# Patient Record
Sex: FEMALE | Race: WHITE | NOT HISPANIC OR LATINO | Employment: OTHER | URBAN - METROPOLITAN AREA
[De-identification: names, ages, dates, MRNs, and addresses within clinical notes are randomized per-mention and may not be internally consistent; named-entity substitution may affect disease eponyms.]

---

## 2017-01-31 DIAGNOSIS — F41.9 ANXIETY: Primary | ICD-10-CM

## 2017-02-01 RX ORDER — LORAZEPAM 0.5 MG/1
0.5 TABLET ORAL EVERY 8 HOURS PRN
Qty: 40 TABLET | Refills: 0 | Status: SHIPPED | OUTPATIENT
Start: 2017-02-01 | End: 2017-05-11

## 2017-02-01 NOTE — TELEPHONE ENCOUNTER
LORazepam (ATIVAN) tablet 0.5 mg (Discontinued)      Last Written Prescription Date:  08-  Last Fill Quantity: NA,   # refills: NA  Last Office Visit with G, P or [facility] prescribing provider: 12-  Future Office visit:       Routing refill request to provider for review/approval because:  LORazepam (ATIVAN) tablet 0.5 mg (Discontinued)

## 2017-02-05 DIAGNOSIS — E78.5 HYPERLIPIDEMIA LDL GOAL <100: Primary | ICD-10-CM

## 2017-02-05 NOTE — TELEPHONE ENCOUNTER
atorvastatin (LIPITOR) 40 MG tablet     Last Written Prescription Date: 03/29/2016  Last Fill Quantity: 90, # refills: 1  Last Office Visit with [facility], [facility] or [facility] prescribing provider: 11/16/2016 Jadiel       CHOL      133   8/2/2016  HDL       73   8/2/2016  LDL       45   8/2/2016  TRIG       73   8/2/2016  CHOLHDLRATIO      1.5   3/19/2015

## 2017-02-06 RX ORDER — ATORVASTATIN CALCIUM 40 MG/1
40 TABLET, FILM COATED ORAL DAILY
Qty: 90 TABLET | Refills: 1 | Status: SHIPPED | OUTPATIENT
Start: 2017-02-06 | End: 2017-05-11

## 2017-02-06 NOTE — TELEPHONE ENCOUNTER
Prescription approved per [facility] Refill Protocol.  Penelope Haas RN, BSN

## 2017-03-29 ENCOUNTER — OFFICE VISIT (OUTPATIENT)
Dept: FAMILY MEDICINE | Facility: CLINIC | Age: 72
End: 2017-03-29
Payer: COMMERCIAL

## 2017-03-29 VITALS
WEIGHT: 171 LBS | HEIGHT: 64 IN | BODY MASS INDEX: 29.19 KG/M2 | DIASTOLIC BLOOD PRESSURE: 80 MMHG | SYSTOLIC BLOOD PRESSURE: 134 MMHG | TEMPERATURE: 98 F

## 2017-03-29 DIAGNOSIS — Z12.31 ENCOUNTER FOR SCREENING MAMMOGRAM FOR MALIGNANT NEOPLASM OF BREAST: ICD-10-CM

## 2017-03-29 DIAGNOSIS — I10 BENIGN ESSENTIAL HYPERTENSION: ICD-10-CM

## 2017-03-29 DIAGNOSIS — Z87.891 FORMER SMOKER: ICD-10-CM

## 2017-03-29 DIAGNOSIS — E78.5 HYPERLIPIDEMIA LDL GOAL <100: ICD-10-CM

## 2017-03-29 DIAGNOSIS — Z00.00 ENCOUNTER FOR ROUTINE ADULT HEALTH EXAMINATION WITHOUT ABNORMAL FINDINGS: Primary | ICD-10-CM

## 2017-03-29 DIAGNOSIS — M89.9 DISORDER OF BONE: ICD-10-CM

## 2017-03-29 DIAGNOSIS — F41.9 ANXIETY: ICD-10-CM

## 2017-03-29 DIAGNOSIS — K21.9 GASTROESOPHAGEAL REFLUX DISEASE WITHOUT ESOPHAGITIS: ICD-10-CM

## 2017-03-29 LAB
ERYTHROCYTE [DISTWIDTH] IN BLOOD BY AUTOMATED COUNT: 12.4 % (ref 10–15)
HCT VFR BLD AUTO: 39.3 % (ref 35–47)
HGB BLD-MCNC: 13 G/DL (ref 11.7–15.7)
MCH RBC QN AUTO: 31.3 PG (ref 26.5–33)
MCHC RBC AUTO-ENTMCNC: 33.1 G/DL (ref 31.5–36.5)
MCV RBC AUTO: 95 FL (ref 78–100)
PLATELET # BLD AUTO: 230 10E9/L (ref 150–450)
RBC # BLD AUTO: 4.16 10E12/L (ref 3.8–5.2)
WBC # BLD AUTO: 6.3 10E9/L (ref 4–11)

## 2017-03-29 PROCEDURE — 85027 COMPLETE CBC AUTOMATED: CPT | Performed by: FAMILY MEDICINE

## 2017-03-29 PROCEDURE — G0009 ADMIN PNEUMOCOCCAL VACCINE: HCPCS | Performed by: FAMILY MEDICINE

## 2017-03-29 PROCEDURE — 84443 ASSAY THYROID STIM HORMONE: CPT | Performed by: FAMILY MEDICINE

## 2017-03-29 PROCEDURE — 36415 COLL VENOUS BLD VENIPUNCTURE: CPT | Performed by: FAMILY MEDICINE

## 2017-03-29 PROCEDURE — 80053 COMPREHEN METABOLIC PANEL: CPT | Performed by: FAMILY MEDICINE

## 2017-03-29 PROCEDURE — 80061 LIPID PANEL: CPT | Performed by: FAMILY MEDICINE

## 2017-03-29 PROCEDURE — 90732 PPSV23 VACC 2 YRS+ SUBQ/IM: CPT | Performed by: FAMILY MEDICINE

## 2017-03-29 PROCEDURE — 99397 PER PM REEVAL EST PAT 65+ YR: CPT | Mod: 25 | Performed by: FAMILY MEDICINE

## 2017-03-29 RX ORDER — HYDROCHLOROTHIAZIDE 25 MG/1
25 TABLET ORAL DAILY
Qty: 90 TABLET | Refills: 3 | Status: SHIPPED | OUTPATIENT
Start: 2017-03-29 | End: 2018-03-19

## 2017-03-29 RX ORDER — OMEPRAZOLE 40 MG/1
40 CAPSULE, DELAYED RELEASE ORAL DAILY
Qty: 90 CAPSULE | Refills: 3 | Status: SHIPPED | OUTPATIENT
Start: 2017-03-29 | End: 2018-03-22

## 2017-03-29 RX ORDER — ATORVASTATIN CALCIUM 20 MG/1
20 TABLET, FILM COATED ORAL DAILY
Qty: 45 TABLET | Refills: 3 | Status: SHIPPED | OUTPATIENT
Start: 2017-03-29 | End: 2017-03-29

## 2017-03-29 RX ORDER — LORAZEPAM 1 MG/1
1 TABLET ORAL EVERY 8 HOURS PRN
Qty: 20 TABLET | Refills: 3 | Status: SHIPPED | OUTPATIENT
Start: 2017-03-29 | End: 2017-07-08

## 2017-03-29 RX ORDER — TRAZODONE HYDROCHLORIDE 100 MG/1
200 TABLET ORAL AT BEDTIME
Qty: 180 TABLET | Refills: 3 | Status: SHIPPED | OUTPATIENT
Start: 2017-03-29 | End: 2018-03-09

## 2017-03-29 RX ORDER — DULOXETIN HYDROCHLORIDE 60 MG/1
60 CAPSULE, DELAYED RELEASE ORAL DAILY
Qty: 90 CAPSULE | Refills: 3 | Status: SHIPPED | OUTPATIENT
Start: 2017-03-29 | End: 2018-05-06

## 2017-03-29 RX ORDER — LISINOPRIL 40 MG/1
40 TABLET ORAL DAILY
Qty: 90 TABLET | Refills: 3 | Status: SHIPPED | OUTPATIENT
Start: 2017-03-29 | End: 2018-03-20

## 2017-03-29 RX ORDER — HYDRALAZINE HYDROCHLORIDE 50 MG/1
50 TABLET, FILM COATED ORAL 2 TIMES DAILY
Qty: 180 TABLET | Refills: 3 | Status: SHIPPED | OUTPATIENT
Start: 2017-03-29 | End: 2017-05-11

## 2017-03-29 RX ORDER — BUPROPION HYDROCHLORIDE 150 MG/1
150 TABLET, EXTENDED RELEASE ORAL 2 TIMES DAILY
Qty: 180 TABLET | Refills: 3 | Status: SHIPPED | OUTPATIENT
Start: 2017-03-29 | End: 2018-05-06

## 2017-03-29 RX ORDER — ATORVASTATIN CALCIUM 20 MG/1
20 TABLET, FILM COATED ORAL DAILY
Qty: 90 TABLET | Refills: 3 | Status: SHIPPED | OUTPATIENT
Start: 2017-03-29 | End: 2018-05-06

## 2017-03-29 RX ORDER — LORAZEPAM 0.5 MG/1
0.5 TABLET ORAL EVERY 8 HOURS PRN
Qty: 40 TABLET | Refills: 0 | Status: CANCELLED | OUTPATIENT
Start: 2017-03-29

## 2017-03-29 RX ORDER — ATORVASTATIN CALCIUM 40 MG/1
40 TABLET, FILM COATED ORAL DAILY
Qty: 90 TABLET | Refills: 1 | Status: CANCELLED | OUTPATIENT
Start: 2017-03-29

## 2017-03-29 ASSESSMENT — ANXIETY QUESTIONNAIRES
IF YOU CHECKED OFF ANY PROBLEMS ON THIS QUESTIONNAIRE, HOW DIFFICULT HAVE THESE PROBLEMS MADE IT FOR YOU TO DO YOUR WORK, TAKE CARE OF THINGS AT HOME, OR GET ALONG WITH OTHER PEOPLE: SOMEWHAT DIFFICULT
5. BEING SO RESTLESS THAT IT IS HARD TO SIT STILL: NOT AT ALL
6. BECOMING EASILY ANNOYED OR IRRITABLE: SEVERAL DAYS
2. NOT BEING ABLE TO STOP OR CONTROL WORRYING: MORE THAN HALF THE DAYS
GAD7 TOTAL SCORE: 8
1. FEELING NERVOUS, ANXIOUS, OR ON EDGE: SEVERAL DAYS
7. FEELING AFRAID AS IF SOMETHING AWFUL MIGHT HAPPEN: MORE THAN HALF THE DAYS
3. WORRYING TOO MUCH ABOUT DIFFERENT THINGS: SEVERAL DAYS

## 2017-03-29 ASSESSMENT — PATIENT HEALTH QUESTIONNAIRE - PHQ9: 5. POOR APPETITE OR OVEREATING: SEVERAL DAYS

## 2017-03-29 NOTE — MR AVS SNAPSHOT
After Visit Summary   3/29/2017    Monse Peoples    MRN: [id number]           Patient Information     Date Of Birth          1945        Visit Information        Provider Department      3/29/2017 11:00 AM Susan Camacho MD [facility]        Today's Diagnoses     Encounter for routine adult health examination without abnormal findings    -  1    Hyperlipidemia LDL goal <100        Anxiety        Essential hypertension        Benign essential hypertension        Gastroesophageal reflux disease without esophagitis        Encounter for screening mammogram for malignant neoplasm of breast         Disorder of bone           Care Instructions      Preventive Health Recommendations  Female Ages 65 +    Yearly exam:     See your health care provider every year in order to  o Review health changes.   o Discuss preventive care.    o Review your medicines if your doctor has prescribed any.      You no longer need a yearly Pap test unless you've had an abnormal Pap test in the past 10 years. If you have vaginal symptoms, such as bleeding or discharge, be sure to talk with your provider about a Pap test.      Every 1 to 2 years, have a mammogram.  If you are over 69, talk with your health care provider about whether or not you want to continue having screening mammograms.      Every 10 years, have a colonoscopy. Or, have a yearly FIT test (stool test). These exams will check for colon cancer.       Have a cholesterol test every 5 years, or more often if your doctor advises it.       Have a diabetes test (fasting glucose) every three years. If you are at risk for diabetes, you should have this test more often.       At age 65, have a bone density scan (DEXA) to check for osteoporosis (brittle bone disease).    Shots:    Get a flu shot each year.    Get a tetanus shot every 10 years.    Talk to your doctor about your pneumonia vaccines. There are now two you should receive - Pneumovax  (PPSV 23) and Prevnar (PCV 13).    Talk to your doctor about the shingles vaccine.    Talk to your doctor about the hepatitis B vaccine.    Nutrition:     Eat at least 5 servings of fruits and vegetables each day.      Eat whole-grain bread, whole-wheat pasta and brown rice instead of white grains and rice.      Talk to your provider about Calcium and Vitamin D.     Lifestyle    Exercise at least 150 minutes a week (30 minutes a day, 5 days a week). This will help you control your weight and prevent disease.      Limit alcohol to one drink per day.      No smoking.       Wear sunscreen to prevent skin cancer.       See your dentist twice a year for an exam and cleaning.      See your eye doctor every 1 to 2 years to screen for conditions such as glaucoma, macular degeneration, cataracts, etc         Follow-ups after your visit        Future tests that were ordered for you today     Open Future Orders        Priority Expected Expires Ordered    *MA Screening Digital Bilateral Routine  3/29/2018 3/29/2017    DX Hip/Pelvis/Spine Routine  3/29/2018 3/29/2017            Who to contact     If you have questions or need follow up information about today's clinic visit or your schedule please contact [facility] directly at [phone].  Normal or non-critical lab and imaging results will be communicated to you by MyChart, letter or phone within 4 business days after the clinic has received the results. If you do not hear from us within 7 days, please contact the clinic through [employer] or phone. If you have a critical or abnormal lab result, we will notify you by phone as soon as possible.  Submit refill requests through [employer] or call your pharmacy and they will forward the refill request to us. Please allow 3 business days for your refill to be completed.          Additional Information About Your Visit        [employer]Share[employer] Information     [employer] lets you send messages to your doctor, view your test results,  "renew your prescriptions, schedule appointments and more. To sign up, go to [email or web address] . Click on \"Log in\" on the left side of the screen, which will take you to the Welcome page. Then click on \"Sign up Now\" on the right side of the page.     You will be asked to enter the access code listed below, as well as some personal information. Please follow the directions to create your username and password.     Your access code is: [email or web address]  Expires: 2017 11:56 AM     Your access code will  in 90 days. If you need help or a new code, please call your [facility] clinic or [phone].        Care EveryWhere ID     This is your Care EveryWhere ID. This could be used by other organizations to access your [facility] medical records  [id number]        Your Vitals Were     Temperature Height Breastfeeding? BMI (Body Mass Index)          98  F (36.7  C) (Oral) 5' 4.25\" (1.632 m) No 29.12 kg/m2         Blood Pressure from Last 3 Encounters:   17 154/70   16 124/80   16 130/82    Weight from Last 3 Encounters:   17 171 lb (77.6 kg)   16 167 lb (75.8 kg)   16 167 lb 12.8 oz (76.1 kg)              We Performed the Following     CBC with platelets     Comprehensive metabolic panel (BMP + Alb, Alk Phos, ALT, AST, Total. Bili, TP)     Lipid Profile with reflex to direct LDL     TSH with free T4 reflex          Today's Medication Changes          These changes are accurate as of: 3/29/17 11:56 AM.  If you have any questions, ask your nurse or doctor.               These medicines have changed or have updated prescriptions.        Dose/Directions    * atorvastatin 40 MG tablet   Commonly known as:  LIPITOR   This may have changed:  Another medication with the same name was added. Make sure you understand how and when to take each.   Used for:  Hyperlipidemia LDL goal <100   Changed by:  Susan Camacho MD        Dose:  40 mg   Take 1 tablet (40 mg) by mouth daily "   Quantity:  90 tablet   Refills:  1       * atorvastatin 20 MG tablet   Commonly known as:  LIPITOR   This may have changed:  You were already taking a medication with the same name, and this prescription was added. Make sure you understand how and when to take each.   Used for:  Hyperlipidemia LDL goal <100   Changed by:  Susan Camacho MD        Dose:  20 mg   Take 1 tablet (20 mg) by mouth daily   Quantity:  45 tablet   Refills:  3       * LORazepam 0.5 MG tablet   Commonly known as:  ATIVAN   This may have changed:  Another medication with the same name was added. Make sure you understand how and when to take each.   Used for:  Anxiety   Changed by:  Susan Camacho MD        Dose:  0.5 mg   Take 1 tablet (0.5 mg) by mouth every 8 hours as needed for anxiety   Quantity:  40 tablet   Refills:  0       * LORazepam 1 MG tablet   Commonly known as:  ATIVAN   This may have changed:  You were already taking a medication with the same name, and this prescription was added. Make sure you understand how and when to take each.   Used for:  Anxiety   Changed by:  Susan Camacho MD        Dose:  1 mg   Take 1 tablet (1 mg) by mouth every 8 hours as needed for anxiety   Quantity:  20 tablet   Refills:  3       * Notice:  This list has 4 medication(s) that are the same as other medications prescribed for you. Read the directions carefully, and ask your doctor or other care provider to review them with you.      Stop taking these medicines if you haven't already. Please contact your care team if you have questions.     COMPRESSION STOCKINGS   Stopped by:  Susan Camacho MD                Where to get your medicines      These medications were sent to [id number] - [facility] [address] [address] [address]     Phone:  [phone]     atorvastatin 20 MG tablet    buPROPion 150 MG 12 hr tablet    DULoxetine 60 MG EC capsule    hydrALAZINE 50 MG tablet     hydrochlorothiazide 25 MG tablet    lisinopril 40 MG tablet    omeprazole 40 MG capsule    traZODone 100 MG tablet         Some of these will need a paper prescription and others can be bought over the counter.  Ask your nurse if you have questions.     Bring a paper prescription for each of these medications     LORazepam 1 MG tablet                Primary Care Provider Office Phone # Fax #    Susan Luc Camacho [phone]       [facility] [address]  [facility] [address]        Thank you!     Thank you for choosing [facility]  for your care. Our goal is always to provide you with excellent care. Hearing back from our patients is one way we can continue to improve our services. Please take a few minutes to complete the written survey that you may receive in the mail after your visit with us. Thank you!             Your Updated Medication List - Protect others around you: Learn how to safely use, store and throw away your medicines at www.disposemymeds.org.          This list is accurate as of: 3/29/17 11:56 AM.  Always use your most recent med list.                   Brand Name Dispense Instructions for use    atenolol 50 MG tablet    TENORMIN    30 tablet    Take 1 tablet (50 mg) by mouth daily       * atorvastatin 40 MG tablet    LIPITOR    90 tablet    Take 1 tablet (40 mg) by mouth daily       * atorvastatin 20 MG tablet    LIPITOR    45 tablet    Take 1 tablet (20 mg) by mouth daily       buPROPion 150 MG 12 hr tablet    WELLBUTRIN SR    180 tablet    Take 1 tablet (150 mg) by mouth 2 times daily       calcium-vitamin D 600-400 MG-UNIT per tablet    CALTRATE     Take 1 tablet by mouth 2 times daily       CO Q 10 PO      Take by mouth daily       CYANOCOBALAMIN SL      Place 1,000 mcg under the tongue daily       DULoxetine 60 MG EC capsule    CYMBALTA    90 capsule    Take 1 capsule (60 mg) by mouth daily       hydrALAZINE 50 MG tablet    APRESOLINE     180 tablet    Take 1 tablet (50 mg) by mouth 2 times daily       hydrochlorothiazide 25 MG tablet    HYDRODIURIL    90 tablet    Take 1 tablet (25 mg) by mouth daily       lisinopril 40 MG tablet    PRINIVIL/ZESTRIL    90 tablet    Take 1 tablet (40 mg) by mouth daily       * LORazepam 0.5 MG tablet    ATIVAN    40 tablet    Take 1 tablet (0.5 mg) by mouth every 8 hours as needed for anxiety       * LORazepam 1 MG tablet    ATIVAN    20 tablet    Take 1 tablet (1 mg) by mouth every 8 hours as needed for anxiety       Multi-vitamin Tabs tablet      Take 1 tablet by mouth daily       OMEGA-3 FISH OIL PO      Take 1.2 g by mouth 2 times daily (with meals)       omeprazole 40 MG capsule    priLOSEC    90 capsule    Take 1 capsule (40 mg) by mouth daily Take 30-60 minutes before a meal.       traMADol 50 MG tablet    ULTRAM    15 tablet    Take 1-2 tablets ( mg) by mouth every 6 hours as needed for pain maximum 6 tablet(s) per day       traZODone 100 MG tablet    DESYREL    180 tablet    Take 2 tablets (200 mg) by mouth At Bedtime       TYLENOL 325 MG tablet   Generic drug:  acetaminophen     100 tablet    Take 2 tablets (650 mg) by mouth 2 times daily as needed for mild pain       * Notice:  This list has 4 medication(s) that are the same as other medications prescribed for you. Read the directions carefully, and ask your doctor or other care provider to review them with you.

## 2017-03-29 NOTE — NURSING NOTE
"Chief Complaint   Patient presents with     Physical       Initial /70 (BP Location: Right arm, Patient Position: Chair, Cuff Size: Adult Large)  Temp 98  F (36.7  C) (Oral)  Ht 5' 4.25\" (1.632 m)  Wt 171 lb (77.6 kg)  Breastfeeding? No  BMI 29.12 kg/m2 Estimated body mass index is 29.12 kg/(m^2) as calculated from the following:    Height as of this encounter: 5' 4.25\" (1.632 m).    Weight as of this encounter: 171 lb (77.6 kg).  Medication Reconciliation: complete     Hugo Taylor CMA          "

## 2017-03-29 NOTE — LETTER
[facility]          [address] [place] [address] [phone]      Monse Peoples  [address]  [place] [id number]      Dear Monse,        Your recent lab looks great.  Enclosed is a copy of the results.      Thank you for choosing [facility]. We appreciate the opportunity to serve you and look forward to supporting your healthcare needs in the future.    If you have any questions or concerns, please call me or my nurse at [phone].        Sincerely,    Susan Camacho MD      Results for orders placed or performed in visit on 03/29/17   Comprehensive metabolic panel (BMP + Alb, Alk Phos, ALT, AST, Total. Bili, TP)   Result Value Ref Range    Sodium 140 133 - 144 mmol/L    Potassium 4.5 3.4 - 5.3 mmol/L    Chloride 105 94 - 109 mmol/L    Carbon Dioxide 28 20 - 32 mmol/L    Anion Gap 7 3 - 14 mmol/L    Glucose 88 70 - 99 mg/dL    Urea Nitrogen 15 7 - 30 mg/dL    Creatinine 0.84 0.52 - 1.04 mg/dL    GFR Estimate 67 >60 mL/min/1.7m2    GFR Estimate If Black 81 >60 mL/min/1.7m2    Calcium 8.9 8.5 - 10.1 mg/dL    Bilirubin Total 0.4 0.2 - 1.3 mg/dL    Albumin 3.9 3.4 - 5.0 g/dL    Protein Total 7.1 6.8 - 8.8 g/dL    Alkaline Phosphatase 58 40 - 150 U/L    ALT 51 (H) 0 - 50 U/L    AST 31 0 - 45 U/L   Lipid Profile with reflex to direct LDL   Result Value Ref Range    Cholesterol 145 <200 mg/dL    Triglycerides 108 <150 mg/dL    HDL Cholesterol 72 >49 mg/dL    LDL Cholesterol Calculated 51 <100 mg/dL    Non HDL Cholesterol 73 <130 mg/dL   TSH with free T4 reflex   Result Value Ref Range    TSH 0.53 0.40 - 4.00 mU/L   CBC with platelets   Result Value Ref Range    WBC 6.3 4.0 - 11.0 10e9/L    RBC Count 4.16 3.8 - 5.2 10e12/L    Hemoglobin 13.0 11.7 - 15.7 g/dL    Hematocrit 39.3 35.0 - 47.0 %    MCV 95 78 - 100 fl    MCH 31.3 26.5 - 33.0 pg     MCHC 33.1 31.5 - 36.5 g/dL    RDW 12.4 10.0 - 15.0 %    Platelet Count 230 150 - 450 10e9/L

## 2017-03-29 NOTE — PROGRESS NOTES
SUBJECTIVE:                                                            Monse Peoples is a 71 year old female who presents for Preventive Visit.  Are you in the first 12 months of your Medicare Part B coverage?  No    Healthy Habits:    Do you get at least three servings of calcium containing foods daily (dairy, green leafy vegetables, etc.)? yes    Amount of exercise or daily activities, outside of work: 1 day(s) per week    Problems taking medications regularly No    Medication side effects: No    Have you had an eye exam in the past two years? yes    Do you see a dentist twice per year? no    Do you have sleep apnea, excessive snoring or daytime drowsiness?no    COGNITIVE SCREEN  1) Repeat 3 items (Banana, Sunrise, Chair)    2) Clock draw: NORMAL  3) 3 item recall: Recalls 2 objects   Results: NORMAL clock, 1-2 items recalled: COGNITIVE IMPAIRMENT LESS LIKELY    Mini-CogTM Copyright S Uma. Licensed by the author for use in [facility]; reprinted with permission (jed@[facility]). All rights reserved.        PROBLEMS TO ADD ON...    Wonders about safety of omeprazole, saw that it can be harmful to the heart. Finds benefit in controlling her GERD, has tried other agents in the past, were not helpful.       Reviewed and updated as needed this visit by clinical staff  Tobacco  Allergies  Med Hx  Surg Hx  Fam Hx  Soc Hx        Reviewed and updated as needed this visit by Provider        Social History   Substance Use Topics     Smoking status: Former Smoker     Packs/day: 0.50     Years: 50.00     Types: Cigarettes     Quit date: 1/19/2013     Smokeless tobacco: Never Used     Alcohol use 0.0 oz/week     0 Standard drinks or equivalent per week      Comment: 4 drinks weekly       The patient does not drink >3 drinks per day nor >7 drinks per week.    Today's PHQ-2 Score:   PHQ-2 ( 1999 Pf[employer]) 3/19/2015   Q1: Little interest or pleasure in doing things 2   Q2: Feeling down, depressed or hopeless 2    PHQ-2 Score 4       Do you feel safe in your environment - Yes    Do you have a Health Care Directive?: Yes: Advance Directive has been received and scanned.    Current providers sharing in care for this patient include:   Patient Care Team:  Susan Camacho MD as PCP - General (Family Practice)      Hearing impairment: No    Ability to successfully perform activities of daily living: Yes, no assistance needed     Fall risk:       Home safety:  Grab bar used in the shower      The following health maintenance items are reviewed in Epic and correct as of today:  Health Maintenance   Topic Date Due     HEPATITIS C SCREENING  12/11/1963     DEXA SCAN SCREENING (SYSTEM ASSIGNED)  12/11/2011     PNEUMOCOCCAL (2 of 2 - PPSV23) 11/03/2016     FALL RISK ASSESSMENT  03/29/2017     MICROALBUMIN Q1 YEAR( NO INBASKET)  03/29/2017     MAMMO SCREEN Q2 YR (SYSTEM ASSIGNED)  05/11/2017     INFLUENZA VACCINE (SYSTEM ASSIGNED)  09/01/2017     ADVANCE DIRECTIVE PLANNING Q5 YRS (NO INBASKET)  11/03/2020     COLONOSCOPY Q5 YR INBASKET MESSAGE  04/19/2021     LIPID SCREEN Q5 YR FEMALE (SYSTEM ASSIGNED)  08/02/2021     TETANUS IMMUNIZATION (SYSTEM ASSIGNED)  03/29/2026         Pneumonia Vaccine:Adults age 65+ who have not received previous Pneumovax (PPSV23) or PCV13 as an adult: Should first be given PCV13 AND then should be given PPSV23 6-12 months after PCV13  Mammogram Screening: Patient over age 50, mutual decision to screen reflected in health maintenance.     ROS:  Constitutional, HEENT, cardiovascular, pulmonary, GI, , musculoskeletal, neuro, skin, endocrine and psych systems are negative, except as otherwise noted.    Problem list, Medication list, Allergies, and Medical/Social/Surgical histories reviewed in [facility] and updated as appropriate.  OBJECTIVE:                                                            /70 (BP Location: Right arm, Patient Position: Chair, Cuff Size: Adult Large)  Temp 98  F (36.7  C)  "(Oral)  Ht 5' 4.25\" (1.632 m)  Wt 171 lb (77.6 kg)  Breastfeeding? No  BMI 29.12 kg/m2 Estimated body mass index is 29.12 kg/(m^2) as calculated from the following:    Height as of this encounter: 5' 4.25\" (1.632 m).    Weight as of this encounter: 171 lb (77.6 kg).  EXAM:   GENERAL APPEARANCE: healthy, alert and no distress  EYES: Eyes grossly normal to inspection, PERRL and conjunctivae and sclerae normal  HENT: ear canals and TM's normal, nose and mouth without ulcers or lesions, oropharynx clear and oral mucous membranes moist  NECK: no adenopathy, no asymmetry, masses, or scars and thyroid normal to palpation  RESP: lungs clear to auscultation - no rales, rhonchi or wheezes  BREAST: normal without masses, tenderness or nipple discharge and no palpable axillary masses or adenopathy  CV: regular rate and rhythm, normal S1 S2, no S3 or S4, no murmur, click or rub, no peripheral edema and peripheral pulses strong  ABDOMEN: soft, nontender, no hepatosplenomegaly, no masses and bowel sounds normal   (female): normal female external genitalia, normal urethral meatus, vaginal mucosal atrophy noted, normal adnexae, and uterus without masses or abnormal discharge  MS: no musculoskeletal defects are noted and gait is age appropriate without ataxia  SKIN: no suspicious lesions or rashes  NEURO: Normal strength and tone, sensory exam grossly normal, mentation intact and speech normal  PSYCH: mentation appears normal and affect normal/bright    ASSESSMENT / PLAN:                                                            1. Encounter for routine adult health examination without abnormal findings  - *MA Screening Digital Bilateral; Future  - DX Hip/Pelvis/Spine; Future  - Comprehensive metabolic panel (BMP + Alb, Alk Phos, ALT, AST, Total. Bili, TP)  - Lipid Profile with reflex to direct LDL  - TSH with free T4 reflex  - CBC with platelets    2. Hyperlipidemia LDL goal <100 - stable last year, will recheck lipids for " Cardiology, refills  - atorvastatin (LIPITOR) 20 MG tablet; Take 1 tablet (20 mg) by mouth daily  Dispense: 90 tablet; Refill: 3    3. Anxiety - taking her ativan 3-4 times monthly, using 2 tabs of the 0.5 mg each dose.   - buPROPion (WELLBUTRIN SR) 150 MG 12 hr tablet; Take 1 tablet (150 mg) by mouth 2 times daily  Dispense: 180 tablet; Refill: 3  - DULoxetine (CYMBALTA) 60 MG EC capsule; Take 1 capsule (60 mg) by mouth daily  Dispense: 90 capsule; Refill: 3  - traZODone (DESYREL) 100 MG tablet; Take 2 tablets (200 mg) by mouth At Bedtime  Dispense: 180 tablet; Refill: 3  - LORazepam (ATIVAN) 1 MG tablet; Take 1 tablet (1 mg) by mouth every 8 hours as needed for anxiety  Dispense: 20 tablet; Refill: 3    5. Benign essential hypertension - controlled upon recheck, advised recheck at Cardiology visit in the near future.   - hydrochlorothiazide (HYDRODIURIL) 25 MG tablet; Take 1 tablet (25 mg) by mouth daily  Dispense: 90 tablet; Refill: 3  - hydrALAZINE (APRESOLINE) 50 MG tablet; Take 1 tablet (50 mg) by mouth 2 times daily  Dispense: 180 tablet; Refill: 3  - lisinopril (PRINIVIL/ZESTRIL) 40 MG tablet; Take 1 tablet (40 mg) by mouth daily  Dispense: 90 tablet; Refill: 3    6. Gastroesophageal reflux disease without esophagitis - well controlled, continue refills, will continue to address new information regarding PPIs as it comes out.   - omeprazole (PRILOSEC) 40 MG capsule; Take 1 capsule (40 mg) by mouth daily Take 30-60 minutes before a meal.  Dispense: 90 capsule; Refill: 3    7. Encounter for screening mammogram for malignant neoplasm of breast   - *MA Screening Digital Bilateral; Future    8. Disorder of bone - due for DXA  - DX Hip/Pelvis/Spine; Future    9. Former smoker - has had pulmonary nodules in the past - CT scan in 2016 showed stable    End of Life Planning:  Patient currently has an advanced directive: No.  I have verified the patient's ablity to prepare an advanced directive/make health care  "decisions.  Literature was provided to assist patient in preparing an advanced directive.    COUNSELING:  Reviewed preventive health counseling, as reflected in patient instructions    Estimated body mass index is 29.12 kg/(m^2) as calculated from the following:    Height as of this encounter: 5' 4.25\" (1.632 m).    Weight as of this encounter: 171 lb (77.6 kg).     reports that she quit smoking about 4 years ago. Her smoking use included Cigarettes. She has a 25.00 pack-year smoking history. She has never used smokeless tobacco.      Appropriate preventive services were discussed with this patient, including applicable screening as appropriate for cardiovascular disease, diabetes, osteopenia/osteoporosis, and glaucoma.  As appropriate for age/gender, discussed screening for colorectal cancer, prostate cancer, breast cancer, and cervical cancer. Checklist reviewing preventive services available has been given to the patient.    Reviewed patients plan of care and provided an AVS. The Basic Care Plan (routine screening as documented in Health Maintenance) for Monse meets the Care Plan requirement. This Care Plan has been established and reviewed with the Patient.    Susan Camacho MD  [facility]  "

## 2017-03-30 LAB
ALBUMIN SERPL-MCNC: 3.9 G/DL (ref 3.4–5)
ALP SERPL-CCNC: 58 U/L (ref 40–150)
ALT SERPL W P-5'-P-CCNC: 51 U/L (ref 0–50)
ANION GAP SERPL CALCULATED.3IONS-SCNC: 7 MMOL/L (ref 3–14)
AST SERPL W P-5'-P-CCNC: 31 U/L (ref 0–45)
BILIRUB SERPL-MCNC: 0.4 MG/DL (ref 0.2–1.3)
BUN SERPL-MCNC: 15 MG/DL (ref 7–30)
CALCIUM SERPL-MCNC: 8.9 MG/DL (ref 8.5–10.1)
CHLORIDE SERPL-SCNC: 105 MMOL/L (ref 94–109)
CHOLEST SERPL-MCNC: 145 MG/DL
CO2 SERPL-SCNC: 28 MMOL/L (ref 20–32)
CREAT SERPL-MCNC: 0.84 MG/DL (ref 0.52–1.04)
GFR SERPL CREATININE-BSD FRML MDRD: 67 ML/MIN/1.7M2
GLUCOSE SERPL-MCNC: 88 MG/DL (ref 70–99)
HDLC SERPL-MCNC: 72 MG/DL
LDLC SERPL CALC-MCNC: 51 MG/DL
NONHDLC SERPL-MCNC: 73 MG/DL
POTASSIUM SERPL-SCNC: 4.5 MMOL/L (ref 3.4–5.3)
PROT SERPL-MCNC: 7.1 G/DL (ref 6.8–8.8)
SODIUM SERPL-SCNC: 140 MMOL/L (ref 133–144)
TRIGL SERPL-MCNC: 108 MG/DL
TSH SERPL DL<=0.005 MIU/L-ACNC: 0.53 MU/L (ref 0.4–4)

## 2017-03-30 ASSESSMENT — ANXIETY QUESTIONNAIRES: GAD7 TOTAL SCORE: 8

## 2017-03-30 ASSESSMENT — PATIENT HEALTH QUESTIONNAIRE - PHQ9: SUM OF ALL RESPONSES TO PHQ QUESTIONS 1-9: 4

## 2017-03-31 ENCOUNTER — HOSPITAL ENCOUNTER (OUTPATIENT)
Dept: MAMMOGRAPHY | Facility: CLINIC | Age: 72
Discharge: HOME | End: 2017-03-31
Attending: FAMILY MEDICINE | Admitting: FAMILY MEDICINE
Payer: MEDICARE

## 2017-03-31 DIAGNOSIS — Z00.00 ENCOUNTER FOR ROUTINE ADULT HEALTH EXAMINATION WITHOUT ABNORMAL FINDINGS: ICD-10-CM

## 2017-03-31 DIAGNOSIS — Z12.31 ENCOUNTER FOR SCREENING MAMMOGRAM FOR MALIGNANT NEOPLASM OF BREAST: ICD-10-CM

## 2017-03-31 PROCEDURE — 77063 BREAST TOMOSYNTHESIS BI: CPT

## 2017-04-10 DIAGNOSIS — I10 ESSENTIAL HYPERTENSION: ICD-10-CM

## 2017-04-10 RX ORDER — ATENOLOL 50 MG/1
50 TABLET ORAL DAILY
Qty: 30 TABLET | Refills: 11
Start: 2017-04-10 | End: 2018-03-19

## 2017-04-10 RX ORDER — ATENOLOL 50 MG/1
50 TABLET ORAL DAILY
Qty: 30 TABLET | Refills: 0 | Status: SHIPPED | OUTPATIENT
Start: 2017-04-10 | End: 2017-04-10

## 2017-04-10 NOTE — TELEPHONE ENCOUNTER
atenolol (TENORMIN) 50 MG tablet      Last Written Prescription Date: 03/15/17  Last Fill Quantity: 30, # refills: 0    Last Office Visit with [facility], [facility] or [facility] prescribing provider:  03/29/17   Future Office Visit:        BP Readings from Last 3 Encounters:   03/29/17 134/80   12/30/16 124/80   11/16/16 130/82

## 2017-04-10 NOTE — TELEPHONE ENCOUNTER
Prescription approved per [facility] Refill Protocol.  Kayla Lares RN

## 2017-05-02 ENCOUNTER — OFFICE VISIT (OUTPATIENT)
Dept: FAMILY MEDICINE | Facility: CLINIC | Age: 72
End: 2017-05-02
Payer: COMMERCIAL

## 2017-05-02 VITALS
HEIGHT: 64 IN | HEART RATE: 70 BPM | DIASTOLIC BLOOD PRESSURE: 84 MMHG | BODY MASS INDEX: 29.98 KG/M2 | RESPIRATION RATE: 18 BRPM | TEMPERATURE: 98.4 F | SYSTOLIC BLOOD PRESSURE: 170 MMHG | WEIGHT: 175.6 LBS

## 2017-05-02 DIAGNOSIS — R03.0 ELEVATED BLOOD PRESSURE (NOT HYPERTENSION): Primary | ICD-10-CM

## 2017-05-02 DIAGNOSIS — R30.0 DYSURIA: ICD-10-CM

## 2017-05-02 LAB
ALBUMIN UR-MCNC: NEGATIVE MG/DL
APPEARANCE UR: CLEAR
BACTERIA #/AREA URNS HPF: ABNORMAL /HPF
BILIRUB UR QL STRIP: ABNORMAL
COLOR UR AUTO: YELLOW
GLUCOSE UR STRIP-MCNC: NEGATIVE MG/DL
HGB UR QL STRIP: NEGATIVE
KETONES UR STRIP-MCNC: ABNORMAL MG/DL
LEUKOCYTE ESTERASE UR QL STRIP: ABNORMAL
MUCOUS THREADS #/AREA URNS LPF: PRESENT /LPF
NITRATE UR QL: NEGATIVE
NON-SQ EPI CELLS #/AREA URNS LPF: ABNORMAL /LPF
PH UR STRIP: 6 PH (ref 5–7)
RBC #/AREA URNS AUTO: ABNORMAL /HPF (ref 0–2)
SP GR UR STRIP: 1.02 (ref 1–1.03)
URN SPEC COLLECT METH UR: ABNORMAL
UROBILINOGEN UR STRIP-ACNC: 1 EU/DL (ref 0.2–1)
WBC #/AREA URNS AUTO: ABNORMAL /HPF (ref 0–2)

## 2017-05-02 PROCEDURE — 36415 COLL VENOUS BLD VENIPUNCTURE: CPT | Performed by: NURSE PRACTITIONER

## 2017-05-02 PROCEDURE — 93000 ELECTROCARDIOGRAM COMPLETE: CPT | Performed by: NURSE PRACTITIONER

## 2017-05-02 PROCEDURE — 81001 URINALYSIS AUTO W/SCOPE: CPT | Performed by: NURSE PRACTITIONER

## 2017-05-02 PROCEDURE — 99214 OFFICE O/P EST MOD 30 MIN: CPT | Performed by: NURSE PRACTITIONER

## 2017-05-02 PROCEDURE — 87086 URINE CULTURE/COLONY COUNT: CPT | Performed by: NURSE PRACTITIONER

## 2017-05-02 PROCEDURE — 80053 COMPREHEN METABOLIC PANEL: CPT | Performed by: NURSE PRACTITIONER

## 2017-05-02 NOTE — MR AVS SNAPSHOT
After Visit Summary   5/2/2017    Monse Peoples    MRN: [id number]           Patient Information     Date Of Birth          1945        Visit Information        Provider Department      5/2/2017 2:00 PM Hilton Duvall APRN [facility]        Today's Diagnoses     Elevated blood pressure (not hypertension)    -  1    Dysuria          Care Instructions    Start taking your Hydralazine 50 mgs three times a day. Follow up with cardiology as discussed. Can be seen sooner with concerns as discussed and as below       Established High Blood Pressure    High blood pressure (hypertension) is a chronic disease. Often health care providers don t know what causes it. But it can be caused by certain health conditions and medicines.  If you have high blood pressure, you may not have any symptoms. If you do have symptoms, they may include headache, dizziness, changes in your vision, chest pain, and shortness of breath. But even without symptoms, high blood pressure that s not treated raises your risk for heart attack and stroke. High blood pressure is a serious health risk and shouldn t be ignored.  A blood pressure reading is made up of two numbers: a higher number over a lower number. The top number is the systolic pressure. The bottom number is the diastolic pressure. A normal blood pressure is less than 120 over less than 80.  High blood pressure is when either the top number is 140 or higher, or the bottom number is 90 or higher. This must be the result when taking your blood pressure a number of times. The blood pressures between normal and high are called prehypertension.  Home care  If you have high blood pressure, you should do what is listed below to lower your blood pressure. If you are taking medicines for high blood pressure, these methods may reduce or end your need for medicines in the future.    Begin a weight-loss program if you are overweight.    Cut back on how  much salt you get in your diet. Here s how to do this:    Don t eat foods that have a lot of salt. These include olives, pickles, smoked meats, and salted potato chips.    Don t add salt to your food at the table.    Use only small amounts of salt when cooking.    Begin an exercise program. Talk with your health care provider about the type of exercise program that would be best for you. It doesn't have to be hard. Even brisk walking for 20 minutes 3 times a week is a good form of exercise.    Don t take medicines that have heart stimulants. This includes many cold and sinus decongestant pills and sprays, as well as diet pills. Check the warnings about hypertension on the label. Stimulants such as amphetamine or cocaine could be lethal for someone with high blood pressure. Never take these.    Limit how much caffeine you get in your diet. Switch to caffeine-free products.    Stop smoking. If you are a long-time smoker, this can be hard. Enroll in a stop-smoking program to make it more likely that you will quit for good.    Learn how to handle stress. This is an important part of any program to lower blood pressure. Learn about relaxation methods like meditation, yoga, or biofeedback.    If your provider prescribed medicines, take them exactly as directed. Missing doses may cause your blood pressure get out of control.    Consider buying an automatic blood pressure machine. You can get one of these at most pharmacies. Use this to watch your blood pressure at home. Give the results to your provider.  Follow-up care  You will need to make regular visits to your health care provider. This is to check your blood pressure and to make changes to your medicines. Make a follow-up appointment as directed.  When to seek medical advice  Call your health care provider right away if any of these occur:    Chest pain or shortness of breath    Severe headache    Throbbing or rushing sound in the ears    Nosebleed    Sudden severe  pain in your belly (abdomen)    Extreme drowsiness, confusion, or fainting    Dizziness or dizziness with a spinning sensation (vertigo)    Weakness of an arm or leg or one side of the face    You have problems speaking or seeing     [id number] [employer]. [address] [place] [address]98. All rights reserved. This information is not intended as a substitute for professional medical care. Always follow your healthcare professional's instructions.              Follow-ups after your visit        Additional Services     CARDIOLOGY EVAL ADULT REFERRAL       Your provider has referred you to:  Patient see Dr Montenegro     Please be aware that coverage of these services is subject to the terms and limitations of your health insurance plan.  Call member services at your health plan with any benefit or coverage questions.      Type of Referral:  Cardiology Follow Up    Timeframe requested:  1 Week    Please bring the following to your appointment:  >>   Any x-rays, CTs or MRIs which have been performed.  Contact the facility where they were done to arrange for  prior to your scheduled appointment.    >>   List of current medications  >>   This referral request   >>   Any documents/labs given to you for this referral                  Your next 10 appointments already scheduled     May 04, 2017  2:00 PM CDT   DX HIP/PELVIS/SPINE with RID[facility]   [facility]    [address]  [address]  [facility] [address]              Please do not take any of the following 48 hours prior to your exam: vitamins, calcium tablets, antacids.            May 11, 2017  3:10 PM CDT   Return Visit with Jaelyn Lowry PA-C   [facility] ([facility])    [address]  [facility] [address] [phone]              Who to contact     If you have questions or need follow up information about today's  "clinic visit or your schedule please contact [facility] directly at [phone].  Normal or non-critical lab and imaging results will be communicated to you by MyChart, letter or phone within 4 business days after the clinic has received the results. If you do not hear from us within 7 days, please contact the clinic through [email or web address] or phone. If you have a critical or abnormal lab result, we will notify you by phone as soon as possible.  Submit refill requests through [employer] or call your pharmacy and they will forward the refill request to us. Please allow 3 business days for your refill to be completed.          Additional Information About Your Visit        MyChart Information     [employer] lets you send messages to your doctor, view your test results, renew your prescriptions, schedule appointments and more. To sign up, go to [email or web address] . Click on \"Log in\" on the left side of the screen, which will take you to the Welcome page. Then click on \"Sign up Now\" on the right side of the page.     You will be asked to enter the access code listed below, as well as some personal information. Please follow the directions to create your username and password.     Your access code is: [email or web address]  Expires: 2017 11:56 AM     Your access code will  in 90 days. If you need help or a new code, please call your [facility] clinic or [phone].        Care EveryWhere ID     This is your Care EveryWhere ID. This could be used by other organizations to access your [facility] medical records  [id number]        Your Vitals Were     Pulse Temperature Respirations Height BMI (Body Mass Index)       70 98.4  F (36.9  C) (Oral) 18 5' 4.25\" (1.632 m) 29.91 kg/m2        Blood Pressure from Last 3 Encounters:   17 170/84   17 134/80   16 124/80    Weight from Last 3 Encounters:   17 175 lb 9.6 oz (79.7 kg)   17 171 lb (77.6 kg)   16 167 lb (75.8 kg)              We " Performed the Following     *UA reflex to Microscopic and Culture ([place] [facility] (except [place] and [place])     CARDIOLOGY EVAL ADULT REFERRAL     Comprehensive metabolic panel (BMP + Alb, Alk Phos, ALT, AST, Total. Bili, TP)     EKG 12-lead complete w/read - Clinics     Urine Culture Aerobic Bacterial     Urine Microscopic        Primary Care Provider Office Phone # Fax #    Susan Luc Camacho [phone]       [facility] [address]  [facility] [address]        Thank you!     Thank you for choosing [facility]  for your care. Our goal is always to provide you with excellent care. Hearing back from our patients is one way we can continue to improve our services. Please take a few minutes to complete the written survey that you may receive in the mail after your visit with us. Thank you!             Your Updated Medication List - Protect others around you: Learn how to safely use, store and throw away your medicines at www.disposemymeds.org.          This list is accurate as of: 5/2/17  3:39 PM.  Always use your most recent med list.                   Brand Name Dispense Instructions for use    atenolol 50 MG tablet    TENORMIN    30 tablet    Take 1 tablet (50 mg) by mouth daily       * atorvastatin 40 MG tablet    LIPITOR    90 tablet    Take 1 tablet (40 mg) by mouth daily       * atorvastatin 20 MG tablet    LIPITOR    90 tablet    Take 1 tablet (20 mg) by mouth daily       buPROPion 150 MG 12 hr tablet    WELLBUTRIN SR    180 tablet    Take 1 tablet (150 mg) by mouth 2 times daily       calcium-vitamin D 600-400 MG-UNIT per tablet    CALTRATE     Take 1 tablet by mouth 2 times daily       CO Q 10 PO      Take by mouth daily       CYANOCOBALAMIN SL      Place 1,000 mcg under the tongue daily       DULoxetine 60 MG EC capsule    CYMBALTA    90 capsule    Take 1 capsule (60 mg) by mouth daily       hydrALAZINE 50 MG tablet    APRESOLINE    180  tablet    Take 1 tablet (50 mg) by mouth 2 times daily       hydrochlorothiazide 25 MG tablet    HYDRODIURIL    90 tablet    Take 1 tablet (25 mg) by mouth daily       lisinopril 40 MG tablet    PRINIVIL/ZESTRIL    90 tablet    Take 1 tablet (40 mg) by mouth daily       * LORazepam 0.5 MG tablet    ATIVAN    40 tablet    Take 1 tablet (0.5 mg) by mouth every 8 hours as needed for anxiety       * LORazepam 1 MG tablet    ATIVAN    20 tablet    Take 1 tablet (1 mg) by mouth every 8 hours as needed for anxiety       Multi-vitamin Tabs tablet      Take 1 tablet by mouth daily       OMEGA-3 FISH OIL PO      Take 1.2 g by mouth 2 times daily (with meals)       omeprazole 40 MG capsule    priLOSEC    90 capsule    Take 1 capsule (40 mg) by mouth daily Take 30-60 minutes before a meal.       traMADol 50 MG tablet    ULTRAM    15 tablet    Take 1-2 tablets ( mg) by mouth every 6 hours as needed for pain maximum 6 tablet(s) per day       traZODone 100 MG tablet    DESYREL    180 tablet    Take 2 tablets (200 mg) by mouth At Bedtime       TYLENOL 325 MG tablet   Generic drug:  acetaminophen     100 tablet    Take 2 tablets (650 mg) by mouth 2 times daily as needed for mild pain       * Notice:  This list has 4 medication(s) that are the same as other medications prescribed for you. Read the directions carefully, and ask your doctor or other care provider to review them with you.

## 2017-05-02 NOTE — NURSING NOTE
"Chief Complaint   Patient presents with     Hypertension     Headache       Initial /84  Pulse 70  Temp 98.4  F (36.9  C) (Oral)  Resp 18  Ht 5' 4.25\" (1.632 m)  Wt 175 lb 9.6 oz (79.7 kg)  BMI 29.91 kg/m2 Estimated body mass index is 29.91 kg/(m^2) as calculated from the following:    Height as of this encounter: 5' 4.25\" (1.632 m).    Weight as of this encounter: 175 lb 9.6 oz (79.7 kg).  Medication Reconciliation: complete     Joanna Tamayo CMA      "

## 2017-05-02 NOTE — PROGRESS NOTES
SUBJECTIVE:                                                    Monse Peoples is a 71 year old female who presents to clinic today for the following health issues:      Hypertension Follow-up      Outpatient blood pressures are being checked at home 160/91,157/86    Low Salt Diet: not monitoring salt       Amount of exercise or physical activity: none    Problems taking medications regularly: NO    Medication side effects: none    Diet: regular (no restrictions)   Having pressure headaches  Patient is here primarily for evaluation of headaches and elevated blood pressures. She has been taking her blood pressures at home and she presents her home machine which has multiple blood pressure readings which are averaging in the mid 160s systolically and high 70s diastolically. Headaches have been present now for about 2 weeks. She is on multiple hypertensive medications and reporting compliance. She is seen regularly by cardiology and there has been no recent changes in dosing. However she switched her pharmacy and has been receiving her prescription from a new pharmacy lately. Denies any edema. No dyspnea during exertion although she has some brief episodes of shortness of breath when she flexes forward. She has chronic intermittent reflux for which she is on a PPI. Also with intermittent chest pains which reportedly also chronic and she was evaluated including some time late last month for the same with negative findings. She is reporting taking an over-the-counter Biotin  vitamin. She also takes fiber additive. Denying any other over-the-counter usage. She is acknowledging ongoing family stressors including caring for her son who is an alcoholic.      Reviewed and updated as needed this visit by clinical staff  Tobacco  Allergies  Meds  Med Hx  Surg Hx  Fam Hx  Soc Hx      Reviewed and updated as needed this visit by Provider         ROS:  Constitutional, HEENT, cardiovascular, pulmonary, gi and gu systems  "are negative, except as otherwise noted.    OBJECTIVE:                                                    /84  Pulse 70  Temp 98.4  F (36.9  C) (Oral)  Resp 18  Ht 5' 4.25\" (1.632 m)  Wt 175 lb 9.6 oz (79.7 kg)  BMI 29.91 kg/m2  Body mass index is 29.91 kg/(m^2).  GENERAL: healthy, alert and no distress  EYES: Eyes grossly normal to inspection, PERRL and conjunctivae and sclerae normal  HENT: ear canals and TM's normal, nose and mouth without ulcers or lesions  NECK: no adenopathy, no asymmetry, masses, or scars and thyroid normal to palpation. No carotid bruits  RESP: lungs clear to auscultation - no rales, rhonchi or wheezes  CV: regular rate and rhythm, normal S1 S2, no S3 or S4, no murmur, click or rub, no peripheral edema and peripheral pulses strong. No abdominal bruit  ABDOMEN: soft, nontender, no hepatosplenomegaly, no masses and bowel sounds normal  MS: no gross musculoskeletal defects noted, no edema. No calf tenderness    Diagnostic Test Results:    EKG was of sinus rhythm without any ectopy.  Repeat blood pressures were also elevated.  Results for orders placed or performed in visit on 05/02/17 (from the past 24 hour(s))   *UA reflex to Microscopic and Culture ([place] [facility] (except [place] and [place])   Result Value Ref Range    Color Urine Yellow     Appearance Urine Clear     Glucose Urine Negative NEG mg/dL    Bilirubin Urine (A) NEG     Small  This is an unconfirmed screening test result. A positive result may be false.      Ketones Urine Trace (A) NEG mg/dL    Specific Gravity Urine 1.020 1.003 - 1.035    Blood Urine Negative NEG    pH Urine 6.0 5.0 - 7.0 pH    Protein Albumin Urine Negative NEG mg/dL    Urobilinogen Urine 1.0 0.2 - 1.0 EU/dL    Nitrite Urine Negative NEG    Leukocyte Esterase Urine Trace (A) NEG    Source Midstream Urine    Urine Microscopic   Result Value Ref Range    WBC Urine 2-5 (A) 0 - 2 /HPF    RBC Urine O - 2 0 - 2 /HPF    Squamous Epithelial " /LPF Urine Few FEW /LPF    Bacteria Urine Few (A) NEG /HPF    Mucous Urine Present (A) NEG /LPF        ASSESSMENT/PLAN:                                                      Clinically stable patient with elevated blood pressure with intermittent headaches. Reviewed differentials extensively. We have a BMP pending. We'll increase the hydralazine to t.i.d. We made arrangements for patient to be seen by cardiology in the coming week. Continue to monitor symptoms very closely. Can be seen emergently with concerning symptomatology. Sooner with complications.      ICD-10-CM    1. Elevated blood pressure (not hypertension) R03.0 *UA reflex to Microscopic and Culture ([place] and [facility] (except [place] and [place])     Urine Microscopic     EKG 12-lead complete w/read - Clinics     CARDIOLOGY EVAL ADULT REFERRAL     Comprehensive metabolic panel (BMP + Alb, Alk Phos, ALT, AST, Total. Bili, TP)     CANCELED: Basic metabolic panel  (Ca, Cl, CO2, Creat, Gluc, K, Na, BUN)   2. Dysuria R30.0 Urine Culture Aerobic Bacterial           MIRTA Larson CNP  [facility]

## 2017-05-02 NOTE — PATIENT INSTRUCTIONS
Start taking your Hydralazine 50 mgs three times a day. Follow up with cardiology as discussed. Can be seen sooner with concerns as discussed and as below       Established High Blood Pressure    High blood pressure (hypertension) is a chronic disease. Often health care providers don t know what causes it. But it can be caused by certain health conditions and medicines.  If you have high blood pressure, you may not have any symptoms. If you do have symptoms, they may include headache, dizziness, changes in your vision, chest pain, and shortness of breath. But even without symptoms, high blood pressure that s not treated raises your risk for heart attack and stroke. High blood pressure is a serious health risk and shouldn t be ignored.  A blood pressure reading is made up of two numbers: a higher number over a lower number. The top number is the systolic pressure. The bottom number is the diastolic pressure. A normal blood pressure is less than 120 over less than 80.  High blood pressure is when either the top number is 140 or higher, or the bottom number is 90 or higher. This must be the result when taking your blood pressure a number of times. The blood pressures between normal and high are called prehypertension.  Home care  If you have high blood pressure, you should do what is listed below to lower your blood pressure. If you are taking medicines for high blood pressure, these methods may reduce or end your need for medicines in the future.    Begin a weight-loss program if you are overweight.    Cut back on how much salt you get in your diet. Here s how to do this:    Don t eat foods that have a lot of salt. These include olives, pickles, smoked meats, and salted potato chips.    Don t add salt to your food at the table.    Use only small amounts of salt when cooking.    Begin an exercise program. Talk with your health care provider about the type of exercise program that would be best for you. It doesn't have  to be hard. Even brisk walking for 20 minutes 3 times a week is a good form of exercise.    Don t take medicines that have heart stimulants. This includes many cold and sinus decongestant pills and sprays, as well as diet pills. Check the warnings about hypertension on the label. Stimulants such as amphetamine or cocaine could be lethal for someone with high blood pressure. Never take these.    Limit how much caffeine you get in your diet. Switch to caffeine-free products.    Stop smoking. If you are a long-time smoker, this can be hard. Enroll in a stop-smoking program to make it more likely that you will quit for good.    Learn how to handle stress. This is an important part of any program to lower blood pressure. Learn about relaxation methods like meditation, yoga, or biofeedback.    If your provider prescribed medicines, take them exactly as directed. Missing doses may cause your blood pressure get out of control.    Consider buying an automatic blood pressure machine. You can get one of these at most pharmacies. Use this to watch your blood pressure at home. Give the results to your provider.  Follow-up care  You will need to make regular visits to your health care provider. This is to check your blood pressure and to make changes to your medicines. Make a follow-up appointment as directed.  When to seek medical advice  Call your health care provider right away if any of these occur:    Chest pain or shortness of breath    Severe headache    Throbbing or rushing sound in the ears    Nosebleed    Sudden severe pain in your belly (abdomen)    Extreme drowsiness, confusion, or fainting    Dizziness or dizziness with a spinning sensation (vertigo)    Weakness of an arm or leg or one side of the face    You have problems speaking or seeing     [id number] [employer]. [address] [place] [address]. All rights reserved. This information is not intended as a substitute for professional medical  care. Always follow your healthcare professional's instructions.

## 2017-05-03 LAB
ALBUMIN SERPL-MCNC: 4.1 G/DL (ref 3.4–5)
ALP SERPL-CCNC: 60 U/L (ref 40–150)
ALT SERPL W P-5'-P-CCNC: 50 U/L (ref 0–50)
ANION GAP SERPL CALCULATED.3IONS-SCNC: 7 MMOL/L (ref 3–14)
AST SERPL W P-5'-P-CCNC: 30 U/L (ref 0–45)
BILIRUB SERPL-MCNC: 0.5 MG/DL (ref 0.2–1.3)
BUN SERPL-MCNC: 16 MG/DL (ref 7–30)
CALCIUM SERPL-MCNC: 8.7 MG/DL (ref 8.5–10.1)
CHLORIDE SERPL-SCNC: 104 MMOL/L (ref 94–109)
CO2 SERPL-SCNC: 28 MMOL/L (ref 20–32)
CREAT SERPL-MCNC: 0.76 MG/DL (ref 0.52–1.04)
GFR SERPL CREATININE-BSD FRML MDRD: 76 ML/MIN/1.7M2
GLUCOSE SERPL-MCNC: 86 MG/DL (ref 70–99)
POTASSIUM SERPL-SCNC: 4.1 MMOL/L (ref 3.4–5.3)
PROT SERPL-MCNC: 7.5 G/DL (ref 6.8–8.8)
SODIUM SERPL-SCNC: 139 MMOL/L (ref 133–144)

## 2017-05-04 ENCOUNTER — RADIANT APPOINTMENT (OUTPATIENT)
Dept: BONE DENSITY | Facility: CLINIC | Age: 72
End: 2017-05-04
Payer: COMMERCIAL

## 2017-05-04 ENCOUNTER — RADIANT APPOINTMENT (OUTPATIENT)
Dept: BONE DENSITY | Facility: CLINIC | Age: 72
End: 2017-05-04
Attending: FAMILY MEDICINE
Payer: COMMERCIAL

## 2017-05-04 DIAGNOSIS — M89.9 DISORDER OF BONE: ICD-10-CM

## 2017-05-04 DIAGNOSIS — Z00.00 ENCOUNTER FOR ROUTINE ADULT HEALTH EXAMINATION WITHOUT ABNORMAL FINDINGS: ICD-10-CM

## 2017-05-04 PROCEDURE — 77085 DXA BONE DENSITY AXL VRT FX: CPT | Performed by: INTERNAL MEDICINE

## 2017-05-09 LAB
BACTERIA SPEC CULT: NO GROWTH
MICRO REPORT STATUS: NORMAL
SPECIMEN SOURCE: NORMAL

## 2017-05-11 ENCOUNTER — OFFICE VISIT (OUTPATIENT)
Dept: CARDIOLOGY | Facility: CLINIC | Age: 72
End: 2017-05-11
Attending: NURSE PRACTITIONER
Payer: COMMERCIAL

## 2017-05-11 VITALS
HEART RATE: 66 BPM | SYSTOLIC BLOOD PRESSURE: 132 MMHG | DIASTOLIC BLOOD PRESSURE: 68 MMHG | HEIGHT: 64 IN | WEIGHT: 175 LBS | BODY MASS INDEX: 29.88 KG/M2

## 2017-05-11 DIAGNOSIS — I10 BENIGN ESSENTIAL HYPERTENSION: ICD-10-CM

## 2017-05-11 PROCEDURE — 99214 OFFICE O/P EST MOD 30 MIN: CPT | Performed by: PHYSICIAN ASSISTANT

## 2017-05-11 RX ORDER — HYDRALAZINE HYDROCHLORIDE 50 MG/1
50 TABLET, FILM COATED ORAL 3 TIMES DAILY
Qty: 270 TABLET | Refills: 3 | Status: SHIPPED | OUTPATIENT
Start: 2017-05-11 | End: 2018-03-23

## 2017-05-11 NOTE — LETTER
5/11/2017    Hilton Duvall, APRN CNP  [address] [place] [address]    RE: Monse LO Richelle       Dear Colleague,    I had the pleasure of seeing Monse Peoples in the [facility].    I had the pleasure of meeting Monse Peoples today in the Cardiology Clinic for followup of her hypertension.  Monse is a very pleasant 71-year-old woman who is a patient of Dr. Sotero Garza.  Her past medical history consists of mild nonobstructive coronary artery disease based upon a coronary angiogram performed in 2005 at [facility].  She has a history of hypertension, hyperlipidemia and gastric bypass surgery.  According to past notes, Monse also underwent stress testing in 2009 and 2011 at [facility], both of which by verbal report were normal, though we do not have the actual reports available to us.      Monse was last seen by Dr. Garza 10/28/2016, and at that time she was doing quite well.  She had some occasional atypical chest discomfort.  Her lipid profile was under control, and her blood pressure was controlled on 4 antihypertensive medications.  Plan was to follow up in a year with a stress echocardiogram and repeat fasting lipid panel.      Monse was seen by her primary care provider 05/02/2017 due to elevated systolic blood pressures in the 160s and 170s at home.  She also had a headache associated with these blood pressures.  Her provider increased the hydralazine to 50 mg t.i.d. (was dosed at 50 mg b.i.d.), and recommended that she follow up with Cardiology.      Monse presents today and tells me that her blood pressures at home are much better controlled in the 130s and 140s.  She notes that sometimes around 9:30, her blood pressure is elevated, but she takes her hydralazine shortly thereafter and her blood pressure improves.  Monse tells me that she had been following with a therapist and using talk therapy regarding some of her issues,  and she says she has not seen him since December and she is a bit stressed out these days and is considering talking with Dr. Walters again.  She also has a son and it sounds like he has a pretty significant alcoholism problem.  This is weighing on her heavily, and she is quite stressed.  She again notes some atypical chest pain.  This discomfort is sharp, nonexertional.  It is very short in duration and spontaneously resolves.  She has no true shortness of breath.  She has no palpitations or syncope.  She had a fasting lipid panel drawn in March which looked very well controlled with a total cholesterol 145, HDL 72, LDL 51, triglycerides 108.      PHYSICAL EXAMINATION:   VITAL SIGNS:  Blood pressure 132/68, pulse 66, weight 175.  BMI 29.87.   GENERAL:  A 71-year-old woman in no apparent distress, mildly anxious.   NECK:  No carotid bruit, no JVD.   LUNGS:  Clear.   CARDIAC:  Regular S1, S2, no significant murmur appreciated.   ABDOMEN:  Soft, bowel sounds positive, nontender.   EXTREMITIES:  Warm, without any pitting edema.  The upper extremity pulses are full and equal.   NEUROLOGIC:  Alert and oriented x3, no focal deficits.     Outpatient Encounter Prescriptions as of 5/11/2017   Medication Sig Dispense Refill     aspirin 81 MG tablet Take 81 mg by mouth daily       hydrALAZINE (APRESOLINE) 50 MG tablet Take 1 tablet (50 mg) by mouth 3 times daily 270 tablet 3     atenolol (TENORMIN) 50 MG tablet Take 1 tablet (50 mg) by mouth daily 30 tablet 11     Coenzyme Q10 (CO Q 10 PO) Take by mouth daily       buPROPion (WELLBUTRIN SR) 150 MG 12 hr tablet Take 1 tablet (150 mg) by mouth 2 times daily 180 tablet 3     DULoxetine (CYMBALTA) 60 MG EC capsule Take 1 capsule (60 mg) by mouth daily 90 capsule 3     hydrochlorothiazide (HYDRODIURIL) 25 MG tablet Take 1 tablet (25 mg) by mouth daily 90 tablet 3     lisinopril (PRINIVIL/ZESTRIL) 40 MG tablet Take 1 tablet (40 mg) by mouth daily 90 tablet 3     omeprazole (PRILOSEC)  40 MG capsule Take 1 capsule (40 mg) by mouth daily Take 30-60 minutes before a meal. 90 capsule 3     traZODone (DESYREL) 100 MG tablet Take 2 tablets (200 mg) by mouth At Bedtime 180 tablet 3     atorvastatin (LIPITOR) 20 MG tablet Take 1 tablet (20 mg) by mouth daily 90 tablet 3     [DISCONTINUED] LORazepam (ATIVAN) 1 MG tablet Take 1 tablet (1 mg) by mouth every 8 hours as needed for anxiety 20 tablet 3     acetaminophen (TYLENOL) 325 MG tablet Take 650 mg by mouth 2 times daily  100 tablet 0     calcium-vitamin D (CALTRATE) 600-400 MG-UNIT per tablet Take 1 tablet by mouth 2 times daily       CYANOCOBALAMIN SL Place 1,000 mcg under the tongue daily       Omega-3 Fatty Acids (OMEGA-3 FISH OIL PO) Take 1.2 g by mouth 2 times daily (with meals)        multivitamin, therapeutic with minerals (MULTI-VITAMIN) TABS Take 1 tablet by mouth daily       [DISCONTINUED] hydrALAZINE (APRESOLINE) 50 MG tablet Take 1 tablet (50 mg) by mouth 2 times daily (Patient taking differently: Take 50 mg by mouth 3 times daily ) 180 tablet 3     [DISCONTINUED] atorvastatin (LIPITOR) 40 MG tablet Take 1 tablet (40 mg) by mouth daily 90 tablet 1     [DISCONTINUED] LORazepam (ATIVAN) 0.5 MG tablet Take 1 tablet (0.5 mg) by mouth every 8 hours as needed for anxiety 40 tablet 0     [DISCONTINUED] traMADol (ULTRAM) 50 MG tablet Take 1-2 tablets ( mg) by mouth every 6 hours as needed for pain maximum 6 tablet(s) per day (Patient not taking: Reported on 5/11/2017) 15 tablet 0     No facility-administered encounter medications on file as of 5/11/2017.       IMPRESSION:   1.  Mild nonobstructive coronary artery disease based on coronary angiogram in 2005 at [facility].     a.  She apparently underwent stress testing in 2009 and 2011 at [facility], both of which by report were normal (we do not have the actual reports available).   b.  No symptoms of angina.   2.  Excellent fasting lipid profile on atorvastatin 20 mg daily and  fish oil.   3.  Essential hypertension, blood pressure had been well controlled on 4 agents.  More recently with increase in stress, her blood pressure was uncontrolled with systolic blood pressures in the 160s and 170s.  Her hydralazine was increased to t.i.d. dosing.  Blood pressures are much better controlled now with a blood pressure today of 132/68.   4.  Atypical chest discomfort.      PLAN:   1.  We will continue with the current medication regimen including aspirin and statin for her mild nonobstructive coronary artery disease.   2.  We will continue her on atenolol 50 mg daily, lisinopril 40 mg daily, hydralazine 50 mg t.i.d., hydrochlorothiazide 25 mg daily, at this time.  Should her stress level come down and she notes that her blood pressures are trending lower at home, she will give us a call and we can decrease/adjust her medications.  In the future if her blood pressures are trending high again, we could consider changing her atenolol to carvedilol for additional blood pressure control.   3.  She will see Dr. Garza in October and will have a stress echocardiogram prior to that visit.      Thank you very much for allowing me to participate in the care of Monse Peoples.     Sincerely,    Jaelyn Lowry PA-C     [facility]    cc:   Susan Camacho MD  [address] [address] [address]

## 2017-05-11 NOTE — MR AVS SNAPSHOT
After Visit Summary   5/11/2017    Monse Peoples    MRN: [id number]           Patient Information     Date Of Birth          1945        Visit Information        Provider Department      5/11/2017 3:10 PM Jaelyn Lowry PA-C [facility] [place]        Today's Diagnoses     Benign essential hypertension          Care Instructions    With the increase in hydralazine, the blood pressure looks much better controlled. Let's keep you at the three times a day dosing for now.   If you notice that your blood pressures are starting to run lower (with less stress going), give us a call and we can back off the medication a bit.   Otherwise follow up with Dr. Garza in October 2017. He wants you to have that stress echo before the appointment. They will send out a  to help remind you to schedule. Call as soon as you can as schedules fill up.         Follow-ups after your visit        Who to contact     If you have questions or need follow up information about today's clinic visit or your schedule please contact [facility] [place] directly at [phone].  Normal or non-critical lab and imaging results will be communicated to you by [employer], letter or phone within 4 business days after the clinic has received the results. If you do not hear from us within 7 days, please contact the clinic through [employer] or phone. If you have a critical or abnormal lab result, we will notify you by phone as soon as possible.  Submit refill requests through [employer] or call your pharmacy and they will forward the refill request to us. Please allow 3 business days for your refill to be completed.          Additional Information About Your Visit        [employer] Information     [employer] gives you secure access to your electronic health record. If you see a primary care provider, you can also send messages to your care team and make appointments. If  "you have questions, please call your primary care clinic.  If you do not have a primary care provider, please call [phone] and they will assist you.        Care EveryWhere ID     This is your Care EveryWhere ID. This could be used by other organizations to access your [place] medical records  [id number]        Your Vitals Were     Pulse Height BMI (Body Mass Index)             66 1.632 m (5' 4.25\") 29.81 kg/m2          Blood Pressure from Last 3 Encounters:   05/11/17 132/68   05/02/17 170/84   03/29/17 134/80    Weight from Last 3 Encounters:   05/11/17 79.4 kg (175 lb)   05/02/17 79.7 kg (175 lb 9.6 oz)   03/29/17 77.6 kg (171 lb)              Today, you had the following     No orders found for display         Where to get your medicines      These medications were sent to [id number] - [place], [place] - [address] [address] [address]     Phone:  [phone]     hydrALAZINE 50 MG tablet          Primary Care Provider Office Phone # Fax #    Susan Luc Camacho [phone]       [place] [address]  [facility] [address]        Thank you!     Thank you for choosing [facility] [place]  for your care. Our goal is always to provide you with excellent care. Hearing back from our patients is one way we can continue to improve our services. Please take a few minutes to complete the written survey that you may receive in the mail after your visit with us. Thank you!             Your Updated Medication List - Protect others around you: Learn how to safely use, store and throw away your medicines at www.disposemymeds.org.          This list is accurate as of: 5/11/17  3:49 PM.  Always use your most recent med list.                   Brand Name Dispense Instructions for use    aspirin 81 MG tablet      Take 81 mg by mouth daily       atenolol 50 MG tablet    TENORMIN    30 tablet    Take 1 tablet (50 mg) by " mouth daily       atorvastatin 20 MG tablet    LIPITOR    90 tablet    Take 1 tablet (20 mg) by mouth daily       buPROPion 150 MG 12 hr tablet    WELLBUTRIN SR    180 tablet    Take 1 tablet (150 mg) by mouth 2 times daily       calcium-vitamin D 600-400 MG-UNIT per tablet    CALTRATE     Take 1 tablet by mouth 2 times daily       CO Q 10 PO      Take by mouth daily       CYANOCOBALAMIN SL      Place 1,000 mcg under the tongue daily       DULoxetine 60 MG EC capsule    CYMBALTA    90 capsule    Take 1 capsule (60 mg) by mouth daily       hydrALAZINE 50 MG tablet    APRESOLINE    270 tablet    Take 1 tablet (50 mg) by mouth 3 times daily       hydrochlorothiazide 25 MG tablet    HYDRODIURIL    90 tablet    Take 1 tablet (25 mg) by mouth daily       lisinopril 40 MG tablet    PRINIVIL/ZESTRIL    90 tablet    Take 1 tablet (40 mg) by mouth daily       LORazepam 1 MG tablet    ATIVAN    20 tablet    Take 1 tablet (1 mg) by mouth every 8 hours as needed for anxiety       Multi-vitamin Tabs tablet      Take 1 tablet by mouth daily       OMEGA-3 FISH OIL PO      Take 1.2 g by mouth 2 times daily (with meals)       omeprazole 40 MG capsule    priLOSEC    90 capsule    Take 1 capsule (40 mg) by mouth daily Take 30-60 minutes before a meal.       traZODone 100 MG tablet    DESYREL    180 tablet    Take 2 tablets (200 mg) by mouth At Bedtime       TYLENOL 325 MG tablet   Generic drug:  acetaminophen     100 tablet    Take 650 mg by mouth 2 times daily

## 2017-05-11 NOTE — PATIENT INSTRUCTIONS
With the increase in hydralazine, the blood pressure looks much better controlled. Let's keep you at the three times a day dosing for now.   If you notice that your blood pressures are starting to run lower (with less stress going), give us a call and we can back off the medication a bit.   Otherwise follow up with Dr. Garza in October 2017. He wants you to have that stress echo before the appointment. They will send out a  to help remind you to schedule. Call as soon as you can as schedules fill up.

## 2017-05-11 NOTE — PROGRESS NOTES
HPI and Plan:   See dictation  [id number]    Orders this Visit:  No orders of the defined types were placed in this encounter.    Orders Placed This Encounter   Medications     aspirin 81 MG tablet     Sig: Take 81 mg by mouth daily     hydrALAZINE (APRESOLINE) 50 MG tablet     Sig: Take 1 tablet (50 mg) by mouth 3 times daily     Dispense:  270 tablet     Refill:  3     Do not fill at this time. Patient will call when ready to refill.     Medications Discontinued During This Encounter   Medication Reason     atorvastatin (LIPITOR) 40 MG tablet Medication Reconciliation Clean Up     LORazepam (ATIVAN) 0.5 MG tablet Medication Reconciliation Clean Up     traMADol (ULTRAM) 50 MG tablet Therapy completed     hydrALAZINE (APRESOLINE) 50 MG tablet Reorder         Encounter Diagnosis   Name Primary?     Benign essential hypertension        CURRENT MEDICATIONS:  Current Outpatient Prescriptions   Medication Sig Dispense Refill     aspirin 81 MG tablet Take 81 mg by mouth daily       hydrALAZINE (APRESOLINE) 50 MG tablet Take 1 tablet (50 mg) by mouth 3 times daily 270 tablet 3     atenolol (TENORMIN) 50 MG tablet Take 1 tablet (50 mg) by mouth daily 30 tablet 11     Coenzyme Q10 (CO Q 10 PO) Take by mouth daily       buPROPion (WELLBUTRIN SR) 150 MG 12 hr tablet Take 1 tablet (150 mg) by mouth 2 times daily 180 tablet 3     DULoxetine (CYMBALTA) 60 MG EC capsule Take 1 capsule (60 mg) by mouth daily 90 capsule 3     hydrochlorothiazide (HYDRODIURIL) 25 MG tablet Take 1 tablet (25 mg) by mouth daily 90 tablet 3     lisinopril (PRINIVIL/ZESTRIL) 40 MG tablet Take 1 tablet (40 mg) by mouth daily 90 tablet 3     omeprazole (PRILOSEC) 40 MG capsule Take 1 capsule (40 mg) by mouth daily Take 30-60 minutes before a meal. 90 capsule 3     traZODone (DESYREL) 100 MG tablet Take 2 tablets (200 mg) by mouth At Bedtime 180 tablet 3     LORazepam (ATIVAN) 1 MG tablet Take 1 tablet (1 mg) by mouth every 8 hours as needed for anxiety 20  "tablet 3     atorvastatin (LIPITOR) 20 MG tablet Take 1 tablet (20 mg) by mouth daily 90 tablet 3     acetaminophen (TYLENOL) 325 MG tablet Take 650 mg by mouth 2 times daily  100 tablet 0     calcium-vitamin D (CALTRATE) 600-400 MG-UNIT per tablet Take 1 tablet by mouth 2 times daily       CYANOCOBALAMIN SL Place 1,000 mcg under the tongue daily       Omega-3 Fatty Acids (OMEGA-3 FISH OIL PO) Take 1.2 g by mouth 2 times daily (with meals)        multivitamin, therapeutic with minerals (MULTI-VITAMIN) TABS Take 1 tablet by mouth daily       [DISCONTINUED] hydrALAZINE (APRESOLINE) 50 MG tablet Take 1 tablet (50 mg) by mouth 2 times daily (Patient taking differently: Take 50 mg by mouth 3 times daily ) 180 tablet 3     [DISCONTINUED] atorvastatin (LIPITOR) 40 MG tablet Take 1 tablet (40 mg) by mouth daily 90 tablet 1       ALLERGIES  Allergies   Allergen Reactions     Codeine Sulfate      \"hyper\"       PAST MEDICAL HISTORY:  Past Medical History:   Diagnosis Date     Asthma     no inhaler for 2-3 years     Coronary artery disease      Depression      Gastro-oesophageal reflux disease      History of blood transfusion      Hyperlipidemia      Hypertension      Osteoarthritis      Osteopenia        PAST SURGICAL HISTORY:  Past Surgical History:   Procedure Laterality Date     ANGIOGRAM  6/15/05     ARTHROPLASTY HIP  9/23/2013    Procedure: ARTHROPLASTY HIP;  Right total hip arthroplasty       ARTHROPLASTY HIP Left 5/19/2015    Procedure: ARTHROPLASTY HIP;  Surgeon: Tyrese Howell MD;  Location:  OR     ARTHROPLASTY KNEE Left 8/22/2016    Procedure: ARTHROPLASTY KNEE;  Surgeon: Tyrese Howell MD;  Location:  OR     CHOLECYSTECTOMY       COLONOSCOPY       COLONOSCOPY Left 4/19/2016    Procedure: COLONOSCOPY;  Surgeon: Moe Haney MD;  Location:  GI     GASTRIC BYPASS      2011 tiff en y     GI SURGERY      fistulotomy     ORTHOPEDIC SURGERY      left knee \"green procedure\"     TONSILLECTOMY       TUBAL " "LIGATION         FAMILY HISTORY:  Family History   Problem Relation Age of Onset     Coronary Artery Disease Father      Hypertension Father      Coronary Artery Disease Sister      Hypertension Sister      Coronary Artery Disease Brother      Hypertension Brother      HEART DISEASE Nephew      Other Cancer Maternal Half-Sister        SOCIAL HISTORY:  Social History     Social History     Marital status:      Spouse name: N/A     Number of children: N/A     Years of education: N/A     Social History Main Topics     Smoking status: Former Smoker     Packs/day: 0.50     Years: 50.00     Types: Cigarettes     Quit date: 1/19/2013     Smokeless tobacco: Never Used     Alcohol use 0.0 oz/week     0 Standard drinks or equivalent per week      Comment: 4 drinks weekly     Drug use: No     Sexual activity: Yes     Partners: Male     Other Topics Concern     Parent/Sibling W/ Cabg, Mi Or Angioplasty Before 65f 55m? Yes     brother age 31     Caffeine Concern Yes     1 cup of coffee      Sleep Concern Yes     lately it has been an issue     Special Diet No     watching what she eats      Exercise No     Seat Belt Yes     Social History Narrative       Review of Systems:  Skin:  Positive for bruising   Eyes:  Positive for glasses;cataracts  ENT:  Positive for tinnitus  Respiratory:    shortness of breath;cough  Cardiovascular:    Positive for;chest pain;fatigue  Gastroenterology: Positive for heartburn  Genitourinary:  Negative    Musculoskeletal:  Positive for foot pain;joint pain;joint stiffness;arthritis  Neurologic:  Positive for headaches  Psychiatric:  Positive for excessive stress;anxiety;depression  Heme/Lymph/Imm:  Positive for easy bruising;allergies;hay fever  Endocrine:  Negative        Physical Exam:  Vitals: /68 (BP Location: Right arm, Patient Position: Chair, Cuff Size: Adult Regular)  Pulse 66  Ht 1.632 m (5' 4.25\")  Wt 79.4 kg (175 lb)  BMI 29.81 kg/m2    Constitutional:  cooperative, alert " and oriented, well developed, well nourished, in no acute distress overweight mildly anxious    Skin:  warm and dry to the touch, no apparent skin lesions or masses noted        Head:  normocephalic, no masses or lesions        Eyes:  pupils equal and round;conjunctivae and lids unremarkable;sclera white        ENT:  no pallor or cyanosis, dentition good        Neck:  no carotid bruit;JVP normal        Chest:  normal breath sounds, clear to auscultation, normal A-P diameter, normal symmetry, normal respiratory excursion, no use of accessory muscles        Cardiac: regular rhythm, normal S1/S2, no S3 or S4, apical impulse not displaced, no murmurs, gallops or rubs                  Abdomen:  abdomen soft;BS normoactive;non-tender        Vascular:                                   UE pulses full and equal    Extremities and Back:  no deformities, clubbing, cyanosis, erythema observed;no edema   no edema    Neurological:  affect appropriate, oriented to time, person and place;no gross motor deficits          Recent Lab Results:  LIPID RESULTS:  Lab Results   Component Value Date    CHOL 145 03/29/2017    HDL 72 03/29/2017    LDL 51 03/29/2017    TRIG 108 03/29/2017    CHOLHDLRATIO 1.5 03/19/2015       LIVER ENZYME RESULTS:  Lab Results   Component Value Date    AST 30 05/02/2017    ALT 50 05/02/2017       CBC RESULTS:  Lab Results   Component Value Date    WBC 6.3 03/29/2017    RBC 4.16 03/29/2017    HGB 13.0 03/29/2017    HCT 39.3 03/29/2017    MCV 95 03/29/2017    MCH 31.3 03/29/2017    MCHC 33.1 03/29/2017    RDW 12.4 03/29/2017     03/29/2017       BMP RESULTS:  Lab Results   Component Value Date     05/02/2017    POTASSIUM 4.1 05/02/2017    CHLORIDE 104 05/02/2017    CO2 28 05/02/2017    ANIONGAP 7 05/02/2017    GLC 86 05/02/2017    BUN 16 05/02/2017    CR 0.76 05/02/2017    GFRESTIMATED 76 05/02/2017    GFRESTBLACK >90   GFR Calc   05/02/2017    HATTIE 8.7 05/02/2017        A1C  RESULTS:  Lab Results   Component Value Date    A1C 5.5 03/19/2015       INR RESULTS:  Lab Results   Component Value Date    INR 1.91 (H) 08/25/2016    INR 2.05 (H) 08/24/2016           CC  [facility]  [address] [place] [address]

## 2017-05-12 NOTE — PROGRESS NOTES
HISTORY OF PRESENT ILLNESS:  I had the pleasure of meeting Monse Peoples today in the Cardiology Clinic for followup of her hypertension.  Monse is a very pleasant 71-year-old woman who is a patient of Dr. Sotero Garza.  Her past medical history consists of mild nonobstructive coronary artery disease based upon a coronary angiogram performed in 2005 at [facility].  She has a history of hypertension, hyperlipidemia and gastric bypass surgery.  According to past notes, Monse also underwent stress testing in 2009 and 2011 at [facility], both of which by verbal report were normal, though we do not have the actual reports available to us.      Monse was last seen by Dr. Garza 10/28/2016, and at that time she was doing quite well.  She had some occasional atypical chest discomfort.  Her lipid profile was under control, and her blood pressure was controlled on 4 antihypertensive medications.  Plan was to follow up in a year with a stress echocardiogram and repeat fasting lipid panel.      Monse was seen by her primary care provider 05/02/2017 due to elevated systolic blood pressures in the 160s and 170s at home.  She also had a headache associated with these blood pressures.  Her provider increased the hydralazine to 50 mg t.i.d. (was dosed at 50 mg b.i.d.), and recommended that she follow up with Cardiology.      Monse presents today and tells me that her blood pressures at home are much better controlled in the 130s and 140s.  She notes that sometimes around 9:30, her blood pressure is elevated, but she takes her hydralazine shortly thereafter and her blood pressure improves.  Monse tells me that she had been following with a therapist and using talk therapy regarding some of her issues, and she says she has not seen him since December and she is a bit stressed out these days and is considering talking with Dr. Walters again.  She also has a son and it sounds like he has a pretty significant  alcoholism problem.  This is weighing on her heavily, and she is quite stressed.  She again notes some atypical chest pain.  This discomfort is sharp, nonexertional.  It is very short in duration and spontaneously resolves.  She has no true shortness of breath.  She has no palpitations or syncope.  She had a fasting lipid panel drawn in March which looked very well controlled with a total cholesterol 145, HDL 72, LDL 51, triglycerides 108.      PHYSICAL EXAMINATION:   VITAL SIGNS:  Blood pressure 132/68, pulse 66, weight 175.  BMI 29.87.   GENERAL:  A 71-year-old woman in no apparent distress, mildly anxious.   NECK:  No carotid bruit, no JVD.   LUNGS:  Clear.   CARDIAC:  Regular S1, S2, no significant murmur appreciated.   ABDOMEN:  Soft, bowel sounds positive, nontender.   EXTREMITIES:  Warm, without any pitting edema.  The upper extremity pulses are full and equal.   NEUROLOGIC:  Alert and oriented x3, no focal deficits.      IMPRESSION:   1.  Mild nonobstructive coronary artery disease based on coronary angiogram in 2005 at [facility].     a.  She apparently underwent stress testing in 2009 and 2011 at [facility], both of which by report were normal (we do not have the actual reports available).   b.  No symptoms of angina.   2.  Excellent fasting lipid profile on atorvastatin 20 mg daily and fish oil.   3.  Essential hypertension, blood pressure had been well controlled on 4 agents.  More recently with increase in stress, her blood pressure was uncontrolled with systolic blood pressures in the 160s and 170s.  Her hydralazine was increased to t.i.d. dosing.  Blood pressures are much better controlled now with a blood pressure today of 132/68.   4.  Atypical chest discomfort.      PLAN:   1.  We will continue with the current medication regimen including aspirin and statin for her mild nonobstructive coronary artery disease.   2.  We will continue her on atenolol 50 mg daily, lisinopril 40 mg daily,  hydralazine 50 mg t.i.d., hydrochlorothiazide 25 mg daily, at this time.  Should her stress level come down and she notes that her blood pressures are trending lower at home, she will give us a call and we can decrease/adjust her medications.  In the future if her blood pressures are trending high again, we could consider changing her atenolol to carvedilol for additional blood pressure control.   3.  She will see Dr. Garza in October and will have a stress echocardiogram prior to that visit.      Thank you very much for allowing me to participate in the care of Aminta Goel.      ARGELIA Constantino PA-C             D: 2017 16:30   T: 2017 15:04   MT: SUZE      Name:     AMINTA GOEL   MRN:      [id number]        Account:      [id number]   :      1945           Service Date: 2017      Document: [id number]

## 2017-07-08 DIAGNOSIS — F41.9 ANXIETY: ICD-10-CM

## 2017-07-08 NOTE — TELEPHONE ENCOUNTER
Pending Prescriptions:                       Disp   Refills    LORazepam (ATIVAN) 1 MG tablet            20 tab*3            Sig: Take 1 tablet (1 mg) by mouth every 8 hours as           needed for anxiety      LORAZEPAM      Last Written Prescription Date:  3/19/2017  Last Fill Quantity: 20,   # refills: 3  Last Office Visit with [facility] or [facility] prescribing provider: 3/29/2017Doug    Future Office visit:       Routing refill request to provider for review/approval because:  Drug not on the [facility], [facility] or [facility] refill protocol or controlled substance

## 2017-07-10 NOTE — TELEPHONE ENCOUNTER
[place] ([place]) reviewed:  reviewed- no concerns    [place]  [email or web address]

## 2017-07-12 RX ORDER — LORAZEPAM 1 MG/1
1 TABLET ORAL EVERY 8 HOURS PRN
Qty: 20 TABLET | Refills: 3 | Status: SHIPPED | OUTPATIENT
Start: 2017-07-12 | End: 2018-02-08

## 2017-08-27 ENCOUNTER — HOSPITAL ENCOUNTER (OUTPATIENT)
Facility: CLINIC | Age: 72
Setting detail: OBSERVATION
Discharge: HOME | End: 2017-08-28
Attending: EMERGENCY MEDICINE | Admitting: INTERNAL MEDICINE
Payer: MEDICARE

## 2017-08-27 ENCOUNTER — APPOINTMENT (OUTPATIENT)
Dept: GENERAL RADIOLOGY | Facility: CLINIC | Age: 72
End: 2017-08-27
Attending: EMERGENCY MEDICINE
Payer: MEDICARE

## 2017-08-27 DIAGNOSIS — R06.02 SOB (SHORTNESS OF BREATH): ICD-10-CM

## 2017-08-27 DIAGNOSIS — N39.0 URINARY TRACT INFECTION WITHOUT HEMATURIA, SITE UNSPECIFIED: ICD-10-CM

## 2017-08-27 DIAGNOSIS — R11.0 NAUSEA: ICD-10-CM

## 2017-08-27 DIAGNOSIS — R42 LIGHTHEADEDNESS: ICD-10-CM

## 2017-08-27 DIAGNOSIS — R07.9 CHEST PAIN, UNSPECIFIED TYPE: ICD-10-CM

## 2017-08-27 LAB
ALBUMIN UR-MCNC: NEGATIVE MG/DL
ANION GAP SERPL CALCULATED.3IONS-SCNC: 7 MMOL/L (ref 3–14)
APPEARANCE UR: ABNORMAL
BACTERIA #/AREA URNS HPF: ABNORMAL /HPF
BASOPHILS # BLD AUTO: 0 10E9/L (ref 0–0.2)
BASOPHILS NFR BLD AUTO: 0.4 %
BILIRUB UR QL STRIP: NEGATIVE
BUN SERPL-MCNC: 15 MG/DL (ref 7–30)
CALCIUM SERPL-MCNC: 8.7 MG/DL (ref 8.5–10.1)
CHLORIDE SERPL-SCNC: 104 MMOL/L (ref 94–109)
CO2 SERPL-SCNC: 27 MMOL/L (ref 20–32)
COLOR UR AUTO: YELLOW
CREAT SERPL-MCNC: 0.85 MG/DL (ref 0.52–1.04)
DIFFERENTIAL METHOD BLD: NORMAL
EOSINOPHIL # BLD AUTO: 0.1 10E9/L (ref 0–0.7)
EOSINOPHIL NFR BLD AUTO: 1.6 %
ERYTHROCYTE [DISTWIDTH] IN BLOOD BY AUTOMATED COUNT: 12.4 % (ref 10–15)
GFR SERPL CREATININE-BSD FRML MDRD: 66 ML/MIN/1.7M2
GLUCOSE SERPL-MCNC: 101 MG/DL (ref 70–99)
GLUCOSE UR STRIP-MCNC: NEGATIVE MG/DL
HCT VFR BLD AUTO: 37.8 % (ref 35–47)
HGB BLD-MCNC: 12.7 G/DL (ref 11.7–15.7)
HGB UR QL STRIP: NEGATIVE
HYALINE CASTS #/AREA URNS LPF: 7 /LPF (ref 0–2)
IMM GRANULOCYTES # BLD: 0 10E9/L (ref 0–0.4)
IMM GRANULOCYTES NFR BLD: 0.4 %
KETONES UR STRIP-MCNC: NEGATIVE MG/DL
LEUKOCYTE ESTERASE UR QL STRIP: ABNORMAL
LYMPHOCYTES # BLD AUTO: 2.5 10E9/L (ref 0.8–5.3)
LYMPHOCYTES NFR BLD AUTO: 30.9 %
MCH RBC QN AUTO: 31.7 PG (ref 26.5–33)
MCHC RBC AUTO-ENTMCNC: 33.6 G/DL (ref 31.5–36.5)
MCV RBC AUTO: 94 FL (ref 78–100)
MONOCYTES # BLD AUTO: 0.7 10E9/L (ref 0–1.3)
MONOCYTES NFR BLD AUTO: 8.4 %
MUCOUS THREADS #/AREA URNS LPF: PRESENT /LPF
NEUTROPHILS # BLD AUTO: 4.7 10E9/L (ref 1.6–8.3)
NEUTROPHILS NFR BLD AUTO: 58.3 %
NITRATE UR QL: NEGATIVE
NRBC # BLD AUTO: 0 10*3/UL
NRBC BLD AUTO-RTO: 0 /100
PH UR STRIP: 5 PH (ref 5–7)
PLATELET # BLD AUTO: 237 10E9/L (ref 150–450)
POTASSIUM SERPL-SCNC: 3.8 MMOL/L (ref 3.4–5.3)
RBC # BLD AUTO: 4.01 10E12/L (ref 3.8–5.2)
RBC #/AREA URNS AUTO: 3 /HPF (ref 0–2)
SODIUM SERPL-SCNC: 138 MMOL/L (ref 133–144)
SOURCE: ABNORMAL
SP GR UR STRIP: 1.02 (ref 1–1.03)
SQUAMOUS #/AREA URNS AUTO: 9 /HPF (ref 0–1)
TRANS CELLS #/AREA URNS HPF: 1 /HPF (ref 0–1)
TROPONIN I SERPL-MCNC: <0.015 UG/L (ref 0–0.04)
TROPONIN I SERPL-MCNC: <0.015 UG/L (ref 0–0.04)
UROBILINOGEN UR STRIP-MCNC: 0 MG/DL (ref 0–2)
WBC # BLD AUTO: 8 10E9/L (ref 4–11)
WBC #/AREA URNS AUTO: 16 /HPF (ref 0–2)

## 2017-08-27 PROCEDURE — 85025 COMPLETE CBC W/AUTO DIFF WBC: CPT | Performed by: EMERGENCY MEDICINE

## 2017-08-27 PROCEDURE — 99220 ZZC INITIAL OBSERVATION CARE,LEVL III: CPT | Performed by: PHYSICIAN ASSISTANT

## 2017-08-27 PROCEDURE — 93005 ELECTROCARDIOGRAM TRACING: CPT

## 2017-08-27 PROCEDURE — 99285 EMERGENCY DEPT VISIT HI MDM: CPT | Mod: 25

## 2017-08-27 PROCEDURE — 84484 ASSAY OF TROPONIN QUANT: CPT | Performed by: PHYSICIAN ASSISTANT

## 2017-08-27 PROCEDURE — G0378 HOSPITAL OBSERVATION PER HR: HCPCS

## 2017-08-27 PROCEDURE — 25000128 H RX IP 250 OP 636: Performed by: EMERGENCY MEDICINE

## 2017-08-27 PROCEDURE — 96366 THER/PROPH/DIAG IV INF ADDON: CPT

## 2017-08-27 PROCEDURE — 36415 COLL VENOUS BLD VENIPUNCTURE: CPT | Performed by: PHYSICIAN ASSISTANT

## 2017-08-27 PROCEDURE — 81001 URINALYSIS AUTO W/SCOPE: CPT | Performed by: EMERGENCY MEDICINE

## 2017-08-27 PROCEDURE — 96365 THER/PROPH/DIAG IV INF INIT: CPT

## 2017-08-27 PROCEDURE — 25000132 ZZH RX MED GY IP 250 OP 250 PS 637: Mod: GY | Performed by: EMERGENCY MEDICINE

## 2017-08-27 PROCEDURE — 84484 ASSAY OF TROPONIN QUANT: CPT | Performed by: EMERGENCY MEDICINE

## 2017-08-27 PROCEDURE — 80048 BASIC METABOLIC PNL TOTAL CA: CPT | Performed by: EMERGENCY MEDICINE

## 2017-08-27 PROCEDURE — 96375 TX/PRO/DX INJ NEW DRUG ADDON: CPT

## 2017-08-27 PROCEDURE — A9270 NON-COVERED ITEM OR SERVICE: HCPCS | Mod: GY | Performed by: EMERGENCY MEDICINE

## 2017-08-27 PROCEDURE — 87086 URINE CULTURE/COLONY COUNT: CPT | Performed by: EMERGENCY MEDICINE

## 2017-08-27 PROCEDURE — 71020 XR CHEST 2 VW: CPT

## 2017-08-27 RX ORDER — ALUMINA, MAGNESIA, AND SIMETHICONE 2400; 2400; 240 MG/30ML; MG/30ML; MG/30ML
15-30 SUSPENSION ORAL EVERY 4 HOURS PRN
Status: DISCONTINUED | OUTPATIENT
Start: 2017-08-27 | End: 2017-08-28 | Stop reason: HOSPADM

## 2017-08-27 RX ORDER — NITROGLYCERIN 0.4 MG/1
0.4 TABLET SUBLINGUAL EVERY 5 MIN PRN
Status: DISCONTINUED | OUTPATIENT
Start: 2017-08-27 | End: 2017-08-27

## 2017-08-27 RX ORDER — ASPIRIN 81 MG/1
324 TABLET, CHEWABLE ORAL ONCE
Status: COMPLETED | OUTPATIENT
Start: 2017-08-27 | End: 2017-08-27

## 2017-08-27 RX ORDER — HYDRALAZINE HYDROCHLORIDE 25 MG/1
50 TABLET, FILM COATED ORAL 3 TIMES DAILY
Status: DISCONTINUED | OUTPATIENT
Start: 2017-08-28 | End: 2017-08-28 | Stop reason: HOSPADM

## 2017-08-27 RX ORDER — ONDANSETRON 2 MG/ML
4 INJECTION INTRAMUSCULAR; INTRAVENOUS ONCE
Status: COMPLETED | OUTPATIENT
Start: 2017-08-27 | End: 2017-08-27

## 2017-08-27 RX ORDER — LORATADINE 10 MG
1 TABLET ORAL DAILY
COMMUNITY

## 2017-08-27 RX ORDER — NALOXONE HYDROCHLORIDE 0.4 MG/ML
.1-.4 INJECTION, SOLUTION INTRAMUSCULAR; INTRAVENOUS; SUBCUTANEOUS
Status: DISCONTINUED | OUTPATIENT
Start: 2017-08-27 | End: 2017-08-28 | Stop reason: HOSPADM

## 2017-08-27 RX ORDER — ASPIRIN 81 MG/1
81 TABLET ORAL DAILY
Status: DISCONTINUED | OUTPATIENT
Start: 2017-08-28 | End: 2017-08-28 | Stop reason: HOSPADM

## 2017-08-27 RX ORDER — TRAZODONE HYDROCHLORIDE 100 MG/1
200 TABLET ORAL
Status: DISCONTINUED | OUTPATIENT
Start: 2017-08-27 | End: 2017-08-28 | Stop reason: HOSPADM

## 2017-08-27 RX ORDER — ACETAMINOPHEN 325 MG/1
650 TABLET ORAL EVERY 4 HOURS PRN
Status: DISCONTINUED | OUTPATIENT
Start: 2017-08-27 | End: 2017-08-28 | Stop reason: HOSPADM

## 2017-08-27 RX ORDER — LIDOCAINE 40 MG/G
CREAM TOPICAL
Status: DISCONTINUED | OUTPATIENT
Start: 2017-08-27 | End: 2017-08-28 | Stop reason: HOSPADM

## 2017-08-27 RX ORDER — LORAZEPAM 0.5 MG/1
1 TABLET ORAL EVERY 8 HOURS PRN
Status: DISCONTINUED | OUTPATIENT
Start: 2017-08-27 | End: 2017-08-28 | Stop reason: HOSPADM

## 2017-08-27 RX ORDER — NITROGLYCERIN 0.4 MG/1
0.4 TABLET SUBLINGUAL EVERY 5 MIN PRN
Status: DISCONTINUED | OUTPATIENT
Start: 2017-08-27 | End: 2017-08-28 | Stop reason: HOSPADM

## 2017-08-27 RX ORDER — HYDROCHLOROTHIAZIDE 25 MG/1
25 TABLET ORAL DAILY
Status: DISCONTINUED | OUTPATIENT
Start: 2017-08-28 | End: 2017-08-28 | Stop reason: HOSPADM

## 2017-08-27 RX ORDER — LISINOPRIL 20 MG/1
40 TABLET ORAL EVERY EVENING
Status: DISCONTINUED | OUTPATIENT
Start: 2017-08-28 | End: 2017-08-28 | Stop reason: HOSPADM

## 2017-08-27 RX ORDER — CEFTRIAXONE 500 MG/1
500 INJECTION, POWDER, FOR SOLUTION INTRAMUSCULAR; INTRAVENOUS ONCE
Status: COMPLETED | OUTPATIENT
Start: 2017-08-27 | End: 2017-08-27

## 2017-08-27 RX ORDER — ACETAMINOPHEN 650 MG/1
650 SUPPOSITORY RECTAL EVERY 4 HOURS PRN
Status: DISCONTINUED | OUTPATIENT
Start: 2017-08-27 | End: 2017-08-28 | Stop reason: HOSPADM

## 2017-08-27 RX ADMIN — CEFTRIAXONE SODIUM 500 MG: 500 INJECTION, POWDER, FOR SOLUTION INTRAMUSCULAR; INTRAVENOUS at 20:49

## 2017-08-27 RX ADMIN — ASPIRIN 81 MG 324 MG: 81 TABLET ORAL at 20:38

## 2017-08-27 RX ADMIN — ONDANSETRON 4 MG: 2 INJECTION INTRAMUSCULAR; INTRAVENOUS at 20:37

## 2017-08-27 ASSESSMENT — ENCOUNTER SYMPTOMS
NUMBNESS: 0
SHORTNESS OF BREATH: 1
NAUSEA: 1
DIAPHORESIS: 1
LIGHT-HEADEDNESS: 1
FREQUENCY: 1
HEADACHES: 1
CHILLS: 1
COUGH: 0
SORE THROAT: 0
VOMITING: 0

## 2017-08-27 NOTE — IP AVS SNAPSHOT
MRN:[id number]                      After Visit Summary   8/27/2017    Monse Peoples    MRN: [id number]           Thank you!     Thank you for choosing [facility] for your care. Our goal is always to provide you with excellent care. Hearing back from our patients is one way we can continue to improve our services. Please take a few minutes to complete the written survey that you may receive in the mail after you visit. If you would like to speak to someone directly about your visit please contact Patient Relations at [phone]. Thank you!          Patient Information     Date Of Birth          1945        About your hospital stay     You were admitted on:  August 27, 2017 You last received care in the:  [facility] Observation Department    You were discharged on:  August 28, 2017        Reason for your hospital stay       You were evaluated in the hospital for chest pain. You had a normal lab work up and a normal chest x-ray. Your exercise stress echo looked normal. Follow up with your primary care provider in the next week. Follow up with Dr. Garza in October as planned. You were not having urinary tract infection symptoms this morning, so we will send you home without an antibiotic. If anything grows in the urine culture, we will call you to make sure you get started on an antibiotic.                  Who to Call     For medical emergencies, please call 601.  For non-urgent questions about your medical care, please call your primary care provider or clinic, [phone]          Attending Provider     Provider Specialty    Susan Perez MD Emergency Medicine    Lia Goldman DO Internal Medicine       Primary Care Provider Office Phone # Fax #    Susan Camacho [phone]       When to contact your care team       Call your primary doctor if you have any of the following: temperature greater than 101,  increased shortness of breath or increased pain.                  After Care Instructions     Activity       Your activity upon discharge: activity as tolerated            Diet       Follow this diet upon discharge: Cardiac prudent diet                  Follow-up Appointments     Follow-up and recommended labs and tests        Follow up with primary care provider, Susan Camacho, within 7 days for hospital follow- up.  No follow up labs or test are needed.                  Your next 10 appointments already scheduled     Oct 26, 2017 12:15 PM CDT   Return Visit with Sotero Garza MD   [facility] ([facility])    [address] [place] [id number] [phone]                         Pending Results     Date and Time Order Name Status Description    8/27/2017 1942 Urine Culture Aerobic Bacterial Preliminary             Statement of Approval     Ordered          08/28/17 1031  I have reviewed and agree with all the recommendations and orders detailed in this document.  EFFECTIVE NOW     Approved and electronically signed by:  Maryann Gray PA-C             Admission Information     Date & Time Provider Department Dept. Phone    8/27/2017 Lia Goldman DO [facility] Observation Department [phone]      Your Vitals Were     Blood Pressure Pulse Temperature Respirations Pulse Oximetry       121/58 74 98  F (36.7  C) (Oral) 20 98%       MyChart Information     [employer] gives you secure access to your electronic health record. If you see a primary care provider, you can also send messages to your care team and make appointments. If you have questions, please call your primary care clinic.  If you do not have a primary care provider, please call [phone] and they will assist you.        Care EveryWhere ID     This is your Care EveryWhere ID. This could be used by other organizations to access your  [facility] medical records  [id number]        Equal Access to Services     RAYMUNDORICKEY YANNI : Hadii aad ku hadmerlinesaba León, waezio alvarez, qakenroy gurrola, john moore. So [facility] [phone].    ATENCIÓN: Si habla español, tiene a rodgers disposición servicios gratuitos de asistencia lingüística. Llame al [phone].    We comply with applicable federal civil rights laws and [place] laws. We do not discriminate on the basis of race, color, national origin, age, disability sex, sexual orientation or gender identity.               Review of your medicines      CONTINUE these medicines which have NOT CHANGED        Dose / Directions    aspirin 81 MG tablet        Dose:  81 mg   Take 81 mg by mouth daily   Refills:  0       atenolol 50 MG tablet   Commonly known as:  TENORMIN   Used for:  Essential hypertension        Dose:  50 mg   Take 1 tablet (50 mg) by mouth daily   Quantity:  30 tablet   Refills:  11       atorvastatin 20 MG tablet   Commonly known as:  LIPITOR   Used for:  Hyperlipidemia LDL goal <100        Dose:  20 mg   Take 1 tablet (20 mg) by mouth daily   Quantity:  90 tablet   Refills:  3       buPROPion 150 MG 12 hr tablet   Commonly known as:  WELLBUTRIN SR   Used for:  Anxiety        Dose:  150 mg   Take 1 tablet (150 mg) by mouth 2 times daily   Quantity:  180 tablet   Refills:  3       calcium carbonate-vitamin D 600-400 MG-UNIT Chew        Dose:  2 tablet   Take 2 tablets by mouth every morning   Refills:  0       CO Q 10 PO        Dose:  100 mg   Take 100 mg by mouth every evening   Refills:  0       CYANOCOBALAMIN SL        Dose:  1000 mcg   Place 1,000 mcg under the tongue daily   Refills:  0       DULoxetine 60 MG EC capsule   Commonly known as:  CYMBALTA   Used for:  Anxiety        Dose:  60 mg   Take 1 capsule (60 mg) by mouth daily   Quantity:  90 capsule   Refills:  3       FIBER SELECT GUMMIES Chew        Dose:  1 chew tab   Take 1 chew tab by mouth every  other day   Refills:  0       hydrALAZINE 50 MG tablet   Commonly known as:  APRESOLINE   Used for:  Benign essential hypertension        Dose:  50 mg   Take 1 tablet (50 mg) by mouth 3 times daily   Quantity:  270 tablet   Refills:  3       hydrochlorothiazide 25 MG tablet   Commonly known as:  HYDRODIURIL   Used for:  Benign essential hypertension        Dose:  25 mg   Take 1 tablet (25 mg) by mouth daily   Quantity:  90 tablet   Refills:  3       lisinopril 40 MG tablet   Commonly known as:  PRINIVIL/ZESTRIL   Used for:  Benign essential hypertension        Dose:  40 mg   Take 1 tablet (40 mg) by mouth daily   Quantity:  90 tablet   Refills:  3       LORazepam 1 MG tablet   Commonly known as:  ATIVAN   Used for:  Anxiety        Dose:  1 mg   Take 1 tablet (1 mg) by mouth every 8 hours as needed for anxiety   Quantity:  20 tablet   Refills:  3       Multi-vitamin Tabs tablet        Dose:  1 tablet   Take 1 tablet by mouth daily   Refills:  0       OMEGA-3 FISH OIL PO        Dose:  1.2 g   Take 1.2 g by mouth daily   Refills:  0       omeprazole 40 MG capsule   Commonly known as:  priLOSEC   Used for:  Gastroesophageal reflux disease without esophagitis        Dose:  40 mg   Take 1 capsule (40 mg) by mouth daily Take 30-60 minutes before a meal.   Quantity:  90 capsule   Refills:  3       traZODone 100 MG tablet   Commonly known as:  DESYREL   Used for:  Anxiety        Dose:  200 mg   Take 2 tablets (200 mg) by mouth At Bedtime   Quantity:  180 tablet   Refills:  3       * ACETAMINOPHEN PO        Dose:  650 mg   Take 650 mg by mouth daily as needed for pain   Refills:  0       * TYLENOL 325 MG tablet   Generic drug:  acetaminophen        Dose:  650 mg   Take 650 mg by mouth every morning   Quantity:  100 tablet   Refills:  0       * Notice:  This list has 2 medication(s) that are the same as other medications prescribed for you. Read the directions carefully, and ask your doctor or other care provider to review  them with you.             Protect others around you: Learn how to safely use, store and throw away your medicines at www.disposemymeds.org.             Medication List: This is a list of all your medications and when to take them. Check marks below indicate your daily home schedule. Keep this list as a reference.      Medications           Morning Afternoon Evening Bedtime As Needed    aspirin 81 MG tablet   Take 81 mg by mouth daily   Last time this was given:  8/28/17 1130am                                   atenolol 50 MG tablet   Commonly known as:  TENORMIN   Take 1 tablet (50 mg) by mouth daily                                   atorvastatin 20 MG tablet   Commonly known as:  LIPITOR   Take 1 tablet (20 mg) by mouth daily   Last time this was given:  20 mg on 8/28/2017  1:42 AM                                   buPROPion 150 MG 12 hr tablet   Commonly known as:  WELLBUTRIN SR   Take 1 tablet (150 mg) by mouth 2 times daily   Last time this was given:  150 mg on 8/28/2017  1:42 AM                                      calcium carbonate-vitamin D 600-400 MG-UNIT Chew   Take 2 tablets by mouth every morning                                   CO Q 10 PO   Take 100 mg by mouth every evening                                   CYANOCOBALAMIN SL   Place 1,000 mcg under the tongue daily                                   DULoxetine 60 MG EC capsule   Commonly known as:  CYMBALTA   Take 1 capsule (60 mg) by mouth daily   Last time this was given:  60 mg on 8/28/2017 11:24 AM                                   FIBER SELECT GUMMIES Chew   Take 1 chew tab by mouth every other day                                hydrALAZINE 50 MG tablet   Commonly known as:  APRESOLINE   Take 1 tablet (50 mg) by mouth 3 times daily                                         hydrochlorothiazide 25 MG tablet   Commonly known as:  HYDRODIURIL   Take 1 tablet (25 mg) by mouth daily                                   lisinopril 40 MG tablet   Commonly  known as:  PRINIVIL/ZESTRIL   Take 1 tablet (40 mg) by mouth daily   Last time this was given:  40 mg on 8/28/2017  1:42 AM                                   LORazepam 1 MG tablet   Commonly known as:  ATIVAN   Take 1 tablet (1 mg) by mouth every 8 hours as needed for anxiety                                   Multi-vitamin Tabs tablet   Take 1 tablet by mouth daily                                   OMEGA-3 FISH OIL PO   Take 1.2 g by mouth daily                                   omeprazole 40 MG capsule   Commonly known as:  priLOSEC   Take 1 capsule (40 mg) by mouth daily Take 30-60 minutes before a meal.   Last time this was given:  40 mg on 8/28/2017 11:25 AM                                   traZODone 100 MG tablet   Commonly known as:  DESYREL   Take 2 tablets (200 mg) by mouth At Bedtime   Last time this was given:  200 mg on 8/28/2017  1:42 AM                                   * ACETAMINOPHEN PO   Take 650 mg by mouth daily as needed for pain                                   * TYLENOL 325 MG tablet   Take 650 mg by mouth every morning   Generic drug:  acetaminophen                                   * Notice:  This list has 2 medication(s) that are the same as other medications prescribed for you. Read the directions carefully, and ask your doctor or other care provider to review them with you.

## 2017-08-27 NOTE — ED PROVIDER NOTES
History     Chief Complaint:  Shortness of breath    HPI   Monse Peoples is a 71 year old female with a history of GERD, hypertension, and hyperlipidemia who presents with shortness of breath. The patient reports that she has been experiencing intermittent brief episodes of chest pain over the last week and increased fatigue over the past two days approximately 3 hours ago. Today the patient took a brief walk at the mall when she began experience some shortness of breath, a cold sweat and some light headedness. She notes experiencing some chest pain and shoulder pain that she notes were not bad at all. She ate half a burger and some fries but continued to have nausea. recently she has had increasing chest pain, non exertional.  The patient denies any history of clots or clotting disorder. She has been worked up for cardiac disease several times with no positive findings. She does note that her primary care doctor has schedule an echo scheduled for October. Patient notes a strong family history of heart disease but no individual history. Of note the patent also endorses having increased frequency as of late. She denies currently experiencing any numbness/weakness, vomiting, cough, sore throat, or other cold symptoms.     Cardiac/PE/DVT Risk Factors:  History of hypertension - Yes  History of hyperlipidemia - Yes  History of diabetes - No  History of smoking - Former  Personal history of PE/DVT - No  Family history of PE/DVT - No  Family history of heart complications - Yes  Recent travel - No  Recent surgery - No  Other immobilizations - No  Cancer - No     Allergies:  Codeine sulfate    Medications:    Ativan  Apresoline  Tenormin  Wellbutrin  Cymbalta  Hydrodiuril  Prinivil  Prilosec  Desyrel  Lipitor    Past Medical History:    Asthma  CAD  Depression  GERD  History of blood transfusion  Hyperlipidemia  Hypertension  Osteoarthritis  Osteopenia  Anxiety    Past Surgical History:     Angiogram  Arthroplasty  Cholecystectomy  Colonoscopy  Gastric bypass  Fistulotomy  Tonsillectomy  Tubal ligation    Family History:    CAD  Hypertension    Social History:  The patient was accompanied to the ED by her .  Smoking Status: Former  Smokeless Tobacco: No  Alcohol Use: Yes   Marital Status:   [2]    Review of Systems   Constitutional: Positive for chills and diaphoresis.   HENT: Negative for sore throat.    Respiratory: Positive for shortness of breath. Negative for cough.    Cardiovascular: Positive for chest pain.   Gastrointestinal: Positive for nausea. Negative for vomiting.   Genitourinary: Positive for frequency.   Neurological: Positive for light-headedness and headaches. Negative for numbness.   All other systems reviewed and are negative.    Physical Exam   Vitals:  Patient Vitals for the past 24 hrs:   BP Temp Temp src Pulse Resp SpO2   08/27/17 1829 149/83 96.8  F (36  C) Temporal 80 24 98 %      Physical Exam  Physical Exam   General: Resting on the bed.  Head: No obvious trauma to head.  Ears, Nose, Throat:  External ears normal.  Nose normal.    Eyes:  Conjunctivae and EOM are normal.  Pupils are equal, round, and reactive.   Neck: Normal range of motion.  Neck supple.   CV: Regular rate and rhythm.  No murmurs.  2+ radial pulses.    Respiratory: Effort normal and breath sounds normal.  No wheezing or crackles.   Gastrointestinal: Soft.  No distension. There is no tenderness.   Musculoskeletal: non tender calves, no edema  Neuro: Alert. Moving all extremities appropriately.  Normal speech.    Skin: Skin is warm and dry.  No rash noted.   Psych: Normal mood and affect. Behavior is normal.     Emergency Department Course     ECG:  ECG taken at 1836, ECG read at 1840  Normal sinus rhythm. Normal ECG  Rate 74 bpm. CO interval 192. QRS duration 84. QT/QTc 412/457. P-R-T axes 39, 66, 34.     Imaging:  Radiology findings were communicated with the patient who voiced understanding  of the findings.  XR chest 2 views:  IMPRESSION: No active cardiopulmonary disease or change since the  prior exam.  Reading per radiology.     Laboratory:  Laboratory findings were communicated with the patient and family who voiced understanding of the findings.  Urine culture: pending  UA: Leukocyte esterase: small, RB: 3(H), WBC: 16(H), bacteria: few, Squamous epithelial: 9(H), Mucous: present, Hyaline crystal: 7(H)  CBC: AWNL (WBC 8.0, HGB 12.7, )   BMP: Glucose: 101(H), o/w WNL (Creatinine 0.85)   Troponin (Collected 1920): <0.015     Interventions:  2037 Zofran 4 mg IV   2038 Aspirin 324 mg oral  2049 Rocephin 500 mg IV  Medications   nitroGLYcerin (NITROSTAT) sublingual tablet 0.4 mg (not administered)   cefTRIAXone (ROCEPHIN) 500 mg vial to attach to  ml bag for ADULTS or NS 50 ml bag for PEDS (500 mg Intravenous New Bag 8/27/17 2049)   aspirin chewable tablet 324 mg (324 mg Oral Given 8/27/17 2038)   ondansetron (ZOFRAN) injection 4 mg (4 mg Intravenous Given 8/27/17 2037)        Emergency Department Course:  Nursing notes and vitals reviewed.  I performed an exam of the patient as documented above.   IV was inserted and blood was drawn for laboratory testing, results above.  The patient provided a urine sample here in the emergency department. This was sent for laboratory testing, findings above.   The patient was sent for a XR while in the emergency department, results above.      2051 I spoke with Abraham BOJORQUEZ, who will accept the patient    I discussed the treatment plan with the patient. They expressed understanding of this plan and consented to admission. I discussed the patient with Genesis ALVARADO, who will admit the patient to a monitored bed for further evaluation and treatment.     I personally reviewed the laboratory results with the Patient and answered all related questions prior to discharge.    Impression & Plan      Medical Decision Making:  Monse Peoples is a 71 year old  female with a history of hypertension and hyperlipidemia, family history of heart disease  who presents to the emergency department today with chest pain, diaphoresis, and light headedness. She also reports a vague left shoulder pain.  VS reviewed, reassuring.  Broad differential including but not limited to acute coronary syndrome, PE, dissection, pneumonia, anxiety, dehydration or electrolyte abnormality, etc. Work up was directed towards acute coronary symptoms in light of her symptoms and history. ECG was reviewed showing sinus rhythm, no acute ischemic changes, normal intervals. Troponin, BMP, CBC all ordered in addition to chest x ray. Overall very low suspicion for dissection as patient has no neurologic findings, symmetrical peripheral pulses, no tearing pain. Additionally low suspicion for PE. She is not tachycardic, no evidence of hypoxia, no history of blood clots or clotting disorders. She did have a PE study done once in 2016 which was negative for any acute dissection or PE. Overall low suspicion for infectious etiology as well. She did note some dysuria and therefor urinalysis was obtained. UA shows evidence of infection, therefore administered ceftriaxone. Plan for admission based on patient's heart scare. Heart score of 5. Patient was given aspirin, zofran and nitro in the emergency department.  Advised admission for acute coronary syndrome rule out, serial troponins, and stress test.  Hospitalist graciously accepted admission.      Diagnosis:    ICD-10-CM    1. Chest pain, unspecified type R07.9    2. SOB (shortness of breath) R06.02    3. Nausea R11.0    4. Lightheadedness R42    5. Urinary tract infection without hematuria, site unspecified N39.0       Disposition:   Admitted    Scribe Disclosure:  KAREY, Thierno Oshea, am serving as a scribe at 6:35 PM on 8/27/2017 to document services personally performed by Susan Perez MD, based on my observations and the provider's statements to me.    [facility] EMERGENCY DEPARTMENT       Susan Perez MD  08/27/17 0705

## 2017-08-27 NOTE — ED NOTES
ABC's intact.  Alert and oriented x4.    Pt c/o intermittent chest pain over last few weeks.  Has a heart test scheduled, but doesn't know what it is.  Today went for a walk and has been short of breath with sweating since.  Pt referred to some discomfort to L shoulder.

## 2017-08-27 NOTE — IP AVS SNAPSHOT
[facility] Observation Department    [address]    [facility] [address]    Phone:  [phone]                                       After Visit Summary   8/27/2017    Monse Peoples    MRN: [id number]           After Visit Summary Signature Page     I have received my discharge instructions, and my questions have been answered. I have discussed any challenges I see with this plan with the nurse or doctor.    ..........................................................................................................................................  Patient/Patient Representative Signature      ..........................................................................................................................................  Patient Representative Print Name and Relationship to Patient    ..................................................               ................................................  Date                                            Time    ..........................................................................................................................................  Reviewed by Signature/Title    ...................................................              ..............................................  Date                                                            Time

## 2017-08-28 ENCOUNTER — APPOINTMENT (OUTPATIENT)
Dept: CARDIOLOGY | Facility: CLINIC | Age: 72
End: 2017-08-28
Attending: PHYSICIAN ASSISTANT
Payer: MEDICARE

## 2017-08-28 VITALS
OXYGEN SATURATION: 98 % | DIASTOLIC BLOOD PRESSURE: 58 MMHG | HEART RATE: 74 BPM | SYSTOLIC BLOOD PRESSURE: 121 MMHG | RESPIRATION RATE: 20 BRPM | TEMPERATURE: 98 F

## 2017-08-28 LAB
BACTERIA SPEC CULT: NORMAL
BACTERIA SPEC CULT: NORMAL
INTERPRETATION ECG - MUSE: NORMAL
Lab: NORMAL
SPECIMEN SOURCE: NORMAL

## 2017-08-28 PROCEDURE — 93350 STRESS TTE ONLY: CPT | Mod: TC

## 2017-08-28 PROCEDURE — A9270 NON-COVERED ITEM OR SERVICE: HCPCS | Mod: GY | Performed by: INTERNAL MEDICINE

## 2017-08-28 PROCEDURE — 93350 STRESS TTE ONLY: CPT | Mod: 26 | Performed by: INTERNAL MEDICINE

## 2017-08-28 PROCEDURE — 25000132 ZZH RX MED GY IP 250 OP 250 PS 637: Mod: GY | Performed by: INTERNAL MEDICINE

## 2017-08-28 PROCEDURE — A9270 NON-COVERED ITEM OR SERVICE: HCPCS | Mod: GY | Performed by: PHYSICIAN ASSISTANT

## 2017-08-28 PROCEDURE — 99217 ZZC OBSERVATION CARE DISCHARGE: CPT | Performed by: PHYSICIAN ASSISTANT

## 2017-08-28 PROCEDURE — 93325 DOPPLER ECHO COLOR FLOW MAPG: CPT | Mod: 26 | Performed by: INTERNAL MEDICINE

## 2017-08-28 PROCEDURE — 93018 CV STRESS TEST I&R ONLY: CPT | Performed by: INTERNAL MEDICINE

## 2017-08-28 PROCEDURE — G0378 HOSPITAL OBSERVATION PER HR: HCPCS

## 2017-08-28 PROCEDURE — 25000132 ZZH RX MED GY IP 250 OP 250 PS 637: Mod: GY | Performed by: PHYSICIAN ASSISTANT

## 2017-08-28 PROCEDURE — 93016 CV STRESS TEST SUPVJ ONLY: CPT | Performed by: INTERNAL MEDICINE

## 2017-08-28 PROCEDURE — 93321 DOPPLER ECHO F-UP/LMTD STD: CPT | Mod: 26 | Performed by: INTERNAL MEDICINE

## 2017-08-28 RX ORDER — ATORVASTATIN CALCIUM 20 MG/1
20 TABLET, FILM COATED ORAL EVERY EVENING
Status: DISCONTINUED | OUTPATIENT
Start: 2017-08-28 | End: 2017-08-28 | Stop reason: HOSPADM

## 2017-08-28 RX ORDER — BUPROPION HYDROCHLORIDE 150 MG/1
150 TABLET, EXTENDED RELEASE ORAL 2 TIMES DAILY
Status: DISCONTINUED | OUTPATIENT
Start: 2017-08-28 | End: 2017-08-28 | Stop reason: HOSPADM

## 2017-08-28 RX ORDER — DULOXETIN HYDROCHLORIDE 30 MG/1
60 CAPSULE, DELAYED RELEASE ORAL DAILY
Status: DISCONTINUED | OUTPATIENT
Start: 2017-08-28 | End: 2017-08-28 | Stop reason: HOSPADM

## 2017-08-28 RX ADMIN — TRAZODONE HYDROCHLORIDE 200 MG: 100 TABLET ORAL at 01:42

## 2017-08-28 RX ADMIN — OMEPRAZOLE 40 MG: 20 CAPSULE, DELAYED RELEASE ORAL at 11:25

## 2017-08-28 RX ADMIN — ASPIRIN 81 MG: 81 TABLET, COATED ORAL at 11:25

## 2017-08-28 RX ADMIN — BUPROPION HYDROCHLORIDE 150 MG: 150 TABLET, FILM COATED, EXTENDED RELEASE ORAL at 01:42

## 2017-08-28 RX ADMIN — DULOXETINE HYDROCHLORIDE 60 MG: 30 CAPSULE, DELAYED RELEASE ORAL at 11:24

## 2017-08-28 RX ADMIN — LISINOPRIL 40 MG: 20 TABLET ORAL at 01:42

## 2017-08-28 RX ADMIN — ATORVASTATIN CALCIUM 20 MG: 20 TABLET, FILM COATED ORAL at 01:42

## 2017-08-28 NOTE — PLAN OF CARE
Problem: Discharge Planning  Goal: Discharge Planning (Adult, OB, Behavioral, Peds)  Outcome: Improving  PRIMARY DIAGNOSIS: CHEST PAIN  OUTPATIENT/OBSERVATION GOALS TO BE MET BEFORE DISCHARGE:  1. Ruled out acute coronary syndrome (negative or stable Troponin):  Yes  2. Pain Status: Pain free.  3. Appropriate provocative testing performed: No  - Stress Test Procedure: Regular  - Interpretation of cardiac rhythm per telemetry tech: SR w/minimal ST elevation HR 60s-70s     4. Cleared by Consultants (if applicable):N/A  5. Return to near baseline physical activity: Yes  Discharge Planner Nurse   Safe discharge environment identified: Yes  Barriers to discharge: Yes           Please review provider order for any additional goals.   Nurse to notify provider when observation goals have been met and patient is ready for discharge.  A&O x4. VSS. Independent in room. Denies cp, sob, dizziness, diaphoresis, and N/V. Trops neg x2. Exercise stress test scheduled for today. Will continue to monitor.

## 2017-08-28 NOTE — ED NOTES
ROOM # 213-2    Living Situation (if not independent, order SW consult): Independent w/  Facility name: N/A  : - [facility]    Activity level at baseline: Independent  Activity level on admit: Independent    Patient registered to observation; given Patient Bill of Rights; given the opportunity to ask questions about observation status and their plan of care.  Patient has been oriented to the observation room, bathroom and call light is in place.    Discussed discharge goals and expectations with patient/family.

## 2017-08-28 NOTE — PHARMACY-ADMISSION MEDICATION HISTORY
Admission medication history interview status for this patient is complete. See [facility] admission navigator for allergy information, prior to admission medications and immunization status.     Medication history interview source(s):Patient  Medication history resources (including written lists, pill bottles, clinic record):None  Primary pharmacy:[id number] [place], [place] [facility] [address] [facility]    Changes made to PTA medication list:  Added: fiber chew, apap prn, co Q10 dose and sig  Deleted: none  Changed: apap from 650 mg bid to 650 mg qd and PRN, bogdan/vit d to chewable,     Actions taken by pharmacist (provider contacted, etc):None     Additional medication history information:None    Medication reconciliation/reorder completed by provider prior to medication history? No    Do you take OTC medications (eg tylenol, ibuprofen, fish oil, eye/ear drops, etc)? Y -- APAP, fish oil, MVI, CoQ10, fiber chews, ASA 81 mg, calcium-vitamin D        Prior to Admission medications    Medication Sig Last Dose Taking? Auth Provider   calcium carbonate-vitamin D 600-400 MG-UNIT CHEW Take 2 tablets by mouth every morning 8/27/2017 at AM Yes Unknown, Entered By History   FIBER SELECT GUMMIES CHEW Take 1 chew tab by mouth every other day 8/27/2017 at AM Yes Unknown, Entered By History   LORazepam (ATIVAN) 1 MG tablet Take 1 tablet (1 mg) by mouth every 8 hours as needed for anxiety Past Week at Unknown time Yes Susan Camacho MD   aspirin 81 MG tablet Take 81 mg by mouth daily 8/27/2017 at AM Yes Reported, Patient   hydrALAZINE (APRESOLINE) 50 MG tablet Take 1 tablet (50 mg) by mouth 3 times daily 8/27/2017 at 1530 Yes Jaelyn Lowry PA-C   atenolol (TENORMIN) 50 MG tablet Take 1 tablet (50 mg) by mouth daily 8/27/2017 at AM Yes Susan Camacho MD   Coenzyme Q10 (CO Q 10 PO) Take 100 mg by mouth every evening  8/26/2017 at PM Yes Reported, Patient   buPROPion (WELLBUTRIN SR) 150 MG 12 hr tablet Take 1 tablet (150  mg) by mouth 2 times daily 8/27/2017 at AM Yes Susan Camacho MD   DULoxetine (CYMBALTA) 60 MG EC capsule Take 1 capsule (60 mg) by mouth daily 8/27/2017 at AM Yes Susan Camacho MD   hydrochlorothiazide (HYDRODIURIL) 25 MG tablet Take 1 tablet (25 mg) by mouth daily 8/27/2017 at AM Yes Susan Camacho MD   lisinopril (PRINIVIL/ZESTRIL) 40 MG tablet Take 1 tablet (40 mg) by mouth daily 8/26/2017 at PM Yes Susan Camacho MD   omeprazole (PRILOSEC) 40 MG capsule Take 1 capsule (40 mg) by mouth daily Take 30-60 minutes before a meal. 8/27/2017 at AM Yes Susan Camacho MD   atorvastatin (LIPITOR) 20 MG tablet Take 1 tablet (20 mg) by mouth daily 8/26/2017 at PM Yes Susan Camacho MD   acetaminophen (TYLENOL) 325 MG tablet Take 650 mg by mouth every morning  8/27/2017 at AM Yes Susan Camacho MD   CYANOCOBALAMIN SL Place 1,000 mcg under the tongue daily 8/27/2017 at AM Yes Reported, Patient   Omega-3 Fatty Acids (OMEGA-3 FISH OIL PO) Take 1.2 g by mouth daily  8/27/2017 at AM Yes Reported, Patient   multivitamin, therapeutic with minerals (MULTI-VITAMIN) TABS Take 1 tablet by mouth daily 8/27/2017 at AM Yes Reported, Patient   ACETAMINOPHEN PO Take 650 mg by mouth daily as needed for pain Unknown at Unknown time  Unknown, Entered By History   traZODone (DESYREL) 100 MG tablet Take 2 tablets (200 mg) by mouth At Bedtime  at PM  Susan Camacho MD

## 2017-08-28 NOTE — PLAN OF CARE
Problem: Discharge Planning  Goal: Discharge Planning (Adult, OB, Behavioral, Peds)  Outcome: Adequate for Discharge Date Met:  08/28/17  PRIMARY DIAGNOSIS: CHEST PAIN  OUTPATIENT/OBSERVATION GOALS TO BE MET BEFORE DISCHARGE:  1. Ruled out acute coronary syndrome (negative or stable Troponin):  Yes  2. Pain Status: Pain free.  3. Appropriate provocative testing performed: Yes  - Stress Test Procedure: Echo  - Interpretation of cardiac rhythm per telemetry tech: SR with ST depression per tele tech     4. Cleared by Consultants (if applicable):N/A  5. Return to near baseline physical activity: Yes  Discharge Planner Nurse   Safe discharge environment identified: Yes  Barriers to discharge: No       Entered by: Lynne Monzon 08/28/2017 11:38 AM     Please review provider order for any additional goals.   Nurse to notify provider when observation goals have been met and patient is ready for discharge.

## 2017-08-28 NOTE — PROGRESS NOTES
Patient discharged home via private car, ambulated with family off unit.  Patient verbalized and received copy of discharge instructions.  Personal belongings gathered and sent with patient.  VSS. IV removed prior to discharge.

## 2017-08-28 NOTE — PROGRESS NOTES
Pt in cardio for stress echo.  Pt has a stress echo scheduled in October- cancelled that appt since she had test today.

## 2017-08-28 NOTE — ED NOTES
"[facility]  ED Nurse Handoff Report    Aminta Goel is a 71 year old female   ED Chief complaint: Shortness of Breath  . ED Diagnosis:   Final diagnoses:   Chest pain, unspecified type   SOB (shortness of breath)   Nausea   Lightheadedness   Urinary tract infection without hematuria, site unspecified     Allergies:   Allergies   Allergen Reactions     Codeine Sulfate      \"hyper\"       Code Status: Full Code  Activity level - Baseline/Home:  Independent. Activity Level - Current:   Independent. Lift room needed: No. Bariatric: No   Needed: No   Isolation: No. Infection: Not Applicable.     Vital Signs:   Vitals:    08/27/17 1829   BP: 149/83   Pulse: 80   Resp: 24   Temp: 96.8  F (36  C)   TempSrc: Temporal   SpO2: 98%       Cardiac Rhythm:  ,      Pain level: 0-10 Pain Scale: 2  Patient confused: No. Patient Falls Risk: Yes.   Elimination Status: Has voided   Patient Report / Focused Assessment: pt comes in with intermittent chest pain for some time, but worsened a few days ago when at the mall witth nausea, fatigue and a  Episode where she became sweaty but cold.    Tests Performed: . Abnormal Results: .   Results for AMINTA GOEL (MRN [id number]) as of 8/27/2017 21:05   Ref. Range 8/27/2017 19:20 8/27/2017 19:32 8/27/2017 19:42 8/27/2017 20:04   Sodium Latest Ref Range: 133 - 144 mmol/L 138      Potassium Latest Ref Range: 3.4 - 5.3 mmol/L 3.8      Chloride Latest Ref Range: 94 - 109 mmol/L 104      Carbon Dioxide Latest Ref Range: 20 - 32 mmol/L 27      Urea Nitrogen Latest Ref Range: 7 - 30 mg/dL 15      Creatinine Latest Ref Range: 0.52 - 1.04 mg/dL 0.85      GFR Estimate Latest Ref Range: >60 mL/min/1.7m2 66      GFR Estimate If Black Latest Ref Range: >60 mL/min/1.7m2 80      Calcium Latest Ref Range: 8.5 - 10.1 mg/dL 8.7      Anion Gap Latest Ref Range: 3 - 14 mmol/L 7      Troponin I ES Latest Ref Range: 0.000 - 0.045 ug/L <0.015      Glucose Latest Ref Range: 70 - 99 " mg/dL 101 (H)      WBC Latest Ref Range: 4.0 - 11.0 10e9/L 8.0      Hemoglobin Latest Ref Range: 11.7 - 15.7 g/dL 12.7      Hematocrit Latest Ref Range: 35.0 - 47.0 % 37.8      Platelet Count Latest Ref Range: 150 - 450 10e9/L 237      RBC Count Latest Ref Range: 3.8 - 5.2 10e12/L 4.01      MCV Latest Ref Range: 78 - 100 fl 94      MCH Latest Ref Range: 26.5 - 33.0 pg 31.7      MCHC Latest Ref Range: 31.5 - 36.5 g/dL 33.6      RDW Latest Ref Range: 10.0 - 15.0 % 12.4      Diff Method Unknown Automated Method      % Neutrophils Latest Units: % 58.3      % Lymphocytes Latest Units: % 30.9      % Monocytes Latest Units: % 8.4      % Eosinophils Latest Units: % 1.6      % Basophils Latest Units: % 0.4      % Immature Granulocytes Latest Units: % 0.4      Nucleated RBCs Latest Ref Range: 0 /100 0      Absolute Neutrophil Latest Ref Range: 1.6 - 8.3 10e9/L 4.7      Absolute Lymphocytes Latest Ref Range: 0.8 - 5.3 10e9/L 2.5      Absolute Monocytes Latest Ref Range: 0.0 - 1.3 10e9/L 0.7      Absolute Eosinophils Latest Ref Range: 0.0 - 0.7 10e9/L 0.1      Absolute Basophils Latest Ref Range: 0.0 - 0.2 10e9/L 0.0      Abs Immature Granulocytes Latest Ref Range: 0 - 0.4 10e9/L 0.0      Absolute Nucleated RBC Unknown 0.0      Color Urine Unknown  Yellow     Appearance Urine Unknown  Slightly Cloudy     Glucose Urine Latest Ref Range: NEG^Negative mg/dL  Negative     Bilirubin Urine Latest Ref Range: NEG^Negative   Negative     Ketones Urine Latest Ref Range: NEG^Negative mg/dL  Negative     Specific Gravity Urine Latest Ref Range: 1.003 - 1.035   1.025     pH Urine Latest Ref Range: 5.0 - 7.0 pH  5.0     Protein Albumin Urine Latest Ref Range: NEG^Negative mg/dL  Negative     Urobilinogen mg/dL Latest Ref Range: 0.0 - 2.0 mg/dL  0.0     Nitrite Urine Latest Ref Range: NEG^Negative   Negative     Blood Urine Latest Ref Range: NEG^Negative   Negative     Leukocyte Esterase Urine Latest Ref Range: NEG^Negative   Small (A)      Source Unknown  Midstream Urine     WBC Urine Latest Ref Range: 0 - 2 /HPF  16 (H)     RBC Urine Latest Ref Range: 0 - 2 /HPF  3 (H)     Bacteria Urine Latest Ref Range: NEG^Negative /HPF  Few (A)     Squamous Epithelial /HPF Urine Latest Ref Range: 0 - 1 /HPF  9 (H)     Transitional Epi Latest Ref Range: 0 - 1 /HPF  1     Mucous Urine Latest Ref Range: NEG^Negative /LPF  Present (A)     Hyaline Casts Latest Ref Range: 0 - 2 /LPF  7 (H)     URINE CULTURE AEROBIC BACTERIAL Unknown   Rpt    XR CHEST 2 VW Unknown    Rpt     Treatments provided:   Family Comments:   OBS brochure/video discussed/provided to patient:  Yes  ED Medications:   Medications   nitroGLYcerin (NITROSTAT) sublingual tablet 0.4 mg (not administered)   cefTRIAXone (ROCEPHIN) 500 mg vial to attach to  ml bag for ADULTS or NS 50 ml bag for PEDS (500 mg Intravenous New Bag 8/27/17 2049)   aspirin chewable tablet 324 mg (324 mg Oral Given 8/27/17 2038)   ondansetron (ZOFRAN) injection 4 mg (4 mg Intravenous Given 8/27/17 2037)     Drips infusing:  Yes  For the majority of the shift this patient was Green. Interventions performed were piv, vitals, radiology,medications.     Severe Sepsis OR Septic Shock Diagnosis Present: No      ED Nurse Name/Phone Number: Marietta Payne,   9:04 PM    RECEIVING UNIT ED HANDOFF REVIEW    Above ED Nurse Handoff Report was reviewed: Yes  Reviewed by: Marlys Enciso on August 27, 2017 at 10:21 PM

## 2017-08-28 NOTE — H&P
[facility] Outpatient / Observation Unit  History and Physical Exam     Monse Peoples MRN# [id number]   YOB: 1945 Age: 71 year old      Date of Admission:  8/27/2017    Primary care provider: Susan Camacho          Assessment:   Monse Peoples is a 71 year old female with a PMH significant for HTN, HLP, GERD, CAD and depression, who presents with chest tightness, cold sweats and SOB.   Work up in ED reveals: VSS. BMP and CBC are unremarkable. Troponin <0.015. UA small LE and 16 WBC. Urine culture pending. CXR clear. EKG SR. She received 324 mg PO ASA, 4 mg IV zofran and IV Rocephin.   Patient is being registered to observation for further evaluation and to rule out possible ACS.     1. Acute Chest pain: intermittent episodes of chest tightness with associated SOB, cold sweats and nausea for past week. Strong family hx of early onset CAD, negative stress test in 2011. Sx have occurred at rest, today sx were more severe and occurred while [place] the mall. Initial work up in the ED is negative for ischemia. Troponin negative. EKG NSR. Will monitor on tele, check one more troponin and obtain stress echo in AM.  2. Equivocal UA - small LE and 16 WBC. Pt does endorse increased frequency for 1 day but denies dysuria. She received IV rocephin in ED. Will hold further abx for now and reassess sx in AM.  Follow culture.  3. HTN - resume home meds with parameters  4. GERD - resume home meds           Plan:     1. [place] to Observation  2. Continue telemetry  3. Follow serial troponins  4. Stress testing with Stress Echo  5. Cont Aspirin EC 81 mg po daily  6. Blood pressure control  7. Morphine and nitroglycerine PRN for pain    8. regular diet, No caffeine if Nuclear testing selected  9. DVT prophylaxis: pt at low risk, encourage ambulation  10. Code Status: full code  11. Dispo: home tomorrow likely                  Chief Complaint:   Chest Pain         History of Present Illness:   Monse LO  Richelle is a 71 year old female with a PMH significant for HTN, HLP, GERD, CAD and depression, who presents with chest tightness, cold sweats and SOB.  The patient reports that she has been experiencing intermittent brief episodes of chest tightness over the last week with associated fatigue, SOB, cold sweats and nausea. Today the patient was walking around the mall when she began experience some chest tightness, shortness of breath, a cold sweat and some light headedness. Her sx earlier in the week were not related to exertion. She denies fever, chills, abd pain, vomiting, diarrhea or dysuria. Patient notes a strong family history of heart disease but no individual history. She is followed by cardiology for her HTN and states she is scheduled for a stress echo in October prior to follow up with Dr. Garza. She denies personal hx of CAD and has had a previous negative stress test in 2011. Of note the patent also endorses having increased urinary frequency for the past day.              Past Medical History:     Past Medical History:   Diagnosis Date     Asthma     no inhaler for 2-3 years     Coronary artery disease      Depression      Gastro-oesophageal reflux disease      History of blood transfusion      Hyperlipidemia      Hypertension      Osteoarthritis      Osteopenia                Past Surgical History:     Past Surgical History:   Procedure Laterality Date     ANGIOGRAM  6/15/05     ARTHROPLASTY HIP  9/23/2013    Procedure: ARTHROPLASTY HIP;  Right total hip arthroplasty       ARTHROPLASTY HIP Left 5/19/2015    Procedure: ARTHROPLASTY HIP;  Surgeon: Tyrese Howell MD;  Location:  OR     ARTHROPLASTY KNEE Left 8/22/2016    Procedure: ARTHROPLASTY KNEE;  Surgeon: Tyrese Howell MD;  Location:  OR     CHOLECYSTECTOMY       COLONOSCOPY       COLONOSCOPY Left 4/19/2016    Procedure: COLONOSCOPY;  Surgeon: Moe Haney MD;  Location:  GI     GASTRIC BYPASS      2011 tiff en y     GI  "SURGERY      fistulotomy     ORTHOPEDIC SURGERY      left knee \"green procedure\"     TONSILLECTOMY       TUBAL LIGATION                 Social History:     Social History     Social History     Marital status:      Spouse name: N/A     Number of children: N/A     Years of education: N/A     Occupational History     Not on file.     Social History Main Topics     Smoking status: Former Smoker     Packs/day: 0.50     Years: 50.00     Types: Cigarettes     Quit date: 1/19/2013     Smokeless tobacco: Never Used     Alcohol use 0.0 oz/week     0 Standard drinks or equivalent per week      Comment: 4 drinks weekly     Drug use: No     Sexual activity: Yes     Partners: Male     Other Topics Concern     Parent/Sibling W/ Cabg, Mi Or Angioplasty Before 65f 55m? Yes     brother age 31     Caffeine Concern Yes     1 cup of coffee      Sleep Concern Yes     lately it has been an issue     Special Diet No     watching what she eats      Exercise No     Seat Belt Yes     Social History Narrative               Family History:     Family History   Problem Relation Age of Onset     Coronary Artery Disease Father      Hypertension Father      Coronary Artery Disease Sister      Hypertension Sister      Coronary Artery Disease Brother      Hypertension Brother      HEART DISEASE Nephew      Other Cancer Maternal Half-Sister               Allergies:      Allergies   Allergen Reactions     Codeine Sulfate      \"hyper\"               Medications:     Prior to Admission medications    Medication Sig Last Dose Taking? Auth Provider   calcium carbonate-vitamin D 600-400 MG-UNIT CHEW Take 2 tablets by mouth every morning 8/27/2017 at AM Yes Unknown, Entered By History   FIBER SELECT GUMMIES CHEW Take 1 chew tab by mouth every other day 8/27/2017 at AM Yes Unknown, Entered By History   LORazepam (ATIVAN) 1 MG tablet Take 1 tablet (1 mg) by mouth every 8 hours as needed for anxiety Past Week at Unknown time Yes Susan Camacho MD "   aspirin 81 MG tablet Take 81 mg by mouth daily 8/27/2017 at AM Yes Reported, Patient   hydrALAZINE (APRESOLINE) 50 MG tablet Take 1 tablet (50 mg) by mouth 3 times daily 8/27/2017 at 1530 Yes Jaelyn Lowry PA-C   atenolol (TENORMIN) 50 MG tablet Take 1 tablet (50 mg) by mouth daily 8/27/2017 at AM Yes Susan Camacho MD   Coenzyme Q10 (CO Q 10 PO) Take 100 mg by mouth every evening  8/26/2017 at PM Yes Reported, Patient   buPROPion (WELLBUTRIN SR) 150 MG 12 hr tablet Take 1 tablet (150 mg) by mouth 2 times daily 8/27/2017 at AM Yes Susan Camacho MD   DULoxetine (CYMBALTA) 60 MG EC capsule Take 1 capsule (60 mg) by mouth daily 8/27/2017 at AM Yes Susan Camacho MD   hydrochlorothiazide (HYDRODIURIL) 25 MG tablet Take 1 tablet (25 mg) by mouth daily 8/27/2017 at AM Yes Susan Camacho MD   lisinopril (PRINIVIL/ZESTRIL) 40 MG tablet Take 1 tablet (40 mg) by mouth daily 8/26/2017 at PM Yes Susan Camacho MD   omeprazole (PRILOSEC) 40 MG capsule Take 1 capsule (40 mg) by mouth daily Take 30-60 minutes before a meal. 8/27/2017 at AM Yes Susan Camacho MD   atorvastatin (LIPITOR) 20 MG tablet Take 1 tablet (20 mg) by mouth daily 8/26/2017 at PM Yes Susan Camacho MD   acetaminophen (TYLENOL) 325 MG tablet Take 650 mg by mouth every morning  8/27/2017 at AM Yes Susan Camacho MD   CYANOCOBALAMIN SL Place 1,000 mcg under the tongue daily 8/27/2017 at AM Yes Reported, Patient   Omega-3 Fatty Acids (OMEGA-3 FISH OIL PO) Take 1.2 g by mouth daily  8/27/2017 at AM Yes Reported, Patient   multivitamin, therapeutic with minerals (MULTI-VITAMIN) TABS Take 1 tablet by mouth daily 8/27/2017 at AM Yes Reported, Patient   ACETAMINOPHEN PO Take 650 mg by mouth daily as needed for pain Unknown at Unknown time  Unknown, Entered By History   traZODone (DESYREL) 100 MG tablet Take 2 tablets (200 mg) by mouth At Bedtime  at PM  Susan Camacho MD              Review of  Systems:   A Comprehensive greater than 10 system review of systems was carried out.  Pertinent positives and negatives are noted above.  Otherwise negative for contributory information.     Constitutional, neuro, ENT, endocrine, pulmonary, cardiac, gastrointestinal, genitourinary, musculoskeletal, integument and psychiatric systems are negative, except as otherwise noted.             Physical Exam:   Blood pressure (!) 155/100, pulse 80, temperature 96.8  F (36  C), temperature source Temporal, resp. rate 21, SpO2 95 %, not currently breastfeeding.    GENERAL:  Comfortable.  PSYCH: pleasant, oriented, No acute distress.  HEENT:  Atraumatic, normocephalic. Normal conjunctiva, normal hearing, and oropharynx is normal.  NECK:  Supple, no neck vein distention  HEART:  Normal S1, S2 with no murmur, no pericardial rub, gallops or S3 or S4.  LUNGS:  Clear to auscultation, normal Respiratory effort. No wheezing, rales or ronchi.  GI:  Soft, normal bowel sounds. Non-tender, non distended.   EXTREMITIES:  No pedal edema, +2 pulses bilateral and equal.  SKIN:  Dry to touch, No rash, wound or ulcerations.  NEUROLOGIC:  CN 2-12 intact, BL 5/5 symmetric upper and lower extremity strength, sensation is intact with no focal deficits.              Data:     EKG demonstrates:  Normal Sinus Rhythm, unchanged from previous tracings.      Recent Labs  Lab 08/27/17 1920   WBC 8.0   HGB 12.7   HCT 37.8   MCV 94          Recent Labs  Lab 08/27/17 1920      POTASSIUM 3.8   CHLORIDE 104   CO2 27   ANIONGAP 7   *   BUN 15   CR 0.85   GFRESTIMATED 66   GFRESTBLACK 80   HATTIE 8.7       Recent Labs  Lab 08/27/17 1920   TROPI <0.015       Recent Labs  Lab 08/27/17 1932   COLOR Yellow   APPEARANCE Slightly Cloudy   URINEGLC Negative   URINEBILI Negative   URINEKETONE Negative   SG 1.025   UBLD Negative   URINEPH 5.0   PROTEIN Negative   NITRITE Negative   LEUKEST Small*   RBCU 3*   WBCU 16*       Recent Results (from the  past 48 hour(s))   XR Chest 2 Views    Narrative    CHEST TWO VIEW 8/27/2017 8:04 PM     HISTORY: Chest pain.    COMPARISON: 12/30/2016      Impression    IMPRESSION: No active cardiopulmonary disease or change since the  prior exam.         Genesis Vasquez PA-C

## 2017-08-29 ENCOUNTER — TELEPHONE (OUTPATIENT)
Dept: FAMILY MEDICINE | Facility: CLINIC | Age: 72
End: 2017-08-29

## 2017-08-29 NOTE — TELEPHONE ENCOUNTER
ED / Discharge Outreach Protocol    Patient Contact    Attempt # 1    Was call answered?  No.  Left message on voicemail with information to call me back.    Follow-up and recommended labs and tests          Follow up with primary care provider, Susan Camacho, within 7 days for hospital follow- up.  No follow up labs or test are needed.             Penelope Haas RN, BSN

## 2017-08-29 NOTE — DISCHARGE SUMMARY
[facility] Outpatient / Observation Unit  Discharge Summary        Monse Peoples MRN# [id number]   YOB: 1945 Age: 71 year old     Date of Admission:  8/27/2017  Date of Discharge:  8/28/2017  Admitting Physician:  Lia Goldman, DO  Discharge Physician: Maryann Gray PA-C  Discharging Service: Hospitalist      Primary Provider: Susan Camacho  Primary Care Physician Phone Number: [phone]         Primary Discharge Diagnoses:    Monse Peoples was admitted on 8/27/2017 for concerns of acute chest pain.     1. Chest pain: Ruled out ACS.   -Suspect musculoskeletal strain vs anxiety-related. Patient reports left-sided chest pain that comes on randomly (not correlated with any particular activity) and will last for about a minute at maximum. She will get sweaty with these episodes sometimes, occasionally has had nausea but no emesis. She recalls she was doing some weeding this past week, and her chest pain when it occurs has some reported reproducibility with palpation, so may have strained a muscle in the gardening process. She also reports being an anxious person in general. Her pain will come and go and is not correlated with exertion. It is not pleuritic in nature. It feels different from her heartburn. Fortunately, her troponins were undetectable during her hospital stay, telemetry showed sinus rhythm throughout the evening with no arrhythmias reported, and her exercise stress echo today is negative for ischemia. She will follow up outpatient with her primary care provider within the next week to further discuss these symptoms. She has scheduled follow up with Dr. Garza in 10/2017 and has been encouraged to keep that appointment.           Secondary Discharge Diagnoses:     Past Medical History:   Diagnosis Date     Asthma     no inhaler for 2-3 years     Coronary artery disease      Depression      Gastro-oesophageal reflux disease      History of blood transfusion       Hyperlipidemia      Hypertension      Osteoarthritis      Osteopenia                 Code Status:      Full Code        Brief Hospital Summary:        Reason for your hospital stay       You were evaluated in the hospital for chest pain. You had a normal lab   work up and a normal chest x-ray. Your exercise stress echo looked normal.   Follow up with your primary care provider in the next week. Follow up with   Dr. Garza in October as planned. You were not having urinary tract   infection symptoms this morning, so we will send you home without an   antibiotic. If anything grows in the urine culture, we will call you to   make sure you get started on an antibiotic.                    Please refer to initial admission history and physical for further details.   Briefly, Monse Peoples was admitted on 8/27/2017 for concerns of acute chest pain.   Initial work up in the ED did not reveal evidence of STEMI or findings consistent with unstable angina or acute coronary ischemia. Pt was registered to the Observation Unit for further evaluation.   Pt ruled out with serial troponins, underwent Stress Echo  that did not show evidence of significant coronary ischemia. Labs were reviewed and significant results addressed. On the day of discharge, pt was pain free, with no complaints of pain. Medications were reviewed and adjustments made as necessary. Pt is instructed to follow up as below.           Significant Labs & Imaging During Hospitalization:        Results for orders placed or performed during the hospital encounter of 08/27/17 (from the past 48 hour(s))   EKG 12 lead   Result Value Ref Range    Interpretation ECG Click View Image link to view waveform and result    CBC with platelets differential   Result Value Ref Range    WBC 8.0 4.0 - 11.0 10e9/L    RBC Count 4.01 3.8 - 5.2 10e12/L    Hemoglobin 12.7 11.7 - 15.7 g/dL    Hematocrit 37.8 35.0 - 47.0 %    MCV 94 78 - 100 fl    MCH 31.7 26.5 - 33.0 pg    MCHC  33.6 31.5 - 36.5 g/dL    RDW 12.4 10.0 - 15.0 %    Platelet Count 237 150 - 450 10e9/L    Diff Method Automated Method     % Neutrophils 58.3 %    % Lymphocytes 30.9 %    % Monocytes 8.4 %    % Eosinophils 1.6 %    % Basophils 0.4 %    % Immature Granulocytes 0.4 %    Nucleated RBCs 0 0 /100    Absolute Neutrophil 4.7 1.6 - 8.3 10e9/L    Absolute Lymphocytes 2.5 0.8 - 5.3 10e9/L    Absolute Monocytes 0.7 0.0 - 1.3 10e9/L    Absolute Eosinophils 0.1 0.0 - 0.7 10e9/L    Absolute Basophils 0.0 0.0 - 0.2 10e9/L    Abs Immature Granulocytes 0.0 0 - 0.4 10e9/L    Absolute Nucleated RBC 0.0    Basic metabolic panel   Result Value Ref Range    Sodium 138 133 - 144 mmol/L    Potassium 3.8 3.4 - 5.3 mmol/L    Chloride 104 94 - 109 mmol/L    Carbon Dioxide 27 20 - 32 mmol/L    Anion Gap 7 3 - 14 mmol/L    Glucose 101 (H) 70 - 99 mg/dL    Urea Nitrogen 15 7 - 30 mg/dL    Creatinine 0.85 0.52 - 1.04 mg/dL    GFR Estimate 66 >60 mL/min/1.7m2    GFR Estimate If Black 80 >60 mL/min/1.7m2    Calcium 8.7 8.5 - 10.1 mg/dL   Troponin I   Result Value Ref Range    Troponin I ES <0.015 0.000 - 0.045 ug/L   UA reflex to Microscopic and Culture   Result Value Ref Range    Color Urine Yellow     Appearance Urine Slightly Cloudy     Glucose Urine Negative NEG^Negative mg/dL    Bilirubin Urine Negative NEG^Negative    Ketones Urine Negative NEG^Negative mg/dL    Specific Gravity Urine 1.025 1.003 - 1.035    Blood Urine Negative NEG^Negative    pH Urine 5.0 5.0 - 7.0 pH    Protein Albumin Urine Negative NEG^Negative mg/dL    Urobilinogen mg/dL 0.0 0.0 - 2.0 mg/dL    Nitrite Urine Negative NEG^Negative    Leukocyte Esterase Urine Small (A) NEG^Negative    Source Midstream Urine     RBC Urine 3 (H) 0 - 2 /HPF    WBC Urine 16 (H) 0 - 2 /HPF    Bacteria Urine Few (A) NEG^Negative /HPF    Squamous Epithelial /HPF Urine 9 (H) 0 - 1 /HPF    Transitional Epi 1 0 - 1 /HPF    Mucous Urine Present (A) NEG^Negative /LPF    Hyaline Casts 7 (H) 0 - 2 /LPF    Urine Culture Aerobic Bacterial   Result Value Ref Range    Specimen Description Midstream Urine     Special Requests Specimen received in preservative     Culture Micro >100,000 colonies/mL  mixed urogenital og       Culture Micro Susceptibility testing not routinely done    XR Chest 2 Views    Narrative    CHEST TWO VIEW 2017 8:04 PM     HISTORY: Chest pain.    COMPARISON: 2016      Impression    IMPRESSION: No active cardiopulmonary disease or change since the  prior exam.    CRISTHIAN MARIN MD   Troponin I   Result Value Ref Range    Troponin I ES <0.015 0.000 - 0.045 ug/L   Exercise Stress Echocardiogram    Narrative    [id number]  [facility]  [id number]  [id number]^JUAQUIN^JEFFERY^[facility]  Echocardiography Laboratory  [address] [place] [address]        Name: AMINTA GOEL  MRN: [id number]  : 1945  Study Date: 2017 07:18 AM  Age: 71 yrs  Gender: Female  Patient Location: [facility]  Reason For Study: Chest Pain  History: Chest Pain,CAD  Ordering Physician: JEFFERY REZA  Referring Physician: Jeffery Reza  Performed By: Jaciel Mcrae RDCS     BSA: 1.9 m2  Height: 64 in  Weight: 179 lb  HR: 65  BP: 138/70 mmHg  Medications: Atenolol,Lisinopril,HCTZ,ASA  _____________________________________________________________________________  __        Procedure  Stress Echo Complete.  _____________________________________________________________________________  __        Interpretation Summary  Target Heart Rate was achieved.  The EKG portion of this stress test was negative for inducible ischemia (see  echo results below).  This was a normal stress echocardiogram with no evidence of stress-induced  ischemia.  _____________________________________________________________________________  __     Stress  A moderate workload was achieved.  RPP [id number].  The patient exhibited no chest pain during exercise.  There was a borderline hypertensive BP response to  exercise.  Target Heart Rate was achieved.  The Duke treadmill score was intermediate risk ( -11< Gomez score <5).  The EKG portion of this stress test was negative for inducible ischemia (see  echo results below).  This was a normal stress echocardiogram with no evidence of stress-induced  ischemia.  The visual ejection fraction is estimated at 65-70%.     Baseline  The patient is in normal sinus rhythm.  The resting phase of the study was normal.  The visual ejection fraction is estimated at 60-65%.     Stress Results             Protocol:  David          Maximum Predicted HR:   150 bpm             Target HR: 128 bpm        % Maximum Predicted HR: 93 %              Duration  Heart    Stage   (mm:ss)   Rate     BP                    Comment                     (bpm)   Stage 1   3:00     113    160/70   Stage 2   2:08     139    194/70  RecoveryR  6:00      85    140/70FAC Average, Duke score 2.5 intermediate                                   risk,               Stress Duration:   5:08 mm:ss *        Recovery Time: 6:00 mm:ss         Maximum Stress HR: 139 bpm *           METS:          7     Left Ventricle  The visual ejection fraction is estimated at 60-65%.     Right Ventricle  The right ventricle is normal in size and function.     Mitral Valve  There is trace to mild mitral regurgitation.        Tricuspid Valve  There is trace tricuspid regurgitation.     Aortic Valve  There is mild trileaflet aortic sclerosis. No hemodynamically significant  valvular aortic stenosis.     Pericardium  There is no pericardial effusion.     Rhythm  The rhythm was normal sinus.     _____________________________________________________________________________  __        Doppler Measurements & Calculations  TR max alexandro: 271.5 cm/sec  TR max P.5 mmHg              _____________________________________________________________________________  __        Report approved by: Rishi Bob 2017 08:43 AM              Pending  Results:        Unresulted Labs Ordered in the Past 30 Days of this Admission     No orders found for last 61 day(s).              Consultations This Hospital Stay:      No consultations were requested during this admission         Discharge Instructions and Follow-Up:      Follow-up Appointments     Follow-up and recommended labs and tests        Follow up with primary care provider, Susan Camacho, within 7 days   for hospital follow- up.  No follow up labs or test are needed.                  Pt instructed to follow up with PCP in 7 days and with Consultant if indicated.   Follow-up Labs None        Discharge Disposition:      Discharged to home         Discharge Medications:        Discharge Medication List as of 8/28/2017 11:37 AM      CONTINUE these medications which have NOT CHANGED    Details   calcium carbonate-vitamin D 600-400 MG-UNIT CHEW Take 2 tablets by mouth every morning, Historical      FIBER SELECT GUMMIES CHEW Take 1 chew tab by mouth every other day, Historical      LORazepam (ATIVAN) 1 MG tablet Take 1 tablet (1 mg) by mouth every 8 hours as needed for anxiety, Disp-20 tablet, R-3, Local Print      aspirin 81 MG tablet Take 81 mg by mouth daily, Historical      hydrALAZINE (APRESOLINE) 50 MG tablet Take 1 tablet (50 mg) by mouth 3 times daily, Disp-270 tablet, R-3, E-PrescribeDo not fill at this time. Patient will call when ready to refill.      atenolol (TENORMIN) 50 MG tablet Take 1 tablet (50 mg) by mouth daily, Disp-30 tablet, R-11, No Print Out      Coenzyme Q10 (CO Q 10 PO) Take 100 mg by mouth every evening , Historical      buPROPion (WELLBUTRIN SR) 150 MG 12 hr tablet Take 1 tablet (150 mg) by mouth 2 times daily, Disp-180 tablet, R-3, E-Prescribe      DULoxetine (CYMBALTA) 60 MG EC capsule Take 1 capsule (60 mg) by mouth daily, Disp-90 capsule, R-3, E-Prescribe      hydrochlorothiazide (HYDRODIURIL) 25 MG tablet Take 1 tablet (25 mg) by mouth daily, Disp-90 tablet, R-3,  "E-Prescribe      lisinopril (PRINIVIL/ZESTRIL) 40 MG tablet Take 1 tablet (40 mg) by mouth daily, Disp-90 tablet, R-3, E-Prescribe      omeprazole (PRILOSEC) 40 MG capsule Take 1 capsule (40 mg) by mouth daily Take 30-60 minutes before a meal., Disp-90 capsule, R-3, E-Prescribe      traZODone (DESYREL) 100 MG tablet Take 2 tablets (200 mg) by mouth At Bedtime, Disp-180 tablet, R-3, E-Prescribe      atorvastatin (LIPITOR) 20 MG tablet Take 1 tablet (20 mg) by mouth daily, Disp-90 tablet, R-3, E-PrescribeCancel previous script      !! acetaminophen (TYLENOL) 325 MG tablet Take 650 mg by mouth every morning , Disp-100 tablet, R-0, Historical      CYANOCOBALAMIN SL Place 1,000 mcg under the tongue daily, Historical      Omega-3 Fatty Acids (OMEGA-3 FISH OIL PO) Take 1.2 g by mouth daily , Historical      multivitamin, therapeutic with minerals (MULTI-VITAMIN) TABS Take 1 tablet by mouth daily, Historical      !! ACETAMINOPHEN PO Take 650 mg by mouth daily as needed for pain, Historical       !! - Potential duplicate medications found. Please discuss with provider.              Allergies:         Allergies   Allergen Reactions     Codeine Sulfate      \"hyper\"           Condition and Physical on Discharge:      Discharge condition: Stable   Vitals: Blood pressure 121/58, pulse 74, temperature 98  F (36.7  C), temperature source Oral, resp. rate 20, SpO2 98 %, not currently breastfeeding.  0 lbs 0 oz      GENERAL:  Comfortable.  PSYCH: pleasant, oriented, No acute distress.  HEENT:  PERRLA. Normal conjunctiva, normal hearing, nasal mucosa and Oropharynx are normal.  NECK:  Supple, no neck vein distention, adenopathy or bruits, normal thyroid.  HEART:  Normal S1, S2 with no murmur, no pericardial rub, gallops or S3 or S4.  LUNGS:  Clear to auscultation, normal Respiratory effort. No wheezing, rales or ronchi.  ABDOMEN:  Soft, no hepatosplenomegaly, normal bowel sounds. Non-tender, non distended.   EXTREMITIES:  No pedal " edema, +2 pulses bilateral and equal.  SKIN:  Dry to touch, No rash, wound or ulcerations.  NEUROLOGIC:  CN 2-12 intact, BL 5/5 symmetric upper and lower extremity strength, sensation is intact with no focal deficits.

## 2017-08-30 NOTE — TELEPHONE ENCOUNTER
ED / Discharge Outreach Protocol    Patient Contact    Attempt # 2    Was call answered?  No.  Left message on voicemail with information to call me back.    Penelope Haas RN, BSN

## 2017-08-30 NOTE — TELEPHONE ENCOUNTER
"Hospital/TCU/ED for chronic condition Discharge Protocol    \"Hi, my name is Penelope Haas, a registered nurse, and I am calling from [facility].  I am calling to follow up and see how things are going for you after your recent emergency visit/hospital/TCU stay.\"    Tell me how you are doing now that you are home?\" doing ok      Discharge Instructions    \"Let's review your discharge instructions.  What is/are the follow-up recommendations?  Pt. Response: follow up with pcp    \"Has an appointment with your primary care provider been scheduled?\"   No (schedule appointment)    \"When you see the provider, I would recommend that you bring your medications with you.\"    Medications    \"Tell me what changed about your medicines when you discharged?\"    Changes to chronic meds?    0-1    \"What questions do you have about your medications?\"    None     New diagnoses of heart failure, COPD, diabetes, or MI?    No                  Medication reconciliation completed? Yes  Was MTM referral placed (*Make sure to put transitions as reason for referral)?   No    Call Summary    \"What questions or concerns do you have about your recent visit and your follow-up care?\"     none    \"If you have questions or things don't continue to improve, we encourage you contact us through the main clinic number (give number).  Even if the clinic is not open, triage nurses are available 24/7 to help you.     We would like you to know that our clinic has extended hours (provide information).  We also have urgent care (provide details on closest location and hours/contact info)\"      \"Thank you for your time and take care!\"     Penelope Haas RN, BSN      "

## 2017-09-06 ENCOUNTER — OFFICE VISIT (OUTPATIENT)
Dept: FAMILY MEDICINE | Facility: CLINIC | Age: 72
End: 2017-09-06
Payer: COMMERCIAL

## 2017-09-06 VITALS
TEMPERATURE: 98.1 F | HEART RATE: 68 BPM | DIASTOLIC BLOOD PRESSURE: 80 MMHG | WEIGHT: 181.8 LBS | SYSTOLIC BLOOD PRESSURE: 138 MMHG | HEIGHT: 65 IN | OXYGEN SATURATION: 98 % | BODY MASS INDEX: 30.29 KG/M2

## 2017-09-06 DIAGNOSIS — F41.9 ANXIETY: ICD-10-CM

## 2017-09-06 DIAGNOSIS — Z09 HOSPITAL DISCHARGE FOLLOW-UP: Primary | ICD-10-CM

## 2017-09-06 DIAGNOSIS — R07.9 CHEST PAIN, UNSPECIFIED TYPE: ICD-10-CM

## 2017-09-06 DIAGNOSIS — I10 BENIGN ESSENTIAL HYPERTENSION: ICD-10-CM

## 2017-09-06 PROCEDURE — 99495 TRANSJ CARE MGMT MOD F2F 14D: CPT | Performed by: FAMILY MEDICINE

## 2017-09-06 NOTE — NURSING NOTE
"Chief Complaint   Patient presents with     Hospital F/U       Initial /80 (BP Location: Right arm, Patient Position: Chair, Cuff Size: Adult Regular)  Pulse 68  Temp 98.1  F (36.7  C) (Oral)  Ht 5' 4.5\" (1.638 m)  Wt 181 lb 12.8 oz (82.5 kg)  SpO2 98%  Breastfeeding? No  BMI 30.72 kg/m2 Estimated body mass index is 30.72 kg/(m^2) as calculated from the following:    Height as of this encounter: 5' 4.5\" (1.638 m).    Weight as of this encounter: 181 lb 12.8 oz (82.5 kg).  Medication Reconciliation: complete   TWYLA Gallo      "

## 2017-09-06 NOTE — PROGRESS NOTES
SUBJECTIVE:   Monse Peoples is a 71 year old female who presents to clinic today for the following health issues:      Hospital Follow-up Visit:    Hospital/Nursing Home/IP Rehab Facility: [facility]  Date of Admission: 08/27/2017  Date of Discharge: 08/28/2017  Reason(s) for Admission: acute left chest pain             Problems taking medications regularly:  None       Medication changes since discharge: None       Problems adhering to non-medication therapy:  None    Summary of hospitalization:  [facility] discharge summary reviewed  Diagnostic Tests/Treatments reviewed.  Follow up needed: none  Other Healthcare Providers Involved in Patient s Care:         None  Update since discharge: improved.     Post Discharge Medication Reconciliation: discharge medications reconciled, continue medications without change.  Plan of care communicated with patient     Coding guidelines for this visit:  Type of Medical   Decision Making Face-to-Face Visit       within 7 Days of discharge Face-to-Face Visit        within 14 days of discharge   Moderate Complexity [id number]   High Complexity [id number]              Went to ER with a week of short lived, intermittent left chest pain. She also began to have some SOB and dizziness with episodes, which prompted her ER visit.     She was worried about ACS, hx of mild CAD, HTN. This was ruled out with serial trops and stress echo. She is due for follow up with Cardiology in October.     Reports dull but deep localized pain, lasting only a minute maximum, completely resolves between episodes, can some at rest. No longer having dizziness or dyspnea.     Taking BP meds daily with rare missed dose.     Taking omeprazole 40 mg daily for GERD, notes no dyspepsia, although she has been burping more and feels more bloated.     Has been gardening more lately.     Feels major relief that her chest pain does not appear to be cardiac in nature.       Problem list and  "histories reviewed & adjusted, as indicated.  Additional history: none    Patient Active Problem List   Diagnosis     Hyperlipidemia LDL goal <100     Esophageal reflux     OA (osteoarthritis) of knee     Osteopenia     Anxiety     Pulmonary nodules     Advanced care planning/counseling discussion     Benign essential hypertension     S/P total hip arthroplasty     Coronary artery disease involving native coronary artery of native heart without angina pectoris     Anemia, unspecified     Intestinal malabsorption, unspecified     Degenerative arthritis of knee     Former smoker - has had pulmonary nodules in the past     Chest pain, unspecified type     Past Surgical History:   Procedure Laterality Date     ANGIOGRAM  6/15/05     ARTHROPLASTY HIP  9/23/2013    Procedure: ARTHROPLASTY HIP;  Right total hip arthroplasty       ARTHROPLASTY HIP Left 5/19/2015    Procedure: ARTHROPLASTY HIP;  Surgeon: Tyrese Howell MD;  Location: [facility] OR     ARTHROPLASTY KNEE Left 8/22/2016    Procedure: ARTHROPLASTY KNEE;  Surgeon: Tyrese Howell MD;  Location: [facility] OR     CHOLECYSTECTOMY       COLONOSCOPY       COLONOSCOPY Left 4/19/2016    Procedure: COLONOSCOPY;  Surgeon: Moe Haney MD;  Location:  GI     GASTRIC BYPASS      2011 tiff en y     GI SURGERY      fistulotomy     ORTHOPEDIC SURGERY      left knee \"green procedure\"     TONSILLECTOMY       TUBAL LIGATION         Social History   Substance Use Topics     Smoking status: Former Smoker     Packs/day: 0.50     Years: 50.00     Types: Cigarettes     Quit date: 1/19/2013     Smokeless tobacco: Never Used     Alcohol use 0.0 oz/week     0 Standard drinks or equivalent per week      Comment: 4 drinks weekly     Family History   Problem Relation Age of Onset     Coronary Artery Disease Father      Hypertension Father      Coronary Artery Disease Sister      Hypertension Sister      Coronary Artery Disease Brother      Hypertension Brother      HEART DISEASE Nephew      " "Other Cancer Maternal Half-Sister              Reviewed and updated as needed this visit by clinical staffTobacco  Allergies  Meds  Problems  Med Hx  Surg Hx  Fam Hx  Soc Hx        Reviewed and updated as needed this visit by Provider  Tobacco  Allergies  Meds  Problems         ROS:  Constitutional, HEENT, cardiovascular, pulmonary, gi and gu systems are negative, except as otherwise noted.      OBJECTIVE:   /80  Pulse 68  Temp 98.1  F (36.7  C) (Oral)  Ht 5' 4.5\" (1.638 m)  Wt 181 lb 12.8 oz (82.5 kg)  SpO2 98%  Breastfeeding? No  BMI 30.72 kg/m2  Body mass index is 30.72 kg/(m^2).  GENERAL: healthy, alert and no distress  NECK: no adenopathy, no asymmetry, masses, or scars and thyroid normal to palpation  RESP: lungs clear to auscultation - no rales, rhonchi or wheezes  CV: regular rate and rhythm, normal S1 S2, no S3 or S4, no murmur, click or rub, no peripheral edema and peripheral pulses strong  ABDOMEN: soft, nontender, no hepatosplenomegaly, no masses and bowel sounds normal  MS: no gross musculoskeletal defects noted, no edema  PSYCH: mentation appears normal, affect normal/bright    Diagnostic Test Results:  none     ASSESSMENT/PLAN:     1. Hospital discharge follow-up - overall improved    2. Chest pain, unspecified type - thought to be MSK in nature versus anxiety from the ER. Does not appear to be cardiac or pulmonary. She seems satisfied with this and will continue to monitor symptoms.     3. Benign essential hypertension - running high, has been an ongoing issues. Advised to continue taking at home, bring values to Cardiology visit in October unless she is consistently running high.     4. Anxiety - perhaps playing a factor into her symptoms related to #2, she willingly admits this          Susan Camacho MD  [facility]    "

## 2017-09-06 NOTE — MR AVS SNAPSHOT
After Visit Summary   9/6/2017    Monse Peoples    MRN: [id number]           Patient Information     Date Of Birth          1945        Visit Information        Provider Department      9/6/2017 11:30 AM Susan Camacho MD [facility]         Follow-ups after your visit        Your next 10 appointments already scheduled     Oct 26, 2017 12:15 PM CDT   Return Visit with Sotero Garza MD   [facility] [place] ([facility])    [address] [place] [id number] [phone]              Who to contact     If you have questions or need follow up information about today's clinic visit or your schedule please contact [facility] directly at [phone].  Normal or non-critical lab and imaging results will be communicated to you by MyChart, letter or phone within 4 business days after the clinic has received the results. If you do not hear from us within 7 days, please contact the clinic through MyChart or phone. If you have a critical or abnormal lab result, we will notify you by phone as soon as possible.  Submit refill requests through [employer] or call your pharmacy and they will forward the refill request to us. Please allow 3 business days for your refill to be completed.          Additional Information About Your Visit        MyChart Information     [employer] gives you secure access to your electronic health record. If you see a primary care provider, you can also send messages to your care team and make appointments. If you have questions, please call your primary care clinic.  If you do not have a primary care provider, please call [phone] and they will assist you.        Care EveryWhere ID     This is your Care EveryWhere ID. This could be used by other organizations to access your [facility] medical records  [id number]        Your Vitals Were     Pulse Temperature Height Pulse  "Oximetry Breastfeeding? BMI (Body Mass Index)    68 98.1  F (36.7  C) (Oral) 5' 4.5\" (1.638 m) 98% No 30.72 kg/m2       Blood Pressure from Last 3 Encounters:   09/06/17 140/80   08/28/17 121/58   05/11/17 132/68    Weight from Last 3 Encounters:   09/06/17 181 lb 12.8 oz (82.5 kg)   05/11/17 175 lb (79.4 kg)   05/02/17 175 lb 9.6 oz (79.7 kg)              Today, you had the following     No orders found for display       Primary Care Provider Office Phone # Fax #    Susan Camacho [phone] [address] [facility] [id number]        Equal Access to Services     [facility] YANNI : Hadii aad ku hadasho Soomaali, waaxda luqadaha, qaybta kaalmada adeegyada, john barton . So [facility] [phone].    ATENCIÓN: Si habla español, tiene a rodgers disposición servicios gratuitos de asistencia lingüística. Tanner al [phone].    We comply with applicable federal civil rights laws and [place] laws. We do not discriminate on the basis of race, color, national origin, age, disability sex, sexual orientation or gender identity.            Thank you!     Thank you for choosing [facility]  for your care. Our goal is always to provide you with excellent care. Hearing back from our patients is one way we can continue to improve our services. Please take a few minutes to complete the written survey that you may receive in the mail after your visit with us. Thank you!             Your Updated Medication List - Protect others around you: Learn how to safely use, store and throw away your medicines at www.disposemymeds.org.          This list is accurate as of: 9/6/17 12:12 PM.  Always use your most recent med list.                   Brand Name Dispense Instructions for use Diagnosis    aspirin 81 MG tablet      Take 81 mg by mouth daily        atenolol 50 MG tablet    TENORMIN    30 tablet    Take 1 tablet (50 mg) by mouth daily    Essential hypertension       " atorvastatin 20 MG tablet    LIPITOR    90 tablet    Take 1 tablet (20 mg) by mouth daily    Hyperlipidemia LDL goal <100       buPROPion 150 MG 12 hr tablet    WELLBUTRIN SR    180 tablet    Take 1 tablet (150 mg) by mouth 2 times daily    Anxiety       calcium carbonate-vitamin D 600-400 MG-UNIT Chew      Take 2 tablets by mouth every morning        CO Q 10 PO      Take 100 mg by mouth every evening        CYANOCOBALAMIN SL      Place 1,000 mcg under the tongue daily        DULoxetine 60 MG EC capsule    CYMBALTA    90 capsule    Take 1 capsule (60 mg) by mouth daily    Anxiety       FIBER SELECT GUMMIES Chew      Take 1 chew tab by mouth every other day        hydrALAZINE 50 MG tablet    APRESOLINE    270 tablet    Take 1 tablet (50 mg) by mouth 3 times daily    Benign essential hypertension       hydrochlorothiazide 25 MG tablet    HYDRODIURIL    90 tablet    Take 1 tablet (25 mg) by mouth daily    Benign essential hypertension       lisinopril 40 MG tablet    PRINIVIL/ZESTRIL    90 tablet    Take 1 tablet (40 mg) by mouth daily    Benign essential hypertension       LORazepam 1 MG tablet    ATIVAN    20 tablet    Take 1 tablet (1 mg) by mouth every 8 hours as needed for anxiety    Anxiety       Multi-vitamin Tabs tablet      Take 1 tablet by mouth daily        OMEGA-3 FISH OIL PO      Take 1.2 g by mouth daily        omeprazole 40 MG capsule    priLOSEC    90 capsule    Take 1 capsule (40 mg) by mouth daily Take 30-60 minutes before a meal.    Gastroesophageal reflux disease without esophagitis       traZODone 100 MG tablet    DESYREL    180 tablet    Take 2 tablets (200 mg) by mouth At Bedtime    Anxiety       * ACETAMINOPHEN PO      Take 650 mg by mouth daily as needed for pain        * TYLENOL 325 MG tablet   Generic drug:  acetaminophen     100 tablet    Take 650 mg by mouth every morning        * Notice:  This list has 2 medication(s) that are the same as other medications prescribed for you. Read the  directions carefully, and ask your doctor or other care provider to review them with you.

## 2017-09-11 ENCOUNTER — TELEPHONE (OUTPATIENT)
Dept: FAMILY MEDICINE | Facility: CLINIC | Age: 72
End: 2017-09-11

## 2017-09-11 NOTE — TELEPHONE ENCOUNTER
Pt returned call, given message below. Appointment scheduled for 9/12 @ 10:30     Lester Bains   09/11/17 9:18 AM

## 2017-09-11 NOTE — TELEPHONE ENCOUNTER
Panel Management Review      Patient has the following on her problem list:     Hypertension   Last three blood pressure readings:  BP Readings from Last 3 Encounters:   09/06/17 138/80   08/28/17 121/58   05/11/17 132/68     Blood pressure: FAILED    HTN Guidelines:  Age 18-59 BP range:  Less than 140/90  Age 60-85 with Diabetes:  Less than 140/90  Age 60-85 without Diabetes:  less than 150/90        Composite cancer screening  Chart review shows that this patient is due/due soon for the following None  Summary:    Patient is due/failing the following:   BP CHECK    Action needed:   Patient needs nurse only appointment.    Type of outreach:    Phone, left message for patient to call back.     Questions for provider review:    None                                                                                                                                    Marielle Romeo CMA       Chart routed to none .

## 2017-09-12 ENCOUNTER — ALLIED HEALTH/NURSE VISIT (OUTPATIENT)
Dept: NURSING | Facility: CLINIC | Age: 72
End: 2017-09-12
Payer: COMMERCIAL

## 2017-09-12 VITALS — SYSTOLIC BLOOD PRESSURE: 120 MMHG | DIASTOLIC BLOOD PRESSURE: 70 MMHG

## 2017-09-12 DIAGNOSIS — I10 BENIGN ESSENTIAL HYPERTENSION: Primary | ICD-10-CM

## 2017-09-12 PROCEDURE — 99207 ZZC NO CHARGE NURSE ONLY: CPT

## 2017-10-26 ENCOUNTER — OFFICE VISIT (OUTPATIENT)
Dept: CARDIOLOGY | Facility: CLINIC | Age: 72
End: 2017-10-26
Attending: INTERNAL MEDICINE
Payer: COMMERCIAL

## 2017-10-26 VITALS
DIASTOLIC BLOOD PRESSURE: 84 MMHG | SYSTOLIC BLOOD PRESSURE: 148 MMHG | HEART RATE: 66 BPM | WEIGHT: 184.6 LBS | BODY MASS INDEX: 30.75 KG/M2 | HEIGHT: 65 IN

## 2017-10-26 DIAGNOSIS — I10 BENIGN ESSENTIAL HYPERTENSION: ICD-10-CM

## 2017-10-26 DIAGNOSIS — I25.10 CORONARY ARTERY DISEASE INVOLVING NATIVE CORONARY ARTERY OF NATIVE HEART WITHOUT ANGINA PECTORIS: ICD-10-CM

## 2017-10-26 PROCEDURE — 99213 OFFICE O/P EST LOW 20 MIN: CPT | Performed by: INTERNAL MEDICINE

## 2017-10-26 NOTE — PROGRESS NOTES
HPI and Plan:   See dictation    Orders Placed This Encounter   Procedures     Basic metabolic panel     Lipid Profile     ALT     Follow-Up with Cardiac Advanced Practice Provider     Follow-Up with Cardiac Advanced Practice Provider     Follow-Up with Cardiologist       No orders of the defined types were placed in this encounter.      Medications Discontinued During This Encounter   Medication Reason     ACETAMINOPHEN PO          Encounter Diagnoses   Name Primary?     Coronary artery disease involving native coronary artery of native heart without angina pectoris      Benign essential hypertension        CURRENT MEDICATIONS:  Current Outpatient Prescriptions   Medication Sig Dispense Refill     calcium carbonate-vitamin D 600-400 MG-UNIT CHEW Take 2 tablets by mouth every morning       FIBER SELECT GUMMIES CHEW Take 1 chew tab by mouth daily        LORazepam (ATIVAN) 1 MG tablet Take 1 tablet (1 mg) by mouth every 8 hours as needed for anxiety 20 tablet 3     aspirin 81 MG tablet Take 81 mg by mouth daily       hydrALAZINE (APRESOLINE) 50 MG tablet Take 1 tablet (50 mg) by mouth 3 times daily 270 tablet 3     atenolol (TENORMIN) 50 MG tablet Take 1 tablet (50 mg) by mouth daily 30 tablet 11     Coenzyme Q10 (CO Q 10 PO) Take 100 mg by mouth every evening        buPROPion (WELLBUTRIN SR) 150 MG 12 hr tablet Take 1 tablet (150 mg) by mouth 2 times daily 180 tablet 3     DULoxetine (CYMBALTA) 60 MG EC capsule Take 1 capsule (60 mg) by mouth daily 90 capsule 3     hydrochlorothiazide (HYDRODIURIL) 25 MG tablet Take 1 tablet (25 mg) by mouth daily 90 tablet 3     lisinopril (PRINIVIL/ZESTRIL) 40 MG tablet Take 1 tablet (40 mg) by mouth daily 90 tablet 3     omeprazole (PRILOSEC) 40 MG capsule Take 1 capsule (40 mg) by mouth daily Take 30-60 minutes before a meal. 90 capsule 3     traZODone (DESYREL) 100 MG tablet Take 2 tablets (200 mg) by mouth At Bedtime 180 tablet 3     atorvastatin (LIPITOR) 20 MG tablet Take 1  "tablet (20 mg) by mouth daily 90 tablet 3     acetaminophen (TYLENOL) 325 MG tablet Take 650 mg by mouth every morning  100 tablet 0     CYANOCOBALAMIN SL Place 1,000 mcg under the tongue daily       Omega-3 Fatty Acids (OMEGA-3 FISH OIL PO) Take 1.2 g by mouth daily        multivitamin, therapeutic with minerals (MULTI-VITAMIN) TABS Take 1 tablet by mouth daily         ALLERGIES     Allergies   Allergen Reactions     Codeine Sulfate      \"hyper\"       PAST MEDICAL HISTORY:  Past Medical History:   Diagnosis Date     Asthma     no inhaler for 2-3 years     Coronary artery disease      Depression      Gastro-oesophageal reflux disease      History of blood transfusion      Hyperlipidemia      Hypertension      Osteoarthritis      Osteopenia        PAST SURGICAL HISTORY:  Past Surgical History:   Procedure Laterality Date     ANGIOGRAM  6/15/05     ARTHROPLASTY HIP  9/23/2013    Procedure: ARTHROPLASTY HIP;  Right total hip arthroplasty       ARTHROPLASTY HIP Left 5/19/2015    Procedure: ARTHROPLASTY HIP;  Surgeon: Tyrese Howell MD;  Location: [facility] OR     ARTHROPLASTY KNEE Left 8/22/2016    Procedure: ARTHROPLASTY KNEE;  Surgeon: Tyrese Howell MD;  Location: [facility] OR     CHOLECYSTECTOMY       COLONOSCOPY       COLONOSCOPY Left 4/19/2016    Procedure: COLONOSCOPY;  Surgeon: Moe Haney MD;  Location: [facility] GI     GASTRIC BYPASS      2011 tiff en y     GI SURGERY      fistulotomy     ORTHOPEDIC SURGERY      left knee \"green procedure\"     TONSILLECTOMY       TUBAL LIGATION         FAMILY HISTORY:  Family History   Problem Relation Age of Onset     Coronary Artery Disease Father      Hypertension Father      Coronary Artery Disease Sister      Hypertension Sister      Coronary Artery Disease Brother      Hypertension Brother      HEART DISEASE Nephew      Other Cancer Maternal Half-Sister        SOCIAL HISTORY:  Social History     Social History     Marital status:      Spouse name: N/A     Number of " "children: N/A     Years of education: N/A     Social History Main Topics     Smoking status: Former Smoker     Packs/day: 0.50     Years: 50.00     Types: Cigarettes     Quit date: 1/19/2013     Smokeless tobacco: Never Used     Alcohol use 0.0 oz/week     0 Standard drinks or equivalent per week      Comment: 4 drinks weekly     Drug use: No     Sexual activity: Yes     Partners: Male     Other Topics Concern     Parent/Sibling W/ Cabg, Mi Or Angioplasty Before 65f 55m? Yes     brother age 31     Caffeine Concern Yes     1 cup of coffee      Sleep Concern Yes     lately it has been an issue     Special Diet No     watching what she eats      Exercise No     Seat Belt Yes     Social History Narrative       Review of Systems:  Skin:  Positive for bruising     Eyes:  Positive for glasses;cataracts some vision changes -  due for eye exam   ENT:  Positive for tinnitus (pt feels this is getting better) has been having ringing in ears at nights   Respiratory:  Positive for shortness of breath (when standing from a bending position)     Cardiovascular:    Positive for;dizziness;chest pain    Gastroenterology: Positive for heartburn under control with Omeprazole ,   Genitourinary:  Negative      Musculoskeletal:         Neurologic:  Positive for headaches (pt feels this is better)    Psychiatric:  Positive for sleep disturbances no sleeping issues - taking Trazodone qhs   Heme/Lymph/Imm:  Positive for easy bruising;allergies;hay fever RX allergy , seasonal allergies   Endocrine:  Negative        Physical Exam:  Vitals: /84  Pulse 66  Ht 1.638 m (5' 4.5\")  Wt 83.7 kg (184 lb 9.6 oz)  BMI 31.2 kg/m2    Constitutional:  cooperative, alert and oriented, well developed, well nourished, in no acute distress overweight mildly anxious    Skin:  warm and dry to the touch, no apparent skin lesions or masses noted   warm and dry    Head:  normocephalic, no masses or lesions   atraumatic    Eyes:  pupils equal and " round;conjunctivae and lids unremarkable;sclera white   EOMI, pupils round and equal    ENT:  no pallor or cyanosis, dentition good   speech normal, tongue midline    Neck:  no carotid bruit;JVP normal   supple    Chest:  normal breath sounds, clear to auscultation, normal A-P diameter, normal symmetry, normal respiratory excursion, no use of accessory muscles     clear without rales/wheezing    Cardiac: regular rhythm, normal S1/S2, no S3 or S4, apical impulse not displaced, no murmurs, gallops or rubs             RRR without murmur/lift/thrill    Abdomen:  abdomen soft;BS normoactive;non-tender   non-tender    Vascular: pulses full and equal, no bruits auscultated                                   UE pulses full and equal    Extremities and Back:  no deformities, clubbing, cyanosis, erythema observed;no edema         no edema    Neurological:  affect appropriate, oriented to time, person and place;no gross motor deficits   motor grossly intact          CC  Sotero Garza MD  [address]  [place] [address]

## 2017-10-26 NOTE — PROGRESS NOTES
REFERRING PHYSICIAN:  Dr. Susan Camacho.      HISTORY OF PRESENT ILLNESS:  It is my pleasure to see your patient, Monse Peoples, who is a very pleasant 71-year-old female patient.  This is a patient with mild coronary artery disease based upon coronary angiography performed in 2005 at [facility].  She also has a history of hypertension, hyperlipidemia and gastric bypass surgery.  Her blood pressure was well controlled after we increased  hydralazine to 50 mg 3 times a day.  Her blood pressure in late May was 132/68.  Her blood pressure was also 120/70 on the 12th of September.  However, she has gained approximately 9 pounds in weight and her blood pressure today was 148/84.  I did recheck her blood pressure at the end of our visit and it was 144/76.  As I said, the most likely cause for her elevated blood pressure is the weight gain.  She also admits to being quite sedentary.  She has no chest pains or chest pressure.  Her last lipid profile was at the end of March of this year which was excellent with an LDL of 51, HDL of 72 and triglycerides of 108.  Her total cholesterol was 145.  One of the other reasons why her blood pressure may be slightly on the higher side is that she forgets to take one of the doses of hydralazine.  I did explain to her that hydralazine has an 8 hour half-life, so requires the medication to be taken 3 times a day.  Coronary artery disease.  The patient is asymptomatic with respect to coronary artery disease with no symptoms of angina pectoris.      IMPRESSION:     1.  Essential hypertension.  Her blood pressure appears to be raised here today, though her last 3 blood pressures were good.  This may be in part due to the fact that she has gained 9 pounds in weight and is sedentary.      PLAN:  I will have the patient lose weight, exercise, and try to eliminate salt from her diet.  We will recheck her blood pressure when she comes back from [place] in March.  If on recheck her blood  pressure is still raised, I would recommend increasing the hydralazine to 100 mg 3 times a day.  It has been my pleasure to be involved in the care of this very nice patient.  As always, she has been told to contact us if she has any questions or any concerns.      cc:   [place] [address]         SARA ZELAYA MD, [facility]             D: 10/26/2017 12:38   T: 10/26/2017 14:53   MT: [id number]      Name:     AMINTA GOEL   MRN:      [id number]        Account:      [id number]   :      1945           Service Date: 10/26/2017      Document: [id number]

## 2017-10-26 NOTE — LETTER
10/26/2017    Susan Camacho MD  [address]  [facility] [address]    RE: Monse Peoples       Dear Colleague,    I had the pleasure of seeing Monse Peoples in the [facility].    REFERRING PHYSICIAN:  Dr. Susan Camacho.      It is my pleasure to see your patient, Monse Peoples, who is a very pleasant 71-year-old female patient.  This is a patient with mild coronary artery disease based upon coronary angiography performed in 2005 at [facility].  She also has a history of hypertension, hyperlipidemia and gastric bypass surgery.  Her blood pressure was well controlled after we increased  hydralazine to 50 mg 3 times a day.  Her blood pressure in late May was 132/68.  Her blood pressure was also 120/70 on the 12th of September.  However, she has gained approximately 9 pounds in weight and her blood pressure today was 148/84.  I did recheck her blood pressure at the end of our visit and it was 144/76.  As I said, the most likely cause for her elevated blood pressure is the weight gain.  She also admits to being quite sedentary.  She has no chest pains or chest pressure.  Her last lipid profile was at the end of March of this year which was excellent with an LDL of 51, HDL of 72 and triglycerides of 108.  Her total cholesterol was 145.  One of the other reasons why her blood pressure may be slightly on the higher side is that she forgets to take one of the doses of hydralazine.  I did explain to her that hydralazine has an 8 hour half-life, so requires the medication to be taken 3 times a day.  Coronary artery disease.  The patient is asymptomatic with respect to coronary artery disease with no symptoms of angina pectoris.     Outpatient Encounter Prescriptions as of 10/26/2017   Medication Sig Dispense Refill     calcium carbonate-vitamin D 600-400 MG-UNIT CHEW Take 2 tablets by mouth every morning       FIBER SELECT GUMMIES CHEW Take 1 chew tab by mouth daily         LORazepam (ATIVAN) 1 MG tablet Take 1 tablet (1 mg) by mouth every 8 hours as needed for anxiety 20 tablet 3     aspirin 81 MG tablet Take 81 mg by mouth daily       hydrALAZINE (APRESOLINE) 50 MG tablet Take 1 tablet (50 mg) by mouth 3 times daily 270 tablet 3     atenolol (TENORMIN) 50 MG tablet Take 1 tablet (50 mg) by mouth daily 30 tablet 11     Coenzyme Q10 (CO Q 10 PO) Take 100 mg by mouth every evening        buPROPion (WELLBUTRIN SR) 150 MG 12 hr tablet Take 1 tablet (150 mg) by mouth 2 times daily 180 tablet 3     DULoxetine (CYMBALTA) 60 MG EC capsule Take 1 capsule (60 mg) by mouth daily 90 capsule 3     hydrochlorothiazide (HYDRODIURIL) 25 MG tablet Take 1 tablet (25 mg) by mouth daily 90 tablet 3     lisinopril (PRINIVIL/ZESTRIL) 40 MG tablet Take 1 tablet (40 mg) by mouth daily 90 tablet 3     omeprazole (PRILOSEC) 40 MG capsule Take 1 capsule (40 mg) by mouth daily Take 30-60 minutes before a meal. 90 capsule 3     traZODone (DESYREL) 100 MG tablet Take 2 tablets (200 mg) by mouth At Bedtime 180 tablet 3     atorvastatin (LIPITOR) 20 MG tablet Take 1 tablet (20 mg) by mouth daily 90 tablet 3     acetaminophen (TYLENOL) 325 MG tablet Take 650 mg by mouth every morning  100 tablet 0     CYANOCOBALAMIN SL Place 1,000 mcg under the tongue daily       Omega-3 Fatty Acids (OMEGA-3 FISH OIL PO) Take 1.2 g by mouth daily        multivitamin, therapeutic with minerals (MULTI-VITAMIN) TABS Take 1 tablet by mouth daily       [DISCONTINUED] ACETAMINOPHEN PO Take 650 mg by mouth daily as needed for pain       No facility-administered encounter medications on file as of 10/26/2017.       IMPRESSION:     1.  Essential hypertension.  Her blood pressure appears to be raised here today, though her last 3 blood pressures were good.  This may be in part due to the fact that she has gained 9 pounds in weight and is sedentary.      PLAN:  I will have the patient lose weight, exercise, and try to eliminate salt from her  diet.  We will recheck her blood pressure when she comes back from [place] in March.  If on recheck her blood pressure is still raised, I would recommend increasing the hydralazine to 100 mg 3 times a day.  It has been my pleasure to be involved in the care of this very nice patient.  As always, she has been told to contact us if she has any questions or any concerns.     Sincerely,    Sotero Garza MD    [facility]

## 2017-10-26 NOTE — MR AVS SNAPSHOT
After Visit Summary   10/26/2017    Monse Peoples    MRN: [id number]           Patient Information     Date Of Birth          1945        Visit Information        Provider Department      10/26/2017 12:15 PM Sotero Garza MD [facility] HEART [facility]        Today's Diagnoses     Coronary artery disease involving native coronary artery of native heart without angina pectoris        Benign essential hypertension           Follow-ups after your visit        Additional Services     Follow-Up with Cardiac Advanced Practice Provider           Follow-Up with Cardiac Advanced Practice Provider           Follow-Up with Cardiologist                 Future tests that were ordered for you today     Open Future Orders        Priority Expected Expires Ordered    Basic metabolic panel Routine 10/26/2018 10/27/2018 10/26/2017    Lipid Profile Routine 10/26/2018 10/26/2018 10/26/2017    ALT Routine 10/26/2018 10/26/2018 10/26/2017    Follow-Up with Cardiologist Routine 10/26/2018 10/27/2018 10/26/2017    Follow-Up with Cardiac Advanced Practice Provider Routine 3/23/2018 10/26/2018 10/26/2017    Follow-Up with Cardiac Advanced Practice Provider Routine 1/24/2018 10/26/2018 10/26/2017            Who to contact     If you have questions or need follow up information about today's clinic visit or your schedule please contact [facility] directly at [phone].  Normal or non-critical lab and imaging results will be communicated to you by MyChart, letter or phone within 4 business days after the clinic has received the results. If you do not hear from us within 7 days, please contact the clinic through MyChart or phone. If you have a critical or abnormal lab result, we will notify you by phone as soon as possible.  Submit refill requests through [employer] or call your pharmacy and they will forward the refill request to us. Please  "allow 3 business days for your refill to be completed.          Additional Information About Your Visit        MyChart Information     [facility] [employer] gives you secure access to your electronic health record. If you see a primary care provider, you can also send messages to your care team and make appointments. If you have questions, please call your primary care clinic.  If you do not have a primary care provider, please call [phone] and they will assist you.        Care EveryWhere ID     This is your Care EveryWhere ID. This could be used by other organizations to access your [facility] medical records  [id number]        Your Vitals Were     Pulse Height BMI (Body Mass Index)             66 1.638 m (5' 4.5\") 31.2 kg/m2          Blood Pressure from Last 3 Encounters:   10/26/17 148/84   09/12/17 120/70   09/06/17 138/80    Weight from Last 3 Encounters:   10/26/17 83.7 kg (184 lb 9.6 oz)   09/06/17 82.5 kg (181 lb 12.8 oz)   05/11/17 79.4 kg (175 lb)              We Performed the Following     Follow-Up with Cardiologist          Today's Medication Changes          These changes are accurate as of: 10/26/17 12:39 PM.  If you have any questions, ask your nurse or doctor.               These medicines have changed or have updated prescriptions.        Dose/Directions    TYLENOL 325 MG tablet   This may have changed:  Another medication with the same name was removed. Continue taking this medication, and follow the directions you see here.   Generic drug:  acetaminophen   Changed by:  Susan Camacho MD        Dose:  650 mg   Take 650 mg by mouth every morning   Quantity:  100 tablet   Refills:  0                Primary Care Provider Office Phone # Fax #    Susan Camacho [phone] [address] [facility] [address]        Equal Access to Services     LION LIAO AH: Boogie gonzalezo Sodamon, waaxda luqadaha, qaybta kaalkris gurrola, john moore. " So [facility] [phone].    ATENCIÓN: Si ting garcias, tiene a rodgers disposición servicios gratuitos de asistencia lingüística. Tanner treviño [phone].    We comply with applicable federal civil rights laws and [place] laws. We do not discriminate on the basis of race, color, national origin, age, disability, sex, sexual orientation, or gender identity.            Thank you!     Thank you for choosing [facility] [place]  for your care. Our goal is always to provide you with excellent care. Hearing back from our patients is one way we can continue to improve our services. Please take a few minutes to complete the written survey that you may receive in the mail after your visit with us. Thank you!             Your Updated Medication List - Protect others around you: Learn how to safely use, store and throw away your medicines at www.disposemymeds.org.          This list is accurate as of: 10/26/17 12:39 PM.  Always use your most recent med list.                   Brand Name Dispense Instructions for use Diagnosis    aspirin 81 MG tablet      Take 81 mg by mouth daily        atenolol 50 MG tablet    TENORMIN    30 tablet    Take 1 tablet (50 mg) by mouth daily    Essential hypertension       atorvastatin 20 MG tablet    LIPITOR    90 tablet    Take 1 tablet (20 mg) by mouth daily    Hyperlipidemia LDL goal <100       buPROPion 150 MG 12 hr tablet    WELLBUTRIN SR    180 tablet    Take 1 tablet (150 mg) by mouth 2 times daily    Anxiety       calcium carbonate-vitamin D 600-400 MG-UNIT Chew      Take 2 tablets by mouth every morning        CO Q 10 PO      Take 100 mg by mouth every evening        CYANOCOBALAMIN SL      Place 1,000 mcg under the tongue daily        DULoxetine 60 MG EC capsule    CYMBALTA    90 capsule    Take 1 capsule (60 mg) by mouth daily    Anxiety       FIBER SELECT GUMMIES Chew      Take 1 chew tab by mouth daily        hydrALAZINE 50 MG tablet    APRESOLINE    270  tablet    Take 1 tablet (50 mg) by mouth 3 times daily    Benign essential hypertension       hydrochlorothiazide 25 MG tablet    HYDRODIURIL    90 tablet    Take 1 tablet (25 mg) by mouth daily    Benign essential hypertension       lisinopril 40 MG tablet    PRINIVIL/ZESTRIL    90 tablet    Take 1 tablet (40 mg) by mouth daily    Benign essential hypertension       LORazepam 1 MG tablet    ATIVAN    20 tablet    Take 1 tablet (1 mg) by mouth every 8 hours as needed for anxiety    Anxiety       Multi-vitamin Tabs tablet      Take 1 tablet by mouth daily        OMEGA-3 FISH OIL PO      Take 1.2 g by mouth daily        omeprazole 40 MG capsule    priLOSEC    90 capsule    Take 1 capsule (40 mg) by mouth daily Take 30-60 minutes before a meal.    Gastroesophageal reflux disease without esophagitis       traZODone 100 MG tablet    DESYREL    180 tablet    Take 2 tablets (200 mg) by mouth At Bedtime    Anxiety       TYLENOL 325 MG tablet   Generic drug:  acetaminophen     100 tablet    Take 650 mg by mouth every morning

## 2017-12-06 ENCOUNTER — TELEPHONE (OUTPATIENT)
Dept: FAMILY MEDICINE | Facility: CLINIC | Age: 72
End: 2017-12-06

## 2017-12-06 NOTE — TELEPHONE ENCOUNTER
Panel Management Review      Patient has the following on her problem list:     Hypertension   Last three blood pressure readings:  BP Readings from Last 3 Encounters:   10/26/17 148/84   09/12/17 120/70   09/06/17 138/80     Blood pressure: FAILED    HTN Guidelines:  Age 18-59 BP range:  Less than 140/90  Age 60-85 with Diabetes:  Less than 140/90  Age 60-85 without Diabetes:  less than 150/90        Composite cancer screening  Chart review shows that this patient is due/due soon for the following None  Summary:    Patient is due/failing the following:   BP CHECK    Action needed:   Patient needs nurse only appointment.    Type of outreach:    Phone, left message for patient to call back.     Questions for provider review:    None                                                                                                                                    Hugo [facility]           Chart routed to Care Team .

## 2017-12-07 ENCOUNTER — ALLIED HEALTH/NURSE VISIT (OUTPATIENT)
Dept: NURSING | Facility: CLINIC | Age: 72
End: 2017-12-07
Payer: COMMERCIAL

## 2017-12-07 ENCOUNTER — TELEPHONE (OUTPATIENT)
Dept: FAMILY MEDICINE | Facility: CLINIC | Age: 72
End: 2017-12-07

## 2017-12-07 VITALS — SYSTOLIC BLOOD PRESSURE: 124 MMHG | OXYGEN SATURATION: 96 % | HEART RATE: 85 BPM | DIASTOLIC BLOOD PRESSURE: 70 MMHG

## 2017-12-07 DIAGNOSIS — Z01.30 BP CHECK: Primary | ICD-10-CM

## 2017-12-07 PROCEDURE — 99207 ZZC NO CHARGE NURSE ONLY: CPT

## 2017-12-07 NOTE — TELEPHONE ENCOUNTER
Pt was here for a  Nurse only and took BP on right arm at 3pm on 12/6/2017, sat for 10 minutes.  Reason she came in was, she was at her cardiologist and her BP was a little higher, she just wanted it checked today.Alberto Marquez CMA

## 2018-02-08 DIAGNOSIS — F41.9 ANXIETY: ICD-10-CM

## 2018-02-08 RX ORDER — LORAZEPAM 1 MG/1
1 TABLET ORAL EVERY 8 HOURS PRN
Qty: 20 TABLET | Refills: 3 | Status: SHIPPED | OUTPATIENT
Start: 2018-02-08 | End: 2018-03-21

## 2018-02-08 NOTE — TELEPHONE ENCOUNTER
Controlled Substance Refill Request for LORazepam (ATIVAN) 1 MG tablet  Problem List Complete:  No     PROVIDER TO CONSIDER COMPLETION OF PROBLEM LIST AND OVERVIEW/CONTROLLED SUBSTANCE AGREEMENT    Last Written Prescription Date:  07/12/2017  Last Fill Quantity: 20 TABLET,   # refills: 3    Last Office Visit with [facility] primary care provider: 09/06/2017    Future Office visit:     Controlled substance agreement on file: No.     Processing:  Fax Rx to [facility] pharmacy   checked in past 6 months?  No, route to KULDEEP LOW

## 2018-02-08 NOTE — TELEPHONE ENCOUNTER
RX [place] ([place]) reviewed:  reviewed- no concerns    [place]:  [email or web address]-[email or web address][email or web address]

## 2018-03-09 DIAGNOSIS — F41.9 ANXIETY: ICD-10-CM

## 2018-03-10 NOTE — TELEPHONE ENCOUNTER
"Requested Prescriptions   Pending Prescriptions Disp Refills     traZODone (DESYREL) 100 MG tablet  Last Written Prescription Date:  03/29/2017  Last Fill Quantity: 180,  # refills: 3   Last office visit: No previous visit found with prescribing provider:  09/06/2017   Future Office Visit:   Next 5 appointments (look out 90 days)     Mar 23, 2018  1:00 PM CDT   Return Visit with Jaelyn Lowry PA-C   [facility] ([facility])    [address] [address] [address] [phone]                  180 tablet 3     Sig: Take 2 tablets (200 mg) by mouth At Bedtime    Serotonin Modulators Passed    3/9/2018  8:49 PM       Passed - Recent (12 mo) or future (30 days) visit within the authorizing provider's specialty    Patient had office visit in the last 12 months or has a visit in the next 30 days with authorizing provider or within the authorizing provider's specialty.  See \"Patient Info\" tab in inbasket, or \"Choose Columns\" in Meds & Orders section of the refill encounter.           Passed - Patient is age 18 or older       Passed - No active pregnancy on record       Passed - No positive pregnancy test in past 12 months          "

## 2018-03-12 RX ORDER — TRAZODONE HYDROCHLORIDE 100 MG/1
200 TABLET ORAL AT BEDTIME
Qty: 180 TABLET | Refills: 0 | Status: SHIPPED | OUTPATIENT
Start: 2018-03-12 | End: 2018-06-06

## 2018-03-12 NOTE — TELEPHONE ENCOUNTER
Medication is being filled for 1 time refill only due to:  Patient needs to be seen because due for physical this month.  mychart sent.   Penelope Haas RN, BSN

## 2018-03-19 DIAGNOSIS — I10 ESSENTIAL HYPERTENSION: ICD-10-CM

## 2018-03-19 DIAGNOSIS — I10 BENIGN ESSENTIAL HYPERTENSION: ICD-10-CM

## 2018-03-19 NOTE — TELEPHONE ENCOUNTER
Pt has an upcoming appt with cardiology.  Do you want to refill?  Pt is due for a yearly px at the end of this month?    Penelope Haas RN, BSN

## 2018-03-19 NOTE — TELEPHONE ENCOUNTER
"Requested Prescriptions   Pending Prescriptions Disp Refills     atenolol (TENORMIN) 50 MG tablet 30 tablet 11    Last Written Prescription Date:  04/10/2017  Last Fill Quantity: 30 tablet,  # refills: 11   Last office visit: 9/6/2017 with prescribing provider:  09/06/2017   Future Office Visit:   Next 5 appointments (look out 90 days)     Mar 23, 2018  1:00 PM CDT   Return Visit with Jaelyn Lowry PA-C   [facility] ([facility])    [address] [place] [address] [address]  [facility] [address]   [phone]                  Sig: Take 1 tablet (50 mg) by mouth daily    Beta-Blockers Protocol Passed    3/19/2018  2:05 PM       Passed - Blood pressure under 140/90 in past 12 months    BP Readings from Last 3 Encounters:   12/07/17 124/70   10/26/17 148/84   09/12/17 120/70                Passed - Patient is age 6 or older       Passed - Recent (12 mo) or future (30 days) visit within the authorizing provider's specialty    Patient had office visit in the last 12 months or has a visit in the next 30 days with authorizing provider or within the authorizing provider's specialty.  See \"Patient Info\" tab in inbasket, or \"Choose Columns\" in Meds & Orders section of the refill encounter.                  hydrochlorothiazide (HYDRODIURIL) 25 MG tablet 90 tablet 3    Last Written Prescription Date:  03/29/2017  Last Fill Quantity: 90 tablet,  # refills: 3   Last office visit: 9/6/2017 with prescribing provider:  09/06/2017   Future Office Visit:   Next 5 appointments (look out 90 days)     Mar 23, 2018  1:00 PM CDT   Return Visit with Jaelyn Lowry PA-C   [facility] ([facility])    [address] [facility] [address]  [facility] [id number] [phone]                  Sig: Take 1 tablet (25 mg) by mouth daily    Diuretics (Including Combos) Protocol Passed    3/19/2018  2:05 PM       Passed - Blood pressure under 140/90 in past 12 " "months    BP Readings from Last 3 Encounters:   12/07/17 124/70   10/26/17 148/84   09/12/17 120/70                Passed - Recent (12 mo) or future (30 days) visit within the authorizing provider's specialty    Patient had office visit in the last 12 months or has a visit in the next 30 days with authorizing provider or within the authorizing provider's specialty.  See \"Patient Info\" tab in inbasket, or \"Choose Columns\" in Meds & Orders section of the refill encounter.           Passed - Patient is age 18 or older       Passed - No active pregancy on record       Passed - Normal serum creatinine on file in past 12 months    Recent Labs   Lab Test  08/27/17 1920   CR  0.85             Passed - Normal serum potassium on file in past 12 months    Recent Labs   Lab Test  08/27/17 1920   POTASSIUM  3.8                   Passed - Normal serum sodium on file in past 12 months    Recent Labs   Lab Test  08/27/17   1920   NA  138             Passed - No positive pregnancy test in past 12 months          Tyerse Pacheco XRT  "

## 2018-03-20 DIAGNOSIS — I10 BENIGN ESSENTIAL HYPERTENSION: ICD-10-CM

## 2018-03-20 RX ORDER — LISINOPRIL 40 MG/1
40 TABLET ORAL DAILY
Qty: 90 TABLET | Refills: 0 | Status: SHIPPED | OUTPATIENT
Start: 2018-03-20 | End: 2018-06-16

## 2018-03-20 NOTE — TELEPHONE ENCOUNTER
"Requested Prescriptions   Pending Prescriptions Disp Refills     lisinopril (PRINIVIL/ZESTRIL) 40 MG tablet 90 tablet 3    Last Written Prescription Date:  03/29/2017  Last Fill Quantity: 90 tablet,  # refills: 3   Last office visit: 9/6/2017 with prescribing provider:  09/06/2017   Future Office Visit:   Next 5 appointments (look out 90 days)     Mar 23, 2018  1:00 PM CDT   Return Visit with Jaelyn Lowry PA-C   [facility] ([facility])    [address] [place] [address] [phone]                  Sig: Take 1 tablet (40 mg) by mouth daily    ACE Inhibitors (Including Combos) Protocol Passed    3/20/2018 10:12 AM       Passed - Blood pressure under 140/90 in past 12 months    BP Readings from Last 3 Encounters:   12/07/17 124/70   10/26/17 148/84   09/12/17 120/70                Passed - Recent (12 mo) or future (30 days) visit within the authorizing provider's specialty    Patient had office visit in the last 12 months or has a visit in the next 30 days with authorizing provider or within the authorizing provider's specialty.  See \"Patient Info\" tab in inbasket, or \"Choose Columns\" in Meds & Orders section of the refill encounter.           Passed - Patient is age 18 or older       Passed - No active pregnancy on record       Passed - Normal serum creatinine on file in past 12 months    Recent Labs   Lab Test  08/27/17 1920   CR  0.85            Passed - Normal serum potassium on file in past 12 months    Recent Labs   Lab Test  08/27/17 1920   POTASSIUM  3.8            Passed - No positive pregnancy test in past 12 months          Tyrese FORTET  "

## 2018-03-21 DIAGNOSIS — F41.9 ANXIETY: ICD-10-CM

## 2018-03-21 RX ORDER — HYDROCHLOROTHIAZIDE 25 MG/1
25 TABLET ORAL DAILY
Qty: 90 TABLET | Refills: 3 | Status: SHIPPED | OUTPATIENT
Start: 2018-03-21 | End: 2019-02-15

## 2018-03-21 RX ORDER — ATENOLOL 50 MG/1
50 TABLET ORAL DAILY
Qty: 30 TABLET | Refills: 11 | Status: SHIPPED | OUTPATIENT
Start: 2018-03-21 | End: 2019-02-15

## 2018-03-22 DIAGNOSIS — K21.9 GASTROESOPHAGEAL REFLUX DISEASE WITHOUT ESOPHAGITIS: ICD-10-CM

## 2018-03-22 RX ORDER — LORAZEPAM 1 MG/1
1 TABLET ORAL DAILY PRN
Qty: 30 TABLET | Refills: 0 | Status: SHIPPED | OUTPATIENT
Start: 2018-03-22 | End: 2018-05-24

## 2018-03-22 RX ORDER — OMEPRAZOLE 40 MG/1
40 CAPSULE, DELAYED RELEASE ORAL DAILY
Qty: 90 CAPSULE | Refills: 1 | Status: SHIPPED | OUTPATIENT
Start: 2018-03-22 | End: 2018-09-19

## 2018-03-22 NOTE — TELEPHONE ENCOUNTER
Lorazepam Oral Tablet 1MG        Last Written Prescription Date:  02/08/2018  Last Fill Quantity: 20,   # refills: 3  Last Office Visit: 10/26/2017  Future Office visit:    Next 5 appointments (look out 90 days)     Mar 23, 2018  1:00 PM CDT   Return Visit with Jaelyn Lowry PA-C   [facility] ([facility])    [address] [place] [address] [phone]                   Routing refill request to provider for review/approval because:  Drug not on the [facility], [facility] or [facility] refill protocol or controlled substance

## 2018-03-22 NOTE — TELEPHONE ENCOUNTER
Looks like the worked through #80 ativan in 1.5 months. This is way too much. Script for once daily ativan placed in [employer] bin. She needs to be cautious in taking this too much. If struggling with anxiety, needs follow up to discuss daily med. JH

## 2018-03-22 NOTE — TELEPHONE ENCOUNTER
RX [place] program ([place]) reviewed:  reviewed- no concerns    [place] profile:  [email or web address]

## 2018-03-22 NOTE — TELEPHONE ENCOUNTER
"Requested Prescriptions   Pending Prescriptions Disp Refills     omeprazole (PRILOSEC) 40 MG capsule 90 capsule 3     Sig: Take 1 capsule (40 mg) by mouth daily Take 30-60 minutes before a meal.    PPI Protocol Passed    3/22/2018 12:24 PM       Passed - Not on Clopidogrel (unless Pantoprazole ordered)       Passed - No diagnosis of osteoporosis on record       Passed - Recent (12 mo) or future (30 days) visit within the authorizing provider's specialty    Patient had office visit in the last 12 months or has a visit in the next 30 days with authorizing provider or within the authorizing provider's specialty.  See \"Patient Info\" tab in inbasket, or \"Choose Columns\" in Meds & Orders section of the refill encounter.           Passed - Patient is age 18 or older       Passed - No active pregnacy on record       Passed - No positive pregnancy test in past 12 months        Last Written Prescription Date:  09/29/17  Last Fill Quantity: 90,  # refills: 3   Last office visit: 9/6/2017 with prescribing provider:  Susan Camacho   Future Office Visit:   Next 5 appointments (look out 90 days)     Mar 23, 2018  1:00 PM CDT   Return Visit with Jaelyn Lowry PA-C   [facility] ([facility])    [address] [address] [address] [phone]                   "

## 2018-03-22 NOTE — TELEPHONE ENCOUNTER
Prescription approved per [facility] Refill Protocol.  Penelope Haas RN, BSN

## 2018-03-22 NOTE — TELEPHONE ENCOUNTER
Pt did not get any refills on the Feb RX    Did review refill profile with her and let her know that last summer she did use quiet a bit-  Advised if she feels she is having more anxiety she should be seen to discuss daily control medication change or dosing change for this.      She denies ever using more than 1 tab per day and states she us them on a daily basis.        Pt expressed understanding and acceptance of the plan.  Pt had no further questions at this time.  Advised can call back to clinic at any time with concerns.       Kayla Lares RN

## 2018-03-23 ENCOUNTER — OFFICE VISIT (OUTPATIENT)
Dept: CARDIOLOGY | Facility: CLINIC | Age: 73
End: 2018-03-23
Attending: INTERNAL MEDICINE
Payer: COMMERCIAL

## 2018-03-23 VITALS
DIASTOLIC BLOOD PRESSURE: 72 MMHG | OXYGEN SATURATION: 98 % | SYSTOLIC BLOOD PRESSURE: 138 MMHG | BODY MASS INDEX: 30.54 KG/M2 | HEART RATE: 68 BPM | WEIGHT: 183.3 LBS | HEIGHT: 65 IN

## 2018-03-23 DIAGNOSIS — I10 BENIGN ESSENTIAL HYPERTENSION: ICD-10-CM

## 2018-03-23 PROCEDURE — 99214 OFFICE O/P EST MOD 30 MIN: CPT | Performed by: PHYSICIAN ASSISTANT

## 2018-03-23 RX ORDER — HYDRALAZINE HYDROCHLORIDE 25 MG/1
75 TABLET, FILM COATED ORAL 3 TIMES DAILY
Qty: 810 TABLET | Refills: 3 | Status: SHIPPED | OUTPATIENT
Start: 2018-03-23 | End: 2019-02-15

## 2018-03-23 NOTE — PROGRESS NOTES
"CARDIOLOGY CLINIC PROGRESS NOTE    DOS: 3/23/2018    Monse Peoples  : 1945, 72 year old  MRN: [id number]      History:    I had the pleasure of following up with Monse Peoples today in the Cardiology Clinic. Monse is a very pleasant 72-year-old woman who is a patient of Dr. Sotero Garza.      Her past medical history consists of mild nonobstructive coronary artery disease based upon a coronary angiogram performed in  at [facility].  She has a history of hypertension, hyperlipidemia and gastric bypass surgery.  Also anxiety.  According to past notes, Monse also underwent stress testing in  and  at [facility], both of which by verbal report were normal, though we do not have the actual reports available to us.  She more recently underwent a stress echo 2017 that was normal.       Monse was last seen by Dr. Garza 10/26/17.  Overall she was stable.  However, she had gained 9 lbs, and her BP was borderline elevated. He recommended weight loss, low sodium diet, and follow up in 6 months.       Monse presents today for follow up.  She checks her BP occasionally at home. It is anywhere from 140/80 to 160/90.  This is when she has headaches.  She does not check it otherwise.  She was working out at [employer], but that was cancelled so she had about a month of no activity.  More recently, she has started at the [employer] [facility]. She works out 30 minutes 3 times a week without chest discomfort. She has some sore muscles. She does note some atypical chest \"jabs\" at rest that last seconds. She has had these for years, and they have not changed. As I noted she had a stress test in 2017 that was negative and she can be active for 30 minutes without sxs.       ROS:  Skin:  Positive for bruising   Eyes:  Positive for glasses;cataracts  ENT:  Positive for tinnitus  Respiratory:  Positive for shortness of breath  Cardiovascular:    Positive " "for;chest pain;lightheadedness;fatigue  Gastroenterology: Positive for heartburn  Genitourinary:  Negative    Musculoskeletal:  Positive for foot pain;joint pain;joint stiffness;arthritis  Neurologic:  Positive for headaches  Psychiatric:  not assessed    Heme/Lymph/Imm:  Positive for allergies;night sweats;hay fever;easy bruising  Endocrine:  Negative      PAST MEDICAL HISTORY:  Past Medical History:   Diagnosis Date     Asthma     no inhaler for 2-3 years     Coronary artery disease      Depression      Gastro-oesophageal reflux disease      History of blood transfusion      Hyperlipidemia      Hypertension      Osteoarthritis      Osteopenia        PAST SURGICAL HISTORY:  Past Surgical History:   Procedure Laterality Date     ANGIOGRAM  6/15/05     ARTHROPLASTY HIP  9/23/2013    Procedure: ARTHROPLASTY HIP;  Right total hip arthroplasty       ARTHROPLASTY HIP Left 5/19/2015    Procedure: ARTHROPLASTY HIP;  Surgeon: Tyrese Howell MD;  Location: [facility] OR     ARTHROPLASTY KNEE Left 8/22/2016    Procedure: ARTHROPLASTY KNEE;  Surgeon: Tyrese Howell MD;  Location: [facility] OR     CHOLECYSTECTOMY       COLONOSCOPY       COLONOSCOPY Left 4/19/2016    Procedure: COLONOSCOPY;  Surgeon: Moe Haney MD;  Location:  GI     GASTRIC BYPASS      2011 tiff en y     GI SURGERY      fistulotomy     ORTHOPEDIC SURGERY      left knee \"green procedure\"     TONSILLECTOMY       TUBAL LIGATION         SOCIAL HISTORY:  Social History     Social History     Marital status:      Spouse name: N/A     Number of children: N/A     Years of education: N/A     Social History Main Topics     Smoking status: Former Smoker     Packs/day: 0.50     Years: 50.00     Types: Cigarettes     Quit date: 1/19/2013     Smokeless tobacco: Never Used     Alcohol use 0.0 oz/week     0 Standard drinks or equivalent per week      Comment: 4 drinks weekly     Drug use: No     Sexual activity: Yes     Partners: Male     Other Topics Concern     " Parent/Sibling W/ Cabg, Mi Or Angioplasty Before 65f 55m? Yes     brother age 31     Caffeine Concern Yes     1 cup of coffee      Sleep Concern Yes     lately it has been an issue     Special Diet No     watching what she eats      Exercise No     Seat Belt Yes     Social History Narrative       FAMILY HISTORY:  Family History   Problem Relation Age of Onset     Coronary Artery Disease Father      Hypertension Father      Coronary Artery Disease Sister      Hypertension Sister      Coronary Artery Disease Brother      Hypertension Brother      HEART DISEASE Nephew      Other Cancer Maternal Half-Sister        MEDS:   Current Outpatient Prescriptions on File Prior to Visit:  LORazepam (ATIVAN) 1 MG tablet Take 1 tablet (1 mg) by mouth daily as needed for anxiety   omeprazole (PRILOSEC) 40 MG capsule Take 1 capsule (40 mg) by mouth daily Take 30-60 minutes before a meal.   atenolol (TENORMIN) 50 MG tablet Take 1 tablet (50 mg) by mouth daily   hydrochlorothiazide (HYDRODIURIL) 25 MG tablet Take 1 tablet (25 mg) by mouth daily   lisinopril (PRINIVIL/ZESTRIL) 40 MG tablet Take 1 tablet (40 mg) by mouth daily   traZODone (DESYREL) 100 MG tablet Take 2 tablets (200 mg) by mouth At Bedtime   calcium carbonate-vitamin D 600-400 MG-UNIT CHEW Take 2 tablets by mouth every morning   FIBER SELECT GUMMIES CHEW Take 1 chew tab by mouth daily    aspirin 81 MG tablet Take 81 mg by mouth daily   Coenzyme Q10 (CO Q 10 PO) Take 100 mg by mouth every evening    buPROPion (WELLBUTRIN SR) 150 MG 12 hr tablet Take 1 tablet (150 mg) by mouth 2 times daily   DULoxetine (CYMBALTA) 60 MG EC capsule Take 1 capsule (60 mg) by mouth daily   atorvastatin (LIPITOR) 20 MG tablet Take 1 tablet (20 mg) by mouth daily   acetaminophen (TYLENOL) 325 MG tablet Take 650 mg by mouth every morning    CYANOCOBALAMIN SL Place 1,000 mcg under the tongue daily   Omega-3 Fatty Acids (OMEGA-3 FISH OIL PO) Take 1.2 g by mouth daily    multivitamin, therapeutic  "with minerals (MULTI-VITAMIN) TABS Take 1 tablet by mouth daily   [DISCONTINUED] hydrALAZINE (APRESOLINE) 50 MG tablet Take 1 tablet (50 mg) by mouth 3 times daily     No current facility-administered medications on file prior to visit.     ALLERGIES:   Allergies   Allergen Reactions     Codeine Sulfate      \"hyper\"       PHYSICAL EXAM:  Vitals: /72 (BP Location: Right arm, Patient Position: Chair, Cuff Size: Adult Regular)  Pulse 68  Ht 1.638 m (5' 4.5\")  Wt 83.1 kg (183 lb 4.8 oz)  SpO2 98%  BMI 30.98 kg/m2  Constitutional:  cooperative, alert and oriented, well developed, well nourished, in no acute distress overweight mildly anxious    Skin:  warm and dry to the touch, no apparent skin lesions or masses noted   warm and dry    Head:  normocephalic, no masses or lesions   atraumatic    Eyes:  pupils equal and round;conjunctivae and lids unremarkable;sclera white        ENT:  no pallor or cyanosis, dentition good        Neck:  no carotid bruit;JVP normal   supple    Respiratory:  normal breath sounds, clear to auscultation, normal A-P diameter, normal symmetry, normal respiratory excursion, no use of accessory muscles   clear without rales/wheezing    Cardiac: regular rhythm, normal S1/S2, no S3 or S4, apical impulse not displaced, no murmurs, gallops or rubs                  GI:  abdomen soft;BS normoactive;non-tender   non-tender    Vascular: pulses full and equal                                      Extremities and Musculoskeletal:  no deformities, clubbing, cyanosis, erythema observed;no edema        Neurological:  no gross motor deficits;affect appropriate          ASSESSMENT/PLAN:  1.  Mild nonobstructive coronary artery disease based on coronary angiogram in 2005 at [facility].     a.  She apparently underwent stress testing in 2009 and 2011 at [facility], both of which by report were normal (we do not have the actual reports available).  Also negative stress echo 8/2017.   b.  No " anginal sxs. She has some reports of atypical chest discomfort, but this is chronic and has been evaluated previously.   c.  Continue ASA, BB, statin.   d.  If sxs worsen, we can check a repeat nuc or a CTA.     2.  Excellent fasting lipid profile 3/2017 on atorvastatin 20 mg daily and fish oil.     3.  Essential hypertension.  BP consistently uncontrolled.  Continue atenolol 50 mg, HCTZ 25 mg, lisinopril 40 mg.  Increase hydralazine to 75 mg TID. Follow up in 1 month. Continue to work on weight loss, increased activity, low sodium diet.     Thank you very much for allowing me to participate in the care of Monse Peoples.     Jaelyn Lowry PA-C

## 2018-03-23 NOTE — MR AVS SNAPSHOT
After Visit Summary   3/23/2018    Monse Peoples    MRN: [id number]           Patient Information     Date Of Birth          1945        Visit Information        Provider Department      3/23/2018 1:00 PM Jaelyn Lowry PA-C [facility]        Today's Diagnoses     Benign essential hypertension          Care Instructions    Blood pressures are running a bit too high on a consistent basis at home.   Increase the hydralazine to 75 mg three times daily.   Check it at home over the next month, and write it down, and bring it with next appointment.   See me in about 1-2 months to follow up on the blood pressure.           Follow-ups after your visit        Additional Services     Follow-Up with Cardiac Advanced Practice Provider                 Your next 10 appointments already scheduled     Apr 20, 2018  1:00 PM CDT   Return Visit with Jaelyn Lowry PA-C   [facility] ([facility])    [address] [address] [id number] [phone]              Future tests that were ordered for you today     Open Future Orders        Priority Expected Expires Ordered    Follow-Up with Cardiac Advanced Practice Provider Routine 4/22/2018 3/23/2019 3/23/2018            Who to contact     If you have questions or need follow up information about today's clinic visit or your schedule please contact [facility] directly at [phone].  Normal or non-critical lab and imaging results will be communicated to you by MyChart, letter or phone within 4 business days after the clinic has received the results. If you do not hear from us within 7 days, please contact the clinic through MyChart or phone. If you have a critical or abnormal lab result, we will notify you by phone as soon as possible.  Submit refill requests through [employer] or call your pharmacy and they  "will forward the refill request to us. Please allow 3 business days for your refill to be completed.          Additional Information About Your Visit        [employer] Information     [employer] gives you secure access to your electronic health record. If you see a primary care provider, you can also send messages to your care team and make appointments. If you have questions, please call your primary care clinic.  If you do not have a primary care provider, please call [phone] and they will assist you.        Care EveryWhere ID     This is your Care EveryWhere ID. This could be used by other organizations to access your [facility] medical records  [id number]        Your Vitals Were     Pulse Height Pulse Oximetry BMI (Body Mass Index)          68 1.638 m (5' 4.5\") 98% 30.98 kg/m2         Blood Pressure from Last 3 Encounters:   03/23/18 138/72   12/07/17 124/70   10/26/17 148/84    Weight from Last 3 Encounters:   03/23/18 83.1 kg (183 lb 4.8 oz)   10/26/17 83.7 kg (184 lb 9.6 oz)   09/06/17 82.5 kg (181 lb 12.8 oz)              We Performed the Following     Follow-Up with Cardiac Advanced Practice Provider          Today's Medication Changes          These changes are accurate as of 3/23/18  1:34 PM.  If you have any questions, ask your nurse or doctor.               These medicines have changed or have updated prescriptions.        Dose/Directions    hydrALAZINE 25 MG tablet   Commonly known as:  APRESOLINE   This may have changed:    - medication strength  - how much to take  - additional instructions   Used for:  Benign essential hypertension   Changed by:  Jaelyn Lowry PA-C        Dose:  75 mg   Take 3 tablets (75 mg) by mouth 3 times daily (you can use up the 50 mg tabs by taking 1.5 tabs 3 times a day first)   Quantity:  810 tablet   Refills:  3            Where to get your medicines      These medications were sent to [id number] - [place], [place] - [address] [address], " [facility] [address]     Phone:  [phone]     hydrALAZINE 25 MG tablet                Primary Care Provider Office Phone # Fax #    Susan Luc Camacho [phone] [address]  [facility] [address]        Equal Access to Services     LION LIAO : Hadii aad ku hadasho Soomaali, waaxda luqadaha, qaybta kaalmada adeegyada, waxay tinin haylolyn leslie amezcuagraemelogan moore. So [facility] [phone].    ATENCIÓN: Si habla español, tiene a rodgers disposición servicios gratuitos de asistencia lingüística. Tanner al [phone].    We comply with applicable federal civil rights laws and [place] laws. We do not discriminate on the basis of race, color, national origin, age, disability, sex, sexual orientation, or gender identity.            Thank you!     Thank you for choosing [facility]  for your care. Our goal is always to provide you with excellent care. Hearing back from our patients is one way we can continue to improve our services. Please take a few minutes to complete the written survey that you may receive in the mail after your visit with us. Thank you!             Your Updated Medication List - Protect others around you: Learn how to safely use, store and throw away your medicines at www.disposemymeds.org.          This list is accurate as of 3/23/18  1:34 PM.  Always use your most recent med list.                   Brand Name Dispense Instructions for use Diagnosis    aspirin 81 MG tablet      Take 81 mg by mouth daily        atenolol 50 MG tablet    TENORMIN    30 tablet    Take 1 tablet (50 mg) by mouth daily    Essential hypertension       atorvastatin 20 MG tablet    LIPITOR    90 tablet    Take 1 tablet (20 mg) by mouth daily    Hyperlipidemia LDL goal <100       buPROPion 150 MG 12 hr tablet    WELLBUTRIN SR    180 tablet    Take 1 tablet (150 mg) by mouth 2 times daily    Anxiety       calcium carbonate-vitamin D 600-400 MG-UNIT Chew      Take 2  tablets by mouth every morning        CO Q 10 PO      Take 100 mg by mouth every evening        CYANOCOBALAMIN SL      Place 1,000 mcg under the tongue daily        DULoxetine 60 MG EC capsule    CYMBALTA    90 capsule    Take 1 capsule (60 mg) by mouth daily    Anxiety       FIBER SELECT GUMMIES Chew      Take 1 chew tab by mouth daily        hydrALAZINE 25 MG tablet    APRESOLINE    810 tablet    Take 3 tablets (75 mg) by mouth 3 times daily (you can use up the 50 mg tabs by taking 1.5 tabs 3 times a day first)    Benign essential hypertension       hydrochlorothiazide 25 MG tablet    HYDRODIURIL    90 tablet    Take 1 tablet (25 mg) by mouth daily    Benign essential hypertension       lisinopril 40 MG tablet    PRINIVIL/ZESTRIL    90 tablet    Take 1 tablet (40 mg) by mouth daily    Benign essential hypertension       LORazepam 1 MG tablet    ATIVAN    30 tablet    Take 1 tablet (1 mg) by mouth daily as needed for anxiety    Anxiety       Multi-vitamin Tabs tablet      Take 1 tablet by mouth daily        OMEGA-3 FISH OIL PO      Take 1.2 g by mouth daily        omeprazole 40 MG capsule    priLOSEC    90 capsule    Take 1 capsule (40 mg) by mouth daily Take 30-60 minutes before a meal.    Gastroesophageal reflux disease without esophagitis       traZODone 100 MG tablet    DESYREL    180 tablet    Take 2 tablets (200 mg) by mouth At Bedtime    Anxiety       TYLENOL 325 MG tablet   Generic drug:  acetaminophen     100 tablet    Take 650 mg by mouth every morning

## 2018-03-23 NOTE — LETTER
"3/23/2018    Susan Camacho MD  [address]  [facility] [address]    RE: Monse Peoples       Dear Colleague,    I had the pleasure of seeing Monse Peoples in the [facility].    CARDIOLOGY CLINIC PROGRESS NOTE    DOS: 3/23/2018    Monse Peoples  : 1945, 72 year old  MRN: [id number]      History:    I had the pleasure of following up with Monse Peoples today in the Cardiology Clinic. Monse is a very pleasant 72-year-old woman who is a patient of Dr. Sotero Garza.      Her past medical history consists of mild nonobstructive coronary artery disease based upon a coronary angiogram performed in  at [facility].  She has a history of hypertension, hyperlipidemia and gastric bypass surgery.   Also anxiety.  According to past notes, Monse also underwent stress testing in  and  at [facility], both of which by verbal report were normal, though we do not have the actual reports available to us.  She more recently underwent a stress echo 2017 that was normal.       Monse was last seen by Dr. Garza 10/26/17.  Overall she was stable.  However, she had gained 9 lbs, and her BP was borderline elevated. He recommended weight loss, low sodium diet, and follow up in 6 months.       Monse presents today for follow up.  She checks her BP occasionally at home. It is anywhere from 140/80 to 160/90.  This is when she has headaches.  She does not check it otherwise.  She was working out at [employer], but that was cancelled so she had about a month of no activity.  More recently, she has started at the [employer]. She works out 30 minutes 3 times a week without chest discomfort. She has some sore muscles. She does note some atypical chest \"jabs\" at rest that last seconds. She has had these for years, and they have not changed. As I noted she had a stress test in 2017 that was negative and she can be " "active for 30 minutes without sxs.       ROS:  Skin:  Positive for bruising   Eyes:  Positive for glasses;cataracts  ENT:  Positive for tinnitus  Respiratory:  Positive for shortness of breath  Cardiovascular:    Positive for;chest pain;lightheadedness;fatigue  Gastroenterology: Positive for heartburn  Genitourinary:  Negative    Musculoskeletal:  Positive for foot pain;joint pain;joint stiffness;arthritis  Neurologic:  Positive for headaches  Psychiatric:  not assessed    Heme/Lymph/Imm:  Positive for allergies;night sweats;hay fever;easy bruising  Endocrine:  Negative      PAST MEDICAL HISTORY:  Past Medical History:   Diagnosis Date     Asthma     no inhaler for 2-3 years     Coronary artery disease      Depression      Gastro-oesophageal reflux disease      History of blood transfusion      Hyperlipidemia      Hypertension      Osteoarthritis      Osteopenia        PAST SURGICAL HISTORY:  Past Surgical History:   Procedure Laterality Date     ANGIOGRAM  6/15/05     ARTHROPLASTY HIP  9/23/2013    Procedure: ARTHROPLASTY HIP;  Right total hip arthroplasty       ARTHROPLASTY HIP Left 5/19/2015    Procedure: ARTHROPLASTY HIP;  Surgeon: Tyrese Howell MD;  Location: [facility]     ARTHROPLASTY KNEE Left 8/22/2016    Procedure: ARTHROPLASTY KNEE;  Surgeon: Tyrese Howell MD;  Location: [facility] OR     CHOLECYSTECTOMY       COLONOSCOPY       COLONOSCOPY Left 4/19/2016    Procedure: COLONOSCOPY;  Surgeon: Moe Haney MD;  Location:  GI     GASTRIC BYPASS      2011 tiff en y     GI SURGERY      fistulotomy     ORTHOPEDIC SURGERY      left knee \"green procedure\"     TONSILLECTOMY       TUBAL LIGATION         SOCIAL HISTORY:  Social History     Social History     Marital status:      Spouse name: N/A     Number of children: N/A     Years of education: N/A     Social History Main Topics     Smoking status: Former Smoker     Packs/day: 0.50     Years: 50.00     Types: Cigarettes     Quit date: 1/19/2013     " Smokeless tobacco: Never Used     Alcohol use 0.0 oz/week     0 Standard drinks or equivalent per week      Comment: 4 drinks weekly     Drug use: No     Sexual activity: Yes     Partners: Male     Other Topics Concern     Parent/Sibling W/ Cabg, Mi Or Angioplasty Before 65f 55m? Yes     brother age 31     Caffeine Concern Yes     1 cup of coffee      Sleep Concern Yes     lately it has been an issue     Special Diet No     watching what she eats      Exercise No     Seat Belt Yes     Social History Narrative       FAMILY HISTORY:  Family History   Problem Relation Age of Onset     Coronary Artery Disease Father      Hypertension Father      Coronary Artery Disease Sister      Hypertension Sister      Coronary Artery Disease Brother      Hypertension Brother      HEART DISEASE Nephew      Other Cancer Maternal Half-Sister        MEDS:   Current Outpatient Prescriptions on File Prior to Visit:  LORazepam (ATIVAN) 1 MG tablet Take 1 tablet (1 mg) by mouth daily as needed for anxiety   omeprazole (PRILOSEC) 40 MG capsule Take 1 capsule (40 mg) by mouth daily Take 30-60 minutes before a meal.   atenolol (TENORMIN) 50 MG tablet Take 1 tablet (50 mg) by mouth daily   hydrochlorothiazide (HYDRODIURIL) 25 MG tablet Take 1 tablet (25 mg) by mouth daily   lisinopril (PRINIVIL/ZESTRIL) 40 MG tablet Take 1 tablet (40 mg) by mouth daily   traZODone (DESYREL) 100 MG tablet Take 2 tablets (200 mg) by mouth At Bedtime   calcium carbonate-vitamin D 600-400 MG-UNIT CHEW Take 2 tablets by mouth every morning   FIBER SELECT GUMMIES CHEW Take 1 chew tab by mouth daily    aspirin 81 MG tablet Take 81 mg by mouth daily   Coenzyme Q10 (CO Q 10 PO) Take 100 mg by mouth every evening    buPROPion (WELLBUTRIN SR) 150 MG 12 hr tablet Take 1 tablet (150 mg) by mouth 2 times daily   DULoxetine (CYMBALTA) 60 MG EC capsule Take 1 capsule (60 mg) by mouth daily   atorvastatin (LIPITOR) 20 MG tablet Take 1 tablet (20 mg) by mouth daily  "  acetaminophen (TYLENOL) 325 MG tablet Take 650 mg by mouth every morning    CYANOCOBALAMIN SL Place 1,000 mcg under the tongue daily   Omega-3 Fatty Acids (OMEGA-3 FISH OIL PO) Take 1.2 g by mouth daily    multivitamin, therapeutic with minerals (MULTI-VITAMIN) TABS Take 1 tablet by mouth daily   [DISCONTINUED] hydrALAZINE (APRESOLINE) 50 MG tablet Take 1 tablet (50 mg) by mouth 3 times daily     No current facility-administered medications on file prior to visit.     ALLERGIES:   Allergies   Allergen Reactions     Codeine Sulfate      \"hyper\"       PHYSICAL EXAM:  Vitals: /72 (BP Location: Right arm, Patient Position: Chair, Cuff Size: Adult Regular)  Pulse 68  Ht 1.638 m (5' 4.5\")  Wt 83.1 kg (183 lb 4.8 oz)  SpO2 98%  BMI 30.98 kg/m2  Constitutional:  cooperative, alert and oriented, well developed, well nourished, in no acute distress overweight mildly anxious    Skin:  warm and dry to the touch, no apparent skin lesions or masses noted   warm and dry    Head:  normocephalic, no masses or lesions   atraumatic    Eyes:  pupils equal and round;conjunctivae and lids unremarkable;sclera white        ENT:  no pallor or cyanosis, dentition good        Neck:  no carotid bruit;JVP normal   supple    Respiratory:  normal breath sounds, clear to auscultation, normal A-P diameter, normal symmetry, normal respiratory excursion, no use of accessory muscles   clear without rales/wheezing    Cardiac: regular rhythm, normal S1/S2, no S3 or S4, apical impulse not displaced, no murmurs, gallops or rubs                  GI:  abdomen soft;BS normoactive;non-tender   non-tender    Vascular: pulses full and equal                                      Extremities and Musculoskeletal:  no deformities, clubbing, cyanosis, erythema observed;no edema        Neurological:  no gross motor deficits;affect appropriate          ASSESSMENT/PLAN:  1.  Mild nonobstructive coronary artery disease based on coronary angiogram in 2005 at " [facility].     a.  She apparently underwent stress testing in 2009 and 2011 at [facility], both of which by report were normal (we do not have the actual reports available).  Also negative stress echo 8/2017.   b.  N o anginal sxs. She has some reports of atypical chest discomfort, but this is chronic and has been evaluated previously.   c.  Continue ASA, BB, statin.   d.  If sxs worsen, we can check a repeat nuc or a CTA.     2.  Excellent fasting lipid profile 3/2017 on atorvastatin 20 mg daily and fish oil.     3.  Essential hypertension.  BP consistently uncontrolled.  Continue atenolol 50 mg, HCTZ 25 mg, lisinopril 40 mg.  Increase hydralazine to 75 mg TID. Follow up in 1 month. Continue to work on weight loss, increased activity, low sodium diet.     Thank you very much for allowing me to participate in the care of Monse Peoples.     Jaelyn Lowry PA-C    Thank you for allowing me to participate in the care of your patient.      Sincerely,     Jaelyn Lowry PA-C     [facility]

## 2018-03-23 NOTE — PATIENT INSTRUCTIONS
Blood pressures are running a bit too high on a consistent basis at home.   Increase the hydralazine to 75 mg three times daily.   Check it at home over the next month, and write it down, and bring it with next appointment.   See me in about 1-2 months to follow up on the blood pressure.

## 2018-04-06 ENCOUNTER — HOSPITAL ENCOUNTER (OUTPATIENT)
Dept: MAMMOGRAPHY | Facility: CLINIC | Age: 73
Discharge: HOME | End: 2018-04-06
Attending: FAMILY MEDICINE | Admitting: FAMILY MEDICINE
Payer: MEDICARE

## 2018-04-06 DIAGNOSIS — Z12.31 VISIT FOR SCREENING MAMMOGRAM: ICD-10-CM

## 2018-04-06 PROCEDURE — 77063 BREAST TOMOSYNTHESIS BI: CPT

## 2018-04-20 ENCOUNTER — OFFICE VISIT (OUTPATIENT)
Dept: CARDIOLOGY | Facility: CLINIC | Age: 73
End: 2018-04-20
Attending: PHYSICIAN ASSISTANT
Payer: COMMERCIAL

## 2018-04-20 VITALS
BODY MASS INDEX: 30.54 KG/M2 | HEART RATE: 80 BPM | DIASTOLIC BLOOD PRESSURE: 70 MMHG | WEIGHT: 183.3 LBS | SYSTOLIC BLOOD PRESSURE: 118 MMHG | HEIGHT: 65 IN

## 2018-04-20 DIAGNOSIS — I10 BENIGN ESSENTIAL HYPERTENSION: ICD-10-CM

## 2018-04-20 DIAGNOSIS — I25.10 ASHD (ARTERIOSCLEROTIC HEART DISEASE): Primary | ICD-10-CM

## 2018-04-20 DIAGNOSIS — R07.9 CHEST PAIN, UNSPECIFIED TYPE: ICD-10-CM

## 2018-04-20 PROCEDURE — 99214 OFFICE O/P EST MOD 30 MIN: CPT | Performed by: PHYSICIAN ASSISTANT

## 2018-04-20 RX ORDER — NITROGLYCERIN 0.4 MG/1
TABLET SUBLINGUAL
Qty: 25 TABLET | Refills: 0 | Status: SHIPPED | OUTPATIENT
Start: 2018-04-20 | End: 2019-11-15

## 2018-04-20 NOTE — PATIENT INSTRUCTIONS
Keep the current medications.     [facility] to [facility].     See a primary clinic provider (urgent care or walk in clinic or PCP office) for the urinary frequency.     See us in October, sooner if chest pains increase.

## 2018-04-20 NOTE — LETTER
"2018    Susan Camacho MD  [address]  [facility] [address]    RE: Monse Peoples       Dear Colleague,    I had the pleasure of seeing Monse Peoples in the [facility].    CARDIOLOGY CLINIC PROGRESS NOTE    DOS: 2018    Monse Peoples  : 1945, 72 year old  MRN: [id number]      History:   I had the pleasure of following up with Monse Peoples today in the Cardiology Clinic. Monse is a very pleasant 72-year-old woman who is a patient of Dr. Sotero Garza.       Her past medical history consists of mild nonobstructive coronary artery disease based upon a coronary angiogram performed in  at [facility].  She has a history of hypertension, hyperlipidemia and gastric bypass surgery.  Also anxiety.  According to past notes, Monse also underwent stress testing in  and  at [facility], both of which by verbal report were normal, though we do not have the actual reports available to us.  She more recently underwent a stress echo 2017 that was normal.       Monse was last seen by Dr. Garza 10/26/17.  Overall she was stable.  However, she had gained 9 lbs, and her BP was borderline elevated. He recommended weight loss, low sodium diet, and follow up in 6 months.       I saw Monse in follow up 3/23/18.  Her BPs were consistently elevated. We increased hydrazine from 50 mg TID to 75 mg TID.     She presents today for follow up.   She has checked her BP occasionally at home. It is mostly 130-140s (was 140-160s).    Fewer headaches with better BP control. Though does get tension headaches at night sometimes.  She has stress.   She has started at the [employer].  She works out 30 minutes 3 times a week without any chest discomfort.   She does note some atypical chest \"jabs\" at rest that last 2-5 seconds.   She has had these for years, and they are actually occurring less often now.   Also an occasional band " "sensation that is worse at night after eating.   As I stated before, she does not get this with exercise.  As I noted she had a stress test in 8/2017 that was negative and she can be active for 30 minutes without sxs.   Only concern is UTI sxs.       ROS:  Skin:  Negative     Eyes:  Positive for glasses  ENT:  Positive for tinnitus  Respiratory:  Positive for shortness of breath;dyspnea on exertion  Cardiovascular:    chest pain;Positive for  Gastroenterology: Positive for nausea  Genitourinary:  Positive for urinary frequency;urgency  Musculoskeletal:  Positive for arthritis;back pain;neck pain  Neurologic:  Positive for headaches  Psychiatric:  Negative    Heme/Lymph/Imm:  Negative    Endocrine:  Negative      PAST MEDICAL HISTORY:  Past Medical History:   Diagnosis Date     Asthma     no inhaler for 2-3 years     Coronary artery disease      Depression      Gastro-oesophageal reflux disease      History of blood transfusion      Hyperlipidemia      Hypertension      Osteoarthritis      Osteopenia        PAST SURGICAL HISTORY:  Past Surgical History:   Procedure Laterality Date     ANGIOGRAM  6/15/05     ARTHROPLASTY HIP  9/23/2013    Procedure: ARTHROPLASTY HIP;  Right total hip arthroplasty       ARTHROPLASTY HIP Left 5/19/2015    Procedure: ARTHROPLASTY HIP;  Surgeon: Tyrese Howell MD;  Location: [facility] OR     ARTHROPLASTY KNEE Left 8/22/2016    Procedure: ARTHROPLASTY KNEE;  Surgeon: Tyrese Howell MD;  Location: [facility] OR     CHOLECYSTECTOMY       COLONOSCOPY       COLONOSCOPY Left 4/19/2016    Procedure: COLONOSCOPY;  Surgeon: Moe Haney MD;  Location:  GI     GASTRIC BYPASS      2011 tiff en y     GI SURGERY      fistulotomy     ORTHOPEDIC SURGERY      left knee \"green procedure\"     TONSILLECTOMY       TUBAL LIGATION         SOCIAL HISTORY:  Social History     Social History     Marital status:      Spouse name: N/A     Number of children: N/A     Years of education: N/A     Social History " Main Topics     Smoking status: Former Smoker     Packs/day: 0.50     Years: 50.00     Types: Cigarettes     Quit date: 1/19/2013     Smokeless tobacco: Never Used     Alcohol use 0.0 oz/week     0 Standard drinks or equivalent per week      Comment: 4 drinks weekly     Drug use: No     Sexual activity: Yes     Partners: Male     Other Topics Concern     Parent/Sibling W/ Cabg, Mi Or Angioplasty Before 65f 55m? Yes     brother age 31     Caffeine Concern Yes     1 cup of coffee      Sleep Concern Yes     lately it has been an issue     Special Diet No     watching what she eats      Exercise No     Seat Belt Yes     Social History Narrative       FAMILY HISTORY:  Family History   Problem Relation Age of Onset     Coronary Artery Disease Father      Hypertension Father      Coronary Artery Disease Sister      Hypertension Sister      Coronary Artery Disease Brother      Hypertension Brother      HEART DISEASE Nephew      Other Cancer Maternal Half-Sister        MEDS:   Current Outpatient Prescriptions on File Prior to Visit:  acetaminophen (TYLENOL) 325 MG tablet Take 650 mg by mouth every morning    aspirin 81 MG tablet Take 81 mg by mouth daily   atenolol (TENORMIN) 50 MG tablet Take 1 tablet (50 mg) by mouth daily   atorvastatin (LIPITOR) 20 MG tablet Take 1 tablet (20 mg) by mouth daily   buPROPion (WELLBUTRIN SR) 150 MG 12 hr tablet Take 1 tablet (150 mg) by mouth 2 times daily   calcium carbonate-vitamin D 600-400 MG-UNIT CHEW Take 2 tablets by mouth every morning   Coenzyme Q10 (CO Q 10 PO) Take 100 mg by mouth every evening    CYANOCOBALAMIN SL Place 1,000 mcg under the tongue daily   DULoxetine (CYMBALTA) 60 MG EC capsule Take 1 capsule (60 mg) by mouth daily   FIBER SELECT GUMMIES CHEW Take 1 chew tab by mouth daily    hydrALAZINE (APRESOLINE) 25 MG tablet Take 3 tablets (75 mg) by mouth 3 times daily (you can use up the 50 mg tabs by taking 1.5 tabs 3 times a day first)   hydrochlorothiazide (HYDRODIURIL)  "25 MG tablet Take 1 tablet (25 mg) by mouth daily   lisinopril (PRINIVIL/ZESTRIL) 40 MG tablet Take 1 tablet (40 mg) by mouth daily   LORazepam (ATIVAN) 1 MG tablet Take 1 tablet (1 mg) by mouth daily as needed for anxiety   multivitamin, therapeutic with minerals (MULTI-VITAMIN) TABS Take 1 tablet by mouth daily   Omega-3 Fatty Acids (OMEGA-3 FISH OIL PO) Take 1.2 g by mouth daily    omeprazole (PRILOSEC) 40 MG capsule Take 1 capsule (40 mg) by mouth daily Take 30-60 minutes before a meal.   traZODone (DESYREL) 100 MG tablet Take 2 tablets (200 mg) by mouth At Bedtime     No current facility-administered medications on file prior to visit.     ALLERGIES:   Allergies   Allergen Reactions     Codeine Sulfate      \"hyper\"       PHYSICAL EXAM:  Vitals: /70 (BP Location: Right arm, Patient Position: Sitting, Cuff Size: Adult Regular)  Pulse 80  Ht 1.638 m (5' 4.5\")  Wt 83.1 kg (183 lb 4.8 oz)  Breastfeeding? No  BMI 30.98 kg/m2  Constitutional:  cooperative, alert and oriented, well developed, well nourished, in no acute distress overweight mildly anxious    Skin:  warm and dry to the touch, no apparent skin lesions or masses noted   warm and dry    Head:  normocephalic, no masses or lesions   atraumatic    Eyes:  pupils equal and round;conjunctivae and lids unremarkable;sclera white        ENT:  no pallor or cyanosis, dentition good        Neck:  no carotid bruit;JVP normal        Respiratory:      clear without rales/wheezing    Cardiac: regular rhythm, normal S1/S2, no S3 or S4, apical impulse not displaced, no murmurs, gallops or rubs occasional premature beats                GI:  abdomen soft;BS normoactive;non-tender   non-tender    Vascular: pulses full and equal                                      Extremities and Musculoskeletal:  no deformities, clubbing, cyanosis, erythema observed;no edema        Neurological:  no gross motor deficits;affect appropriate            ASSESSMENT/PLAN:  1.  Mild " nonobstructive coronary artery disease based on coronary angiogram in 2005 at [facility].     a.  She apparently underwent stress testing in 2009 and 2011 at [facility], both of which by report were normal (we do not have the actual reports available).  Also negative stress echo 8/2017.   b.  No clear anginal sxs.  She has some reports of atypical chest discomfort (jabs that last 2-5 secs, and band that is worse at night after eating).  Her symptoms have been going on for years, and has been evaluated previously (see stress test 8/2017).     c.  Continue ASA, BB, statin. Rx for SL nitro.    d.  If sxs worsen, she will let us know, and we can check a repeat nuc or a CTA.      2.  Excellent fasting lipid profile 3/2017 on atorvastatin 20 mg daily and fish oil.      3.  Essential hypertension.  BP  better controlled.  Continue atenolol 50 mg, HCTZ 25 mg, lisinopril 40 mg, hydralazine 75 mg TID.  Continue to work on weight loss, increased activity, low sodium diet.        Follow up:  Dr. Garza October 2018 with FLP/ALT, BMP. See us sooner if chest pains increase.      Thank you very much for allowing me to participate in the care of Monse Peoples.     Jaelyn Lowry PA-C    Thank you for allowing me to participate in the care of your patient.      Sincerely,     Jaelyn Lowry PA-C     [facility]    cc:   Jaelyn Lowry PA-C  [address] [facility]  [address]  [place] [address]

## 2018-04-20 NOTE — LETTER
"2018    Susan Camacho MD  [address]  [facility] [address]    RE: Monse Peoples       Dear Colleague,    I had the pleasure of seeing Monse Peoples in the [facility].    CARDIOLOGY CLINIC PROGRESS NOTE    DOS: 2018    Monse Peoples  : 1945, 72 year old  MRN: [id number]      History:   I had the pleasure of following up with Monse Peoples today in the Cardiology Clinic. Monse is a very pleasant 72-year-old woman who is a patient of Dr. Sotero Garza.       Her past medical history consists of mild nonobstructive coronary artery disease based upon a coronary angiogram performed in  at [facility].  She has a history of hypertension, hyperlipidemia and gastric bypass surgery.  Also anxiety.  According to past notes, Monse also underwent stress testing in  and  at [facility], both of which by verbal report were normal, though we do not have the actual reports available to us.  She more recently underwent a stress echo 2017 that was normal.       Monse was last seen by Dr. Garza 10/26/17.  Overall she was stable.  However, she had gained 9 lbs, and her BP was borderline elevated. He recommended weight loss, low sodium diet, and follow up in 6 months.       I saw Monse in follow up 3/23/18.  Her BPs were consistently elevated. We increased hydrazine from 50 mg TID to 75 mg TID.     She presents today for follow up.   She has checked her BP occasionally at home. It is mostly 130-140s (was 140-160s).    Fewer headaches with better BP control. Though does get tension headaches at night sometimes.  She has stress.   She has started at the [employer].  She works out 30 minutes 3 times a week without any chest discomfort.   She does note some atypical chest \"jabs\" at rest that last 2-5 seconds.   She has had these for years, and they are actually occurring less often now.   Also an occasional band " "sensation that is worse at night after eating.   As I stated before, she does not get this with exercise.  As I noted she had a stress test in 8/2017 that was negative and she can be active for 30 minutes without sxs.   Only concern is UTI sxs.       ROS:  Skin:  Negative     Eyes:  Positive for glasses  ENT:  Positive for tinnitus  Respiratory:  Positive for shortness of breath;dyspnea on exertion  Cardiovascular:    chest pain;Positive for  Gastroenterology: Positive for nausea  Genitourinary:  Positive for urinary frequency;urgency  Musculoskeletal:  Positive for arthritis;back pain;neck pain  Neurologic:  Positive for headaches  Psychiatric:  Negative    Heme/Lymph/Imm:  Negative    Endocrine:  Negative      PAST MEDICAL HISTORY:  Past Medical History:   Diagnosis Date     Asthma     no inhaler for 2-3 years     Coronary artery disease      Depression      Gastro-oesophageal reflux disease      History of blood transfusion      Hyperlipidemia      Hypertension      Osteoarthritis      Osteopenia        PAST SURGICAL HISTORY:  Past Surgical History:   Procedure Laterality Date     ANGIOGRAM  6/15/05     ARTHROPLASTY HIP  9/23/2013    Procedure: ARTHROPLASTY HIP;  Right total hip arthroplasty       ARTHROPLASTY HIP Left 5/19/2015    Procedure: ARTHROPLASTY HIP;  Surgeon: Tyrese Howell MD;  Location: [facility] OR     ARTHROPLASTY KNEE Left 8/22/2016    Procedure: ARTHROPLASTY KNEE;  Surgeon: Tyrese Howell MD;  Location: [facility] OR     CHOLECYSTECTOMY       COLONOSCOPY       COLONOSCOPY Left 4/19/2016    Procedure: COLONOSCOPY;  Surgeon: Moe Haney MD;  Location:  GI     GASTRIC BYPASS      2011 tiff en y     GI SURGERY      fistulotomy     ORTHOPEDIC SURGERY      left knee \"green procedure\"     TONSILLECTOMY       TUBAL LIGATION         SOCIAL HISTORY:  Social History     Social History     Marital status:      Spouse name: N/A     Number of children: N/A     Years of education: N/A     Social History " Main Topics     Smoking status: Former Smoker     Packs/day: 0.50     Years: 50.00     Types: Cigarettes     Quit date: 1/19/2013     Smokeless tobacco: Never Used     Alcohol use 0.0 oz/week     0 Standard drinks or equivalent per week      Comment: 4 drinks weekly     Drug use: No     Sexual activity: Yes     Partners: Male     Other Topics Concern     Parent/Sibling W/ Cabg, Mi Or Angioplasty Before 65f 55m? Yes     brother age 31     Caffeine Concern Yes     1 cup of coffee      Sleep Concern Yes     lately it has been an issue     Special Diet No     watching what she eats      Exercise No     Seat Belt Yes     Social History Narrative       FAMILY HISTORY:  Family History   Problem Relation Age of Onset     Coronary Artery Disease Father      Hypertension Father      Coronary Artery Disease Sister      Hypertension Sister      Coronary Artery Disease Brother      Hypertension Brother      HEART DISEASE Nephew      Other Cancer Maternal Half-Sister        MEDS:   Current Outpatient Prescriptions on File Prior to Visit:  acetaminophen (TYLENOL) 325 MG tablet Take 650 mg by mouth every morning    aspirin 81 MG tablet Take 81 mg by mouth daily   atenolol (TENORMIN) 50 MG tablet Take 1 tablet (50 mg) by mouth daily   atorvastatin (LIPITOR) 20 MG tablet Take 1 tablet (20 mg) by mouth daily   buPROPion (WELLBUTRIN SR) 150 MG 12 hr tablet Take 1 tablet (150 mg) by mouth 2 times daily   calcium carbonate-vitamin D 600-400 MG-UNIT CHEW Take 2 tablets by mouth every morning   Coenzyme Q10 (CO Q 10 PO) Take 100 mg by mouth every evening    CYANOCOBALAMIN SL Place 1,000 mcg under the tongue daily   DULoxetine (CYMBALTA) 60 MG EC capsule Take 1 capsule (60 mg) by mouth daily   FIBER SELECT GUMMIES CHEW Take 1 chew tab by mouth daily    hydrALAZINE (APRESOLINE) 25 MG tablet Take 3 tablets (75 mg) by mouth 3 times daily (you can use up the 50 mg tabs by taking 1.5 tabs 3 times a day first)   hydrochlorothiazide (HYDRODIURIL)  "25 MG tablet Take 1 tablet (25 mg) by mouth daily   lisinopril (PRINIVIL/ZESTRIL) 40 MG tablet Take 1 tablet (40 mg) by mouth daily   LORazepam (ATIVAN) 1 MG tablet Take 1 tablet (1 mg) by mouth daily as needed for anxiety   multivitamin, therapeutic with minerals (MULTI-VITAMIN) TABS Take 1 tablet by mouth daily   Omega-3 Fatty Acids (OMEGA-3 FISH OIL PO) Take 1.2 g by mouth daily    omeprazole (PRILOSEC) 40 MG capsule Take 1 capsule (40 mg) by mouth daily Take 30-60 minutes before a meal.   traZODone (DESYREL) 100 MG tablet Take 2 tablets (200 mg) by mouth At Bedtime     No current facility-administered medications on file prior to visit.     ALLERGIES:   Allergies   Allergen Reactions     Codeine Sulfate      \"hyper\"       PHYSICAL EXAM:  Vitals: /70 (BP Location: Right arm, Patient Position: Sitting, Cuff Size: Adult Regular)  Pulse 80  Ht 1.638 m (5' 4.5\")  Wt 83.1 kg (183 lb 4.8 oz)  Breastfeeding? No  BMI 30.98 kg/m2  Constitutional:  cooperative, alert and oriented, well developed, well nourished, in no acute distress overweight mildly anxious    Skin:  warm and dry to the touch, no apparent skin lesions or masses noted   warm and dry    Head:  normocephalic, no masses or lesions   atraumatic    Eyes:  pupils equal and round;conjunctivae and lids unremarkable;sclera white        ENT:  no pallor or cyanosis, dentition good        Neck:  no carotid bruit;JVP normal        Respiratory:      clear without rales/wheezing    Cardiac: regular rhythm, normal S1/S2, no S3 or S4, apical impulse not displaced, no murmurs, gallops or rubs occasional premature beats                GI:  abdomen soft;BS normoactive;non-tender   non-tender    Vascular: pulses full and equal                                      Extremities and Musculoskeletal:  no deformities, clubbing, cyanosis, erythema observed;no edema        Neurological:  no gross motor deficits;affect appropriate            ASSESSMENT/PLAN:  1.  Mild " nonobstructive coronary artery disease based on coronary angiogram in 2005 at [facility].     a.  She apparently underwent stress testing in 2009 and 2011 at [facility], both of which by report were normal (we do not have the actual reports available).  Also negative stress echo 8/2017.   b.  No clear anginal sxs.  She has some reports of atypical chest discomfort (jabs that last 2-5 secs, and band that is worse at night after eating).  Her symptoms have been going on for years, and has been evaluated previously (see stress test 8/2017).     c.  Continue ASA, BB, statin. Rx for SL nitro.    d.  If sxs worsen, she will let us know, and we can check a repeat nuc or a CTA.      2.  Excellent fasting lipid profile 3/2017 on atorvastatin 20 mg daily and fish oil.      3.  Essential hypertension.  BP  better controlled.  Continue atenolol 50 mg, HCTZ 25 mg, lisinopril 40 mg, hydralazine 75 mg TID.  Continue to work on weight loss, increased activity, low sodium diet.        Follow up:  Dr. Garza October 2018 with FLP/ALT, BMP. See us sooner if chest pains increase.      Thank you very much for allowing me to participate in the care of Monse Peoples.     Sincerely,     Jaelyn Lowry PA-C     [facility]

## 2018-04-20 NOTE — PROGRESS NOTES
"CARDIOLOGY CLINIC PROGRESS NOTE    DOS: 2018    Monse Peoples  : 1945, 72 year old  MRN: [id number]      History:   I had the pleasure of following up with Monse Peoples today in the Cardiology Clinic. Monse is a very pleasant 72-year-old woman who is a patient of Dr. Sotero Garza.       Her past medical history consists of mild nonobstructive coronary artery disease based upon a coronary angiogram performed in  at [facility].  She has a history of hypertension, hyperlipidemia and gastric bypass surgery.  Also anxiety.  According to past notes, Monse also underwent stress testing in  and  at [facility], both of which by verbal report were normal, though we do not have the actual reports available to us.  She more recently underwent a stress echo 2017 that was normal.       Monse was last seen by Dr. Garza 10/26/17.  Overall she was stable.  However, she had gained 9 lbs, and her BP was borderline elevated. He recommended weight loss, low sodium diet, and follow up in 6 months.       I saw Monse in follow up 3/23/18.  Her BPs were consistently elevated. We increased hydrazine from 50 mg TID to 75 mg TID.     She presents today for follow up.   She has checked her BP occasionally at home. It is mostly 130-140s (was 140-160s).    Fewer headaches with better BP control. Though does get tension headaches at night sometimes.  She has stress.   She has started at the [employer].  She works out 30 minutes 3 times a week without any chest discomfort.   She does note some atypical chest \"jabs\" at rest that last 2-5 seconds.   She has had these for years, and they are actually occurring less often now.   Also an occasional band sensation that is worse at night after eating.   As I stated before, she does not get this with exercise.  As I noted she had a stress test in 2017 that was negative and she can be active for 30 minutes without sxs.   Only " "concern is UTI sxs.       ROS:  Skin:  Negative     Eyes:  Positive for glasses  ENT:  Positive for tinnitus  Respiratory:  Positive for shortness of breath;dyspnea on exertion  Cardiovascular:    chest pain;Positive for  Gastroenterology: Positive for nausea  Genitourinary:  Positive for urinary frequency;urgency  Musculoskeletal:  Positive for arthritis;back pain;neck pain  Neurologic:  Positive for headaches  Psychiatric:  Negative    Heme/Lymph/Imm:  Negative    Endocrine:  Negative      PAST MEDICAL HISTORY:  Past Medical History:   Diagnosis Date     Asthma     no inhaler for 2-3 years     Coronary artery disease      Depression      Gastro-oesophageal reflux disease      History of blood transfusion      Hyperlipidemia      Hypertension      Osteoarthritis      Osteopenia        PAST SURGICAL HISTORY:  Past Surgical History:   Procedure Laterality Date     ANGIOGRAM  6/15/05     ARTHROPLASTY HIP  9/23/2013    Procedure: ARTHROPLASTY HIP;  Right total hip arthroplasty       ARTHROPLASTY HIP Left 5/19/2015    Procedure: ARTHROPLASTY HIP;  Surgeon: Tyrese Howell MD;  Location: [facility] OR     ARTHROPLASTY KNEE Left 8/22/2016    Procedure: ARTHROPLASTY KNEE;  Surgeon: Tyrese Howell MD;  Location: [facility] OR     CHOLECYSTECTOMY       COLONOSCOPY       COLONOSCOPY Left 4/19/2016    Procedure: COLONOSCOPY;  Surgeon: Moe Haney MD;  Location:  GI     GASTRIC BYPASS      2011 tiff en y     GI SURGERY      fistulotomy     ORTHOPEDIC SURGERY      left knee \"green procedure\"     TONSILLECTOMY       TUBAL LIGATION         SOCIAL HISTORY:  Social History     Social History     Marital status:      Spouse name: N/A     Number of children: N/A     Years of education: N/A     Social History Main Topics     Smoking status: Former Smoker     Packs/day: 0.50     Years: 50.00     Types: Cigarettes     Quit date: 1/19/2013     Smokeless tobacco: Never Used     Alcohol use 0.0 oz/week     0 Standard drinks or equivalent " per week      Comment: 4 drinks weekly     Drug use: No     Sexual activity: Yes     Partners: Male     Other Topics Concern     Parent/Sibling W/ Cabg, Mi Or Angioplasty Before 65f 55m? Yes     brother age 31     Caffeine Concern Yes     1 cup of coffee      Sleep Concern Yes     lately it has been an issue     Special Diet No     watching what she eats      Exercise No     Seat Belt Yes     Social History Narrative       FAMILY HISTORY:  Family History   Problem Relation Age of Onset     Coronary Artery Disease Father      Hypertension Father      Coronary Artery Disease Sister      Hypertension Sister      Coronary Artery Disease Brother      Hypertension Brother      HEART DISEASE Nephew      Other Cancer Maternal Half-Sister        MEDS:   Current Outpatient Prescriptions on File Prior to Visit:  acetaminophen (TYLENOL) 325 MG tablet Take 650 mg by mouth every morning    aspirin 81 MG tablet Take 81 mg by mouth daily   atenolol (TENORMIN) 50 MG tablet Take 1 tablet (50 mg) by mouth daily   atorvastatin (LIPITOR) 20 MG tablet Take 1 tablet (20 mg) by mouth daily   buPROPion (WELLBUTRIN SR) 150 MG 12 hr tablet Take 1 tablet (150 mg) by mouth 2 times daily   calcium carbonate-vitamin D 600-400 MG-UNIT CHEW Take 2 tablets by mouth every morning   Coenzyme Q10 (CO Q 10 PO) Take 100 mg by mouth every evening    CYANOCOBALAMIN SL Place 1,000 mcg under the tongue daily   DULoxetine (CYMBALTA) 60 MG EC capsule Take 1 capsule (60 mg) by mouth daily   FIBER SELECT GUMMIES CHEW Take 1 chew tab by mouth daily    hydrALAZINE (APRESOLINE) 25 MG tablet Take 3 tablets (75 mg) by mouth 3 times daily (you can use up the 50 mg tabs by taking 1.5 tabs 3 times a day first)   hydrochlorothiazide (HYDRODIURIL) 25 MG tablet Take 1 tablet (25 mg) by mouth daily   lisinopril (PRINIVIL/ZESTRIL) 40 MG tablet Take 1 tablet (40 mg) by mouth daily   LORazepam (ATIVAN) 1 MG tablet Take 1 tablet (1 mg) by mouth daily as needed for anxiety  "  multivitamin, therapeutic with minerals (MULTI-VITAMIN) TABS Take 1 tablet by mouth daily   Omega-3 Fatty Acids (OMEGA-3 FISH OIL PO) Take 1.2 g by mouth daily    omeprazole (PRILOSEC) 40 MG capsule Take 1 capsule (40 mg) by mouth daily Take 30-60 minutes before a meal.   traZODone (DESYREL) 100 MG tablet Take 2 tablets (200 mg) by mouth At Bedtime     No current facility-administered medications on file prior to visit.     ALLERGIES:   Allergies   Allergen Reactions     Codeine Sulfate      \"hyper\"       PHYSICAL EXAM:  Vitals: /70 (BP Location: Right arm, Patient Position: Sitting, Cuff Size: Adult Regular)  Pulse 80  Ht 1.638 m (5' 4.5\")  Wt 83.1 kg (183 lb 4.8 oz)  Breastfeeding? No  BMI 30.98 kg/m2  Constitutional:  cooperative, alert and oriented, well developed, well nourished, in no acute distress overweight mildly anxious    Skin:  warm and dry to the touch, no apparent skin lesions or masses noted   warm and dry    Head:  normocephalic, no masses or lesions   atraumatic    Eyes:  pupils equal and round;conjunctivae and lids unremarkable;sclera white        ENT:  no pallor or cyanosis, dentition good        Neck:  no carotid bruit;JVP normal        Respiratory:      clear without rales/wheezing    Cardiac: regular rhythm, normal S1/S2, no S3 or S4, apical impulse not displaced, no murmurs, gallops or rubs occasional premature beats                GI:  abdomen soft;BS normoactive;non-tender   non-tender    Vascular: pulses full and equal                                      Extremities and Musculoskeletal:  no deformities, clubbing, cyanosis, erythema observed;no edema        Neurological:  no gross motor deficits;affect appropriate            ASSESSMENT/PLAN:  1.  Mild nonobstructive coronary artery disease based on coronary angiogram in 2005 at [facility].     a.  She apparently underwent stress testing in 2009 and 2011 at [facility], both of which by report were normal (we do not " have the actual reports available).  Also negative stress echo 8/2017.   b.  No clear anginal sxs.  She has some reports of atypical chest discomfort (jabs that last 2-5 secs, and band that is worse at night after eating).  Her symptoms have been going on for years, and has been evaluated previously (see stress test 8/2017).     c.  Continue ASA, BB, statin. Rx for SL nitro.    d.  If sxs worsen, she will let us know, and we can check a repeat nuc or a CTA.      2.  Excellent fasting lipid profile 3/2017 on atorvastatin 20 mg daily and fish oil.      3.  Essential hypertension.  BP better controlled.  Continue atenolol 50 mg, HCTZ 25 mg, lisinopril 40 mg, hydralazine 75 mg TID.  Continue to work on weight loss, increased activity, low sodium diet.        Follow up:  Dr. Garza October 2018 with FLP/ALT, BMP. See us sooner if chest pains increase.      Thank you very much for allowing me to participate in the care of Monse Richelle.     Jaelyn Lowry PA-C

## 2018-04-20 NOTE — MR AVS SNAPSHOT
After Visit Summary   4/20/2018    Monse Peoples    MRN: [id number]           Patient Information     Date Of Birth          1945        Visit Information        Provider Department      4/20/2018 1:00 PM Jaelyn Lowry PA-C [facility]        Today's Diagnoses     ASHD (arteriosclerotic heart disease)    -  1    Benign essential hypertension        Chest pain, unspecified type          Care Instructions    Keep the current medications.     Continue to go to the community fitness center.     See a primary clinic provider (urgent care or walk in clinic or PCP office) for the urinary frequency.     See us in October, sooner if chest pains increase.           Follow-ups after your visit        Who to contact     If you have questions or need follow up information about today's clinic visit or your schedule please contact [facility] directly at [phone].  Normal or non-critical lab and imaging results will be communicated to you by [employer], letter or phone within 4 business days after the clinic has received the results. If you do not hear from us within 7 days, please contact the clinic through [employer] or phone. If you have a critical or abnormal lab result, we will notify you by phone as soon as possible.  Submit refill requests through [employer] or call your pharmacy and they will forward the refill request to us. Please allow 3 business days for your refill to be completed.          Additional Information About Your Visit        MyChart Information     [employer] gives you secure access to your electronic health record. If you see a primary care provider, you can also send messages to your care team and make appointments. If you have questions, please call your primary care clinic.  If you do not have a primary care provider, please call [phone] and they will assist you.        Care EveryWhere ID      "This is your Care EveryWhere ID. This could be used by other organizations to access your [facility] medical records  [id number]        Your Vitals Were     Pulse Height Breastfeeding? BMI (Body Mass Index)          80 1.638 m (5' 4.5\") No 30.98 kg/m2         Blood Pressure from Last 3 Encounters:   04/20/18 118/70   03/23/18 138/72   12/07/17 124/70    Weight from Last 3 Encounters:   04/20/18 83.1 kg (183 lb 4.8 oz)   03/23/18 83.1 kg (183 lb 4.8 oz)   10/26/17 83.7 kg (184 lb 9.6 oz)              We Performed the Following     Follow-Up with Cardiac Advanced Practice Provider          Today's Medication Changes          These changes are accurate as of 4/20/18  1:25 PM.  If you have any questions, ask your nurse or doctor.               Start taking these medicines.        Dose/Directions    nitroGLYcerin 0.4 MG sublingual tablet   Commonly known as:  NITROSTAT   Used for:  ASHD (arteriosclerotic heart disease), Chest pain, unspecified type   Started by:  Jaelyn Lowry PA-C        For chest pain place 1 tablet under the tongue every 5 minutes for 3 doses. If symptoms persist 5 minutes after 1st dose call 911.   Quantity:  25 tablet   Refills:  0            Where to get your medicines      These medications were sent to [facility][id number] [facility] [address] [address] [address]     Phone:  [phone]     nitroGLYcerin 0.4 MG sublingual tablet                Primary Care Provider Office Phone # Fax #    Susan Camacho [phone] [address]  [facility] [address]        Equal Access to Services     [facility] YANNI AH: Hadii nallely horn Sodamon, waaxda luqadaha, qaybta kaalmada adeegyada, john moore. So [facility] [phone].    ATENCIÓN: Si habla español, tiene a rodgers disposición servicios gratuitos de asistencia lingüística. Llame al [phone].    We comply with applicable federal civil rights laws and [place] " laws. We do not discriminate on the basis of race, color, national origin, age, disability, sex, sexual orientation, or gender identity.            Thank you!     Thank you for choosing [facility]  for your care. Our goal is always to provide you with excellent care. Hearing back from our patients is one way we can continue to improve our services. Please take a few minutes to complete the written survey that you may receive in the mail after your visit with us. Thank you!             Your Updated Medication List - Protect others around you: Learn how to safely use, store and throw away your medicines at www.disposemymeds.org.          This list is accurate as of 4/20/18  1:25 PM.  Always use your most recent med list.                   Brand Name Dispense Instructions for use Diagnosis    aspirin 81 MG tablet      Take 81 mg by mouth daily        atenolol 50 MG tablet    TENORMIN    30 tablet    Take 1 tablet (50 mg) by mouth daily    Essential hypertension       atorvastatin 20 MG tablet    LIPITOR    90 tablet    Take 1 tablet (20 mg) by mouth daily    Hyperlipidemia LDL goal <100       buPROPion 150 MG 12 hr tablet    WELLBUTRIN SR    180 tablet    Take 1 tablet (150 mg) by mouth 2 times daily    Anxiety       calcium carbonate-vitamin D 600-400 MG-UNIT Chew      Take 2 tablets by mouth every morning        CO Q 10 PO      Take 100 mg by mouth every evening        CYANOCOBALAMIN SL      Place 1,000 mcg under the tongue daily        DULoxetine 60 MG EC capsule    CYMBALTA    90 capsule    Take 1 capsule (60 mg) by mouth daily    Anxiety       FIBER SELECT GUMMIES Chew      Take 1 chew tab by mouth daily        hydrALAZINE 25 MG tablet    APRESOLINE    810 tablet    Take 3 tablets (75 mg) by mouth 3 times daily (you can use up the 50 mg tabs by taking 1.5 tabs 3 times a day first)    Benign essential hypertension       hydrochlorothiazide 25 MG tablet    HYDRODIURIL     90 tablet    Take 1 tablet (25 mg) by mouth daily    Benign essential hypertension       lisinopril 40 MG tablet    PRINIVIL/ZESTRIL    90 tablet    Take 1 tablet (40 mg) by mouth daily    Benign essential hypertension       LORazepam 1 MG tablet    ATIVAN    30 tablet    Take 1 tablet (1 mg) by mouth daily as needed for anxiety    Anxiety       Multi-vitamin Tabs tablet      Take 1 tablet by mouth daily        nitroGLYcerin 0.4 MG sublingual tablet    NITROSTAT    25 tablet    For chest pain place 1 tablet under the tongue every 5 minutes for 3 doses. If symptoms persist 5 minutes after 1st dose call 911.    ASHD (arteriosclerotic heart disease), Chest pain, unspecified type       OMEGA-3 FISH OIL PO      Take 1.2 g by mouth daily        omeprazole 40 MG capsule    priLOSEC    90 capsule    Take 1 capsule (40 mg) by mouth daily Take 30-60 minutes before a meal.    Gastroesophageal reflux disease without esophagitis       traZODone 100 MG tablet    DESYREL    180 tablet    Take 2 tablets (200 mg) by mouth At Bedtime    Anxiety       TYLENOL 325 MG tablet   Generic drug:  acetaminophen     100 tablet    Take 650 mg by mouth every morning

## 2018-05-06 DIAGNOSIS — E78.5 HYPERLIPIDEMIA LDL GOAL <100: ICD-10-CM

## 2018-05-06 DIAGNOSIS — F41.9 ANXIETY: ICD-10-CM

## 2018-05-06 NOTE — TELEPHONE ENCOUNTER
"Requested Prescriptions   Pending Prescriptions Disp Refills     DULoxetine (CYMBALTA) 60 MG EC capsule  Last Written Prescription Date:  3/29/17  Last Fill Quantity: 90 CAPSULE,  # refills: 3   Last office visit: 9/6/2017 with prescribing provider:  CHRISTA   Future Office Visit:     90 capsule 3     Sig: Take 1 capsule (60 mg) by mouth daily    Serotonin-Norepinephrine Reuptake Inhibitors  Passed    5/6/2018  8:20 AM       Passed - Blood pressure under 140/90 in past 12 months    BP Readings from Last 3 Encounters:   04/20/18 118/70   03/23/18 138/72   12/07/17 124/70                Passed - Recent (12 mo) or future (30 days) visit within the authorizing provider's specialty    Patient had office visit in the last 12 months or has a visit in the next 30 days with authorizing provider or within the authorizing provider's specialty.  See \"Patient Info\" tab in inbasket, or \"Choose Columns\" in Meds & Orders section of the refill encounter.           Passed - Patient is age 18 or older       Passed - No active pregnancy on record       Passed - No positive pregnancy test in past 12 months        buPROPion (WELLBUTRIN SR) 150 MG 12 hr tablet  Last Written Prescription Date:  3/29/17  Last Fill Quantity: 180 TABLET,  # refills: 3   Last office visit: 9/6/2017 with prescribing provider:  CHRISTA   Future Office Visit:     180 tablet 3     Sig: Take 1 tablet (150 mg) by mouth 2 times daily    SSRIs Protocol Passed    5/6/2018  8:20 AM  PHQ-9 SCORE 8/3/2016 3/29/2017   Total Score 6 4     CARTER-7 SCORE 8/3/2016 3/29/2017   Total Score 7 8              Passed - Recent (12 mo) or future (30 days) visit within the authorizing provider's specialty    Patient had office visit in the last 12 months or has a visit in the next 30 days with authorizing provider or within the authorizing provider's specialty.  See \"Patient Info\" tab in inbasket, or \"Choose Columns\" in Meds & Orders section of the refill encounter.           Passed - " Medication is Bupropion    If the medication is Bupropion (Wellbutrin), and the patient is taking for smoking cessation; OK to refill.         Passed - Patient is age 18 or older       Passed - No active pregnancy on record       Passed - No positive pregnancy test in last 12 months

## 2018-05-06 NOTE — TELEPHONE ENCOUNTER
"Requested Prescriptions   Pending Prescriptions Disp Refills     atorvastatin (LIPITOR) 20 MG tablet  Last Written Prescription Date:  3/29/17  Last Fill Quantity: 90 TABLET,  # refills: 3   Last office visit: 9/6/2017 with prescribing provider:  CHRISTA   Future Office Visit:     90 tablet 3     Sig: Take 1 tablet (20 mg) by mouth daily    Statins Protocol Failed    5/6/2018  8:16 AM       Failed - LDL on file in past 12 months    Recent Labs   Lab Test  03/29/17   1218   LDL  51            Passed - No abnormal creatine kinase in past 12 months    No lab results found.            Passed - Recent (12 mo) or future (30 days) visit within the authorizing provider's specialty    Patient had office visit in the last 12 months or has a visit in the next 30 days with authorizing provider or within the authorizing provider's specialty.  See \"Patient Info\" tab in inbasket, or \"Choose Columns\" in Meds & Orders section of the refill encounter.           Passed - Patient is age 18 or older       Passed - No active pregnancy on record       Passed - No positive pregnancy test in past 12 months          "

## 2018-05-07 RX ORDER — BUPROPION HYDROCHLORIDE 150 MG/1
150 TABLET, EXTENDED RELEASE ORAL 2 TIMES DAILY
Qty: 180 TABLET | Refills: 0 | Status: SHIPPED | OUTPATIENT
Start: 2018-05-07 | End: 2018-08-01

## 2018-05-07 RX ORDER — DULOXETIN HYDROCHLORIDE 60 MG/1
60 CAPSULE, DELAYED RELEASE ORAL DAILY
Qty: 90 CAPSULE | Refills: 0 | Status: SHIPPED | OUTPATIENT
Start: 2018-05-07 | End: 2018-08-01

## 2018-05-07 NOTE — TELEPHONE ENCOUNTER
Prescription approved per [facility] Refill Protocol.  Penelope Haas RN, BSN

## 2018-05-07 NOTE — TELEPHONE ENCOUNTER
Routing refill request to provider for review/approval because:  Labs not current:  Lipids  Per cardiology note pt needs this lab in October  Penelope Haas RN, BSN

## 2018-05-08 RX ORDER — ATORVASTATIN CALCIUM 20 MG/1
20 TABLET, FILM COATED ORAL DAILY
Qty: 90 TABLET | Refills: 3 | Status: SHIPPED | OUTPATIENT
Start: 2018-05-08 | End: 2019-04-29

## 2018-05-24 ENCOUNTER — OFFICE VISIT (OUTPATIENT)
Dept: FAMILY MEDICINE | Facility: CLINIC | Age: 73
End: 2018-05-24
Payer: COMMERCIAL

## 2018-05-24 VITALS
SYSTOLIC BLOOD PRESSURE: 136 MMHG | WEIGHT: 183 LBS | TEMPERATURE: 98 F | BODY MASS INDEX: 30.49 KG/M2 | DIASTOLIC BLOOD PRESSURE: 68 MMHG | HEART RATE: 73 BPM | HEIGHT: 65 IN

## 2018-05-24 DIAGNOSIS — I10 BENIGN ESSENTIAL HYPERTENSION: ICD-10-CM

## 2018-05-24 DIAGNOSIS — R61 GENERALIZED HYPERHIDROSIS: Primary | ICD-10-CM

## 2018-05-24 DIAGNOSIS — F41.9 ANXIETY: ICD-10-CM

## 2018-05-24 LAB
ALBUMIN SERPL-MCNC: 3.6 G/DL (ref 3.4–5)
ALP SERPL-CCNC: 53 U/L (ref 40–150)
ALT SERPL W P-5'-P-CCNC: 58 U/L (ref 0–50)
ANION GAP SERPL CALCULATED.3IONS-SCNC: 8 MMOL/L (ref 3–14)
AST SERPL W P-5'-P-CCNC: 43 U/L (ref 0–45)
BILIRUB SERPL-MCNC: 0.4 MG/DL (ref 0.2–1.3)
BUN SERPL-MCNC: 13 MG/DL (ref 7–30)
CALCIUM SERPL-MCNC: 9 MG/DL (ref 8.5–10.1)
CHLORIDE SERPL-SCNC: 106 MMOL/L (ref 94–109)
CO2 SERPL-SCNC: 27 MMOL/L (ref 20–32)
CREAT SERPL-MCNC: 0.75 MG/DL (ref 0.52–1.04)
CREAT UR-MCNC: 159 MG/DL
CRP SERPL-MCNC: <2.9 MG/L (ref 0–8)
ERYTHROCYTE [DISTWIDTH] IN BLOOD BY AUTOMATED COUNT: 13.1 % (ref 10–15)
GFR SERPL CREATININE-BSD FRML MDRD: 76 ML/MIN/1.7M2
GLUCOSE SERPL-MCNC: 110 MG/DL (ref 70–99)
HCT VFR BLD AUTO: 38 % (ref 35–47)
HGB BLD-MCNC: 12.5 G/DL (ref 11.7–15.7)
MCH RBC QN AUTO: 31.2 PG (ref 26.5–33)
MCHC RBC AUTO-ENTMCNC: 32.9 G/DL (ref 31.5–36.5)
MCV RBC AUTO: 95 FL (ref 78–100)
MICROALBUMIN UR-MCNC: 26 MG/L
MICROALBUMIN/CREAT UR: 16.35 MG/G CR (ref 0–25)
PLATELET # BLD AUTO: 233 10E9/L (ref 150–450)
POTASSIUM SERPL-SCNC: 4.2 MMOL/L (ref 3.4–5.3)
PROT SERPL-MCNC: 7.2 G/DL (ref 6.8–8.8)
RBC # BLD AUTO: 4.01 10E12/L (ref 3.8–5.2)
SODIUM SERPL-SCNC: 141 MMOL/L (ref 133–144)
TSH SERPL DL<=0.005 MIU/L-ACNC: 0.58 MU/L (ref 0.4–4)
WBC # BLD AUTO: 6.3 10E9/L (ref 4–11)

## 2018-05-24 PROCEDURE — 99214 OFFICE O/P EST MOD 30 MIN: CPT | Performed by: FAMILY MEDICINE

## 2018-05-24 PROCEDURE — 80053 COMPREHEN METABOLIC PANEL: CPT | Performed by: FAMILY MEDICINE

## 2018-05-24 PROCEDURE — 36415 COLL VENOUS BLD VENIPUNCTURE: CPT | Performed by: FAMILY MEDICINE

## 2018-05-24 PROCEDURE — 86140 C-REACTIVE PROTEIN: CPT | Performed by: FAMILY MEDICINE

## 2018-05-24 PROCEDURE — 82043 UR ALBUMIN QUANTITATIVE: CPT | Performed by: FAMILY MEDICINE

## 2018-05-24 PROCEDURE — 84443 ASSAY THYROID STIM HORMONE: CPT | Performed by: FAMILY MEDICINE

## 2018-05-24 PROCEDURE — 85027 COMPLETE CBC AUTOMATED: CPT | Performed by: FAMILY MEDICINE

## 2018-05-24 RX ORDER — LORAZEPAM 1 MG/1
TABLET ORAL
Qty: 30 TABLET | Refills: 0 | Status: SHIPPED | OUTPATIENT
Start: 2018-05-24 | End: 2018-08-21

## 2018-05-24 RX ORDER — GLYCOPYRROLATE 1 MG/1
TABLET ORAL
Qty: 60 TABLET | Refills: 1 | Status: SHIPPED | OUTPATIENT
Start: 2018-05-24 | End: 2019-02-15

## 2018-05-24 NOTE — NURSING NOTE
"  Chief Complaint   Patient presents with     Excessive Sweating       Initial /68 (BP Location: Right arm, Patient Position: Chair, Cuff Size: Adult Large)  Pulse 73  Temp 98  F (36.7  C) (Oral)  Ht 5' 4.5\" (1.638 m)  Wt 183 lb (83 kg)  Breastfeeding? No  BMI 30.93 kg/m2 Estimated body mass index is 30.93 kg/(m^2) as calculated from the following:    Height as of this encounter: 5' 4.5\" (1.638 m).    Weight as of this encounter: 183 lb (83 kg).  Medication Reconciliation: complete      Health Maintenance addressed:  micro albumin pending.     Hugo Taylor CMA          "

## 2018-05-24 NOTE — PROGRESS NOTES
SUBJECTIVE:   Monse Peoples is a 72 year old female who presents to clinic today for the following health issues:      Excessive sweating    During the day, particularly when outdoors. Does sweat overnight as well.   Even notes episodes when she is reading indoors that she becomes sweaty.     Would like to get outside to do her gardening more but is not because she knows how much she will sweat. It is embarrassing for her.     Has been ongoing for years but getting worse over the last year she feels.     Notes anxiety and stress surrounding her son and other family members. She tends to be an anxious person. Is on wellbutrin, cymbalta. Using ativan prn, up to 15-20 tabs monthly. Feels like she cannot cope with the stress surrounding her family.     Now exercising more. In a senior exercise class.     CVD is stable, visiting with Cardiology regularly.     No vaginal bleeding. No constipation or melena. No change in weight. No URI symptoms.           Problem list and histories reviewed & adjusted, as indicated.  Additional history: as documented    Patient Active Problem List   Diagnosis     Hyperlipidemia LDL goal <100     Esophageal reflux     OA (osteoarthritis) of knee     Osteopenia     Anxiety     Pulmonary nodules     Advanced care planning/counseling discussion     Benign essential hypertension     S/P total hip arthroplasty     Coronary artery disease involving native coronary artery of native heart without angina pectoris     Anemia, unspecified     Intestinal malabsorption, unspecified     Degenerative arthritis of knee     Former smoker - has had pulmonary nodules in the past     Chest pain, unspecified type     Past Surgical History:   Procedure Laterality Date     ANGIOGRAM  6/15/05     ARTHROPLASTY HIP  9/23/2013    Procedure: ARTHROPLASTY HIP;  Right total hip arthroplasty       ARTHROPLASTY HIP Left 5/19/2015    Procedure: ARTHROPLASTY HIP;  Surgeon: Tyrese Howell MD;  Location:  OR      "ARTHROPLASTY KNEE Left 8/22/2016    Procedure: ARTHROPLASTY KNEE;  Surgeon: Tyrese Howell MD;  Location: [facility] OR     CHOLECYSTECTOMY       COLONOSCOPY       COLONOSCOPY Left 4/19/2016    Procedure: COLONOSCOPY;  Surgeon: Moe Haney MD;  Location:  GI     GASTRIC BYPASS      2011 tiff en y     GI SURGERY      fistulotomy     ORTHOPEDIC SURGERY      left knee \"green procedure\"     TONSILLECTOMY       TUBAL LIGATION         Social History   Substance Use Topics     Smoking status: Former Smoker     Packs/day: 0.50     Years: 50.00     Types: Cigarettes     Quit date: 1/19/2013     Smokeless tobacco: Never Used     Alcohol use 0.0 oz/week     0 Standard drinks or equivalent per week      Comment: 4 drinks weekly     Family History   Problem Relation Age of Onset     Coronary Artery Disease Father      Hypertension Father      Coronary Artery Disease Sister      Hypertension Sister      Coronary Artery Disease Brother      Hypertension Brother      HEART DISEASE Nephew      Other Cancer Maternal Half-Sister            Reviewed and updated as needed this visit by clinical staff       Reviewed and updated as needed this visit by Provider         ROS:  Constitutional, HEENT, cardiovascular, pulmonary, GI, , musculoskeletal, neuro, skin, endocrine and psych systems are negative, except as otherwise noted.    OBJECTIVE:     /68 (BP Location: Right arm, Patient Position: Chair, Cuff Size: Adult Large)  Pulse 73  Temp 98  F (36.7  C) (Oral)  Ht 5' 4.5\" (1.638 m)  Wt 183 lb (83 kg)  Breastfeeding? No  BMI 30.93 kg/m2  Body mass index is 30.93 kg/(m^2).  GENERAL: healthy, alert and no distress  EYES: Eyes grossly normal to inspection, PERRL and conjunctivae and sclerae normal  HENT: ear canals and TM's normal, nose and mouth without ulcers or lesions  NECK: no adenopathy, no asymmetry, masses, or scars and thyroid normal to palpation  RESP: lungs clear to auscultation - no rales, rhonchi or wheezes  CV: " regular rate and rhythm, normal S1 S2, no S3 or S4, no murmur, click or rub, no peripheral edema and peripheral pulses strong  ABDOMEN: soft, nontender, no hepatosplenomegaly, no masses and bowel sounds normal  MS: no gross musculoskeletal defects noted, no edema  SKIN: no suspicious lesions or rashes  NEURO: Normal strength and tone, mentation intact and speech normal  PSYCH: mentation appears normal, affect anxious, fidgety    Diagnostic Test Results:  No results found for this or any previous visit (from the past 24 hour(s)).    ASSESSMENT/PLAN:     1. Generalized hyperhidrosis - discussed she is on a couple medications that could be contributing to her symptoms, but she does not want to make changes at this time. Will check lab work today. Reviewed a couple options for treating hyperhidrosis. She would like to try robinul, although I cautioned her on the potential side effects. She will monitor for these and contact me if she has any issues.   - glycopyrrolate (ROBINUL) 1 MG tablet; Take 1 tablet daily. If needed increase to 1 tablet twice daily.  Dispense: 60 tablet; Refill: 1  - CRP, inflammation  - CBC with platelets  - Comprehensive metabolic panel (BMP + Alb, Alk Phos, ALT, AST, Total. Bili, TP)  - TSH with free T4 reflex    2. Anxiety - reviewed that she should be using her coping techniques more frequently rather than ativan. Discussed that we should be looking more at her daily medications rather than the prns. She does not want to make changes now but would be willing to in the future. Advised no more than #10 monthly ativan. Encouraged exercise as this is likely to help as well.   - LORazepam (ATIVAN) 1 MG tablet; Take 1 tablet with high anxiety, 10 tablets monthly  Dispense: 30 tablet; Refill: 0    3. Benign essential hypertension - in range today  - Albumin Random Urine Quantitative with Creat Ratio    25 minutes spent with patient face-to-face, > 50% in counseling and coordination of care regarding  the issues addressed in the assessment and plan.     Susan Camacho MD  [facility]

## 2018-06-06 DIAGNOSIS — F41.9 ANXIETY: ICD-10-CM

## 2018-06-06 RX ORDER — TRAZODONE HYDROCHLORIDE 100 MG/1
200 TABLET ORAL AT BEDTIME
Qty: 180 TABLET | Refills: 3 | Status: SHIPPED | OUTPATIENT
Start: 2018-06-06 | End: 2019-02-15

## 2018-06-06 NOTE — TELEPHONE ENCOUNTER
Routing refill request to provider for review/approval because:  Dose warning  Penelope Haas RN, BSN

## 2018-06-06 NOTE — TELEPHONE ENCOUNTER
"Requested Prescriptions   Pending Prescriptions Disp Refills     traZODone (DESYREL) 100 MG tablet 180 tablet 0    Last Written Prescription Date:  03/12/2018  Last Fill Quantity: 180 TABLET,  # refills: 0   Last office visit: 5/24/2018 with prescribing provider:  05/24/2018   Future Office Visit:     Sig: Take 2 tablets (200 mg) by mouth At Bedtime    Serotonin Modulators Passed    6/6/2018  8:10 AM       Passed - Recent (12 mo) or future (30 days) visit within the authorizing provider's specialty    Patient had office visit in the last 12 months or has a visit in the next 30 days with authorizing provider or within the authorizing provider's specialty.  See \"Patient Info\" tab in inbasket, or \"Choose Columns\" in Meds & Orders section of the refill encounter.           Passed - Patient is age 18 or older       Passed - No active pregnancy on record       Passed - No positive pregnancy test in past 12 months          Tyrese LOW  "

## 2018-06-16 DIAGNOSIS — I10 BENIGN ESSENTIAL HYPERTENSION: ICD-10-CM

## 2018-06-18 RX ORDER — LISINOPRIL 40 MG/1
40 TABLET ORAL DAILY
Qty: 90 TABLET | Refills: 1 | Status: SHIPPED | OUTPATIENT
Start: 2018-06-18 | End: 2018-12-17

## 2018-08-01 DIAGNOSIS — F41.9 ANXIETY: ICD-10-CM

## 2018-08-01 RX ORDER — BUPROPION HYDROCHLORIDE 150 MG/1
150 TABLET, EXTENDED RELEASE ORAL 2 TIMES DAILY
Qty: 180 TABLET | Refills: 0 | Status: SHIPPED | OUTPATIENT
Start: 2018-08-01 | End: 2018-10-28

## 2018-08-01 RX ORDER — DULOXETIN HYDROCHLORIDE 60 MG/1
60 CAPSULE, DELAYED RELEASE ORAL DAILY
Qty: 90 CAPSULE | Refills: 0 | Status: SHIPPED | OUTPATIENT
Start: 2018-08-01 | End: 2018-10-28

## 2018-08-01 NOTE — TELEPHONE ENCOUNTER
"Requested Prescriptions   Pending Prescriptions Disp Refills     buPROPion (WELLBUTRIN SR) 150 MG 12 hr tablet 180 tablet 0    Last Written Prescription Date:  05/07/2018  Last Fill Quantity: 180 tablet,  # refills: 0   Last office visit: 5/24/2018 with prescribing provider:  05/24/2018   Future Office Visit:   Next 5 appointments (look out 90 days)     Oct 24, 2018 10:45 AM CDT   Return Visit with Sotero Garza MD   [facility] ([facility])    [address] [place] [address] [address]  [place] [id number] [phone]                  Sig: Take 1 tablet (150 mg) by mouth 2 times daily    SSRIs Protocol Passed    8/1/2018  3:17 PM       Passed - Recent (12 mo) or future (30 days) visit within the authorizing provider's specialty    Patient had office visit in the last 12 months or has a visit in the next 30 days with authorizing provider or within the authorizing provider's specialty.  See \"Patient Info\" tab in inbasket, or \"Choose Columns\" in Meds & Orders section of the refill encounter.           Passed - Medication is Bupropion    If the medication is Bupropion (Wellbutrin), and the patient is taking for smoking cessation; OK to refill.         Passed - Patient is age 18 or older       Passed - No active pregnancy on record       Passed - No positive pregnancy test in last 12 months            DULoxetine (CYMBALTA) 60 MG EC capsule 90 capsule 0    Last Written Prescription Date:  05/07/2018  Last Fill Quantity: 90 capsule,  # refills: 0   Last office visit: 5/24/2018 with prescribing provider:  05/24/2018   Future Office Visit:   Next 5 appointments (look out 90 days)     Oct 24, 2018 10:45 AM CDT   Return Visit with Sotero Garza MD   [facility] ([facility])    [address]  [facility] [id number] [phone]                  Sig: Take 1 capsule (60 mg) by mouth daily    " "Serotonin-Norepinephrine Reuptake Inhibitors  Passed    8/1/2018  3:17 PM       Passed - Blood pressure under 140/90 in past 12 months    BP Readings from Last 3 Encounters:   05/24/18 136/68   04/20/18 118/70   03/23/18 138/72                Passed - Recent (12 mo) or future (30 days) visit within the authorizing provider's specialty    Patient had office visit in the last 12 months or has a visit in the next 30 days with authorizing provider or within the authorizing provider's specialty.  See \"Patient Info\" tab in inbasket, or \"Choose Columns\" in Meds & Orders section of the refill encounter.           Passed - Patient is age 18 or older       Passed - No active pregnancy on record       Passed - No positive pregnancy test in past 12 months          Tyrese LOW  "

## 2018-08-21 DIAGNOSIS — F41.9 ANXIETY: ICD-10-CM

## 2018-08-21 NOTE — TELEPHONE ENCOUNTER
Controlled Substance Refill Request for LORazepam (ATIVAN) 1 MG tablet  Problem List Complete:  No     PROVIDER TO CONSIDER COMPLETION OF PROBLEM LIST AND OVERVIEW/CONTROLLED SUBSTANCE AGREEMENT    Last Written Prescription Date:  5/24/2018  Last Fill Quantity: 30 tablet,   # refills: 0    Last Office Visit with [facility] primary care provider: 5/24/2018Doug    Future Office visit:   Next 5 appointments (look out 90 days)     Oct 24, 2018 10:45 AM CDT   Return Visit with Sotero Garza MD   [facility] ([facility])    [address] [place] [id number] [phone]                  Controlled substance agreement on file: No.     Processing:  Staff will hand deliver Rx to on-site pharmacy   checked in past 3 months?  No, route to RN

## 2018-08-22 NOTE — TELEPHONE ENCOUNTER
[place] reviewed:  reviewed- no concerns    [place]:  [email or web address]

## 2018-08-23 RX ORDER — LORAZEPAM 1 MG/1
TABLET ORAL
Qty: 30 TABLET | Refills: 0 | Status: SHIPPED | OUTPATIENT
Start: 2018-08-23 | End: 2018-11-20

## 2018-08-24 NOTE — TELEPHONE ENCOUNTER
Rx approved, fax to the [facility], Pharmacy will notified patient when prescription is ready to be picked up.   Maya Gonzalez

## 2018-09-19 DIAGNOSIS — K21.9 GASTROESOPHAGEAL REFLUX DISEASE WITHOUT ESOPHAGITIS: ICD-10-CM

## 2018-09-19 RX ORDER — OMEPRAZOLE 40 MG/1
40 CAPSULE, DELAYED RELEASE ORAL DAILY
Qty: 90 CAPSULE | Refills: 1 | Status: SHIPPED | OUTPATIENT
Start: 2018-09-19 | End: 2019-02-15

## 2018-09-19 NOTE — TELEPHONE ENCOUNTER
"Requested Prescriptions   Pending Prescriptions Disp Refills     omeprazole (PRILOSEC) 40 MG capsule 90 capsule 1    Last Written Prescription Date:  03/22/2018  Last Fill Quantity: 90 capsule,  # refills: 0   Last office visit: 5/24/2018 with prescribing provider:  05/24/2018   Future Office Visit:   Next 5 appointments (look out 90 days)     Oct 24, 2018 10:45 AM CDT   Return Visit with Sotero Garza MD   [facility] ([facility])    [address] [address] [address] [phone]                  Sig: Take 1 capsule (40 mg) by mouth daily Take 30-60 minutes before a meal.    PPI Protocol Passed    9/19/2018  8:54 AM       Passed - Not on Clopidogrel (unless Pantoprazole ordered)       Passed - No diagnosis of osteoporosis on record       Passed - Recent (12 mo) or future (30 days) visit within the authorizing provider's specialty    Patient had office visit in the last 12 months or has a visit in the next 30 days with authorizing provider or within the authorizing provider's specialty.  See \"Patient Info\" tab in inbasket, or \"Choose Columns\" in Meds & Orders section of the refill encounter.           Passed - Patient is age 18 or older       Passed - No active pregnacy on record       Passed - No positive pregnancy test in past 12 months          Tyrese FORTET  "

## 2018-09-19 NOTE — TELEPHONE ENCOUNTER
Prescription approved per [facility] Refill Protocol.  Kayla Lares RN

## 2018-10-15 ENCOUNTER — TRANSFERRED RECORDS (OUTPATIENT)
Dept: HEALTH INFORMATION MANAGEMENT | Facility: CLINIC | Age: 73
End: 2018-10-15

## 2018-10-24 ENCOUNTER — OFFICE VISIT (OUTPATIENT)
Dept: CARDIOLOGY | Facility: CLINIC | Age: 73
End: 2018-10-24
Payer: COMMERCIAL

## 2018-10-24 VITALS
BODY MASS INDEX: 30.82 KG/M2 | WEIGHT: 185 LBS | DIASTOLIC BLOOD PRESSURE: 58 MMHG | HEIGHT: 65 IN | HEART RATE: 70 BPM | SYSTOLIC BLOOD PRESSURE: 112 MMHG

## 2018-10-24 DIAGNOSIS — I25.10 CORONARY ARTERY DISEASE INVOLVING NATIVE CORONARY ARTERY OF NATIVE HEART WITHOUT ANGINA PECTORIS: ICD-10-CM

## 2018-10-24 DIAGNOSIS — I10 BENIGN ESSENTIAL HYPERTENSION: ICD-10-CM

## 2018-10-24 LAB
ALT SERPL W P-5'-P-CCNC: 46 U/L (ref 0–50)
ANION GAP SERPL CALCULATED.3IONS-SCNC: 8 MMOL/L (ref 3–14)
BUN SERPL-MCNC: 13 MG/DL (ref 7–30)
CALCIUM SERPL-MCNC: 8.5 MG/DL (ref 8.5–10.1)
CHLORIDE SERPL-SCNC: 105 MMOL/L (ref 94–109)
CHOLEST SERPL-MCNC: 141 MG/DL
CO2 SERPL-SCNC: 26 MMOL/L (ref 20–32)
CREAT SERPL-MCNC: 0.79 MG/DL (ref 0.52–1.04)
GFR SERPL CREATININE-BSD FRML MDRD: 72 ML/MIN/1.7M2
GLUCOSE SERPL-MCNC: 99 MG/DL (ref 70–99)
HDLC SERPL-MCNC: 77 MG/DL
LDLC SERPL CALC-MCNC: 38 MG/DL
NONHDLC SERPL-MCNC: 64 MG/DL
POTASSIUM SERPL-SCNC: 3.9 MMOL/L (ref 3.4–5.3)
SODIUM SERPL-SCNC: 139 MMOL/L (ref 133–144)
TRIGL SERPL-MCNC: 129 MG/DL

## 2018-10-24 PROCEDURE — 80048 BASIC METABOLIC PNL TOTAL CA: CPT | Performed by: INTERNAL MEDICINE

## 2018-10-24 PROCEDURE — 99213 OFFICE O/P EST LOW 20 MIN: CPT | Performed by: INTERNAL MEDICINE

## 2018-10-24 PROCEDURE — 36415 COLL VENOUS BLD VENIPUNCTURE: CPT | Performed by: INTERNAL MEDICINE

## 2018-10-24 PROCEDURE — 80061 LIPID PANEL: CPT | Performed by: INTERNAL MEDICINE

## 2018-10-24 PROCEDURE — 84460 ALANINE AMINO (ALT) (SGPT): CPT | Performed by: INTERNAL MEDICINE

## 2018-10-24 NOTE — MR AVS SNAPSHOT
After Visit Summary   10/24/2018    Monse Peoples    MRN: [id number]           Patient Information     Date Of Birth          1945        Visit Information        Provider Department      10/24/2018 10:45 AM Sotero Garza MD [facility]        Today's Diagnoses     Coronary artery disease involving native coronary artery of native heart without angina pectoris        Benign essential hypertension           Follow-ups after your visit        Additional Services     Follow-Up with Cardiologist                 Future tests that were ordered for you today     Open Future Orders        Priority Expected Expires Ordered    Basic metabolic panel Routine 10/24/2019 10/25/2019 10/24/2018    Lipid Profile Routine 10/24/2019 10/24/2019 10/24/2018    ALT Routine 10/24/2019 10/24/2019 10/24/2018    Follow-Up with Cardiologist Routine 10/24/2019 10/25/2019 10/24/2018            Who to contact     If you have questions or need follow up information about today's clinic visit or your schedule please contact [facility] directly at [phone].  Normal or non-critical lab and imaging results will be communicated to you by [employer], letter or phone within 4 business days after the clinic has received the results. If you do not hear from us within 7 days, please contact the clinic through [employer] or phone. If you have a critical or abnormal lab result, we will notify you by phone as soon as possible.  Submit refill requests through [employer] or call your pharmacy and they will forward the refill request to us. Please allow 3 business days for your refill to be completed.          Additional Information About Your Visit        [employer] Information     [employer] gives you secure access to your electronic health record. If you see a primary care provider, you can also send messages to your care team and make appointments.  "If you have questions, please call your primary care clinic.  If you do not have a primary care provider, please call [phone] and they will assist you.        Care EveryWhere ID     This is your Care EveryWhere ID. This could be used by other organizations to access your [facility] medical records  [id number]        Your Vitals Were     Pulse Height BMI (Body Mass Index)             70 1.638 m (5' 4.5\") 31.26 kg/m2          Blood Pressure from Last 3 Encounters:   10/24/18 112/58   05/24/18 136/68   04/20/18 118/70    Weight from Last 3 Encounters:   10/24/18 83.9 kg (185 lb)   05/24/18 83 kg (183 lb)   04/20/18 83.1 kg (183 lb 4.8 oz)              We Performed the Following     Follow-Up with Cardiologist        Primary Care Provider Office Phone # Fax #    Susan Camacho [phone] [address] [facility] [address]        Equal Access to Services     [facility]: Hadii aad ku hadasho Soomaali, waaxda luqadaha, qaybta kaalmada adeegyada, waxay jazmín haymario alberto barton . So [facility] [phone].    ATENCIÓN: Si habla español, tiene a rodgers disposición servicios gratuitos de asistencia lingüística. Llame al [phone].    We comply with applicable federal civil rights laws and [place] laws. We do not discriminate on the basis of race, color, national origin, age, disability, sex, sexual orientation, or gender identity.            Thank you!     Thank you for choosing [facility]  for your care. Our goal is always to provide you with excellent care. Hearing back from our patients is one way we can continue to improve our services. Please take a few minutes to complete the written survey that you may receive in the mail after your visit with us. Thank you!             Your Updated Medication List - Protect others around you: Learn how to safely use, store and throw away your medicines at www.disposemymeds.org.          This list " is accurate as of 10/24/18 10:56 AM.  Always use your most recent med list.                   Brand Name Dispense Instructions for use Diagnosis    aspirin 81 MG tablet      Take 81 mg by mouth daily        atenolol 50 MG tablet    TENORMIN    30 tablet    Take 1 tablet (50 mg) by mouth daily    Essential hypertension       atorvastatin 20 MG tablet    LIPITOR    90 tablet    Take 1 tablet (20 mg) by mouth daily    Hyperlipidemia LDL goal <100       buPROPion 150 MG 12 hr tablet    WELLBUTRIN SR    180 tablet    Take 1 tablet (150 mg) by mouth 2 times daily    Anxiety       calcium carbonate-vitamin D 600-400 MG-UNIT Chew      Take 2 tablets by mouth every morning        CO Q 10 PO      Take 100 mg by mouth every evening        CYANOCOBALAMIN SL      Place 1,000 mcg under the tongue daily        DULoxetine 60 MG EC capsule    CYMBALTA    90 capsule    Take 1 capsule (60 mg) by mouth daily    Anxiety       FIBER SELECT GUMMIES Chew      Take 1 chew tab by mouth daily        glycopyrrolate 1 MG tablet    ROBINUL    60 tablet    Take 1 tablet daily. If needed increase to 1 tablet twice daily.    Generalized hyperhidrosis       hydrALAZINE 25 MG tablet    APRESOLINE    810 tablet    Take 3 tablets (75 mg) by mouth 3 times daily (you can use up the 50 mg tabs by taking 1.5 tabs 3 times a day first)    Benign essential hypertension       hydrochlorothiazide 25 MG tablet    HYDRODIURIL    90 tablet    Take 1 tablet (25 mg) by mouth daily    Benign essential hypertension       lisinopril 40 MG tablet    PRINIVIL/ZESTRIL    90 tablet    Take 1 tablet (40 mg) by mouth daily    Benign essential hypertension       LORazepam 1 MG tablet    ATIVAN    30 tablet    Take 1 tablet with high anxiety, 10 tablets monthly    Anxiety       Multi-vitamin Tabs tablet      Take 1 tablet by mouth daily        nitroGLYcerin 0.4 MG sublingual tablet    NITROSTAT    25 tablet    For chest pain place 1 tablet under the tongue every 5 minutes for  3 doses. If symptoms persist 5 minutes after 1st dose call 911.    ASHD (arteriosclerotic heart disease), Chest pain, unspecified type       OMEGA-3 FISH OIL PO      Take 1.2 g by mouth daily        omeprazole 40 MG capsule    priLOSEC    90 capsule    Take 1 capsule (40 mg) by mouth daily Take 30-60 minutes before a meal.    Gastroesophageal reflux disease without esophagitis       traZODone 100 MG tablet    DESYREL    180 tablet    Take 2 tablets (200 mg) by mouth At Bedtime    Anxiety       TYLENOL 325 MG tablet   Generic drug:  acetaminophen     100 tablet    Take 650 mg by mouth every morning

## 2018-10-24 NOTE — PROGRESS NOTES
HPI and Plan:   See dictation    Orders Placed This Encounter   Procedures     Basic metabolic panel     Lipid Profile     ALT     Follow-Up with Cardiologist       No orders of the defined types were placed in this encounter.      There are no discontinued medications.      Encounter Diagnoses   Name Primary?     Coronary artery disease involving native coronary artery of native heart without angina pectoris      Benign essential hypertension        CURRENT MEDICATIONS:  Current Outpatient Prescriptions   Medication Sig Dispense Refill     acetaminophen (TYLENOL) 325 MG tablet Take 650 mg by mouth every morning  100 tablet 0     aspirin 81 MG tablet Take 81 mg by mouth daily       atenolol (TENORMIN) 50 MG tablet Take 1 tablet (50 mg) by mouth daily 30 tablet 11     atorvastatin (LIPITOR) 20 MG tablet Take 1 tablet (20 mg) by mouth daily 90 tablet 3     buPROPion (WELLBUTRIN SR) 150 MG 12 hr tablet Take 1 tablet (150 mg) by mouth 2 times daily 180 tablet 0     calcium carbonate-vitamin D 600-400 MG-UNIT CHEW Take 2 tablets by mouth every morning       Coenzyme Q10 (CO Q 10 PO) Take 100 mg by mouth every evening        CYANOCOBALAMIN SL Place 1,000 mcg under the tongue daily       DULoxetine (CYMBALTA) 60 MG EC capsule Take 1 capsule (60 mg) by mouth daily 90 capsule 0     FIBER SELECT GUMMIES CHEW Take 1 chew tab by mouth daily        glycopyrrolate (ROBINUL) 1 MG tablet Take 1 tablet daily. If needed increase to 1 tablet twice daily. 60 tablet 1     hydrALAZINE (APRESOLINE) 25 MG tablet Take 3 tablets (75 mg) by mouth 3 times daily (you can use up the 50 mg tabs by taking 1.5 tabs 3 times a day first) 810 tablet 3     hydrochlorothiazide (HYDRODIURIL) 25 MG tablet Take 1 tablet (25 mg) by mouth daily 90 tablet 3     lisinopril (PRINIVIL/ZESTRIL) 40 MG tablet Take 1 tablet (40 mg) by mouth daily 90 tablet 1     LORazepam (ATIVAN) 1 MG tablet Take 1 tablet with high anxiety, 10 tablets monthly 30 tablet 0      "multivitamin, therapeutic with minerals (MULTI-VITAMIN) TABS Take 1 tablet by mouth daily       nitroGLYcerin (NITROSTAT) 0.4 MG sublingual tablet For chest pain place 1 tablet under the tongue every 5 minutes for 3 doses. If symptoms persist 5 minutes after 1st dose call 911. 25 tablet 0     Omega-3 Fatty Acids (OMEGA-3 FISH OIL PO) Take 1.2 g by mouth daily        omeprazole (PRILOSEC) 40 MG capsule Take 1 capsule (40 mg) by mouth daily Take 30-60 minutes before a meal. 90 capsule 1     traZODone (DESYREL) 100 MG tablet Take 2 tablets (200 mg) by mouth At Bedtime 180 tablet 3       ALLERGIES     Allergies   Allergen Reactions     Codeine Sulfate      \"hyper\"       PAST MEDICAL HISTORY:  Past Medical History:   Diagnosis Date     Anxiety 4/2/2015     Asthma     no inhaler for 2-3 years     Benign essential hypertension 8/2/2016     Chest pain, unspecified type 8/27/2017     Coronary artery disease      Coronary artery disease involving native coronary artery of native heart without angina pectoris 8/2/2016     Depression      Esophageal reflux 3/19/2015     Former smoker - has had pulmonary nodules in the past 3/29/2017     History of blood transfusion      Hyperlipidemia LDL goal <100 3/19/2015     Osteoarthritis      Osteopenia      Pulmonary nodules 4/22/2015    Noted 4/14/15, f/u scan 03/2016 showed minimal change, needs repeat scan 03/2017        PAST SURGICAL HISTORY:  Past Surgical History:   Procedure Laterality Date     ANGIOGRAM  6/15/05     ARTHROPLASTY HIP  9/23/2013    Procedure: ARTHROPLASTY HIP;  Right total hip arthroplasty       ARTHROPLASTY HIP Left 5/19/2015    Procedure: ARTHROPLASTY HIP;  Surgeon: Tyrese Howell MD;  Location:  OR     ARTHROPLASTY KNEE Left 8/22/2016    Procedure: ARTHROPLASTY KNEE;  Surgeon: Tyrese Howell MD;  Location:  OR     CHOLECYSTECTOMY       COLONOSCOPY       COLONOSCOPY Left 4/19/2016    Procedure: COLONOSCOPY;  Surgeon: Moe Hnaey MD;  Location:  " "GI     GASTRIC BYPASS      2011 tiff en y     GI SURGERY      fistulotomy     ORTHOPEDIC SURGERY      left knee \"green procedure\"     TONSILLECTOMY       TUBAL LIGATION         FAMILY HISTORY:  Family History   Problem Relation Age of Onset     Coronary Artery Disease Father      Hypertension Father      Coronary Artery Disease Sister      Hypertension Sister      Coronary Artery Disease Brother      Hypertension Brother      HEART DISEASE Nephew      Other Cancer Maternal Half-Sister        SOCIAL HISTORY:  Social History     Social History     Marital status:      Spouse name: N/A     Number of children: N/A     Years of education: N/A     Social History Main Topics     Smoking status: Former Smoker     Packs/day: 0.50     Years: 50.00     Types: Cigarettes     Quit date: 1/19/2013     Smokeless tobacco: Never Used     Alcohol use 0.0 oz/week     0 Standard drinks or equivalent per week      Comment: 5 drinks a week     Drug use: No     Sexual activity: Yes     Partners: Male     Other Topics Concern     Parent/Sibling W/ Cabg, Mi Or Angioplasty Before 65f 55m? Yes     brother age 31     Caffeine Concern Yes     1 cup of coffee      Sleep Concern Yes     lately it has been an issue     Special Diet No     watching what she eats      Exercise No     Seat Belt Yes     Social History Narrative       Review of Systems:  Skin:  Positive for bruising;rash     Eyes:  Negative for      ENT:  Positive for hearing loss blood when blowing nose  Respiratory:  Positive for dyspnea on exertion;shortness of breath     Cardiovascular:    chest pain;Positive for;fatigue    Gastroenterology:   heartburn    Genitourinary:  Positive for urinary frequency    Musculoskeletal:  Positive for arthritis both hips have been replaced, L TKR, right knee; feet pain - flat feet  Neurologic:  Negative for      Psychiatric:  Negative for      Heme/Lymph/Imm:  Negative   alot of sweating  Endocrine:  Negative        Physical Exam:  Vitals: " "/58  Pulse 70  Ht 1.638 m (5' 4.5\")  Wt 83.9 kg (185 lb)  BMI 31.26 kg/m2    Constitutional:  cooperative, alert and oriented, well developed, well nourished, in no acute distress overweight mildly anxious    Skin:  warm and dry to the touch, no apparent skin lesions or masses noted     warm and dry    Head:  normocephalic, no masses or lesions   atraumatic    Eyes:  pupils equal and round;conjunctivae and lids unremarkable;sclera white   EOMI, pupils round and equal    Lymph:      ENT:  no pallor or cyanosis, dentition good   speech normal, tongue midline    Neck:  no carotid bruit;JVP normal   supple    Respiratory:  normal breath sounds, clear to auscultation, normal A-P diameter, normal symmetry, normal respiratory excursion, no use of accessory muscles    clear without rales/wheezing    Cardiac: regular rhythm, normal S1/S2, no S3 or S4, apical impulse not displaced, no murmurs, gallops or rubs occasional premature beats           RRR without murmur/lift/thrill  pulses full and equal                                   UE pulses full and equal    GI:  not assessed this visit   non-tender    Extremities and Muscular Skeletal:  no deformities, clubbing, cyanosis, erythema observed;no edema         no edema    Neurological:  no gross motor deficits;affect appropriate   motor grossly intact    Psych:  Alert and Oriented x 3 Anxious      CC  Susan Camacho MD  [id number] [place] [address]              "

## 2018-10-24 NOTE — PROGRESS NOTES
Service Date: 10/24/2018      CARDIOLOGY FOLLOWUP VISIT      REFERRING PHYSICIAN:  Dr. Susan Camacho      HISTORY OF PRESENT ILLNESS:  It is my pleasure to see your patient, Aminta Goel.  Aminta Goel is a 72-year-old patient with history of mild coronary artery disease based upon a coronary angiogram that was performed in [facility] in .  She also has a history of hypertension, hyperlipidemia and gastric bypass surgery.  Since I last saw her, she has been doing well.  She is exercising a lot more than she has previously.  Her blood pressure is well controlled at 112/58 today.  Her lipids are excellent with an LDL of 38, HDL of 77 and triglycerides of 129 and a total cholesterol of 141.  Her ALT is normal at 46.  She gets occasional very brief episodes of discomfort in her chest which all occur at rest.  She did have a stress echocardiogram performed on 2017 which is just over a year and 2 months ago and this was entirely normal with no evidence of ischemia on the stress EKG or stress echocardiogram.  Her electrolytes are normal with a potassium of 3.9, sodium 139, BUN of 13 and a creatinine of 0.79.      IMPRESSION:   1.  Coronary artery disease.  The patient is asymptomatic with respect to coronary artery disease.   2.  Excellent lipid profile.   3.  Essential hypertension.  Her blood pressure is well controlled.      PLAN:  We will continue the patient on her good present medications and we will see her back again in 1 year's time but as always, she has been told to contact us if she has any questions or any concerns.      cc:   [place] [address]         SARA ZELAYA MD, [facility]             D: 10/24/2018   T: 10/24/2018   MT: JUANITA      Name:     AMINTA GOEL   MRN:      [id number]        Account:      [id number]   :      1945           Service Date: 10/24/2018      Document: [id number]

## 2018-10-24 NOTE — LETTER
10/24/2018      Susan Camacho MD  [id number] [id number]      RE: Monse Peoples       Dear Colleague,    I had the pleasure of seeing Monse Peoples in the [facility].    Service Date: 10/24/2018      CARDIOLOGY FOLLOWUP VISIT      REFERRING PHYSICIAN:  Dr. Susan Camacho      HISTORY OF PRESENT ILLNESS:  It is my pleasure to see your patient, Monse Peoples.  Monse Peoples is a 72-year-old patient with history of mild coronary artery disease based upon a coronary angiogram that was performed in [facility] [place] in 2005.  She also has a history of hypertension, hyperlipidemia and gastric bypass surgery.  Since I last saw her, she has been doing well.  She is exercising a lot more than she has previously.  Her blood pressure is well controlled at 112/58 today.  Her lipids are excellent with an LDL of 38, HDL of 77 and triglycerides of 129 and a total cholesterol of 141.  Her ALT is normal at 46.  She gets occasional very brief episodes of discomfort in her chest which all occur at rest.  She did have a stress echocardiogram performed on 08/28/2017 which is just over a year and 2 months ago and this was entirely normal with no evidence of ischemia on the stress EKG or stress echocardiogram.  Her electrolytes are normal with a potassium of 3.9, sodium 139, BUN of 13 and a creatinine of 0.79.      IMPRESSION:   1.  Coronary artery disease.  The patient is asymptomatic with respect to coronary artery disease.   2.  Excellent lipid profile.   3.  Essential hypertension.  Her blood pressure is well controlled.      PLAN:  We will continue the patient on her good present medications and we will see her back again in 1 year's time but as always, she has been told to contact us if she has any questions or any concerns.      cc:   [place] [address]         SARA ZELAYA MD, [facility]              D: 10/24/2018   T: 10/24/2018   MT: JUANITA      Name:     AMINTA GOEL   MRN:      [id number]        Account:      [id number]   :      1945           Service Date: 10/24/2018      Document: [id number]         Outpatient Encounter Prescriptions as of 10/24/2018   Medication Sig Dispense Refill     acetaminophen (TYLENOL) 325 MG tablet Take 650 mg by mouth every morning  100 tablet 0     aspirin 81 MG tablet Take 81 mg by mouth daily       atenolol (TENORMIN) 50 MG tablet Take 1 tablet (50 mg) by mouth daily 30 tablet 11     atorvastatin (LIPITOR) 20 MG tablet Take 1 tablet (20 mg) by mouth daily 90 tablet 3     buPROPion (WELLBUTRIN SR) 150 MG 12 hr tablet Take 1 tablet (150 mg) by mouth 2 times daily 180 tablet 0     calcium carbonate-vitamin D 600-400 MG-UNIT CHEW Take 2 tablets by mouth every morning       Coenzyme Q10 (CO Q 10 PO) Take 100 mg by mouth every evening        CYANOCOBALAMIN SL Place 1,000 mcg under the tongue daily       DULoxetine (CYMBALTA) 60 MG EC capsule Take 1 capsule (60 mg) by mouth daily 90 capsule 0     FIBER SELECT GUMMIES CHEW Take 1 chew tab by mouth daily        glycopyrrolate (ROBINUL) 1 MG tablet Take 1 tablet daily. If needed increase to 1 tablet twice daily. 60 tablet 1     hydrALAZINE (APRESOLINE) 25 MG tablet Take 3 tablets (75 mg) by mouth 3 times daily (you can use up the 50 mg tabs by taking 1.5 tabs 3 times a day first) 810 tablet 3     hydrochlorothiazide (HYDRODIURIL) 25 MG tablet Take 1 tablet (25 mg) by mouth daily 90 tablet 3     lisinopril (PRINIVIL/ZESTRIL) 40 MG tablet Take 1 tablet (40 mg) by mouth daily 90 tablet 1     LORazepam (ATIVAN) 1 MG tablet Take 1 tablet with high anxiety, 10 tablets monthly 30 tablet 0     multivitamin, therapeutic with minerals (MULTI-VITAMIN) TABS Take 1 tablet by mouth daily       nitroGLYcerin (NITROSTAT) 0.4 MG sublingual tablet For chest pain place 1 tablet under the tongue every 5 minutes for 3 doses. If symptoms  persist 5 minutes after 1st dose call 911. 25 tablet 0     Omega-3 Fatty Acids (OMEGA-3 FISH OIL PO) Take 1.2 g by mouth daily        omeprazole (PRILOSEC) 40 MG capsule Take 1 capsule (40 mg) by mouth daily Take 30-60 minutes before a meal. 90 capsule 1     traZODone (DESYREL) 100 MG tablet Take 2 tablets (200 mg) by mouth At Bedtime 180 tablet 3     [DISCONTINUED] omeprazole (PRILOSEC) 40 MG capsule Take 1 capsule (40 mg) by mouth daily Take 30-60 minutes before a meal. 90 capsule 1     No facility-administered encounter medications on file as of 10/24/2018.        Again, thank you for allowing me to participate in the care of your patient.      Sincerely,    Sotero Garza MD, MD     [facility]

## 2018-10-28 DIAGNOSIS — F41.9 ANXIETY: ICD-10-CM

## 2018-10-28 NOTE — TELEPHONE ENCOUNTER
"Requested Prescriptions   Pending Prescriptions Disp Refills     buPROPion (WELLBUTRIN SR) 150 MG 12 hr tablet  Last Written Prescription Date:  8/1/2018  Last Fill Quantity: 180 tablet,  # refills: 0   Last office visit: 5/24/2018 with prescribing provider:  Doug      180 tablet 0     Sig: Take 1 tablet (150 mg) by mouth 2 times daily    SSRIs Protocol Passed    10/28/2018  4:38 PM       Passed - Recent (12 mo) or future (30 days) visit within the authorizing provider's specialty    Patient had office visit in the last 12 months or has a visit in the next 30 days with authorizing provider or within the authorizing provider's specialty.  See \"Patient Info\" tab in inbasket, or \"Choose Columns\" in Meds & Orders section of the refill encounter.             Passed - Medication is Bupropion    If the medication is Bupropion (Wellbutrin), and the patient is taking for smoking cessation; OK to refill.         Passed - Patient is age 18 or older       Passed - No active pregnancy on record       Passed - No positive pregnancy test in last 12 months        DULoxetine (CYMBALTA) 60 MG EC capsule  Last Written Prescription Date:  8/1/2018  Last Fill Quantity: 90 capsule,  # refills: 0   Last office visit: 5/24/2018 with prescribing provider:  Doug      90 capsule 0     Sig: Take 1 capsule (60 mg) by mouth daily    Serotonin-Norepinephrine Reuptake Inhibitors  Passed    10/28/2018  4:38 PM       Passed - Blood pressure under 140/90 in past 12 months    BP Readings from Last 3 Encounters:   10/24/18 112/58   05/24/18 136/68   04/20/18 118/70                Passed - Recent (12 mo) or future (30 days) visit within the authorizing provider's specialty    Patient had office visit in the last 12 months or has a visit in the next 30 days with authorizing provider or within the authorizing provider's specialty.  See \"Patient Info\" tab in inbasket, or \"Choose Columns\" in Meds & Orders section of the refill encounter.             " Passed - Patient is age 18 or older       Passed - No active pregnancy on record       Passed - No positive pregnancy test in past 12 months

## 2018-10-29 NOTE — TELEPHONE ENCOUNTER
Routing refill request to provider for review/approval because:  Unsure what f/u PCP wanted     LOV 5/24/18    Kayla Lares RN

## 2018-10-30 RX ORDER — DULOXETIN HYDROCHLORIDE 60 MG/1
60 CAPSULE, DELAYED RELEASE ORAL DAILY
Qty: 90 CAPSULE | Refills: 0 | Status: SHIPPED | OUTPATIENT
Start: 2018-10-30 | End: 2019-02-05

## 2018-10-30 RX ORDER — BUPROPION HYDROCHLORIDE 150 MG/1
150 TABLET, EXTENDED RELEASE ORAL 2 TIMES DAILY
Qty: 180 TABLET | Refills: 0 | Status: SHIPPED | OUTPATIENT
Start: 2018-10-30 | End: 2019-02-05

## 2018-11-06 NOTE — MR AVS SNAPSHOT
"              After Visit Summary   5/24/2018    Monse Peoples    MRN: [id number]           Patient Information     Date Of Birth          1945        Visit Information        Provider Department      5/24/2018 11:20 AM Susan Camacho MD [facility]        Today's Diagnoses     Generalized hyperhidrosis    -  1    Anxiety        Benign essential hypertension           Follow-ups after your visit        Who to contact     If you have questions or need follow up information about today's clinic visit or your schedule please contact [facility] directly at [phone].  Normal or non-critical lab and imaging results will be communicated to you by [employer], letter or phone within 4 business days after the clinic has received the results. If you do not hear from us within 7 days, please contact the clinic through ZingCheckouthart or phone. If you have a critical or abnormal lab result, we will notify you by phone as soon as possible.  Submit refill requests through [employer] or call your pharmacy and they will forward the refill request to us. Please allow 3 business days for your refill to be completed.          Additional Information About Your Visit        MyChart Information     [employer] gives you secure access to your electronic health record. If you see a primary care provider, you can also send messages to your care team and make appointments. If you have questions, please call your primary care clinic.  If you do not have a primary care provider, please call [phone] and they will assist you.        Care EveryWhere ID     This is your Care EveryWhere ID. This could be used by other organizations to access your [facility] medical records  [id number]        Your Vitals Were     Pulse Temperature Height Breastfeeding? BMI (Body Mass Index)       73 98  F (36.7  C) (Oral) 5' 4.5\" (1.638 m) No 30.93 kg/m2        Blood Pressure from Last 3 Encounters:   05/24/18 136/68   04/20/18 " ----- Message from Deedee Rock sent at 11/6/2018  2:08 PM EST -----  Contact: [phone]  Return call   118/70   03/23/18 138/72    Weight from Last 3 Encounters:   05/24/18 183 lb (83 kg)   04/20/18 183 lb 4.8 oz (83.1 kg)   03/23/18 183 lb 4.8 oz (83.1 kg)              We Performed the Following     Albumin Random Urine Quantitative with Creat Ratio     CBC with platelets     Comprehensive metabolic panel (BMP + Alb, Alk Phos, ALT, AST, Total. Bili, TP)     CRP, inflammation     TSH with free T4 reflex          Today's Medication Changes          These changes are accurate as of 5/24/18 12:29 PM.  If you have any questions, ask your nurse or doctor.               Start taking these medicines.        Dose/Directions    glycopyrrolate 1 MG tablet   Commonly known as:  ROBINUL   Used for:  Generalized hyperhidrosis   Started by:  Susan Camacho MD        Take 1 tablet daily. If needed increase to 1 tablet twice daily.   Quantity:  60 tablet   Refills:  1         These medicines have changed or have updated prescriptions.        Dose/Directions    LORazepam 1 MG tablet   Commonly known as:  ATIVAN   This may have changed:    - how much to take  - how to take this  - when to take this  - reasons to take this  - additional instructions   Used for:  Anxiety   Changed by:  Susan aCmacho MD        Take 1 tablet with high anxiety, 10 tablets monthly   Quantity:  30 tablet   Refills:  0            Where to get your medicines      These medications were sent to [id number] - [place], [place] - [address] [facility]  [address] [id number]     Phone:  [phone]     glycopyrrolate 1 MG tablet         Some of these will need a paper prescription and others can be bought over the counter.  Ask your nurse if you have questions.     Bring a paper prescription for each of these medications     LORazepam 1 MG tablet                Primary Care Provider Office Phone # Fax #    Susan Camacho [phone] [address]  [facility] [address]        Equal Access to Services      LION LIAO : Hadii aad ku justina Sodamon, waaxda luqadaha, qaybta kaalmada adeegreginaldo, waxromario jazmín praidpreji amezcuagraemelogan barton . So [facility] [phone].    ATENCIÓN: Si habla español, tiene a rodgers disposición servicios gratuitos de asistencia lingüística. Llame al [phone].    We comply with applicable federal civil rights laws and [place] laws. We do not discriminate on the basis of race, color, national origin, age, disability, sex, sexual orientation, or gender identity.            Thank you!     Thank you for choosing [facility]  for your care. Our goal is always to provide you with excellent care. Hearing back from our patients is one way we can continue to improve our services. Please take a few minutes to complete the written survey that you may receive in the mail after your visit with us. Thank you!             Your Updated Medication List - Protect others around you: Learn how to safely use, store and throw away your medicines at www.disposemymeds.org.          This list is accurate as of 5/24/18 12:29 PM.  Always use your most recent med list.                   Brand Name Dispense Instructions for use Diagnosis    aspirin 81 MG tablet      Take 81 mg by mouth daily        atenolol 50 MG tablet    TENORMIN    30 tablet    Take 1 tablet (50 mg) by mouth daily    Essential hypertension       atorvastatin 20 MG tablet    LIPITOR    90 tablet    Take 1 tablet (20 mg) by mouth daily    Hyperlipidemia LDL goal <100       buPROPion 150 MG 12 hr tablet    WELLBUTRIN SR    180 tablet    Take 1 tablet (150 mg) by mouth 2 times daily    Anxiety       calcium carbonate-vitamin D 600-400 MG-UNIT Chew      Take 2 tablets by mouth every morning        CO Q 10 PO      Take 100 mg by mouth every evening        CYANOCOBALAMIN SL      Place 1,000 mcg under the tongue daily        DULoxetine 60 MG EC capsule    CYMBALTA    90 capsule    Take 1 capsule (60 mg) by mouth daily    Anxiety       FIBER  SELECT GUMMIES Chew      Take 1 chew tab by mouth daily        glycopyrrolate 1 MG tablet    ROBINUL    60 tablet    Take 1 tablet daily. If needed increase to 1 tablet twice daily.    Generalized hyperhidrosis       hydrALAZINE 25 MG tablet    APRESOLINE    810 tablet    Take 3 tablets (75 mg) by mouth 3 times daily (you can use up the 50 mg tabs by taking 1.5 tabs 3 times a day first)    Benign essential hypertension       hydrochlorothiazide 25 MG tablet    HYDRODIURIL    90 tablet    Take 1 tablet (25 mg) by mouth daily    Benign essential hypertension       lisinopril 40 MG tablet    PRINIVIL/ZESTRIL    90 tablet    Take 1 tablet (40 mg) by mouth daily    Benign essential hypertension       LORazepam 1 MG tablet    ATIVAN    30 tablet    Take 1 tablet with high anxiety, 10 tablets monthly    Anxiety       Multi-vitamin Tabs tablet      Take 1 tablet by mouth daily        nitroGLYcerin 0.4 MG sublingual tablet    NITROSTAT    25 tablet    For chest pain place 1 tablet under the tongue every 5 minutes for 3 doses. If symptoms persist 5 minutes after 1st dose call 911.    ASHD (arteriosclerotic heart disease), Chest pain, unspecified type       OMEGA-3 FISH OIL PO      Take 1.2 g by mouth daily        omeprazole 40 MG capsule    priLOSEC    90 capsule    Take 1 capsule (40 mg) by mouth daily Take 30-60 minutes before a meal.    Gastroesophageal reflux disease without esophagitis       traZODone 100 MG tablet    DESYREL    180 tablet    Take 2 tablets (200 mg) by mouth At Bedtime    Anxiety       TYLENOL 325 MG tablet   Generic drug:  acetaminophen     100 tablet    Take 650 mg by mouth every morning

## 2018-11-20 DIAGNOSIS — F41.9 ANXIETY: ICD-10-CM

## 2018-11-21 NOTE — TELEPHONE ENCOUNTER
RX [place] reviewed:  reviewed- no concerns    [place]:  [email or web address]

## 2018-11-21 NOTE — TELEPHONE ENCOUNTER
Controlled Substance Refill Request for LORazepam (ATIVAN) 1 MG tablet  Problem List Complete:  No     PROVIDER TO CONSIDER COMPLETION OF PROBLEM LIST AND OVERVIEW/CONTROLLED SUBSTANCE AGREEMENT    Last Written Prescription Date:  8/23/2018  Last Fill Quantity: 30 tablet,   # refills: 0    Last Office Visit with [facility] primary care provider: 5/24/2018, Doug    Future Office visit:     Controlled substance agreement on file: No.     Processing:  Staff will hand deliver Rx to on-site pharmacy   checked in past 3 months?  No, route to RN

## 2018-11-27 RX ORDER — LORAZEPAM 1 MG/1
TABLET ORAL
Qty: 30 TABLET | Refills: 0 | Status: SHIPPED | OUTPATIENT
Start: 2018-11-27 | End: 2019-02-05

## 2018-12-16 DIAGNOSIS — I10 BENIGN ESSENTIAL HYPERTENSION: ICD-10-CM

## 2018-12-16 NOTE — TELEPHONE ENCOUNTER
"Requested Prescriptions   Pending Prescriptions Disp Refills     lisinopril (PRINIVIL/ZESTRIL) 40 MG tablet  Last Written Prescription Date:  06/18/2018  Last Fill Quantity: 90,  # refills: 1   Last office visit: 5/24/2018 with prescribing provider:  05/24/2018   Future Office Visit:     90 tablet 1     Sig: Take 1 tablet (40 mg) by mouth daily    ACE Inhibitors (Including Combos) Protocol Passed - 12/16/2018  9:37 AM       Passed - Blood pressure under 140/90 in past 12 months    BP Readings from Last 3 Encounters:   10/24/18 112/58   05/24/18 136/68   04/20/18 118/70                Passed - Recent (12 mo) or future (30 days) visit within the authorizing provider's specialty    Patient had office visit in the last 12 months or has a visit in the next 30 days with authorizing provider or within the authorizing provider's specialty.  See \"Patient Info\" tab in inbasket, or \"Choose Columns\" in Meds & Orders section of the refill encounter.             Passed - Patient is age 18 or older       Passed - No active pregnancy on record       Passed - Normal serum creatinine on file in past 12 months    Recent Labs   Lab Test 10/24/18  0935  04/14/15  1352   CR 0.79   < >  --    CREAT  --   --  1.1*    < > = values in this interval not displayed.            Passed - Normal serum potassium on file in past 12 months    Recent Labs   Lab Test 10/24/18  0935   POTASSIUM 3.9            Passed - No positive pregnancy test in past 12 months          "

## 2018-12-17 RX ORDER — LISINOPRIL 40 MG/1
40 TABLET ORAL DAILY
Qty: 90 TABLET | Refills: 1 | Status: SHIPPED | OUTPATIENT
Start: 2018-12-17 | End: 2019-02-15

## 2018-12-17 NOTE — TELEPHONE ENCOUNTER
Prescription approved per [facility] Refill Protocol.  Penelope Haas RN, BSN

## 2019-02-01 DIAGNOSIS — F41.9 ANXIETY: ICD-10-CM

## 2019-02-04 NOTE — TELEPHONE ENCOUNTER
LMTRC    Pt due for a 6 month med check    RX [place] reviewed:  reviewed- no concerns    Per  the refill from December is a 90 day supply so pt should not need until March    [place] profile:  [email or web address]

## 2019-02-05 RX ORDER — BUPROPION HYDROCHLORIDE 150 MG/1
150 TABLET, EXTENDED RELEASE ORAL 2 TIMES DAILY
Qty: 60 TABLET | Refills: 0 | Status: SHIPPED | OUTPATIENT
Start: 2019-02-05 | End: 2019-02-15

## 2019-02-05 RX ORDER — LORAZEPAM 1 MG/1
TABLET ORAL
Qty: 30 TABLET | Refills: 0 | Status: SHIPPED | OUTPATIENT
Start: 2019-02-05 | End: 2019-04-30

## 2019-02-05 RX ORDER — DULOXETIN HYDROCHLORIDE 60 MG/1
60 CAPSULE, DELAYED RELEASE ORAL DAILY
Qty: 30 CAPSULE | Refills: 0 | Status: SHIPPED | OUTPATIENT
Start: 2019-02-05 | End: 2019-02-15

## 2019-02-05 NOTE — TELEPHONE ENCOUNTER
Prescription approved per [facility] Refill Protocol.  LM for call back but did send one time 30 day fill on Wellbutrin and Cymbalta if needed before OV    Lorazepam sent to PCP to fill if appropriate.     Kayla Lares RN

## 2019-02-15 ENCOUNTER — OFFICE VISIT (OUTPATIENT)
Dept: FAMILY MEDICINE | Facility: CLINIC | Age: 74
End: 2019-02-15
Payer: COMMERCIAL

## 2019-02-15 VITALS
HEART RATE: 66 BPM | OXYGEN SATURATION: 98 % | DIASTOLIC BLOOD PRESSURE: 70 MMHG | BODY MASS INDEX: 30.32 KG/M2 | TEMPERATURE: 97.8 F | WEIGHT: 182 LBS | HEIGHT: 65 IN | SYSTOLIC BLOOD PRESSURE: 126 MMHG

## 2019-02-15 DIAGNOSIS — R07.9 CHEST PAIN, UNSPECIFIED TYPE: ICD-10-CM

## 2019-02-15 DIAGNOSIS — I10 BENIGN ESSENTIAL HYPERTENSION: ICD-10-CM

## 2019-02-15 DIAGNOSIS — F41.9 ANXIETY: Primary | ICD-10-CM

## 2019-02-15 DIAGNOSIS — Z12.31 VISIT FOR SCREENING MAMMOGRAM: ICD-10-CM

## 2019-02-15 DIAGNOSIS — N64.9 DISORDER OF BREAST: ICD-10-CM

## 2019-02-15 DIAGNOSIS — K21.9 GASTROESOPHAGEAL REFLUX DISEASE WITHOUT ESOPHAGITIS: ICD-10-CM

## 2019-02-15 DIAGNOSIS — R61 GENERALIZED HYPERHIDROSIS: ICD-10-CM

## 2019-02-15 PROCEDURE — 99214 OFFICE O/P EST MOD 30 MIN: CPT | Performed by: FAMILY MEDICINE

## 2019-02-15 RX ORDER — BUPROPION HYDROCHLORIDE 150 MG/1
150 TABLET, EXTENDED RELEASE ORAL 2 TIMES DAILY
Qty: 180 TABLET | Refills: 3 | Status: SHIPPED | OUTPATIENT
Start: 2019-02-15 | End: 2019-11-15

## 2019-02-15 RX ORDER — LISINOPRIL 40 MG/1
40 TABLET ORAL DAILY
Qty: 90 TABLET | Refills: 3 | Status: SHIPPED | OUTPATIENT
Start: 2019-02-15 | End: 2019-11-15

## 2019-02-15 RX ORDER — OMEPRAZOLE 40 MG/1
40 CAPSULE, DELAYED RELEASE ORAL DAILY
Qty: 90 CAPSULE | Refills: 3 | Status: SHIPPED | OUTPATIENT
Start: 2019-02-15 | End: 2019-11-15

## 2019-02-15 RX ORDER — TRAZODONE HYDROCHLORIDE 100 MG/1
200 TABLET ORAL AT BEDTIME
Qty: 180 TABLET | Refills: 3 | Status: SHIPPED | OUTPATIENT
Start: 2019-02-15 | End: 2019-11-15

## 2019-02-15 RX ORDER — GLYCOPYRROLATE 1 MG/1
1 TABLET ORAL 2 TIMES DAILY
Qty: 180 TABLET | Refills: 3 | Status: SHIPPED | OUTPATIENT
Start: 2019-02-15 | End: 2019-11-15

## 2019-02-15 RX ORDER — HYDROCHLOROTHIAZIDE 25 MG/1
25 TABLET ORAL DAILY
Qty: 90 TABLET | Refills: 3 | Status: SHIPPED | OUTPATIENT
Start: 2019-02-15 | End: 2019-11-15

## 2019-02-15 RX ORDER — ATENOLOL 50 MG/1
50 TABLET ORAL DAILY
Qty: 180 TABLET | Refills: 3 | Status: SHIPPED | OUTPATIENT
Start: 2019-02-15 | End: 2019-11-15

## 2019-02-15 RX ORDER — HYDRALAZINE HYDROCHLORIDE 25 MG/1
75 TABLET, FILM COATED ORAL 3 TIMES DAILY
Qty: 810 TABLET | Refills: 3 | Status: SHIPPED | OUTPATIENT
Start: 2019-02-15 | End: 2019-11-15

## 2019-02-15 RX ORDER — DULOXETIN HYDROCHLORIDE 60 MG/1
60 CAPSULE, DELAYED RELEASE ORAL 2 TIMES DAILY
Qty: 180 CAPSULE | Refills: 3 | Status: SHIPPED | OUTPATIENT
Start: 2019-02-15 | End: 2019-11-15

## 2019-02-15 ASSESSMENT — MIFFLIN-ST. JEOR: SCORE: 1328.49

## 2019-02-15 ASSESSMENT — PATIENT HEALTH QUESTIONNAIRE - PHQ9: SUM OF ALL RESPONSES TO PHQ QUESTIONS 1-9: 12

## 2019-02-15 NOTE — PATIENT INSTRUCTIONS
CHANGE medication - Cymbalta  Take 60 mg twice daily going forward to help with anxiety      Check with insurance to see where Shingrix is best covered for you

## 2019-02-15 NOTE — PROGRESS NOTES
SUBJECTIVE:   Monse Peoples is a 73 year old female who presents to clinic today for the following health issues:      Medication Followup of  Ativan    Taking Medication as prescribed: yes    Side Effects:  None    Medication Helping Symptoms:  yes       1.  Anxiety: She has a long history of anxiety, taking Wellbutrin, Cymbalta on a daily basis.  Using Ativan intermittently for high panic.  Has a strong family history of cardiac disease, with 2 brothers dying at a young age from heart attack.  This creates significant anxiety for Aaliyah as far as her own risk.  She will often have sweating episodes with shortness of breath and chest pain.  She will have a hard time differentiating these symptoms from true angina.  She has addressed this with her cardiologist, but no interventions have been deemed necessary.  Her cholesterol is well managed and she is exercising 3-4 times weekly.  She would be open to interventions to help better manage her anxiety so that she can differentiate between panic attacks and angina.    2.  Hypertension: Taking hydralazine, hydrochlorothiazide, lisinopril, and atenolol for blood pressure management.  Her blood pressure has been stable for the past few years.    3.  Hyperhidrosis: Last year, we started her on Robinul.  She notes this is been a very helpful addition for her.  She is not having quite as much sweating, particularly in the warmer months of the year which was very bothersome for her.  She would like to continue this.  Notes no side effects.    4.  Acid reflux: Taking omeprazole daily.  Notes no side effects.  Would like to continue this.    Problem list and histories reviewed & adjusted, as indicated.  Additional history: as documented    Patient Active Problem List   Diagnosis     Hyperlipidemia LDL goal <100     Esophageal reflux     Osteopenia     Anxiety     Pulmonary nodules     Advanced care planning/counseling discussion     Benign essential hypertension     S/P  "total hip arthroplasty     Coronary artery disease involving native coronary artery of native heart without angina pectoris     Anemia, unspecified     Intestinal malabsorption, unspecified     Degenerative arthritis of knee     Former smoker - has had pulmonary nodules in the past     Chest pain, unspecified type     Past Surgical History:   Procedure Laterality Date     ANGIOGRAM  6/15/05     ARTHROPLASTY HIP  2013    Procedure: ARTHROPLASTY HIP;  Right total hip arthroplasty       ARTHROPLASTY HIP Left 2015    Procedure: ARTHROPLASTY HIP;  Surgeon: Tyrese Howell MD;  Location: [facility] OR     ARTHROPLASTY KNEE Left 2016    Procedure: ARTHROPLASTY KNEE;  Surgeon: Tyrese Howell MD;  Location: [facility] OR     CHOLECYSTECTOMY       COLONOSCOPY       COLONOSCOPY Left 2016    Procedure: COLONOSCOPY;  Surgeon: Moe Haney MD;  Location: [facility] GI     GASTRIC BYPASS       tiff en y     GI SURGERY      fistulotomy     ORTHOPEDIC SURGERY      left knee \"green procedure\"     TONSILLECTOMY       TUBAL LIGATION         Social History     Tobacco Use     Smoking status: Former Smoker     Packs/day: 0.50     Years: 50.00     Pack years: 25.00     Types: Cigarettes     Last attempt to quit: 2013     Years since quittin.0     Smokeless tobacco: Never Used   Substance Use Topics     Alcohol use: Yes     Alcohol/week: 0.0 oz     Comment: 5 drinks a week     Family History   Problem Relation Age of Onset     Coronary Artery Disease Father      Hypertension Father      Coronary Artery Disease Sister      Hypertension Sister      Coronary Artery Disease Brother      Hypertension Brother      Heart Disease Nephew      Other Cancer Maternal Half-Sister            Reviewed and updated as needed this visit by clinical staff  Tobacco  Allergies  Meds  Med Hx  Surg Hx  Fam Hx  Soc Hx      Reviewed and updated as needed this visit by Provider         ROS:  Constitutional, HEENT, cardiovascular, pulmonary, " "gi and gu systems are negative, except as otherwise noted.    OBJECTIVE:     /70 (BP Location: Right arm, Patient Position: Sitting, Cuff Size: Adult Regular)   Pulse 66   Temp 97.8  F (36.6  C) (Oral)   Ht 1.638 m (5' 4.5\")   Wt 82.6 kg (182 lb)   SpO2 98%   BMI 30.76 kg/m    Body mass index is 30.76 kg/m .  GENERAL: healthy, alert and no distress  NECK: no adenopathy, no asymmetry, masses, or scars and thyroid normal to palpation  RESP: lungs clear to auscultation - no rales, rhonchi or wheezes  CV: regular rate and rhythm, normal S1 S2, no S3 or S4, no murmur, click or rub, no peripheral edema and peripheral pulses strong  NEURO: Normal strength and tone, mentation intact and speech normal  PSYCH: mentation appears normal, affect normal/bright    Diagnostic Test Results:  none     ASSESSMENT/PLAN:     1. Anxiety -discussed increased dose of Cymbalta to better help her with her anxiety.  Will increase from 60 mg once daily to 60 mg twice daily.  Continue Wellbutrin at current dosing.  I still encouraged her to use only 10 tablets of Ativan monthly as her use previously seem to be excessive.  - DULoxetine (CYMBALTA) 60 MG capsule; Take 1 capsule (60 mg) by mouth 2 times daily  Dispense: 180 capsule; Refill: 3  - buPROPion (WELLBUTRIN SR) 150 MG 12 hr tablet; Take 1 tablet (150 mg) by mouth 2 times daily  Dispense: 180 tablet; Refill: 3  - traZODone (DESYREL) 100 MG tablet; Take 2 tablets (200 mg) by mouth At Bedtime  Dispense: 180 tablet; Refill: 3    2. Chest pain, unspecified type -has been determined to be non-anginal, although this is a continued source of worry for her given her strong family history of heart disease.  Hopefully increases as above will help her differentiate better.  Also suggested cognitive behavioral therapy to help better differentiate panic attack from anginal chest pain going forward.  She will consider this and get back to me    4. Generalized hyperhidrosis -she would like to " continue Robinul as she found this helpful.  This seems reasonable, refills  - glycopyrrolate (ROBINUL) 1 MG tablet; Take 1 tablet (1 mg) by mouth 2 times daily Take 1 tablet daily. If needed increase to 1 tablet twice daily.  Dispense: 180 tablet; Refill: 3    5. Benign essential hypertension - in range on 4 agents, refills today  - atenolol (TENORMIN) 50 MG tablet; Take 1 tablet (50 mg) by mouth daily  Dispense: 180 tablet; Refill: 3  - hydrALAZINE (APRESOLINE) 25 MG tablet; Take 3 tablets (75 mg) by mouth 3 times daily (you can use up the 50 mg tabs by taking 1.5 tabs 3 times a day first)  Dispense: 810 tablet; Refill: 3  - hydrochlorothiazide (HYDRODIURIL) 25 MG tablet; Take 1 tablet (25 mg) by mouth daily  Dispense: 90 tablet; Refill: 3  - lisinopril (PRINIVIL/ZESTRIL) 40 MG tablet; Take 1 tablet (40 mg) by mouth daily  Dispense: 90 tablet; Refill: 3    6. Gastroesophageal reflux disease without esophagitis -stable, refills  - omeprazole (PRILOSEC) 40 MG DR capsule; Take 1 capsule (40 mg) by mouth daily Take 30-60 minutes before a meal.  Dispense: 90 capsule; Refill: 3    7. Visit for screening mammogram  - MA Diagnostic Digital Bilateral; Future    8. Disorder of breast   - MA Diagnostic Digital Bilateral; Future    Susan Camacho MD  [facility]

## 2019-04-16 ENCOUNTER — HOSPITAL ENCOUNTER (OUTPATIENT)
Dept: MAMMOGRAPHY | Facility: CLINIC | Age: 74
Discharge: HOME | End: 2019-04-16
Attending: FAMILY MEDICINE | Admitting: FAMILY MEDICINE
Payer: COMMERCIAL

## 2019-04-16 DIAGNOSIS — Z12.31 VISIT FOR SCREENING MAMMOGRAM: ICD-10-CM

## 2019-04-16 PROCEDURE — 77063 BREAST TOMOSYNTHESIS BI: CPT

## 2019-04-28 DIAGNOSIS — E78.5 HYPERLIPIDEMIA LDL GOAL <100: ICD-10-CM

## 2019-04-28 NOTE — TELEPHONE ENCOUNTER
"Requested Prescriptions   Pending Prescriptions Disp Refills     atorvastatin (LIPITOR) 20 MG tablet  Last Written Prescription Date:  5/8/2018  Last Fill Quantity: 90 tablet,  # refills: 3   Last office visit: 2/15/2019 with prescribing provider:  Doug   Future Office Visit:     90 tablet 3     Sig: Take 1 tablet (20 mg) by mouth daily       Statins Protocol Passed - 4/28/2019  3:51 PM        Passed - LDL on file in past 12 months     Recent Labs   Lab Test 10/24/18  0935   LDL 38             Passed - No abnormal creatine kinase in past 12 months     No lab results found.             Passed - Recent (12 mo) or future (30 days) visit within the authorizing provider's specialty     Patient had office visit in the last 12 months or has a visit in the next 30 days with authorizing provider or within the authorizing provider's specialty.  See \"Patient Info\" tab in inbasket, or \"Choose Columns\" in Meds & Orders section of the refill encounter.              Passed - Medication is active on med list        Passed - Patient is age 18 or older        Passed - No active pregnancy on record        Passed - No positive pregnancy test in past 12 months          "

## 2019-04-29 DIAGNOSIS — F41.9 ANXIETY: ICD-10-CM

## 2019-04-29 RX ORDER — ATORVASTATIN CALCIUM 20 MG/1
20 TABLET, FILM COATED ORAL DAILY
Qty: 90 TABLET | Refills: 1 | Status: SHIPPED | OUTPATIENT
Start: 2019-04-29 | End: 2019-08-27

## 2019-04-29 NOTE — TELEPHONE ENCOUNTER
Prescription approved per [facility] Refill Protocol.  Penelope Haas RN, BSN

## 2019-04-29 NOTE — TELEPHONE ENCOUNTER
Controlled Substance Refill Request for LORazepam (ATIVAN) 1 MG tablet  Problem List Complete:  No     PROVIDER TO CONSIDER COMPLETION OF PROBLEM LIST AND OVERVIEW/CONTROLLED SUBSTANCE AGREEMENT    Last Written Prescription Date:  02/05/2019  Last Fill Quantity: 30 tablet,   # refills: 0    THE MOST RECENT OFFICE VISIT MUST BE WITHIN THE PAST 3 MONTHS. AT LEAST ONE FACE TO FACE VISIT MUST OCCUR EVERY 6 MONTHS. ADDITIONAL VISITS CAN BE VIRTUAL.  (THIS STATEMENT SHOULD BE DELETED.)    Last Office Visit with [facility] provider: 02/15/2019    Future Office visit:     Controlled substance agreement:   Encounter-Level CSA:    There are no encounter-level csa.     Patient-Level CSA:    There are no patient-level csa.         Last Urine Drug Screen: No results found for: CDAUT, No results found for: COMDAT, No results found for: THC13, PCP13, COC13, MAMP13, OPI13, AMP13, BZO13, TCA13, MTD13, BAR13, OXY13, PPX13, BUP13     Processing:  Fax Rx to [id number] [place], [place] - [address] [facility]     [email or web address]       checked in past 3 months?  Yes 02/01/2019      Tyrese LOW

## 2019-04-30 RX ORDER — LORAZEPAM 1 MG/1
TABLET ORAL
Qty: 30 TABLET | Refills: 0 | Status: SHIPPED | OUTPATIENT
Start: 2019-04-30 | End: 2019-08-28

## 2019-05-28 DIAGNOSIS — F41.9 ANXIETY: ICD-10-CM

## 2019-05-28 RX ORDER — LORAZEPAM 1 MG/1
TABLET ORAL
Qty: 30 TABLET | Refills: 0 | OUTPATIENT
Start: 2019-05-28

## 2019-05-28 NOTE — TELEPHONE ENCOUNTER
Controlled Substance Refill Request for LORazepam (ATIVAN) 1 MG tablet  Problem List Complete:  No     PROVIDER TO CONSIDER COMPLETION OF PROBLEM LIST AND OVERVIEW/CONTROLLED SUBSTANCE AGREEMENT    Last Written Prescription Date:  04/30/2019  Last Fill Quantity: 30 tablet,   # refills: 0    THE MOST RECENT OFFICE VISIT MUST BE WITHIN THE PAST 3 MONTHS. AT LEAST ONE FACE TO FACE VISIT MUST OCCUR EVERY 6 MONTHS. ADDITIONAL VISITS CAN BE VIRTUAL.  (THIS STATEMENT SHOULD BE DELETED.)    Last Office Visit with [facility] primary care provider: 02/15/2019    Future Office visit:     Controlled substance agreement:   Encounter-Level CSA:    There are no encounter-level csa.     Patient-Level CSA:    There are no patient-level csa.         Last Urine Drug Screen: No results found for: CDAUT, No results found for: COMDAT, No results found for: THC13, PCP13, COC13, MAMP13, OPI13, AMP13, BZO13, TCA13, MTD13, BAR13, OXY13, PPX13, BUP13     Processing:  Fax Rx to [facility][id number] [place], [place] - [address] [facility] pharmacy     [email or web address]       checked in past 3 months?  No, route to KULDEEP LOW

## 2019-08-25 DIAGNOSIS — E78.5 HYPERLIPIDEMIA LDL GOAL <100: ICD-10-CM

## 2019-08-25 NOTE — TELEPHONE ENCOUNTER
"Requested Prescriptions   Pending Prescriptions Disp Refills     atorvastatin (LIPITOR) 20 MG tablet  Last Written Prescription Date:  04/29/2019  Last Fill Quantity: 90,  # refills: 1   Last office visit: 2/15/2019 with prescribing provider:  02/15/2019   Future Office Visit:     90 tablet 1     Sig: Take 1 tablet (20 mg) by mouth daily       Statins Protocol Passed - 8/25/2019  9:11 AM        Passed - LDL on file in past 12 months     Recent Labs   Lab Test 10/24/18  0935   LDL 38             Passed - No abnormal creatine kinase in past 12 months     No lab results found.             Passed - Recent (12 mo) or future (30 days) visit within the authorizing provider's specialty     Patient had office visit in the last 12 months or has a visit in the next 30 days with authorizing provider or within the authorizing provider's specialty.  See \"Patient Info\" tab in inbasket, or \"Choose Columns\" in Meds & Orders section of the refill encounter.              Passed - Medication is active on med list        Passed - Patient is age 18 or older        Passed - No active pregnancy on record        Passed - No positive pregnancy test in past 12 months        **PT REQUESTING 90 DAY SUPPLY**    "

## 2019-08-27 DIAGNOSIS — F41.9 ANXIETY: ICD-10-CM

## 2019-08-27 RX ORDER — ATORVASTATIN CALCIUM 20 MG/1
20 TABLET, FILM COATED ORAL DAILY
Qty: 90 TABLET | Refills: 1 | Status: SHIPPED | OUTPATIENT
Start: 2019-08-27 | End: 2019-11-15

## 2019-08-27 NOTE — TELEPHONE ENCOUNTER
Prescription approved per [facility] Refill Protocol.  Kayla Lares RN

## 2019-08-27 NOTE — TELEPHONE ENCOUNTER
Controlled Substance Refill Request for LORazepam (ATIVAN) 1 MG tablet  Problem List Complete:  No     PROVIDER TO CONSIDER COMPLETION OF PROBLEM LIST AND OVERVIEW/CONTROLLED SUBSTANCE AGREEMENT    Last Written Prescription Date: 04/30/2019   Last Fill Quantity: 30 tablet,   # refills: 0    THE MOST RECENT OFFICE VISIT MUST BE WITHIN THE PAST 3 MONTHS. AT LEAST ONE FACE TO FACE VISIT MUST OCCUR EVERY 6 MONTHS. ADDITIONAL VISITS CAN BE VIRTUAL.  (THIS STATEMENT SHOULD BE DELETED.)    Last Office Visit with [facility] [place] care provider: 02/15/2019    Future Office visit:     Controlled substance agreement:   Encounter-Level CSA:    There are no encounter-level csa.     Patient-Level CSA:    There are no patient-level csa.         Last Urine Drug Screen: No results found for: CDAUT, No results found for: COMDAT, No results found for: THC13, PCP13, COC13, MAMP13, OPI13, AMP13, BZO13, TCA13, MTD13, BAR13, OXY13, PPX13, BUP13     Processing:  Fax Rx to [id number] [place], [place] - [address] [facility]     [email or web address]       checked in past 3 months?  No, route to KULDEEP LOW

## 2019-08-28 RX ORDER — LORAZEPAM 1 MG/1
TABLET ORAL
Qty: 30 TABLET | Refills: 0 | Status: SHIPPED | OUTPATIENT
Start: 2019-08-28 | End: 2019-11-15

## 2019-08-28 NOTE — TELEPHONE ENCOUNTER
[place] ([place] reviewed:  reviewed- no concerns    Last fill 4/30/19    Kayla Lares RN

## 2019-10-01 ENCOUNTER — HEALTH MAINTENANCE LETTER (OUTPATIENT)
Age: 74
End: 2019-10-01

## 2019-11-06 NOTE — TELEPHONE ENCOUNTER
"Weekly Treatment Plan Review Phase I Progress Note        ATTENDANCE     All treatment notes and services reviewed for the following dates covering this treatment plan review: 10/30/19-11/06/19        Weekly Treatment Plan Review     Treatment Plan initiated on: 10/11/19.     Dimension1: Acute Intoxication/Withdrawal Potential -   Date of Last Use: 10/08/19 (Xanax and THC).    Any reports of withdrawal symptoms -  Client reporting feeling irritable. .     Dimension 2: Biomedical Conditions & Complications -   Medical Concerns:  Ct is lactose intolerant and has mild asthma. Ct is able to tolerate and cope with medical concerns. Ct reports the following allergies:                Allergies   Allergen Reactions     Lactose Cramps     Mucinex Itching       \"Get itchy and puffy around lips/mouth from the dye in it\".     Pineapple Swelling       \"I get itchy in my mouth and throat, lips and around lips get puffy with pineapple, kiwi, apples and citric acid\".     Citric Acid Itching       \"I get itchy tongue and throat and puffy lips and around lips with fruits, apples, kiwi\".     Seasonal Allergies Itching       \"I get itchy and puffy\"      Current Medications & Medication Changes:      Current Outpatient Medications   Medication     ARIPiprazole (ABILIFY) 2 MG tablet     FLUoxetine (PROZAC) 40 MG capsule          Current Facility-Administered Medications   Medication     albuterol (PROAIR HFA/PROVENTIL HFA/VENTOLIN HFA) 108 (90 Base) MCG/ACT inhaler 2 puff      Medication side effects or concerns: Denies   Outside medical appointments this week (list provider and reason for visit):  None at this time     Dimension 3: Emotional/Behavioral Conditions & Complications -   Mental health diagnoses: F43.1 Posttraumatic Stress Disorder; F33.1 Major Depressive Disorder, Recurrent, Moderate; F41.9 Generalized Anxiety Disorder     Taking meds as prescribed? Yes  Date of last SIB: (1+ year ago, twice) by cutting  Date of  last SI:  " Prescription approved per [facility] Refill Protocol.  Penelope Haas RN, BSN     "Denies recent or hx  Date of last HI: Denies recent or hx  Behavioral Targets:  Impulsivity, anxious sx   Current MH Assignments: TBD     Ct appears to have some coping skills; however, she struggles to individually implement her learned skills to manage symptoms of mental health. Ct is able to acknowledge she struggles immensely with impulsivity (e.g. theft, poly-substance use, etc.). Ct appears relatively receptive to staff feedback in terms of managing and dealing with her mental health symptoms. Ct reported utilizing the DBT skill \"half-smiling\" on a daily basis.     Dimension 4: Treatment Acceptance / Resistance -   Phase - 2  Commitment to tx process/Stage of change- Contemplation   SHANIQUE assignments - Ct will be receiving a \"friendship\" packet to work on from primary counselor.   Behavior plan -  None  Responsibility contract - None  Peer restrictions - None     Ct infrequently verbalizes desire for long-term sobriety and recovery. Ct is able to identify consequences related to her substance use and the problematic nature of her lifestyle choices prior to treatment. Ct presents as open and honest within the individual setting. Ct is capable of engaging more within the group setting; however, she continues to be resistant towards this. Ct infrequently struggles with treatment interfering behaviors, not limited to, swearing, drug glorification, and side conversations. Ct appears vulnerable to peer negativity, though she does not appear to be instigating negativity.  Ct appears to be focused primarily on leaving RTC, and has a difficult time changing focus to more productive areas.      Dimension 5: Relapse / Continued Problem Potential -   Relapses this week - None  Urges to use - Client reports cravings for Nicotine and Cannabis.  Client rated cravings for nicotine at a 8/10, with 10 being the most intense.  She rated cravings for cannabis at a 4/10, with 10 being the most intense.  Client reported her cravings " overall have decreased since last review.     UA results - NEG     Ct is at HIGH risk for relapse. Ct has a hx of failed treatment attempts and failed attempts to maintain sobriety. Ct has minimal coping skills to arrest urges to use. Ct reported exercise and painting as being helpful activities for her to engage in. Ct has no insight on relapse triggers and cues. Ct is able to identify vulnerability to further use, as evidenced by her (verbalized) ambivalence regarding keeping using friendships as they could potentially lead to a relapse.     Dimension 6: Recovery Environment -     Family Involvement - Ct's mother is actively involved in treatment programming.  Summarize attendance at family groups and family sessions - Ct, ct's mother, and primary counselor are scheduled for a family session on Friday, Nov. 8th at 3:00 PM. Ct reported that overall she has a better relationship with her mom.  Ct stated an area she has made progress in is not hanging up the phone when she is mad at her mom.     Family supportive of program/stages?  Yes     Community support group attendance - Ct participates in on-site community support group meetings.  Recreational activities - Ct enjoys writing, coloring, painting, swimming.  Program school involvement - Ct infrequently struggles within the school setting (i.e. needs infrequent prompting by teacher to complete school work).     Ct was most recently enrolled at [employer] in 11th grade. Ct is reported to be significantly behind in school. Ct has hx of skipping school and challenging authority. Ct was placed on diversion for charges of theft, 5th degree assault and possession of a dangerous weapon. Ct has been recommended to complete residential treatment in order to have the charges dismissed. Meeting with diversion has been postponed to 11/11/19 at 1000.  Ct stated that she would like to find a part-time job following RTC.  Ct reported that she is concerned about her  "brother's use and bringing substances, specifically marijuana, into the home.  Ct wants to address these concerns prior to discharge.  Writer will follow up with ct's primary counselor to address.      Discharge Planning:  Target Discharge Date/Timeframe:  45-60 days from admission   Med Mgmt Provider/Appt:  [facility]   Ind therapy Provider/Appt:  VALERIE Valenzuela LADC ([place])   Family therapy Provider/Appt:  VALERIE Valenzuela LADC ([place])   Phase II plan/IOP: [facility]    School enrollment:  [employer] post-IOP   Other referrals:  Follow recommendations of diversion           Dimension Scale Review      Prior ratings: Dim1 - 1 DIM2 - 1 DIM3 - 2 DIM4 - 2 DIM5 - 4 DIM6 -3      Current ratings: Dim1 - 1 DIM2 - 1 DIM3 - 2 DIM4 - 2 DIM5 - 4 DIM6 -3         If client is 18 or older, has vulnerable adult status change? N/A     Are Treatment Plan goals/objectives effective? Yes   *If no, list changes to treatment plan:     Are the current goals meeting client's needs? Yes  *If no, list the changes to treatment plan.     Client Input / Response:   D) Writer met individually with ct for 30 minute treatment plan review.  Writer and ct reached out and left message with ct's home therapist. Writer and ct processed ct's constant feelings of wanting to leave RTC. Writer and ct also discussed areas where ct feels she has made progress in her relationship with her mom.   I) Facilitated session.  A) Ct was pleasant and cooperative.   P) Continue with tx plan - Writer will follow up with ct's primary counselor regarding \"[place]\" tx plan assignment, and her concerns within Dimension 6.     *Client agrees with any changes to the treatment plan: Yes  *Client received copy of changes: No  *Client is aware of right to access a treatment plan review: Yes    "

## 2019-11-15 ENCOUNTER — OFFICE VISIT (OUTPATIENT)
Dept: FAMILY MEDICINE | Facility: CLINIC | Age: 74
End: 2019-11-15
Payer: COMMERCIAL

## 2019-11-15 VITALS
DIASTOLIC BLOOD PRESSURE: 64 MMHG | HEART RATE: 77 BPM | SYSTOLIC BLOOD PRESSURE: 112 MMHG | BODY MASS INDEX: 30.66 KG/M2 | RESPIRATION RATE: 16 BRPM | HEIGHT: 65 IN | WEIGHT: 184 LBS | TEMPERATURE: 98.1 F

## 2019-11-15 DIAGNOSIS — K21.9 GASTROESOPHAGEAL REFLUX DISEASE WITHOUT ESOPHAGITIS: ICD-10-CM

## 2019-11-15 DIAGNOSIS — I25.10 ASHD (ARTERIOSCLEROTIC HEART DISEASE): ICD-10-CM

## 2019-11-15 DIAGNOSIS — Z11.59 ENCOUNTER FOR HEPATITIS C SCREENING TEST FOR LOW RISK PATIENT: ICD-10-CM

## 2019-11-15 DIAGNOSIS — R07.9 CHEST PAIN, UNSPECIFIED TYPE: ICD-10-CM

## 2019-11-15 DIAGNOSIS — D64.9 ANEMIA, UNSPECIFIED TYPE: ICD-10-CM

## 2019-11-15 DIAGNOSIS — E78.5 HYPERLIPIDEMIA LDL GOAL <100: ICD-10-CM

## 2019-11-15 DIAGNOSIS — F41.9 ANXIETY: Primary | ICD-10-CM

## 2019-11-15 DIAGNOSIS — I10 BENIGN ESSENTIAL HYPERTENSION: ICD-10-CM

## 2019-11-15 DIAGNOSIS — R79.9 ABNORMAL FINDING OF BLOOD CHEMISTRY, UNSPECIFIED: ICD-10-CM

## 2019-11-15 PROCEDURE — 99214 OFFICE O/P EST MOD 30 MIN: CPT | Performed by: FAMILY MEDICINE

## 2019-11-15 PROCEDURE — 96127 BRIEF EMOTIONAL/BEHAV ASSMT: CPT | Performed by: FAMILY MEDICINE

## 2019-11-15 RX ORDER — NITROGLYCERIN 0.4 MG/1
TABLET SUBLINGUAL
Qty: 25 TABLET | Refills: 0 | Status: SHIPPED | OUTPATIENT
Start: 2019-11-15

## 2019-11-15 RX ORDER — HYDROCHLOROTHIAZIDE 25 MG/1
25 TABLET ORAL DAILY
Qty: 90 TABLET | Refills: 3 | Status: SHIPPED | OUTPATIENT
Start: 2019-11-15 | End: 2020-11-24

## 2019-11-15 RX ORDER — DULOXETIN HYDROCHLORIDE 60 MG/1
60 CAPSULE, DELAYED RELEASE ORAL DAILY
Qty: 90 CAPSULE | Refills: 3 | Status: SHIPPED | OUTPATIENT
Start: 2019-11-15 | End: 2020-11-30

## 2019-11-15 RX ORDER — BUPROPION HYDROCHLORIDE 150 MG/1
150 TABLET, EXTENDED RELEASE ORAL 2 TIMES DAILY
Qty: 180 TABLET | Refills: 3 | Status: SHIPPED | OUTPATIENT
Start: 2019-11-15 | End: 2020-11-24

## 2019-11-15 RX ORDER — HYDRALAZINE HYDROCHLORIDE 25 MG/1
75 TABLET, FILM COATED ORAL 3 TIMES DAILY
Qty: 810 TABLET | Refills: 3 | Status: SHIPPED | OUTPATIENT
Start: 2019-11-15 | End: 2020-12-31

## 2019-11-15 RX ORDER — ATORVASTATIN CALCIUM 20 MG/1
20 TABLET, FILM COATED ORAL DAILY
Qty: 90 TABLET | Refills: 3 | Status: SHIPPED | OUTPATIENT
Start: 2019-11-15 | End: 2020-11-24

## 2019-11-15 RX ORDER — OMEPRAZOLE 40 MG/1
40 CAPSULE, DELAYED RELEASE ORAL DAILY
Qty: 90 CAPSULE | Refills: 3 | Status: SHIPPED | OUTPATIENT
Start: 2019-11-15 | End: 2020-11-24

## 2019-11-15 RX ORDER — ATENOLOL 50 MG/1
50 TABLET ORAL DAILY
Qty: 180 TABLET | Refills: 3 | Status: SHIPPED | OUTPATIENT
Start: 2019-11-15 | End: 2020-11-24

## 2019-11-15 RX ORDER — TRAZODONE HYDROCHLORIDE 100 MG/1
200 TABLET ORAL AT BEDTIME
Qty: 180 TABLET | Refills: 3 | Status: SHIPPED | OUTPATIENT
Start: 2019-11-15 | End: 2024-07-07

## 2019-11-15 RX ORDER — LISINOPRIL 40 MG/1
40 TABLET ORAL DAILY
Qty: 90 TABLET | Refills: 3 | Status: SHIPPED | OUTPATIENT
Start: 2019-11-15 | End: 2020-11-24

## 2019-11-15 RX ORDER — LORAZEPAM 1 MG/1
TABLET ORAL
Qty: 30 TABLET | Refills: 0 | Status: SHIPPED | OUTPATIENT
Start: 2019-11-15 | End: 2020-02-11

## 2019-11-15 ASSESSMENT — PATIENT HEALTH QUESTIONNAIRE - PHQ9
5. POOR APPETITE OR OVEREATING: SEVERAL DAYS
SUM OF ALL RESPONSES TO PHQ QUESTIONS 1-9: 8

## 2019-11-15 ASSESSMENT — ANXIETY QUESTIONNAIRES
2. NOT BEING ABLE TO STOP OR CONTROL WORRYING: MORE THAN HALF THE DAYS
IF YOU CHECKED OFF ANY PROBLEMS ON THIS QUESTIONNAIRE, HOW DIFFICULT HAVE THESE PROBLEMS MADE IT FOR YOU TO DO YOUR WORK, TAKE CARE OF THINGS AT HOME, OR GET ALONG WITH OTHER PEOPLE: SOMEWHAT DIFFICULT
7. FEELING AFRAID AS IF SOMETHING AWFUL MIGHT HAPPEN: MORE THAN HALF THE DAYS
1. FEELING NERVOUS, ANXIOUS, OR ON EDGE: SEVERAL DAYS
3. WORRYING TOO MUCH ABOUT DIFFERENT THINGS: MORE THAN HALF THE DAYS
5. BEING SO RESTLESS THAT IT IS HARD TO SIT STILL: SEVERAL DAYS
GAD7 TOTAL SCORE: 10
6. BECOMING EASILY ANNOYED OR IRRITABLE: SEVERAL DAYS

## 2019-11-15 ASSESSMENT — MIFFLIN-ST. JEOR: SCORE: 1337.56

## 2019-11-15 NOTE — PROGRESS NOTES
Subjective     Monse Peoples is a 73 year old female who presents to clinic today for the following health issues:    HPI   Medication Followup all     Taking Medication as prescribed: yes    Side Effects:  Sweating? Lowered cymbalta dose seemed to help    Medication Helping Symptoms:  yes       1. Anxiety -taking 60 mg Cymbalta in the morning and Wellbutrin 150 mg BID.  Feels like this is a good dose for her.  Cut back her afternoon dose as she was concerned her sweating episodes may be related to medication side effect.  She notes improvement in her sweating since decreasing her Cymbalta.    2.  Hypertension - taking atenolol, hydralazine, hydrochlorothiazide, and lisinopril daily to manage her hypertension.  Reports no side effects these medications, has been on this regimen for years.  Following with cardiology on an every six-month basis.  Has upcoming lab work for surveillance.    3.  Hyperlipidemia-taking atorvastatin 20 mg daily.  Reports no side effects.    4.  GERD-taking omeprazole 40 mg daily.  Feels like she needs to continue taking this medication.    5.  Insomnia-taking trazodone 200 mg nightly to help her sleep.  Reports she does not feel she would be able to sleep without this medication.  Experiences no waking grogginess.        Patient Active Problem List   Diagnosis     Hyperlipidemia LDL goal <100     Esophageal reflux     Osteopenia     Anxiety     Pulmonary nodules     Advanced care planning/counseling discussion     Benign essential hypertension     S/P total hip arthroplasty     Coronary artery disease involving native coronary artery of native heart without angina pectoris     Anemia, unspecified     Intestinal malabsorption, unspecified     Degenerative arthritis of knee     Former smoker - has had pulmonary nodules in the past     Chest pain, unspecified type     Past Surgical History:   Procedure Laterality Date     ANGIOGRAM  6/15/05     ARTHROPLASTY HIP  9/23/2013    Procedure:  "ARTHROPLASTY HIP;  Right total hip arthroplasty       ARTHROPLASTY HIP Left 2015    Procedure: ARTHROPLASTY HIP;  Surgeon: Tyrese Howell MD;  Location: [facility] OR     ARTHROPLASTY KNEE Left 2016    Procedure: ARTHROPLASTY KNEE;  Surgeon: Tyrese Howell MD;  Location: [facility] OR     CHOLECYSTECTOMY       COLONOSCOPY       COLONOSCOPY Left 2016    Procedure: COLONOSCOPY;  Surgeon: Moe Haney MD;  Location: [facility] GI     GASTRIC BYPASS       tiff en y     GI SURGERY      fistulotomy     ORTHOPEDIC SURGERY      left knee \"green procedure\"     TONSILLECTOMY       TUBAL LIGATION         Social History     Tobacco Use     Smoking status: Former Smoker     Packs/day: 0.50     Years: 50.00     Pack years: 25.00     Types: Cigarettes     Last attempt to quit: 2013     Years since quittin.8     Smokeless tobacco: Never Used   Substance Use Topics     Alcohol use: Yes     Alcohol/week: 0.0 standard drinks     Comment: 5 drinks a week     Family History   Problem Relation Age of Onset     Coronary Artery Disease Father      Hypertension Father      Coronary Artery Disease Sister      Hypertension Sister      Coronary Artery Disease Brother      Hypertension Brother      Heart Disease Nephew      Other Cancer Maternal Half-Sister          Reviewed and updated as needed this visit by Provider  Tobacco  Allergies  Meds  Problems  Med Hx  Surg Hx  Fam Hx         Review of Systems   ROS COMP: Constitutional, HEENT, cardiovascular, pulmonary, GI, , musculoskeletal, neuro, skin, endocrine and psych systems are negative, except as otherwise noted.      Objective    /64   Pulse 77   Temp 98.1  F (36.7  C) (Oral)   Resp 16   Ht 1.638 m (5' 4.5\")   Wt 83.5 kg (184 lb)   Breastfeeding No   BMI 31.10 kg/m    Body mass index is 31.1 kg/m .  Physical Exam   GENERAL: healthy, alert and no distress  RESP: lungs clear to auscultation - no rales, rhonchi or wheezes  CV: regular rate and rhythm, " normal S1 S2, no S3 or S4, no murmur, click or rub, no peripheral edema and peripheral pulses strong  NEURO: Normal strength and tone, mentation intact and speech normal  PSYCH: mentation appears normal, affect normal/bright    Diagnostic Test Results:  Labs reviewed in Epic        Assessment & Plan     1. Anxiety -stable, refills  - DULoxetine (CYMBALTA) 60 MG capsule; Take 1 capsule (60 mg) by mouth daily  Dispense: 90 capsule; Refill: 3  - buPROPion (WELLBUTRIN SR) 150 MG 12 hr tablet; Take 1 tablet (150 mg) by mouth 2 times daily  Dispense: 180 tablet; Refill: 3  - traZODone (DESYREL) 100 MG tablet; Take 2 tablets (200 mg) by mouth At Bedtime  Dispense: 180 tablet; Refill: 3  - LORazepam (ATIVAN) 1 MG tablet; Take 1 tablet with high anxiety, 10 tablets monthly  Dispense: 30 tablet; Refill: 0    2. Benign essential hypertension - in range, continue current, surveillance labs upcoming  - atenolol (TENORMIN) 50 MG tablet; Take 1 tablet (50 mg) by mouth daily  Dispense: 180 tablet; Refill: 3  - hydrALAZINE (APRESOLINE) 25 MG tablet; Take 3 tablets (75 mg) by mouth 3 times daily (you can use up the 50 mg tabs by taking 1.5 tabs 3 times a day first)  Dispense: 810 tablet; Refill: 3  - hydrochlorothiazide (HYDRODIURIL) 25 MG tablet; Take 1 tablet (25 mg) by mouth daily  Dispense: 90 tablet; Refill: 3  - lisinopril (PRINIVIL/ZESTRIL) 40 MG tablet; Take 1 tablet (40 mg) by mouth daily  Dispense: 90 tablet; Refill: 3  - **Comprehensive metabolic panel FUTURE anytime; Future  - **A1C FUTURE anytime; Future    3. Hyperlipidemia LDL goal <100 - on statin, continue  - atorvastatin (LIPITOR) 20 MG tablet; Take 1 tablet (20 mg) by mouth daily  Dispense: 90 tablet; Refill: 3    4. ASHD (arteriosclerotic heart disease) - denies chest pain or dyspnea, refills nitroglycerin  - nitroGLYcerin (NITROSTAT) 0.4 MG sublingual tablet; For chest pain place 1 tablet under the tongue every 5 minutes for 3 doses. If symptoms persist 5 minutes  after 1st dose call 911.  Dispense: 25 tablet; Refill: 0    5. Chest pain, unspecified type - as above  - nitroGLYcerin (NITROSTAT) 0.4 MG sublingual tablet; For chest pain place 1 tablet under the tongue every 5 minutes for 3 doses. If symptoms persist 5 minutes after 1st dose call 911.  Dispense: 25 tablet; Refill: 0    6. Gastroesophageal reflux disease without esophagitis - stable, refills  - omeprazole (PRILOSEC) 40 MG DR capsule; Take 1 capsule (40 mg) by mouth daily Take 30-60 minutes before a meal.  Dispense: 90 capsule; Refill: 3    7. Anemia, unspecified type - surveillance labs  - **CBC with platelets FUTURE anytime; Future    8. Abnormal finding of blood chemistry, unspecified - surveillance labs  - **A1C FUTURE anytime; Future    9. Encounter for hepatitis C screening test for low risk patient  - **Hepatitis C Screen Reflex to RNA FUTURE anytime; Future       Return in about 6 months (around 5/15/2020) for Yearly Physical Exam.    Susan Camacho MD  [facility]

## 2019-11-16 ASSESSMENT — ANXIETY QUESTIONNAIRES: GAD7 TOTAL SCORE: 10

## 2019-12-15 ENCOUNTER — HEALTH MAINTENANCE LETTER (OUTPATIENT)
Age: 74
End: 2019-12-15

## 2019-12-20 ENCOUNTER — OFFICE VISIT (OUTPATIENT)
Dept: CARDIOLOGY | Facility: CLINIC | Age: 74
End: 2019-12-20
Payer: COMMERCIAL

## 2019-12-20 VITALS
BODY MASS INDEX: 30.97 KG/M2 | HEIGHT: 65 IN | SYSTOLIC BLOOD PRESSURE: 128 MMHG | WEIGHT: 185.9 LBS | HEART RATE: 72 BPM | DIASTOLIC BLOOD PRESSURE: 80 MMHG

## 2019-12-20 DIAGNOSIS — I25.10 CORONARY ARTERY DISEASE INVOLVING NATIVE CORONARY ARTERY OF NATIVE HEART WITHOUT ANGINA PECTORIS: Primary | ICD-10-CM

## 2019-12-20 DIAGNOSIS — R07.9 CHEST PAIN, UNSPECIFIED TYPE: ICD-10-CM

## 2019-12-20 DIAGNOSIS — I10 BENIGN ESSENTIAL HYPERTENSION: ICD-10-CM

## 2019-12-20 DIAGNOSIS — I25.10 CORONARY ARTERY DISEASE INVOLVING NATIVE CORONARY ARTERY OF NATIVE HEART WITHOUT ANGINA PECTORIS: ICD-10-CM

## 2019-12-20 LAB
ALT SERPL W P-5'-P-CCNC: 49 U/L (ref 0–50)
ANION GAP SERPL CALCULATED.3IONS-SCNC: 5 MMOL/L (ref 3–14)
BUN SERPL-MCNC: 16 MG/DL (ref 7–30)
CALCIUM SERPL-MCNC: 9 MG/DL (ref 8.5–10.1)
CHLORIDE SERPL-SCNC: 105 MMOL/L (ref 94–109)
CHOLEST SERPL-MCNC: 159 MG/DL
CO2 SERPL-SCNC: 27 MMOL/L (ref 20–32)
CREAT SERPL-MCNC: 0.83 MG/DL (ref 0.52–1.04)
GFR SERPL CREATININE-BSD FRML MDRD: 70 ML/MIN/{1.73_M2}
GLUCOSE SERPL-MCNC: 90 MG/DL (ref 70–99)
HDLC SERPL-MCNC: 78 MG/DL
LDLC SERPL CALC-MCNC: 51 MG/DL
NONHDLC SERPL-MCNC: 81 MG/DL
POTASSIUM SERPL-SCNC: 4 MMOL/L (ref 3.4–5.3)
SODIUM SERPL-SCNC: 137 MMOL/L (ref 133–144)
TRIGL SERPL-MCNC: 152 MG/DL

## 2019-12-20 PROCEDURE — 99214 OFFICE O/P EST MOD 30 MIN: CPT | Performed by: INTERNAL MEDICINE

## 2019-12-20 PROCEDURE — 80061 LIPID PANEL: CPT | Performed by: INTERNAL MEDICINE

## 2019-12-20 PROCEDURE — 80048 BASIC METABOLIC PNL TOTAL CA: CPT | Performed by: INTERNAL MEDICINE

## 2019-12-20 PROCEDURE — 36415 COLL VENOUS BLD VENIPUNCTURE: CPT | Performed by: INTERNAL MEDICINE

## 2019-12-20 PROCEDURE — 84460 ALANINE AMINO (ALT) (SGPT): CPT | Performed by: INTERNAL MEDICINE

## 2019-12-20 ASSESSMENT — MIFFLIN-ST. JEOR: SCORE: 1341.18

## 2019-12-20 NOTE — LETTER
12/20/2019      Susan Camacho MD  [id number]  [facility] [address]      RE: Monse Peoples       Dear Colleague,    I had the pleasure of seeing Monse Peoples in the [facility].    Service Date: 12/20/2019      REFERRING PHYSICIAN:  Dr. Susan Camacho.       HISTORY OF PRESENT ILLNESS:  It is my pleasure to see your patient, Monse Peoples.  She is a very pleasant 74-year-old patient with a past history of coronary artery disease based upon a coronary angiogram performed in [facility] in 2005.  This patient also has a history of gastric bypass surgery, essential hypertension and hyperlipidemia.  She also has a history of benign essential hypertension.      Since I last saw her, she has done well.  She gets occasional bouts of chest discomfort which are really in the epigastric region and left upper abdominal region, which are sharp, last for a few seconds and then go away.  She has no exertional chest pain or exertional chest discomfort.  She has no unusual shortness of breath.  She has no arm, throat or jaw discomfort with exertion or at rest.  Her blood pressure is well controlled today at 128/80 with a pulse of 72 beats per minute.  Her laboratory investigations today show a very good lipid profile.  Her LDL is 51, her HDL is 78 and her triglycerides are just borderline at 152.  Total cholesterol is 159 and her ALT is 49 indicating no hepatic dysfunction.  The patient is taking hydrochlorothiazide and lisinopril, both of which can affect electrolytes and renal function.  However,  her electrolytes are normal with a sodium of 137 and a potassium of 4.0, and her BUN is 16 with a creatinine of 0.83 with GFR of 70.      IMPRESSION:   1.  Coronary artery disease.  The patient is asymptomatic with respect to coronary artery disease with no symptoms suggestive of angina pectoris.   2.  Excellent lipid profile with the exception of borderline hypertriglyceridemia.    3.  Normal renal function and electrolytes on hydrochlorothiazide and lisinopril.   4.  Benign essential hypertension.  Her blood pressure is well controlled.   5.  Mildly overweight with a BMI of 31.42.   6.  Borderline hypertriglyceridemia as mentioned above.      PLAN:  I will continue the patient on her present medications.  I have advised the patient to try and exercise and lose weight and reduce her carbohydrate intake which will improve her triglycerides.  I will have her return to see me in 1 year's time.  I will repeat her lipids and basic metabolic profile at that stage, but as always, she has been told to contact me if she has any questions or any concerns.      cc:   [place] [address]         SARA ZELAYA MD, formerly [facility]             D: 2019   T: 2019   MT: ERNST      Name:     AIMNTA GOEL   MRN:      [id number]        Account:      [id number]   :      1945           Service Date: 2019      Document: [id number]         Outpatient Encounter Medications as of 2019   Medication Sig Dispense Refill     acetaminophen (TYLENOL) 325 MG tablet Take 650 mg by mouth every morning  100 tablet 0     aspirin 81 MG tablet Take 81 mg by mouth daily       atenolol (TENORMIN) 50 MG tablet Take 1 tablet (50 mg) by mouth daily 180 tablet 3     atorvastatin (LIPITOR) 20 MG tablet Take 1 tablet (20 mg) by mouth daily 90 tablet 3     buPROPion (WELLBUTRIN SR) 150 MG 12 hr tablet Take 1 tablet (150 mg) by mouth 2 times daily 180 tablet 3     calcium carbonate-vitamin D 600-400 MG-UNIT CHEW Take 2 tablets by mouth every morning       Coenzyme Q10 (CO Q 10 PO) Take 100 mg by mouth every evening        CYANOCOBALAMIN SL Place 1,000 mcg under the tongue daily       DULoxetine (CYMBALTA) 60 MG capsule Take 1 capsule (60 mg) by mouth daily 90 capsule 3     FIBER SELECT GUMMIES CHEW Take 1 chew tab by mouth daily         hydrALAZINE (APRESOLINE) 25 MG tablet Take 3 tablets (75 mg) by mouth 3 times daily (you can use up the 50 mg tabs by taking 1.5 tabs 3 times a day first) 810 tablet 3     hydrochlorothiazide (HYDRODIURIL) 25 MG tablet Take 1 tablet (25 mg) by mouth daily 90 tablet 3     lisinopril (PRINIVIL/ZESTRIL) 40 MG tablet Take 1 tablet (40 mg) by mouth daily 90 tablet 3     LORazepam (ATIVAN) 1 MG tablet Take 1 tablet with high anxiety, 10 tablets monthly 30 tablet 0     multivitamin, therapeutic with minerals (MULTI-VITAMIN) TABS Take 1 tablet by mouth daily       nitroGLYcerin (NITROSTAT) 0.4 MG sublingual tablet For chest pain place 1 tablet under the tongue every 5 minutes for 3 doses. If symptoms persist 5 minutes after 1st dose call 911. 25 tablet 0     Omega-3 Fatty Acids (OMEGA-3 FISH OIL PO) Take 1.2 g by mouth daily        omeprazole (PRILOSEC) 40 MG DR capsule Take 1 capsule (40 mg) by mouth daily Take 30-60 minutes before a meal. 90 capsule 3     traZODone (DESYREL) 100 MG tablet Take 2 tablets (200 mg) by mouth At Bedtime 180 tablet 3     No facility-administered encounter medications on file as of 12/20/2019.        Again, thank you for allowing me to participate in the care of your patient.      Sincerely,    Sotero Garza MD, MD     [facility]

## 2019-12-20 NOTE — PROGRESS NOTES
HPI and Plan:   See dictation    Orders Placed This Encounter   Procedures     Basic metabolic panel     Lipid Profile     ALT     Follow-Up with Cardiologist       No orders of the defined types were placed in this encounter.      There are no discontinued medications.      Encounter Diagnoses   Name Primary?     Coronary artery disease involving native coronary artery of native heart without angina pectoris Yes     Benign essential hypertension      Chest pain, unspecified type        CURRENT MEDICATIONS:  Current Outpatient Medications   Medication Sig Dispense Refill     acetaminophen (TYLENOL) 325 MG tablet Take 650 mg by mouth every morning  100 tablet 0     aspirin 81 MG tablet Take 81 mg by mouth daily       atenolol (TENORMIN) 50 MG tablet Take 1 tablet (50 mg) by mouth daily 180 tablet 3     atorvastatin (LIPITOR) 20 MG tablet Take 1 tablet (20 mg) by mouth daily 90 tablet 3     buPROPion (WELLBUTRIN SR) 150 MG 12 hr tablet Take 1 tablet (150 mg) by mouth 2 times daily 180 tablet 3     calcium carbonate-vitamin D 600-400 MG-UNIT CHEW Take 2 tablets by mouth every morning       Coenzyme Q10 (CO Q 10 PO) Take 100 mg by mouth every evening        CYANOCOBALAMIN SL Place 1,000 mcg under the tongue daily       DULoxetine (CYMBALTA) 60 MG capsule Take 1 capsule (60 mg) by mouth daily 90 capsule 3     FIBER SELECT GUMMIES CHEW Take 1 chew tab by mouth daily        hydrALAZINE (APRESOLINE) 25 MG tablet Take 3 tablets (75 mg) by mouth 3 times daily (you can use up the 50 mg tabs by taking 1.5 tabs 3 times a day first) 810 tablet 3     hydrochlorothiazide (HYDRODIURIL) 25 MG tablet Take 1 tablet (25 mg) by mouth daily 90 tablet 3     lisinopril (PRINIVIL/ZESTRIL) 40 MG tablet Take 1 tablet (40 mg) by mouth daily 90 tablet 3     LORazepam (ATIVAN) 1 MG tablet Take 1 tablet with high anxiety, 10 tablets monthly 30 tablet 0     multivitamin, therapeutic with minerals (MULTI-VITAMIN) TABS Take 1 tablet by mouth daily    "    nitroGLYcerin (NITROSTAT) 0.4 MG sublingual tablet For chest pain place 1 tablet under the tongue every 5 minutes for 3 doses. If symptoms persist 5 minutes after 1st dose call 911. 25 tablet 0     Omega-3 Fatty Acids (OMEGA-3 FISH OIL PO) Take 1.2 g by mouth daily        omeprazole (PRILOSEC) 40 MG DR capsule Take 1 capsule (40 mg) by mouth daily Take 30-60 minutes before a meal. 90 capsule 3     traZODone (DESYREL) 100 MG tablet Take 2 tablets (200 mg) by mouth At Bedtime 180 tablet 3       ALLERGIES   No Known Allergies    PAST MEDICAL HISTORY:  Past Medical History:   Diagnosis Date     Anxiety 4/2/2015     Asthma     no inhaler for 2-3 years     Benign essential hypertension 8/2/2016     Chest pain, unspecified type 8/27/2017     Coronary artery disease      Coronary artery disease involving native coronary artery of native heart without angina pectoris 8/2/2016     Depression      Esophageal reflux 3/19/2015     Former smoker - has had pulmonary nodules in the past 3/29/2017     History of blood transfusion      Hyperlipidemia LDL goal <100 3/19/2015     Osteoarthritis      Osteopenia      Pulmonary nodules 4/22/2015    Noted 4/14/15, f/u scan 03/2016 showed minimal change, needs repeat scan 03/2017        PAST SURGICAL HISTORY:  Past Surgical History:   Procedure Laterality Date     ANGIOGRAM  6/15/05     ARTHROPLASTY HIP  9/23/2013    Procedure: ARTHROPLASTY HIP;  Right total hip arthroplasty       ARTHROPLASTY HIP Left 5/19/2015    Procedure: ARTHROPLASTY HIP;  Surgeon: Tyrese Howell MD;  Location: [facility] OR     ARTHROPLASTY KNEE Left 8/22/2016    Procedure: ARTHROPLASTY KNEE;  Surgeon: Tyrese Howell MD;  Location:  OR     CHOLECYSTECTOMY       COLONOSCOPY       COLONOSCOPY Left 4/19/2016    Procedure: COLONOSCOPY;  Surgeon: Moe Haney MD;  Location:  GI     GASTRIC BYPASS      2011 tiff en y     GI SURGERY      fistulotomy     ORTHOPEDIC SURGERY      left knee \"green procedure\"     " TONSILLECTOMY       TUBAL LIGATION         FAMILY HISTORY:  Family History   Problem Relation Age of Onset     Coronary Artery Disease Father      Hypertension Father      Coronary Artery Disease Sister      Hypertension Sister      Coronary Artery Disease Brother      Hypertension Brother      Heart Disease Nephew      Other Cancer Maternal Half-Sister        SOCIAL HISTORY:  Social History     Socioeconomic History     Marital status:      Spouse name: None     Number of children: None     Years of education: None     Highest education level: None   Occupational History     None   Social Needs     Financial resource strain: None     Food insecurity:     Worry: None     Inability: None     Transportation needs:     Medical: None     Non-medical: None   Tobacco Use     Smoking status: Former Smoker     Packs/day: 0.50     Years: 50.00     Pack years: 25.00     Types: Cigarettes     Last attempt to quit: 2013     Years since quittin.9     Smokeless tobacco: Never Used   Substance and Sexual Activity     Alcohol use: Yes     Alcohol/week: 0.0 standard drinks     Comment: 5 drinks a week     Drug use: No     Sexual activity: Yes     Partners: Male   Lifestyle     Physical activity:     Days per week: None     Minutes per session: None     Stress: None   Relationships     Social connections:     Talks on phone: None     Gets together: None     Attends [employer] service: None     Active member of club or organization: None     Attends meetings of clubs or organizations: None     Relationship status: None     Intimate partner violence:     Fear of current or ex partner: None     Emotionally abused: None     Physically abused: None     Forced sexual activity: None   Other Topics Concern     Parent/sibling w/ CABG, MI or angioplasty before 65F 55M? Yes     Comment: brother age 31      Service Not Asked     Blood Transfusions Not Asked     Caffeine Concern Yes     Comment: 1 cup of coffee       "Occupational Exposure Not Asked     Hobby Hazards Not Asked     Sleep Concern Yes     Comment: lately it has been an issue     Stress Concern Not Asked     Weight Concern Not Asked     Special Diet No     Comment: watching what she eats      Back Care Not Asked     Exercise No     Bike Helmet Not Asked     Seat Belt Yes     Self-Exams Not Asked   Social History Narrative     None       Review of Systems:  Skin:  Negative       Eyes:  Negative glasses    ENT:  Positive for hearing loss    Respiratory:  Positive for dyspnea on exertion;shortness of breath     Cardiovascular:    chest pain;Positive for;fatigue chest pain when sitting only  Gastroenterology: Positive for heartburn under control with Omeprazole  Genitourinary:  Positive for urinary frequency    Musculoskeletal:  Positive for arthritis;back pain    Neurologic:  Negative      Psychiatric:  Negative      Heme/Lymph/Imm:  Negative      Endocrine:  Negative        Physical Exam:  Vitals: /80 (BP Location: Right arm, Patient Position: Sitting, Cuff Size: Adult Regular)   Pulse 72   Ht 1.638 m (5' 4.5\")   Wt 84.3 kg (185 lb 14.4 oz)   Breastfeeding No   BMI 31.42 kg/m      Constitutional:  cooperative, alert and oriented, well developed, well nourished, in no acute distress overweight mildly anxious    Skin:  warm and dry to the touch, no apparent skin lesions or masses noted     warm and dry    Head:  normocephalic, no masses or lesions   atraumatic    Eyes:  pupils equal and round;conjunctivae and lids unremarkable;sclera white   EOMI, pupils round and equal    Lymph:      ENT:  no pallor or cyanosis, dentition good   speech normal, tongue midline    Neck:  no carotid bruit;JVP normal   supple    Respiratory:  normal breath sounds, clear to auscultation, normal A-P diameter, normal symmetry, normal respiratory excursion, no use of accessory muscles    clear without rales/wheezing    Cardiac: regular rhythm, normal S1/S2, no S3 or S4, apical impulse " not displaced, no murmurs, gallops or rubs occasional premature beats           RRR without murmur/lift/thrill  pulses full and equal                                   UE pulses full and equal    GI:  not assessed this visit   non-tender    Extremities and Muscular Skeletal:  no deformities, clubbing, cyanosis, erythema observed;no edema         no edema    Neurological:  no gross motor deficits;affect appropriate   motor grossly intact    Psych:  Alert and Oriented x 3 Anxious      CC  Susan Camacho MD  [id number] [place] [address]

## 2019-12-20 NOTE — LETTER
12/20/2019    Susan Camacho MD  [address]  [facility] [address]    RE: Monse Peoples       Dear Colleague,    I had the pleasure of seeing Monse Peoples in the [facility].    HPI and Plan:   See dictation    Orders Placed This Encounter   Procedures     Basic metabolic panel     Lipid Profile     ALT     Follow-Up with Cardiologist       No orders of the defined types were placed in this encounter.      There are no discontinued medications.      Encounter Diagnoses   Name Primary?     Coronary artery disease involving native coronary artery of native heart without angina pectoris Yes     Benign essential hypertension      Chest pain, unspecified type        CURRENT MEDICATIONS:  Current Outpatient Medications   Medication Sig Dispense Refill     acetaminophen (TYLENOL) 325 MG tablet Take 650 mg by mouth every morning  100 tablet 0     aspirin 81 MG tablet Take 81 mg by mouth daily       atenolol (TENORMIN) 50 MG tablet Take 1 tablet (50 mg) by mouth daily 180 tablet 3     atorvastatin (LIPITOR) 20 MG tablet Take 1 tablet (20 mg) by mouth daily 90 tablet 3     buPROPion (WELLBUTRIN SR) 150 MG 12 hr tablet Take 1 tablet (150 mg) by mouth 2 times daily 180 tablet 3     calcium carbonate-vitamin D 600-400 MG-UNIT CHEW Take 2 tablets by mouth every morning       Coenzyme Q10 (CO Q 10 PO) Take 100 mg by mouth every evening        CYANOCOBALAMIN SL Place 1,000 mcg under the tongue daily       DULoxetine (CYMBALTA) 60 MG capsule Take 1 capsule (60 mg) by mouth daily 90 capsule 3     FIBER SELECT GUMMIES CHEW Take 1 chew tab by mouth daily        hydrALAZINE (APRESOLINE) 25 MG tablet Take 3 tablets (75 mg) by mouth 3 times daily (you can use up the 50 mg tabs by taking 1.5 tabs 3 times a day first) 810 tablet 3     hydrochlorothiazide (HYDRODIURIL) 25 MG tablet Take 1 tablet (25 mg) by mouth daily 90 tablet 3     lisinopril (PRINIVIL/ZESTRIL) 40 MG tablet Take 1 tablet (40  mg) by mouth daily 90 tablet 3     LORazepam (ATIVAN) 1 MG tablet Take 1 tablet with high anxiety, 10 tablets monthly 30 tablet 0     multivitamin, therapeutic with minerals (MULTI-VITAMIN) TABS Take 1 tablet by mouth daily       nitroGLYcerin (NITROSTAT) 0.4 MG sublingual tablet For chest pain place 1 tablet under the tongue every 5 minutes for 3 doses. If symptoms persist 5 minutes after 1st dose call 911. 25 tablet 0     Omega-3 Fatty Acids (OMEGA-3 FISH OIL PO) Take 1.2 g by mouth daily        omeprazole (PRILOSEC) 40 MG DR capsule Take 1 capsule (40 mg) by mouth daily Take 30-60 minutes before a meal. 90 capsule 3     traZODone (DESYREL) 100 MG tablet Take 2 tablets (200 mg) by mouth At Bedtime 180 tablet 3       ALLERGIES   No Known Allergies    PAST MEDICAL HISTORY:  Past Medical History:   Diagnosis Date     Anxiety 4/2/2015     Asthma     no inhaler for 2-3 years     Benign essential hypertension 8/2/2016     Chest pain, unspecified type 8/27/2017     Coronary artery disease      Coronary artery disease involving native coronary artery of native heart without angina pectoris 8/2/2016     Depression      Esophageal reflux 3/19/2015     Former smoker - has had pulmonary nodules in the past 3/29/2017     History of blood transfusion      Hyperlipidemia LDL goal <100 3/19/2015     Osteoarthritis      Osteopenia      Pulmonary nodules 4/22/2015    Noted 4/14/15, f/u scan 03/2016 showed minimal change, needs repeat scan 03/2017        PAST SURGICAL HISTORY:  Past Surgical History:   Procedure Laterality Date     ANGIOGRAM  6/15/05     ARTHROPLASTY HIP  9/23/2013    Procedure: ARTHROPLASTY HIP;  Right total hip arthroplasty       ARTHROPLASTY HIP Left 5/19/2015    Procedure: ARTHROPLASTY HIP;  Surgeon: Tyrese Howell MD;  Location: [facility] OR     ARTHROPLASTY KNEE Left 8/22/2016    Procedure: ARTHROPLASTY KNEE;  Surgeon: Tyrese Howell MD;  Location: [facility] OR     CHOLECYSTECTOMY       COLONOSCOPY       COLONOSCOPY  "Left 2016    Procedure: COLONOSCOPY;  Surgeon: Moe Haney MD;  Location:  GI     GASTRIC BYPASS       tiff en y     GI SURGERY      fistulotomy     ORTHOPEDIC SURGERY      left knee \"green procedure\"     TONSILLECTOMY       TUBAL LIGATION         FAMILY HISTORY:  Family History   Problem Relation Age of Onset     Coronary Artery Disease Father      Hypertension Father      Coronary Artery Disease Sister      Hypertension Sister      Coronary Artery Disease Brother      Hypertension Brother      Heart Disease Nephew      Other Cancer Maternal Half-Sister        SOCIAL HISTORY:  Social History     Socioeconomic History     Marital status:      Spouse name: None     Number of children: None     Years of education: None     Highest education level: None   Occupational History     None   Social Needs     Financial resource strain: None     Food insecurity:     Worry: None     Inability: None     Transportation needs:     Medical: None     Non-medical: None   Tobacco Use     Smoking status: Former Smoker     Packs/day: 0.50     Years: 50.00     Pack years: 25.00     Types: Cigarettes     Last attempt to quit: 2013     Years since quittin.9     Smokeless tobacco: Never Used   Substance and Sexual Activity     Alcohol use: Yes     Alcohol/week: 0.0 standard drinks     Comment: 5 drinks a week     Drug use: No     Sexual activity: Yes     Partners: Male   Lifestyle     Physical activity:     Days per week: None     Minutes per session: None     Stress: None   Relationships     Social connections:     Talks on phone: None     Gets together: None     Attends Anabaptism service: None     Active member of club or organization: None     Attends meetings of clubs or organizations: None     Relationship status: None     Intimate partner violence:     Fear of current or ex partner: None     Emotionally abused: None     Physically abused: None     Forced sexual activity: None   Other Topics Concern " "    Parent/sibling w/ CABG, MI or angioplasty before 65F 55M? Yes     Comment: brother age 31      Service Not Asked     Blood Transfusions Not Asked     Caffeine Concern Yes     Comment: 1 cup of coffee      Occupational Exposure Not Asked     Hobby Hazards Not Asked     Sleep Concern Yes     Comment: lately it has been an issue     Stress Concern Not Asked     Weight Concern Not Asked     Special Diet No     Comment: watching what she eats      Back Care Not Asked     Exercise No     Bike Helmet Not Asked     Seat Belt Yes     Self-Exams Not Asked   Social History Narrative     None       Review of Systems:  Skin:  Negative       Eyes:  Negative glasses    ENT:  Positive for hearing loss    Respiratory:  Positive for dyspnea on exertion;shortness of breath     Cardiovascular:    chest pain;Positive for;fatigue chest pain when sitting only  Gastroenterology: Positive for heartburn under control with Omeprazole  Genitourinary:  Positive for urinary frequency    Musculoskeletal:  Positive for arthritis;back pain    Neurologic:  Negative      Psychiatric:  Negative      Heme/Lymph/Imm:  Negative      Endocrine:  Negative        Physical Exam:  Vitals: /80 (BP Location: Right arm, Patient Position: Sitting, Cuff Size: Adult Regular)   Pulse 72   Ht 1.638 m (5' 4.5\")   Wt 84.3 kg (185 lb 14.4 oz)   Breastfeeding No   BMI 31.42 kg/m       Constitutional:  cooperative, alert and oriented, well developed, well nourished, in no acute distress overweight mildly anxious    Skin:  warm and dry to the touch, no apparent skin lesions or masses noted     warm and dry    Head:  normocephalic, no masses or lesions   atraumatic    Eyes:  pupils equal and round;conjunctivae and lids unremarkable;sclera white   EOMI, pupils round and equal    Lymph:      ENT:  no pallor or cyanosis, dentition good   speech normal, tongue midline    Neck:  no carotid bruit;JVP normal   supple    Respiratory:  normal breath sounds, " clear to auscultation, normal A-P diameter, normal symmetry, normal respiratory excursion, no use of accessory muscles    clear without rales/wheezing    Cardiac: regular rhythm, normal S1/S2, no S3 or S4, apical impulse not displaced, no murmurs, gallops or rubs occasional premature beats           RRR without murmur/lift/thrill  pulses full and equal                                   UE pulses full and equal    GI:  not assessed this visit   non-tender    Extremities and Muscular Skeletal:  no deformities, clubbing, cyanosis, erythema observed;no edema         no edema    Neurological:  no gross motor deficits;affect appropriate   motor grossly intact    Psych:  Alert and Oriented x 3 Anxious      CC  Susan Camacho [place]  [address] [place] [address]                Thank you for allowing me to participate in the care of your patient.      Sincerely,     Sotero Garza MD, MD     [facility]    cc:   Susan Camacho MD  [address] [place] [address]

## 2019-12-20 NOTE — PROGRESS NOTES
Service Date: 12/20/2019      REFERRING PHYSICIAN:  Dr. Susan Camacho.       HISTORY OF PRESENT ILLNESS:  It is my pleasure to see your patient, Monse Peoples.  She is a very pleasant 74-year-old patient with a past history of coronary artery disease based upon a coronary angiogram performed in [facility] in 2005.  This patient also has a history of gastric bypass surgery, essential hypertension and hyperlipidemia.  She also has a history of benign essential hypertension.      Since I last saw her, she has done well.  She gets occasional bouts of chest discomfort which are really in the epigastric region and left upper abdominal region, which are sharp, last for a few seconds and then go away.  She has no exertional chest pain or exertional chest discomfort.  She has no unusual shortness of breath.  She has no arm, throat or jaw discomfort with exertion or at rest.  Her blood pressure is well controlled today at 128/80 with a pulse of 72 beats per minute.  Her laboratory investigations today show a very good lipid profile.  Her LDL is 51, her HDL is 78 and her triglycerides are just borderline at 152.  Total cholesterol is 159 and her ALT is 49 indicating no hepatic dysfunction.  The patient is taking hydrochlorothiazide and lisinopril, both of which can affect electrolytes and renal function.  However,  her electrolytes are normal with a sodium of 137 and a potassium of 4.0, and her BUN is 16 with a creatinine of 0.83 with GFR of 70.      IMPRESSION:   1.  Coronary artery disease.  The patient is asymptomatic with respect to coronary artery disease with no symptoms suggestive of angina pectoris.   2.  Excellent lipid profile with the exception of borderline hypertriglyceridemia.   3.  Normal renal function and electrolytes on hydrochlorothiazide and lisinopril.   4.  Benign essential hypertension.  Her blood pressure is well controlled.   5.  Mildly overweight with a BMI of 31.42.   6.  Borderline  hypertriglyceridemia as mentioned above.      PLAN:  I will continue the patient on her present medications.  I have advised the patient to try and exercise and lose weight and reduce her carbohydrate intake which will improve her triglycerides.  I will have her return to see me in 1 year's time.  I will repeat her lipids and basic metabolic profile at that stage, but as always, she has been told to contact me if she has any questions or any concerns.      cc:   [place] [address]         SARA ZELAYA MD, [facility]             D: 2019   T: 2019   MT: ERNST      Name:     AMINTA GOEL   MRN:      [id number]        Account:      [id number]   :      1945           Service Date: 2019      Document: [id number]

## 2020-02-10 ENCOUNTER — TELEPHONE (OUTPATIENT)
Dept: FAMILY MEDICINE | Facility: CLINIC | Age: 75
End: 2020-02-10

## 2020-02-10 DIAGNOSIS — F41.9 ANXIETY: ICD-10-CM

## 2020-02-10 NOTE — TELEPHONE ENCOUNTER
[place] reviewed:  reviewed- no concerns    [place]  [email or web address]    Controlled Substance Refill Request for Ativan  Problem List Complete:  Yes   checked in past 3 months?  Yes 2/10/2020     Last Written Prescription Date:  11/15/19  Last Fill Quantity: 30,  # refills: 0   Last office visit: 11/15/2019 with prescribing provider:  Doug   Future Office Visit:      Patient is followed by LESLIE GOODWIN for ongoing prescription of benzodiazepines.  All refills should be approved by this provider, or covering partner.    Medication(s): Ativan 1 mg.   Maximum quantity per month: 30  Clinic visit frequency required: Q 3 months     Controlled substance agreement on file: No  Benzodiazepine use reviewed by psychiatry:  No    Last MNP website verification:  done on 2/10/2020  [email or web address]

## 2020-02-10 NOTE — TELEPHONE ENCOUNTER
Lisseth with [facility] pharmacy calling stating there is a rejection from insurance regarding duplicate therapy for duloxetine and trazodone.  Both were prescribed by PCP on the same day so PCP is aware pt is on both.    Penelope Haas RN, BSN

## 2020-02-11 RX ORDER — LORAZEPAM 1 MG/1
TABLET ORAL
Qty: 30 TABLET | Refills: 0 | Status: SHIPPED | OUTPATIENT
Start: 2020-02-11 | End: 2020-03-05

## 2020-03-05 DIAGNOSIS — F41.9 ANXIETY: ICD-10-CM

## 2020-03-05 RX ORDER — LORAZEPAM 1 MG/1
TABLET ORAL
Qty: 30 TABLET | Refills: 0 | Status: SHIPPED | OUTPATIENT
Start: 2020-03-05 | End: 2020-06-01

## 2020-03-05 NOTE — TELEPHONE ENCOUNTER
Spoke with patient and her insurance does not cover [facility] pharmacy and has to use [facility]    Pt has not filled the script sent on 2/11/20 to [facility] because she can't     Please send script to new pharmacy    Penelope Haas RN, BSN

## 2020-09-07 DIAGNOSIS — F41.9 ANXIETY: ICD-10-CM

## 2020-09-08 RX ORDER — LORAZEPAM 1 MG/1
TABLET ORAL
Qty: 30 TABLET | Refills: 0 | Status: SHIPPED | OUTPATIENT
Start: 2020-09-08 | End: 2020-12-28

## 2020-09-08 NOTE — TELEPHONE ENCOUNTER
[place] reviewed:  reviewed- no concerns    [place]  [email or web address]    Controlled Substance Refill Request for Ativan  Problem List Complete:  Yes   checked in past 3 months?  Yes 9/8/20   Last Written Prescription Date:  6/1/20  Last Fill Quantity: 30,  # refills: 0   Last office visit: 11/15/2019 with prescribing provider:  LIVAN   Future Office Visit:            Patient is followed by LESLIE GOODWIN for ongoing prescription of benzodiazepines.  All refills should be approved by this provider, or covering partner.    Medication(s): Ativan 1 mg.   Maximum quantity per month: 30  Clinic visit frequency required: Q 3 months     Controlled substance agreement on file: No  Benzodiazepine use reviewed by psychiatry:  No    Last [place] website verification:  done on 9/8/20  [email or web address]

## 2020-11-01 DIAGNOSIS — I10 BENIGN ESSENTIAL HYPERTENSION: ICD-10-CM

## 2020-11-02 RX ORDER — HYDRALAZINE HYDROCHLORIDE 25 MG/1
75 TABLET, FILM COATED ORAL 3 TIMES DAILY
Qty: 810 TABLET | Refills: 0 | OUTPATIENT
Start: 2020-11-02

## 2020-11-24 DIAGNOSIS — E78.5 HYPERLIPIDEMIA LDL GOAL <100: ICD-10-CM

## 2020-11-24 DIAGNOSIS — F41.9 ANXIETY: ICD-10-CM

## 2020-11-24 DIAGNOSIS — I10 BENIGN ESSENTIAL HYPERTENSION: ICD-10-CM

## 2020-11-24 DIAGNOSIS — K21.9 GASTROESOPHAGEAL REFLUX DISEASE WITHOUT ESOPHAGITIS: ICD-10-CM

## 2020-11-24 RX ORDER — LISINOPRIL 40 MG/1
40 TABLET ORAL DAILY
Qty: 90 TABLET | Refills: 0 | Status: SHIPPED | OUTPATIENT
Start: 2020-11-24

## 2020-11-24 RX ORDER — ATORVASTATIN CALCIUM 20 MG/1
20 TABLET, FILM COATED ORAL DAILY
Qty: 90 TABLET | Refills: 0 | Status: SHIPPED | OUTPATIENT
Start: 2020-11-24

## 2020-11-24 RX ORDER — HYDROCHLOROTHIAZIDE 25 MG/1
25 TABLET ORAL DAILY
Qty: 90 TABLET | Refills: 0 | Status: SHIPPED | OUTPATIENT
Start: 2020-11-24 | End: 2021-02-23

## 2020-11-24 RX ORDER — BUPROPION HYDROCHLORIDE 150 MG/1
150 TABLET, EXTENDED RELEASE ORAL 2 TIMES DAILY
Qty: 180 TABLET | Refills: 0 | Status: SHIPPED | OUTPATIENT
Start: 2020-11-24

## 2020-11-24 RX ORDER — ATENOLOL 50 MG/1
50 TABLET ORAL DAILY
Qty: 180 TABLET | Refills: 0 | Status: SHIPPED | OUTPATIENT
Start: 2020-11-24 | End: 2023-06-20

## 2020-11-24 RX ORDER — OMEPRAZOLE 40 MG/1
40 CAPSULE, DELAYED RELEASE ORAL DAILY
Qty: 90 CAPSULE | Refills: 0 | Status: SHIPPED | OUTPATIENT
Start: 2020-11-24

## 2020-11-24 NOTE — LETTER
November 24, 2020      [facility] [facility]  [address] [facility] [address]        Dear Monse,     We recently received a call from your pharmacy requesting a refill of your medications.   A review of your chart indicates that an appointment is required. Please contact our office at [phone] to schedule your doctor's appointment.   We have authorized one refill of your medication to allow time for you to schedule your appointment.   Taking care of your health is important to us and ongoing visits with your provider are vital to your care. We look forward to seeing you in the near future.      Sincerely,    Susan Camacho MD/Penelope Haas RN, BSN

## 2020-11-24 NOTE — TELEPHONE ENCOUNTER
Medication is being filled for 1 time refill only due to:  Patient needs to be seen because it has been more than one year since last visit.     Letter sent    Penelope Haas RN, BSN

## 2020-11-29 DIAGNOSIS — F41.9 ANXIETY: ICD-10-CM

## 2020-11-30 RX ORDER — DULOXETIN HYDROCHLORIDE 60 MG/1
60 CAPSULE, DELAYED RELEASE ORAL DAILY
Qty: 90 CAPSULE | Refills: 0 | Status: SHIPPED | OUTPATIENT
Start: 2020-11-30

## 2020-11-30 NOTE — TELEPHONE ENCOUNTER
Routing refill request to provider for review/approval because:  Drug interaction warning  Penelope Haas RN, BSN

## 2020-12-27 DIAGNOSIS — F41.9 ANXIETY: ICD-10-CM

## 2020-12-28 RX ORDER — LORAZEPAM 1 MG/1
TABLET ORAL
Qty: 10 TABLET | Refills: 0 | Status: SHIPPED | OUTPATIENT
Start: 2020-12-28 | End: 2024-07-07

## 2020-12-28 NOTE — TELEPHONE ENCOUNTER
Routing refill request to provider for review/approval because:  Drug not on the [facility] refill protocol     Hilary CORONADO RN, BSN

## 2020-12-31 DIAGNOSIS — I10 BENIGN ESSENTIAL HYPERTENSION: ICD-10-CM

## 2020-12-31 RX ORDER — HYDRALAZINE HYDROCHLORIDE 25 MG/1
75 TABLET, FILM COATED ORAL 3 TIMES DAILY
Qty: 810 TABLET | Refills: 0 | Status: SHIPPED | OUTPATIENT
Start: 2020-12-31 | End: 2022-04-06

## 2020-12-31 NOTE — TELEPHONE ENCOUNTER
Prescription approved per [facility] Refill Protocol.  As pt has upcoming OV    Kayla Lares RN

## 2021-01-15 ENCOUNTER — HEALTH MAINTENANCE LETTER (OUTPATIENT)
Age: 76
End: 2021-01-15

## 2021-02-22 DIAGNOSIS — I10 BENIGN ESSENTIAL HYPERTENSION: ICD-10-CM

## 2021-02-22 NOTE — TELEPHONE ENCOUNTER
Routing refill request to provider for review/approval because:  Re given x1 and patient did not follow up, please advise  Patient needs to be seen because it has been more than 1 year since last office visit.    Pt has appt with cardiology 4/20/21  Do you want them to fill?  Penelope Haas RN, BSN

## 2021-02-23 RX ORDER — HYDROCHLOROTHIAZIDE 25 MG/1
25 TABLET ORAL DAILY
Qty: 30 TABLET | Refills: 0 | Status: SHIPPED | OUTPATIENT
Start: 2021-02-23

## 2021-03-03 DIAGNOSIS — I10 BENIGN ESSENTIAL HYPERTENSION: ICD-10-CM

## 2021-03-03 RX ORDER — HYDROCHLOROTHIAZIDE 25 MG/1
25 TABLET ORAL DAILY
Qty: 30 TABLET | Refills: 0 | OUTPATIENT
Start: 2021-03-03

## 2021-03-09 DIAGNOSIS — I25.10 CAD (CORONARY ARTERY DISEASE): Primary | ICD-10-CM

## 2021-03-11 DIAGNOSIS — I10 BENIGN ESSENTIAL HYPERTENSION: ICD-10-CM

## 2021-03-12 RX ORDER — HYDROCHLOROTHIAZIDE 25 MG/1
25 TABLET ORAL DAILY
Qty: 30 TABLET | Refills: 0 | OUTPATIENT
Start: 2021-03-12

## 2021-04-19 DIAGNOSIS — I25.10 CAD (CORONARY ARTERY DISEASE): ICD-10-CM

## 2021-04-19 LAB
ALT SERPL W P-5'-P-CCNC: 46 U/L (ref 0–50)
ANION GAP SERPL CALCULATED.3IONS-SCNC: 5 MMOL/L (ref 3–14)
BUN SERPL-MCNC: 14 MG/DL (ref 7–30)
CALCIUM SERPL-MCNC: 8.6 MG/DL (ref 8.5–10.1)
CHLORIDE SERPL-SCNC: 105 MMOL/L (ref 94–109)
CHOLEST SERPL-MCNC: 143 MG/DL
CO2 SERPL-SCNC: 27 MMOL/L (ref 20–32)
CREAT SERPL-MCNC: 0.86 MG/DL (ref 0.52–1.04)
GFR SERPL CREATININE-BSD FRML MDRD: 67 ML/MIN/{1.73_M2}
GLUCOSE SERPL-MCNC: 106 MG/DL (ref 70–99)
HDLC SERPL-MCNC: 81 MG/DL
LDLC SERPL CALC-MCNC: 32 MG/DL
NONHDLC SERPL-MCNC: 62 MG/DL
POTASSIUM SERPL-SCNC: 3.6 MMOL/L (ref 3.4–5.3)
SODIUM SERPL-SCNC: 137 MMOL/L (ref 133–144)
TRIGL SERPL-MCNC: 151 MG/DL

## 2021-04-19 PROCEDURE — 36415 COLL VENOUS BLD VENIPUNCTURE: CPT | Performed by: INTERNAL MEDICINE

## 2021-04-19 PROCEDURE — 80061 LIPID PANEL: CPT | Performed by: INTERNAL MEDICINE

## 2021-04-19 PROCEDURE — 84460 ALANINE AMINO (ALT) (SGPT): CPT | Performed by: INTERNAL MEDICINE

## 2021-04-19 PROCEDURE — 80048 BASIC METABOLIC PNL TOTAL CA: CPT | Performed by: INTERNAL MEDICINE

## 2021-04-20 ENCOUNTER — OFFICE VISIT (OUTPATIENT)
Dept: CARDIOLOGY | Facility: CLINIC | Age: 76
End: 2021-04-20
Attending: INTERNAL MEDICINE
Payer: COMMERCIAL

## 2021-04-20 VITALS
WEIGHT: 187.5 LBS | BODY MASS INDEX: 31.24 KG/M2 | HEIGHT: 65 IN | DIASTOLIC BLOOD PRESSURE: 70 MMHG | SYSTOLIC BLOOD PRESSURE: 110 MMHG | OXYGEN SATURATION: 100 % | HEART RATE: 72 BPM

## 2021-04-20 DIAGNOSIS — I10 BENIGN ESSENTIAL HYPERTENSION: ICD-10-CM

## 2021-04-20 DIAGNOSIS — R06.09 DYSPNEA ON EXERTION: ICD-10-CM

## 2021-04-20 DIAGNOSIS — I25.10 CORONARY ARTERY DISEASE INVOLVING NATIVE CORONARY ARTERY OF NATIVE HEART WITHOUT ANGINA PECTORIS: ICD-10-CM

## 2021-04-20 DIAGNOSIS — R07.9 CHEST PAIN, UNSPECIFIED TYPE: Primary | ICD-10-CM

## 2021-04-20 PROCEDURE — 99214 OFFICE O/P EST MOD 30 MIN: CPT | Performed by: INTERNAL MEDICINE

## 2021-04-20 ASSESSMENT — MIFFLIN-ST. JEOR: SCORE: 1343.43

## 2021-04-20 NOTE — PROGRESS NOTES
HPI and Plan:   See dictation    Orders Placed This Encounter   Procedures     Basic metabolic panel     Lipid Profile     ALT     Follow-Up with Cardiac Advanced Practice Provider     Follow-Up with Cardiologist     Exercise Stress Echocardiogram       No orders of the defined types were placed in this encounter.      There are no discontinued medications.      Encounter Diagnoses   Name Primary?     Chest pain, unspecified type Yes     Coronary artery disease involving native coronary artery of native heart without angina pectoris      Benign essential hypertension      Dyspnea on exertion        CURRENT MEDICATIONS:  Current Outpatient Medications   Medication Sig Dispense Refill     acetaminophen (TYLENOL) 325 MG tablet Take 650 mg by mouth every morning  100 tablet 0     aspirin 81 MG tablet Take 81 mg by mouth daily       atenolol (TENORMIN) 50 MG tablet Take 1 tablet (50 mg) by mouth daily 180 tablet 0     atorvastatin (LIPITOR) 20 MG tablet Take 1 tablet (20 mg) by mouth daily (Patient taking differently: Take 40 mg by mouth daily ) 90 tablet 0     buPROPion (WELLBUTRIN SR) 150 MG 12 hr tablet Take 1 tablet (150 mg) by mouth 2 times daily 180 tablet 0     calcium carbonate-vitamin D 600-400 MG-UNIT CHEW Take 2 tablets by mouth every morning       Coenzyme Q10 (CO Q 10 PO) Take 100 mg by mouth every evening        CYANOCOBALAMIN SL Place 1,000 mcg under the tongue daily       DULoxetine (CYMBALTA) 60 MG capsule Take 1 capsule (60 mg) by mouth daily 90 capsule 0     FIBER SELECT GUMMIES CHEW Take 1 chew tab by mouth daily        hydrALAZINE (APRESOLINE) 25 MG tablet Take 3 tablets (75 mg) by mouth 3 times daily (you can use up the 50 mg tabs by taking 1.5 tabs 3 times a day first) 810 tablet 0     hydrochlorothiazide (HYDRODIURIL) 25 MG tablet Take 1 tablet (25 mg) by mouth daily 30 tablet 0     lisinopril (ZESTRIL) 40 MG tablet Take 1 tablet (40 mg) by mouth daily 90 tablet 0     LORazepam (ATIVAN) 1 MG  "tablet TAKE 1 TABLET BY MOUTH WITH HIGH ANXIETY. 10 TABLETS MONTHLY 10 tablet 0     multivitamin, therapeutic with minerals (MULTI-VITAMIN) TABS Take 1 tablet by mouth daily       nitroGLYcerin (NITROSTAT) 0.4 MG sublingual tablet For chest pain place 1 tablet under the tongue every 5 minutes for 3 doses. If symptoms persist 5 minutes after 1st dose call 911. 25 tablet 0     Omega-3 Fatty Acids (OMEGA-3 FISH OIL PO) Take 1.2 g by mouth daily        omeprazole (PRILOSEC) 40 MG DR capsule Take 1 capsule (40 mg) by mouth daily Take 30-60 minutes before a meal. 90 capsule 0     traZODone (DESYREL) 100 MG tablet Take 2 tablets (200 mg) by mouth At Bedtime 180 tablet 3       ALLERGIES     Allergies   Allergen Reactions     Codeine      Contrast Dye      PN: LW CM1: CONTRAST- nka Reaction :     Morphine Sulfate (Concentrate)      does not like side effects/\"hyper\"       PAST MEDICAL HISTORY:  Past Medical History:   Diagnosis Date     Anxiety 4/2/2015     Asthma     no inhaler for 2-3 years     Benign essential hypertension 8/2/2016     Chest pain, unspecified type 8/27/2017     Coronary artery disease      Coronary artery disease involving native coronary artery of native heart without angina pectoris 8/2/2016     Depression      Esophageal reflux 3/19/2015     Former smoker - has had pulmonary nodules in the past 3/29/2017     History of blood transfusion      Hyperlipidemia LDL goal <100 3/19/2015     Osteoarthritis      Osteopenia      Pulmonary nodules 4/22/2015    Noted 4/14/15, f/u scan 03/2016 showed minimal change, needs repeat scan 03/2017        PAST SURGICAL HISTORY:  Past Surgical History:   Procedure Laterality Date     ANGIOGRAM  6/15/05     ARTHROPLASTY HIP  9/23/2013    Procedure: ARTHROPLASTY HIP;  Right total hip arthroplasty       ARTHROPLASTY HIP Left 5/19/2015    Procedure: ARTHROPLASTY HIP;  Surgeon: Tyrese Howell MD;  Location: [facility]     ARTHROPLASTY KNEE Left 8/22/2016    Procedure: ARTHROPLASTY " "KNEE;  Surgeon: Tyrese Howell MD;  Location: [facility] OR     CHOLECYSTECTOMY       COLONOSCOPY       COLONOSCOPY Left 2016    Procedure: COLONOSCOPY;  Surgeon: Moe Haney MD;  Location:  GI     GASTRIC BYPASS       tiff en y     GI SURGERY      fistulotomy     ORTHOPEDIC SURGERY      left knee \"green procedure\"     TONSILLECTOMY       TUBAL LIGATION         FAMILY HISTORY:  Family History   Problem Relation Age of Onset     Coronary Artery Disease Father      Hypertension Father      Coronary Artery Disease Sister      Hypertension Sister      Coronary Artery Disease Brother      Hypertension Brother      Heart Disease Nephew      Other Cancer Maternal Half-Sister        SOCIAL HISTORY:  Social History     Socioeconomic History     Marital status:      Spouse name: None     Number of children: None     Years of education: None     Highest education level: None   Occupational History     None   Social Needs     Financial resource strain: None     Food insecurity     Worry: None     Inability: None     Transportation needs     Medical: None     Non-medical: None   Tobacco Use     Smoking status: Former Smoker     Packs/day: 0.50     Years: 50.00     Pack years: 25.00     Types: Cigarettes     Quit date: 2013     Years since quittin.2     Smokeless tobacco: Never Used   Substance and Sexual Activity     Alcohol use: Yes     Alcohol/week: 0.0 standard drinks     Comment: 5 drinks a week     Drug use: No     Sexual activity: Yes     Partners: Male   Lifestyle     Physical activity     Days per week: None     Minutes per session: None     Stress: None   Relationships     Social connections     Talks on phone: None     Gets together: None     Attends Taoism service: None     Active member of club or organization: None     Attends meetings of clubs or organizations: None     Relationship status: None     Intimate partner violence     Fear of current or ex partner: None     Emotionally " "abused: None     Physically abused: None     Forced sexual activity: None   Other Topics Concern     Parent/sibling w/ CABG, MI or angioplasty before 65F 55M? Yes     Comment: brother age 31      Service Not Asked     Blood Transfusions Not Asked     Caffeine Concern Yes     Comment: 1 cup of coffee      Occupational Exposure Not Asked     Hobby Hazards Not Asked     Sleep Concern Yes     Comment: lately it has been an issue     Stress Concern Not Asked     Weight Concern Not Asked     Special Diet No     Comment: watching what she eats      Back Care Not Asked     Exercise No     Bike Helmet Not Asked     Seat Belt Yes     Self-Exams Not Asked   Social History Narrative     None       Review of Systems:  Skin:  Negative       Eyes:  Positive for glasses    ENT:  Positive for hearing loss    Respiratory:  Positive for dyspnea on exertion;shortness of breath SOB w/minimal movement   Cardiovascular:    palpitations;Positive for;chest pain    Gastroenterology: Positive for heartburn under control with Omeprazole  Genitourinary:         Musculoskeletal:  Positive for arthritis;back pain    Neurologic:  Negative      Psychiatric:  Negative      Heme/Lymph/Imm:  Negative      Endocrine:  Negative        Physical Exam:  Vitals: /70 (BP Location: Right arm, Patient Position: Sitting, Cuff Size: Adult Large)   Pulse 72   Ht 1.638 m (5' 4.5\")   Wt 85 kg (187 lb 8 oz)   LMP  (LMP Unknown)   SpO2 100%   Breastfeeding No   BMI 31.69 kg/m      Constitutional:  cooperative, alert and oriented, well developed, well nourished, in no acute distress overweight;appears anxious      Skin:  warm and dry to the touch, no apparent skin lesions or masses noted     warm and dry    Head:  normocephalic, no masses or lesions   atraumatic    Eyes:  pupils equal and round;conjunctivae and lids unremarkable;sclera white   EOMI, pupils round and equal    Lymph:      ENT:  no pallor or cyanosis, dentition good   speech normal, " tongue midline    Neck:  no carotid bruit;JVP normal   supple    Respiratory:  normal breath sounds, clear to auscultation, normal A-P diameter, normal symmetry, normal respiratory excursion, no use of accessory muscles    clear without rales/wheezing    Cardiac: regular rhythm, normal S1/S2, no S3 or S4, apical impulse not displaced, no murmurs, gallops or rubs occasional premature beats           RRR without murmur/lift/thrill  pulses full and equal                                   UE pulses full and equal    GI:  not assessed this visit   non-tender    Extremities and Muscular Skeletal:  no deformities, clubbing, cyanosis, erythema observed;no edema         no edema    Neurological:  no gross motor deficits;affect appropriate   motor grossly intact    Psych:  Alert and Oriented x 3 Anxious      CC  Sotero Garza MD  [address]  [place] [id number]

## 2021-04-20 NOTE — LETTER
4/20/2021    Marlys Dawson PA-C  [address] [facility] [address]    RE: Monse Peoples       Dear Colleague,    I had the pleasure of seeing Monse Peoples in the [facility].    HPI and Plan:   See dictation    Orders Placed This Encounter   Procedures     Basic metabolic panel     Lipid Profile     ALT     Follow-Up with Cardiac Advanced Practice Provider     Follow-Up with Cardiologist     Exercise Stress Echocardiogram       No orders of the defined types were placed in this encounter.      There are no discontinued medications.      Encounter Diagnoses   Name Primary?     Chest pain, unspecified type Yes     Coronary artery disease involving native coronary artery of native heart without angina pectoris      Benign essential hypertension      Dyspnea on exertion        CURRENT MEDICATIONS:  Current Outpatient Medications   Medication Sig Dispense Refill     acetaminophen (TYLENOL) 325 MG tablet Take 650 mg by mouth every morning  100 tablet 0     aspirin 81 MG tablet Take 81 mg by mouth daily       atenolol (TENORMIN) 50 MG tablet Take 1 tablet (50 mg) by mouth daily 180 tablet 0     atorvastatin (LIPITOR) 20 MG tablet Take 1 tablet (20 mg) by mouth daily (Patient taking differently: Take 40 mg by mouth daily ) 90 tablet 0     buPROPion (WELLBUTRIN SR) 150 MG 12 hr tablet Take 1 tablet (150 mg) by mouth 2 times daily 180 tablet 0     calcium carbonate-vitamin D 600-400 MG-UNIT CHEW Take 2 tablets by mouth every morning       Coenzyme Q10 (CO Q 10 PO) Take 100 mg by mouth every evening        CYANOCOBALAMIN SL Place 1,000 mcg under the tongue daily       DULoxetine (CYMBALTA) 60 MG capsule Take 1 capsule (60 mg) by mouth daily 90 capsule 0     FIBER SELECT GUMMIES CHEW Take 1 chew tab by mouth daily        hydrALAZINE (APRESOLINE) 25 MG tablet Take 3 tablets (75 mg) by mouth 3 times daily (you can use up the 50 mg tabs by taking 1.5 tabs 3 times a  "day first) 810 tablet 0     hydrochlorothiazide (HYDRODIURIL) 25 MG tablet Take 1 tablet (25 mg) by mouth daily 30 tablet 0     lisinopril (ZESTRIL) 40 MG tablet Take 1 tablet (40 mg) by mouth daily 90 tablet 0     LORazepam (ATIVAN) 1 MG tablet TAKE 1 TABLET BY MOUTH WITH HIGH ANXIETY. 10 TABLETS MONTHLY 10 tablet 0     multivitamin, therapeutic with minerals (MULTI-VITAMIN) TABS Take 1 tablet by mouth daily       nitroGLYcerin (NITROSTAT) 0.4 MG sublingual tablet For chest pain place 1 tablet under the tongue every 5 minutes for 3 doses. If symptoms persist 5 minutes after 1st dose call 911. 25 tablet 0     Omega-3 Fatty Acids (OMEGA-3 FISH OIL PO) Take 1.2 g by mouth daily        omeprazole (PRILOSEC) 40 MG DR capsule Take 1 capsule (40 mg) by mouth daily Take 30-60 minutes before a meal. 90 capsule 0     traZODone (DESYREL) 100 MG tablet Take 2 tablets (200 mg) by mouth At Bedtime 180 tablet 3       ALLERGIES     Allergies   Allergen Reactions     Codeine      Contrast Dye      PN: LW CM1: CONTRAST- nka Reaction :     Morphine Sulfate (Concentrate)      does not like side effects/\"hyper\"       PAST MEDICAL HISTORY:  Past Medical History:   Diagnosis Date     Anxiety 4/2/2015     Asthma     no inhaler for 2-3 years     Benign essential hypertension 8/2/2016     Chest pain, unspecified type 8/27/2017     Coronary artery disease      Coronary artery disease involving native coronary artery of native heart without angina pectoris 8/2/2016     Depression      Esophageal reflux 3/19/2015     Former smoker - has had pulmonary nodules in the past 3/29/2017     History of blood transfusion      Hyperlipidemia LDL goal <100 3/19/2015     Osteoarthritis      Osteopenia      Pulmonary nodules 4/22/2015    Noted 4/14/15, f/u scan 03/2016 showed minimal change, needs repeat scan 03/2017        PAST SURGICAL HISTORY:  Past Surgical History:   Procedure Laterality Date     ANGIOGRAM  6/15/05     ARTHROPLASTY HIP  9/23/2013    " "Procedure: ARTHROPLASTY HIP;  Right total hip arthroplasty       ARTHROPLASTY HIP Left 2015    Procedure: ARTHROPLASTY HIP;  Surgeon: Tyrese Howell MD;  Location: [facility]     ARTHROPLASTY KNEE Left 2016    Procedure: ARTHROPLASTY KNEE;  Surgeon: Tyrese Howell MD;  Location: [facility]     CHOLECYSTECTOMY       COLONOSCOPY       COLONOSCOPY Left 2016    Procedure: COLONOSCOPY;  Surgeon: Moe Haney MD;  Location:  GI     GASTRIC BYPASS       tiff en y     GI SURGERY      fistulotomy     ORTHOPEDIC SURGERY      left knee \"green procedure\"     TONSILLECTOMY       TUBAL LIGATION         FAMILY HISTORY:  Family History   Problem Relation Age of Onset     Coronary Artery Disease Father      Hypertension Father      Coronary Artery Disease Sister      Hypertension Sister      Coronary Artery Disease Brother      Hypertension Brother      Heart Disease Nephew      Other Cancer Maternal Half-Sister        SOCIAL HISTORY:  Social History     Socioeconomic History     Marital status:      Spouse name: None     Number of children: None     Years of education: None     Highest education level: None   Occupational History     None   Social Needs     Financial resource strain: None     Food insecurity     Worry: None     Inability: None     Transportation needs     Medical: None     Non-medical: None   Tobacco Use     Smoking status: Former Smoker     Packs/day: 0.50     Years: 50.00     Pack years: 25.00     Types: Cigarettes     Quit date: 2013     Years since quittin.2     Smokeless tobacco: Never Used   Substance and Sexual Activity     Alcohol use: Yes     Alcohol/week: 0.0 standard drinks     Comment: 5 drinks a week     Drug use: No     Sexual activity: Yes     Partners: Male   Lifestyle     Physical activity     Days per week: None     Minutes per session: None     Stress: None   Relationships     Social connections     Talks on phone: None     Gets together: None     Attends " "Baptism service: None     Active member of club or organization: None     Attends meetings of clubs or organizations: None     Relationship status: None     Intimate partner violence     Fear of current or ex partner: None     Emotionally abused: None     Physically abused: None     Forced sexual activity: None   Other Topics Concern     Parent/sibling w/ CABG, MI or angioplasty before 65F 55M? Yes     Comment: brother age 31      Service Not Asked     Blood Transfusions Not Asked     Caffeine Concern Yes     Comment: 1 cup of coffee      Occupational Exposure Not Asked     Hobby Hazards Not Asked     Sleep Concern Yes     Comment: lately it has been an issue     Stress Concern Not Asked     Weight Concern Not Asked     Special Diet No     Comment: watching what she eats      Back Care Not Asked     Exercise No     Bike Helmet Not Asked     Seat Belt Yes     Self-Exams Not Asked   Social History Narrative     None       Review of Systems:  Skin:  Negative       Eyes:  Positive for glasses    ENT:  Positive for hearing loss    Respiratory:  Positive for dyspnea on exertion;shortness of breath SOB w/minimal movement   Cardiovascular:    palpitations;Positive for;chest pain    Gastroenterology: Positive for heartburn under control with Omeprazole  Genitourinary:         Musculoskeletal:  Positive for arthritis;back pain    Neurologic:  Negative      Psychiatric:  Negative      Heme/Lymph/Imm:  Negative      Endocrine:  Negative        Physical Exam:  Vitals: /70 (BP Location: Right arm, Patient Position: Sitting, Cuff Size: Adult Large)   Pulse 72   Ht 1.638 m (5' 4.5\")   Wt 85 kg (187 lb 8 oz)   LMP  (LMP Unknown)   SpO2 100%   Breastfeeding No   BMI 31.69 kg/m      Constitutional:  cooperative, alert and oriented, well developed, well nourished, in no acute distress overweight;appears anxious      Skin:  warm and dry to the touch, no apparent skin lesions or masses noted     warm and " dry    Head:  normocephalic, no masses or lesions   atraumatic    Eyes:  pupils equal and round;conjunctivae and lids unremarkable;sclera white   EOMI, pupils round and equal    Lymph:      ENT:  no pallor or cyanosis, dentition good   speech normal, tongue midline    Neck:  no carotid bruit;JVP normal   supple    Respiratory:  normal breath sounds, clear to auscultation, normal A-P diameter, normal symmetry, normal respiratory excursion, no use of accessory muscles    clear without rales/wheezing    Cardiac: regular rhythm, normal S1/S2, no S3 or S4, apical impulse not displaced, no murmurs, gallops or rubs occasional premature beats           RRR without murmur/lift/thrill  pulses full and equal                                   UE pulses full and equal    GI:  not assessed this visit   non-tender    Extremities and Muscular Skeletal:  no deformities, clubbing, cyanosis, erythema observed;no edema         no edema    Neurological:  no gross motor deficits;affect appropriate   motor grossly intact    Psych:  Alert and Oriented x 3 Anxious    Thank you for allowing me to participate in the care of your patient.      Sincerely,     Sotero Garza MD, MD     [facility]    cc:   Sotero Garza MD  [address] [place] [id number]

## 2021-04-20 NOTE — PROGRESS NOTES
Service Date: 04/20/2021      HISTORY OF PRESENT ILLNESS:  It is my pleasure to see your patient, Aaliyah Peoples, in followup.  This is a pleasant 75-year-old patient with a past history of mild non-flow-limiting coronary artery disease based upon a coronary angiogram in 2005.  She also has a history of essential hypertension and hyperlipidemia.      Since I last saw her, the patient has noticed over the last 6-8 months increasing shortness of breath on exertion.  She notices that even doing fairly easy tasks, such as carrying her groceries to her car or changing the linen on her bed that she is becoming short of breath to such an extent that she is becoming worried about the degree of shortness of breath that she is experiencing.  She has not noticed any ankle edema or orthopnea.  She has not woken up at nighttime short of breath either.  All of it is exertional.        She has also noticed chest discomfort.  The chest discomfort is not typically associated with exertion.  It is not associated with the shortness of breath.  The chest discomfort can last a minute or 2 and it typically occurs at rest.  She has particularly noticed the chest discomfort occurring after she eats a meal in the evening time.  There is no radiation of the discomfort to the arms or to the jaw.  It does not radiate through to her back either.      Her blood pressure is well controlled at 110/70.        Her lipid profile is excellent, well within secondary prevention guidelines with an LDL of 42, HDL of 81 and triglycerides of 151, which is borderline with a total cholesterol of 143.  Liver function tests are normal with an ALT of 46.  Her basic metabolic profile was also normal with a BUN of 14, creatinine 0.86 and GFR 67.  Sodium was 137, potassium is 3.6.      IMPRESSION:   1.  Shortness of breath on exertion.  This is of new onset and appears to be progressive and is worrisome to the patient.  Certainly, ischemia does need to be ruled out  as this patient does have coronary artery disease based upon a coronary angiogram in .   2.  Normotensive.   3.  Excellent lipid profile, well within secondary prevention guidelines.   4.  Chest discomfort.  The chest discomfort sounds atypical for coronary artery disease except for the one component of the chest discomfort which is postprandial and postprandial angina is certainly a real phenomenon in patients with significant coronary artery disease.      PLAN:  We will have a stress echocardiogram performed to determine if there is any evidence of ischemia.  I will have the patient return with the results of the stress echocardiogram with one of our nurse practitioners or physician assistants.        As always, the patient has been told to contact us if she has any questions or any concerns.      cc:      RENO Galindo   [facility] [address] [place] [address]         SARA ZELAYA MD, [facility]             D: 2021   T: 2021   MT: LIVAN      Name:     AMINTA GOEL   MRN:      [id number]        Account:      [id number]   :      1945           Service Date: 2021      Document: [id number]

## 2021-04-27 ENCOUNTER — HOSPITAL ENCOUNTER (OUTPATIENT)
Dept: CARDIOLOGY | Facility: CLINIC | Age: 76
Discharge: HOME | End: 2021-04-27
Attending: INTERNAL MEDICINE | Admitting: INTERNAL MEDICINE
Payer: COMMERCIAL

## 2021-04-27 DIAGNOSIS — R06.09 DYSPNEA ON EXERTION: ICD-10-CM

## 2021-04-27 DIAGNOSIS — R07.9 CHEST PAIN, UNSPECIFIED TYPE: ICD-10-CM

## 2021-04-27 PROCEDURE — 93321 DOPPLER ECHO F-UP/LMTD STD: CPT | Mod: TC

## 2021-04-27 PROCEDURE — 93325 DOPPLER ECHO COLOR FLOW MAPG: CPT | Mod: 26 | Performed by: INTERNAL MEDICINE

## 2021-04-27 PROCEDURE — 93350 STRESS TTE ONLY: CPT | Mod: 26 | Performed by: INTERNAL MEDICINE

## 2021-04-27 PROCEDURE — 93018 CV STRESS TEST I&R ONLY: CPT | Performed by: INTERNAL MEDICINE

## 2021-04-27 PROCEDURE — 93321 DOPPLER ECHO F-UP/LMTD STD: CPT | Mod: 26 | Performed by: INTERNAL MEDICINE

## 2021-04-27 PROCEDURE — 93016 CV STRESS TEST SUPVJ ONLY: CPT | Performed by: INTERNAL MEDICINE

## 2021-04-27 PROCEDURE — 255N000002 HC RX 255 OP 636: Performed by: INTERNAL MEDICINE

## 2021-04-27 RX ADMIN — HUMAN ALBUMIN MICROSPHERES AND PERFLUTREN 3 ML: 10; .22 INJECTION, SOLUTION INTRAVENOUS at 14:14

## 2021-04-29 ENCOUNTER — NURSE TRIAGE (OUTPATIENT)
Dept: NURSING | Facility: CLINIC | Age: 76
End: 2021-04-29

## 2021-04-29 NOTE — TELEPHONE ENCOUNTER
"Pt asks if her Cardio Stress Test can be \"mailed to her.\"  \"Hard copy printout\" please.    Pt states \"I've tried accessing [employer] but it's not allowing me access.\"  Has already called [employer] Support line -> [phone].  Support personnel said no ability to fix pt's issue.    Pt states \"I would even drive over to the clinic to  a hard copy printout of my Cardio Stress Test results and next steps.\"  Pt states \"Please do not try to email me, because I cannot access it in [employer].\"    Best phone # for patient -> [phone]     Thank you-    [facility] Nurse Advisor     Reason for Disposition    Caller requesting lab results    Protocols used: PCP CALL - NO TRIAGE-A-    __________________________      COVID 19 Nurse Triage Plan/Patient Instructions    Please be aware that novel coronavirus (COVID-19) may be circulating in the community. If you develop symptoms such as fever, cough, or SOB or if you have concerns about the presence of another infection including coronavirus (COVID-19), please contact your health care provider or visit [email or web address].     Disposition/Instructions    Additional COVID19 information to add for patients.   How can I protect others?  If you have symptoms (fever, cough, body aches or trouble breathing): Stay home and away from others (self-isolate) until:    At least 10 days have passed since your symptoms started, And     You ve had no fever--and no medicine that reduces fever--for 1 full day (24 hours), And      Your other symptoms have resolved (gotten better).     If you don t have symptoms, but a test showed that you have COVID-19 (you tested positive):    Stay home and away from others (self-isolate). Follow the tips under \"How do I self-isolate?\" below for 10 days (20 days if you have a weak immune system).    You don't need to be retested for COVID-19 before going back to school or work. As long as you're fever-free and feeling better, you can go " back to school, work and other activities after waiting the 10 or 20 days.     How do I self-isolate?    Stay in your own room, even for meals. Use your own bathroom if you can.     Stay away from others in your home. No hugging, kissing or shaking hands. No visitors.    Don t go to work, school or anywhere else.     Clean  high touch  surfaces often (doorknobs, counters, handles, etc.). Use a household cleaning spray or wipes. You ll find a full list on the EPA website:  www.epa.gov/pesticide-registration/list-n-disinfectants-use-against-sars-cov-2.    Cover your mouth and nose with a mask, tissue or washcloth to avoid spreading germs.    Wash your hands and face often. Use soap and water.    Caregivers in these groups are at risk for severe illness due to COVID-19:  o People 65 years and older  o People who live in a nursing home or long-term care facility  o People with chronic disease (lung, heart, cancer, diabetes, kidney, liver, immunologic)  o People who have a weakened immune system, including those who:  - Are in cancer treatment  - Take medicine that weakens the immune system, such as corticosteroids  - Had a bone marrow or organ transplant  - Have an immune deficiency  - Have poorly controlled HIV or AIDS  - Are obese (body mass index of 40 or higher)  - Smoke regularly    Caregivers should wear gloves while washing dishes, handling laundry and cleaning bedrooms and bathrooms.    Use caution when washing and drying laundry: Don t shake dirty laundry, and use the warmest water setting that you can.    For more tips, go to www.cdc.gov/coronavirus/2019-ncov/downloads/10Things.pdf.    How can I take care of myself?  1. Get lots of rest. Drink extra fluids (unless a doctor has told you not to).     2. Take Tylenol (acetaminophen) for fever or pain. If you have liver or kidney problems, ask your family doctor if it s okay to take Tylenol.     Adults can take either:     650 mg (two 325 mg pills) every 4 to 6  hours, or     1,000 mg (two 500 mg pills) every 8 hours as needed.     Note: Don t take more than 3,000 mg in one day.   Acetaminophen is found in many medicines (both prescribed and over-the-counter medicines). Read all labels to be sure you don t take too much.     For children, check the Tylenol bottle for the right dose. The dose is based on the child s age or weight.    3. If you have other health problems (like cancer, heart failure, an organ transplant or severe kidney disease): Call your specialty clinic if you don t feel better in the next 2 days.    4. Know when to call 911: Emergency warning signs include:    Trouble breathing or shortness of breath    Pain or pressure in the chest that doesn t go away    Feeling confused like you haven t felt before, or not being able to wake up    Bluish-colored lips or face    What are the symptoms of COVID-19?     The most common symptoms are cough, fever and trouble breathing.     Less common symptoms include body aches, chills, diarrhea (loose, watery poops), fatigue (feeling very tired), headache, runny nose, sore throat and loss of smell.    COVID-19 can cause severe coughing (bronchitis) and lung infection (pneumonia).    How does it spread?     The virus may spread when a person coughs or sneezes into the air. The virus can travel about 6 feet this way, and it can live on surfaces.      Common  (household disinfectants) will kill the virus.    Who is at risk?  Anyone can catch COVID-19 if they re around someone who has the virus.    How can others protect themselves?     Stay away from people who have COVID-19 (or symptoms of COVID-19).    Wash hands often with soap and water. Or, use hand  with at least 60% alcohol.    Avoid touching the eyes, nose or mouth.     Wear a face mask when you go out in public, when sick or when caring for a sick person.    Where can I get more information?     [facility] [place]: About COVID-19:  [email or web address]    CDC: What to Do If You re Sick: www.cdc.gov/coronavirus/2019-ncov/about/steps-when-sick.html    CDC: Ending Home Isolation: www.cdc.gov/coronavirus/2019-ncov/hcp/disposition-in-home-patients.html     CDC: Caring for Someone: www.cdc.gov/coronavirus/2019-ncov/if-you-are-sick/care-for-someone.html     [facility]: Interim Guidance for Hospital Discharge to Home: www.[facility]/diseases/coronavirus/hcp/hospdischarge.pdf    [facility] clinical trials (COVID-19 research studies): [email or web address]/[facility]-clinical-trials     Below are the COVID-19 hotlines at the [place] [facility] ([facility]). Interpreters are available.   o For health questions: Call 509-219-0266 or 1-341.280.3554 (7 a.m. to 7 p.m.)  o For questions about schools and childcare: Call 439-723-4647 or 1-525.569.1737 (7 a.m. to 7 p.m.)          Thank you for taking steps to prevent the spread of this virus.  o Limit your contact with others.  o Wear a simple mask to cover your cough.  o Wash your hands well and often.    Resources    [facility]: About COVID-19: [email or web address]    CDC: What to Do If You're Sick: www.cdc.gov/coronavirus/2019-ncov/about/steps-when-sick.html    CDC: Ending Home Isolation: www.cdc.gov/coronavirus/2019-ncov/hcp/disposition-in-home-patients.html     CDC: Caring for Someone: www.cdc.gov/coronavirus/2019-ncov/if-you-are-sick/care-for-someone.html     [facility]: Interim Guidance for Hospital Discharge to Home: www.[facility]/diseases/coronavirus/hcp/hospdischarge.pdf    [facility] clinical trials (COVID-19 research studies): [email or web address]/umn-clinical-trials     Below are the COVID-19 hotlines at the [place] [facility] ([facility]). Interpreters are available.   o For health questions: Call 391-593-5499 or 1-503.852.3056 (7 a.m. to 7 p.m.)  o For questions about schools and childcare: Call 918-393-9776 or 1-718.530.7748 (0  a.m. to 7 p.m.)

## 2021-05-11 ENCOUNTER — OFFICE VISIT (OUTPATIENT)
Dept: CARDIOLOGY | Facility: CLINIC | Age: 76
End: 2021-05-11
Payer: COMMERCIAL

## 2021-05-11 VITALS
DIASTOLIC BLOOD PRESSURE: 60 MMHG | SYSTOLIC BLOOD PRESSURE: 102 MMHG | HEIGHT: 65 IN | OXYGEN SATURATION: 96 % | BODY MASS INDEX: 30.71 KG/M2 | WEIGHT: 184.3 LBS | HEART RATE: 70 BPM

## 2021-05-11 DIAGNOSIS — R06.09 DYSPNEA ON EXERTION: ICD-10-CM

## 2021-05-11 DIAGNOSIS — R07.9 CHEST PAIN, UNSPECIFIED TYPE: Primary | ICD-10-CM

## 2021-05-11 DIAGNOSIS — I25.10 CORONARY ARTERY DISEASE INVOLVING NATIVE CORONARY ARTERY OF NATIVE HEART WITHOUT ANGINA PECTORIS: ICD-10-CM

## 2021-05-11 PROCEDURE — 99214 OFFICE O/P EST MOD 30 MIN: CPT | Performed by: PHYSICIAN ASSISTANT

## 2021-05-11 ASSESSMENT — MIFFLIN-ST. JEOR: SCORE: 1328.92

## 2021-05-11 NOTE — PROGRESS NOTES
"    CARDIOLOGY CLINIC PROGRESS NOTE    DOS: 2021      Monse Peoples  : 1945, 75 year old  MRN: [id number]      History:  I am following up with Monse Peoples today.  She is a patient of Dr. Garza.     Monse Peoples is a 75 year old with history of mild nonobstructive coronary artery disease based upon a coronary angiogram performed in  at [facility].  She has a history of hypertension, hyperlipidemia, PABLO (not on CPAP) and gastric bypass surgery.  Also anxiety.  According to past notes, Monse also underwent stress testing in  and  at [facility], both of which by verbal report were normal, though we do not have the actual reports available to us.  She underwent a stress echo 2017 that was normal.       Interval History:   Last seen 21 by Dr. Garza.  She had 6-8 months of ALVARADO and chest discomfort.  A stress echo was ordered. BP and lipids were controlled.     21 stress echo:   A low to moderate workload was achieved.  There was no chest pain or significant ST changes with exercise.  This was a normal stress echocardiogram with no evidence of stress-induced ischemia.  Right ventricular systolic pressure could not be approximated due to  inadequate tricuspid regurgitation.  Very trvial (< 2m/s) \"shark's tooth\" doppler across LVOT in post exercise images. Unlikely to be clinically significant. Image quality in this area not sufficient to see MV/RAKEL etc       Sxs are less.   Really only SOB with bending now.   No edema.   No orthopnea, but typically sleeps with her head elevated for comfort.   She said she has sleep apnea, but did not tolerate the mask.  She sleep with her head elevated and she does not snore.       ROS:  Skin:  Negative     Eyes:  Positive for glasses  ENT:  Positive for hearing loss  Respiratory:  Positive for dyspnea on exertion  Cardiovascular:  Negative    Gastroenterology: Negative    Genitourinary:  Negative  " "  Musculoskeletal:  Negative    Neurologic:  Negative    Psychiatric:  Negative    Heme/Lymph/Imm:  Negative    Endocrine:  Negative      PAST MEDICAL HISTORY:  Past Medical History:   Diagnosis Date     Anxiety 4/2/2015     Asthma     no inhaler for 2-3 years     Benign essential hypertension 8/2/2016     Chest pain, unspecified type 8/27/2017     Coronary artery disease      Coronary artery disease involving native coronary artery of native heart without angina pectoris 8/2/2016     Depression      Esophageal reflux 3/19/2015     Former smoker - has had pulmonary nodules in the past 3/29/2017     History of blood transfusion      Hyperlipidemia LDL goal <100 3/19/2015     Osteoarthritis      Osteopenia      Pulmonary nodules 4/22/2015    Noted 4/14/15, f/u scan 03/2016 showed minimal change, needs repeat scan 03/2017        PAST SURGICAL HISTORY:  Past Surgical History:   Procedure Laterality Date     ANGIOGRAM  6/15/05     ARTHROPLASTY HIP  9/23/2013    Procedure: ARTHROPLASTY HIP;  Right total hip arthroplasty       ARTHROPLASTY HIP Left 5/19/2015    Procedure: ARTHROPLASTY HIP;  Surgeon: Tyrese Howell MD;  Location: [facility] OR     ARTHROPLASTY KNEE Left 8/22/2016    Procedure: ARTHROPLASTY KNEE;  Surgeon: Tyrese Howell MD;  Location: [facility] OR     CHOLECYSTECTOMY       COLONOSCOPY       COLONOSCOPY Left 4/19/2016    Procedure: COLONOSCOPY;  Surgeon: Moe Haney MD;  Location:  GI     GASTRIC BYPASS      2011 tiff en y     GI SURGERY      fistulotomy     ORTHOPEDIC SURGERY      left knee \"green procedure\"     TONSILLECTOMY       TUBAL LIGATION         SOCIAL HISTORY:  Social History     Socioeconomic History     Marital status:      Spouse name: Not on file     Number of children: Not on file     Years of education: Not on file     Highest education level: Not on file   Occupational History     Not on file   Social Needs     Financial resource strain: Not on file     Food insecurity     Worry: Not " on file     Inability: Not on file     Transportation needs     Medical: Not on file     Non-medical: Not on file   Tobacco Use     Smoking status: Former Smoker     Packs/day: 0.50     Years: 50.00     Pack years: 25.00     Types: Cigarettes     Quit date: 2013     Years since quittin.3     Smokeless tobacco: Never Used   Substance and Sexual Activity     Alcohol use: Yes     Alcohol/week: 0.0 standard drinks     Comment: 5 drinks a week     Drug use: No     Sexual activity: Yes     Partners: Male   Lifestyle     Physical activity     Days per week: Not on file     Minutes per session: Not on file     Stress: Not on file   Relationships     Social connections     Talks on phone: Not on file     Gets together: Not on file     Attends [employer] service: Not on file     Active member of club or organization: Not on file     Attends meetings of clubs or organizations: Not on file     Relationship status: Not on file     Intimate partner violence     Fear of current or ex partner: Not on file     Emotionally abused: Not on file     Physically abused: Not on file     Forced sexual activity: Not on file   Other Topics Concern     Parent/sibling w/ CABG, MI or angioplasty before 65F 55M? Yes     Comment: brother age 31      Service Not Asked     Blood Transfusions Not Asked     Caffeine Concern Yes     Comment: 1 cup of coffee      Occupational Exposure Not Asked     Hobby Hazards Not Asked     Sleep Concern Yes     Comment: lately it has been an issue     Stress Concern Not Asked     Weight Concern Not Asked     Special Diet No     Comment: watching what she eats      Back Care Not Asked     Exercise No     Bike Helmet Not Asked     Seat Belt Yes     Self-Exams Not Asked   Social History Narrative     Not on file       FAMILY HISTORY:  Family History   Problem Relation Age of Onset     Coronary Artery Disease Father      Hypertension Father      Coronary Artery Disease Sister      Hypertension Sister       Coronary Artery Disease Brother      Hypertension Brother      Heart Disease Nephew      Other Cancer Maternal Half-Sister        MEDS: acetaminophen (TYLENOL) 325 MG tablet, Take 650 mg by mouth every morning   aspirin 81 MG tablet, Take 81 mg by mouth daily  atenolol (TENORMIN) 50 MG tablet, Take 1 tablet (50 mg) by mouth daily  atorvastatin (LIPITOR) 20 MG tablet, Take 1 tablet (20 mg) by mouth daily (Patient taking differently: Take 40 mg by mouth daily )  buPROPion (WELLBUTRIN SR) 150 MG 12 hr tablet, Take 1 tablet (150 mg) by mouth 2 times daily  calcium carbonate-vitamin D 600-400 MG-UNIT CHEW, Take 2 tablets by mouth every morning  Coenzyme Q10 (CO Q 10 PO), Take 100 mg by mouth every evening   CYANOCOBALAMIN SL, Place 1,000 mcg under the tongue daily  DULoxetine (CYMBALTA) 60 MG capsule, Take 1 capsule (60 mg) by mouth daily  FIBER SELECT GUMMIES CHEW, Take 1 chew tab by mouth daily   hydrALAZINE (APRESOLINE) 25 MG tablet, Take 3 tablets (75 mg) by mouth 3 times daily (you can use up the 50 mg tabs by taking 1.5 tabs 3 times a day first)  hydrochlorothiazide (HYDRODIURIL) 25 MG tablet, Take 1 tablet (25 mg) by mouth daily  lisinopril (ZESTRIL) 40 MG tablet, Take 1 tablet (40 mg) by mouth daily  LORazepam (ATIVAN) 1 MG tablet, TAKE 1 TABLET BY MOUTH WITH HIGH ANXIETY. 10 TABLETS MONTHLY  multivitamin, therapeutic with minerals (MULTI-VITAMIN) TABS, Take 1 tablet by mouth daily  nitroGLYcerin (NITROSTAT) 0.4 MG sublingual tablet, For chest pain place 1 tablet under the tongue every 5 minutes for 3 doses. If symptoms persist 5 minutes after 1st dose call 911.  Omega-3 Fatty Acids (OMEGA-3 FISH OIL PO), Take 1.2 g by mouth daily   omeprazole (PRILOSEC) 40 MG DR capsule, Take 1 capsule (40 mg) by mouth daily Take 30-60 minutes before a meal.  traZODone (DESYREL) 100 MG tablet, Take 2 tablets (200 mg) by mouth At Bedtime    No current facility-administered medications on file prior to visit.       ALLERGIES:  "  Allergies   Allergen Reactions     Codeine      Contrast Dye      PN: LW CM1: CONTRAST- nka Reaction :     Morphine Sulfate (Concentrate)      does not like side effects/\"hyper\"       PHYSICAL EXAM:  Vitals: /60 (BP Location: Right arm, Patient Position: Sitting, Cuff Size: Adult Large)   Pulse 70   Ht 1.638 m (5' 4.5\")   Wt 83.6 kg (184 lb 4.8 oz)   LMP  (LMP Unknown)   SpO2 96%   Breastfeeding No   BMI 31.15 kg/m    Constitutional:  cooperative, alert and oriented, well developed, well nourished, in no acute distress overweight;obese      Skin:  warm and dry to the touch, no apparent skin lesions or masses noted        Head:  normocephalic, no masses or lesions   atraumatic    Eyes:  pupils equal and round;conjunctivae and lids unremarkable;sclera white        ENT:  no pallor or cyanosis        Neck:  no carotid bruit;JVP normal        Respiratory:      clear without rales/wheezing    Cardiac: regular rhythm, normal S1/S2, no S3 or S4, apical impulse not displaced, no murmurs, gallops or rubs                  GI:  abdomen soft;BS normoactive obese non-tender    Vascular: not assessed this visit                                      Extremities and Musculoskeletal:  no deformities, clubbing, cyanosis, erythema observed;no edema        Neurological:  no gross motor deficits;affect appropriate            LABS/DATA:  I reviewed the following:  Component      Latest Ref Rng & Units 4/19/2021   Sodium      133 - 144 mmol/L 137   Potassium      3.4 - 5.3 mmol/L 3.6   Chloride      94 - 109 mmol/L 105   Carbon Dioxide      20 - 32 mmol/L 27   Anion Gap      3 - 14 mmol/L 5   Glucose      70 - 99 mg/dL 106 (H)   Urea Nitrogen      7 - 30 mg/dL 14   Creatinine      0.52 - 1.04 mg/dL 0.86   GFR Estimate      >60 mL/min/1.73:m2 67   GFR Estimate If Black      >60 mL/min/1.73:m2 77   Calcium      8.5 - 10.1 mg/dL 8.6   Cholesterol      <200 mg/dL 143   Triglycerides      <150 mg/dL 151 (H)   HDL Cholesterol      " ">49 mg/dL 81   LDL Cholesterol Calculated      <100 mg/dL 32   Non HDL Cholesterol      <130 mg/dL 62   ALT      0 - 50 U/L 46       Stress echo 4/27/21:  Interpretation Summary  Exercise was stopped due to dyspnea.  A low to moderate workload was achieved.  There was no chest pain or significant ST changes with exercise.  This was a normal stress echocardiogram with no evidence of stress-induced ischemia.  Right ventricular systolic pressure could not be approximated due to inadequate tricuspid regurgitation.  Very trvial (< 2m/s) \"shark's tooth\" doppler across LVOT in post exercise images. Unlikely to be clinically significant. Image quality in this area not sufficient to see MV/RAKEL etc  Note: Pt noted dyspnea before test started        ASSESSMENT/PLAN:  1.  Mild nonobstructive coronary artery disease based on coronary angiogram in 2005 at [facility].     - She apparently underwent stress testing in 2009 and 2011 at [facility], both of which by report were normal (we do not have the actual reports available).  Also negative stress echo 8/2017.   - Had some ALVARADO and chest discomfort (occurs after eating for many years).  Stress echo 4/27/21 negative for ischemia. Baseline LVEF 60-65%, no significant tricuspid valve or aortic valve disease  - Continue ASA, atenolol, atorvastatin, SL nitroglycerin PRN  - Weight loss, increased activity      2.  FLP 4/19/21: , HDL 81, LDL 32, .  Continue atorvastatin 40 mg daily and fish oil.  Needs some dietary changes to decrease TGs.      3.  Essential hypertension.  BP controlled.  Continue atenolol 50 mg, HCTZ 25 mg, lisinopril 40 mg, hydralazine 75 mg TID.  Continue to work on weight loss, increased activity, low sodium diet.     4.  ALVARADO  - HR good  - BP controlled  - Stress echo negative as noted above, also no significant tricuspid or aortic valve disease was seen, and baseline LVEF was normal  - Does not appear fluid overloaded.  Continue " hydrochlorothiazide 25 mg  - Weight loss  - Increase activity as deconditioning could be contributing   - Has untreated sleep apnea, which may be contributing  - Discuss further etiology with PCP, possibly PFTs        Follow up:  Dr. Garza 4/2022 with labs, sooner if further concerns      MILLY ConstantinoC

## 2021-05-11 NOTE — LETTER
"2021    Marlys Dawson PA-C  [id number] [facility] [id number]    RE: Monse Peoples       Dear Colleague,    I had the pleasure of seeing Monse Peoples in the [facility].    CARDIOLOGY CLINIC PROGRESS NOTE    DOS: 2021      Monse Poeples  : 1945, 75 year old  MRN: [id number]      History:  I am following up with Monse Peoples today.  She is a patient of Dr. Garza.     Monse Peoples is a 75 year old with history of mild nonobstructive coronary artery disease based upon a coronary angiogram performed in  at [facility].  She has a history of hypertension, hyperlipidemia, PABLO (not on CPAP) and gastric bypass surgery.  Also anxiety.  According to past notes, Monse also underwent stress testing in  and  at [facility], both of which by verbal report were normal, though we do not have the actual reports available to us.  She underwent a stress echo 2017 that was normal.       Interval History:   Last seen 21 by Dr. Garza.  She had 6-8 months of ALVARADO and chest discomfort.  A stress echo was ordered. BP and lipids were controlled.     21 stress echo:   A low to moderate workload was achieved.  There was no chest pain or significant ST changes with exercise.  This was a normal stress echocardiogram with no evidence of stress-induced ischemia.  Right ventricular systolic pressure could not be approximated due to  inadequate tricuspid regurgitation.  Very trvial (< 2m/s) \"shark's tooth\" doppler across LVOT in post exercise images. Unlikely to be clinically significant. Image quality in this area not sufficient to see MV/RAKEL etc       Sxs are less.   Really only SOB with bending now.   No edema.   No orthopnea, but typically sleeps with her head elevated for comfort.   She said she has sleep apnea, but did not tolerate the mask.  She sleep with her head elevated and she does not snore. " "      ROS:  Skin:  Negative     Eyes:  Positive for glasses  ENT:  Positive for hearing loss  Respiratory:  Positive for dyspnea on exertion  Cardiovascular:  Negative    Gastroenterology: Negative    Genitourinary:  Negative    Musculoskeletal:  Negative    Neurologic:  Negative    Psychiatric:  Negative    Heme/Lymph/Imm:  Negative    Endocrine:  Negative      PAST MEDICAL HISTORY:  Past Medical History:   Diagnosis Date     Anxiety 4/2/2015     Asthma     no inhaler for 2-3 years     Benign essential hypertension 8/2/2016     Chest pain, unspecified type 8/27/2017     Coronary artery disease      Coronary artery disease involving native coronary artery of native heart without angina pectoris 8/2/2016     Depression      Esophageal reflux 3/19/2015     Former smoker - has had pulmonary nodules in the past 3/29/2017     History of blood transfusion      Hyperlipidemia LDL goal <100 3/19/2015     Osteoarthritis      Osteopenia      Pulmonary nodules 4/22/2015    Noted 4/14/15, f/u scan 03/2016 showed minimal change, needs repeat scan 03/2017        PAST SURGICAL HISTORY:  Past Surgical History:   Procedure Laterality Date     ANGIOGRAM  6/15/05     ARTHROPLASTY HIP  9/23/2013    Procedure: ARTHROPLASTY HIP;  Right total hip arthroplasty       ARTHROPLASTY HIP Left 5/19/2015    Procedure: ARTHROPLASTY HIP;  Surgeon: Tyrese Howell MD;  Location: [facility] OR     ARTHROPLASTY KNEE Left 8/22/2016    Procedure: ARTHROPLASTY KNEE;  Surgeon: Tyrese Howell MD;  Location: [facility] OR     CHOLECYSTECTOMY       COLONOSCOPY       COLONOSCOPY Left 4/19/2016    Procedure: COLONOSCOPY;  Surgeon: Moe Haney MD;  Location:  GI     GASTRIC BYPASS      2011 tiff en y     GI SURGERY      fistulotomy     ORTHOPEDIC SURGERY      left knee \"green procedure\"     TONSILLECTOMY       TUBAL LIGATION         SOCIAL HISTORY:  Social History     Socioeconomic History     Marital status:      Spouse name: Not on file     Number of " children: Not on file     Years of education: Not on file     Highest education level: Not on file   Occupational History     Not on file   Social Needs     Financial resource strain: Not on file     Food insecurity     Worry: Not on file     Inability: Not on file     Transportation needs     Medical: Not on file     Non-medical: Not on file   Tobacco Use     Smoking status: Former Smoker     Packs/day: 0.50     Years: 50.00     Pack years: 25.00     Types: Cigarettes     Quit date: 2013     Years since quittin.3     Smokeless tobacco: Never Used   Substance and Sexual Activity     Alcohol use: Yes     Alcohol/week: 0.0 standard drinks     Comment: 5 drinks a week     Drug use: No     Sexual activity: Yes     Partners: Male   Lifestyle     Physical activity     Days per week: Not on file     Minutes per session: Not on file     Stress: Not on file   Relationships     Social connections     Talks on phone: Not on file     Gets together: Not on file     Attends [employer] service: Not on file     Active member of club or organization: Not on file     Attends meetings of clubs or organizations: Not on file     Relationship status: Not on file     Intimate partner violence     Fear of current or ex partner: Not on file     Emotionally abused: Not on file     Physically abused: Not on file     Forced sexual activity: Not on file   Other Topics Concern     Parent/sibling w/ CABG, MI or angioplasty before 65F 55M? Yes     Comment: brother age 31      Service Not Asked     Blood Transfusions Not Asked     Caffeine Concern Yes     Comment: 1 cup of coffee      Occupational Exposure Not Asked     Hobby Hazards Not Asked     Sleep Concern Yes     Comment: lately it has been an issue     Stress Concern Not Asked     Weight Concern Not Asked     Special Diet No     Comment: watching what she eats      Back Care Not Asked     Exercise No     Bike Helmet Not Asked     Seat Belt Yes     Self-Exams Not Asked    Social History Narrative     Not on file       FAMILY HISTORY:  Family History   Problem Relation Age of Onset     Coronary Artery Disease Father      Hypertension Father      Coronary Artery Disease Sister      Hypertension Sister      Coronary Artery Disease Brother      Hypertension Brother      Heart Disease Nephew      Other Cancer Maternal Half-Sister        MEDS: acetaminophen (TYLENOL) 325 MG tablet, Take 650 mg by mouth every morning   aspirin 81 MG tablet, Take 81 mg by mouth daily  atenolol (TENORMIN) 50 MG tablet, Take 1 tablet (50 mg) by mouth daily  atorvastatin (LIPITOR) 20 MG tablet, Take 1 tablet (20 mg) by mouth daily (Patient taking differently: Take 40 mg by mouth daily )  buPROPion (WELLBUTRIN SR) 150 MG 12 hr tablet, Take 1 tablet (150 mg) by mouth 2 times daily  calcium carbonate-vitamin D 600-400 MG-UNIT CHEW, Take 2 tablets by mouth every morning  Coenzyme Q10 (CO Q 10 PO), Take 100 mg by mouth every evening   CYANOCOBALAMIN SL, Place 1,000 mcg under the tongue daily  DULoxetine (CYMBALTA) 60 MG capsule, Take 1 capsule (60 mg) by mouth daily  FIBER SELECT GUMMIES CHEW, Take 1 chew tab by mouth daily   hydrALAZINE (APRESOLINE) 25 MG tablet, Take 3 tablets (75 mg) by mouth 3 times daily (you can use up the 50 mg tabs by taking 1.5 tabs 3 times a day first)  hydrochlorothiazide (HYDRODIURIL) 25 MG tablet, Take 1 tablet (25 mg) by mouth daily  lisinopril (ZESTRIL) 40 MG tablet, Take 1 tablet (40 mg) by mouth daily  LORazepam (ATIVAN) 1 MG tablet, TAKE 1 TABLET BY MOUTH WITH HIGH ANXIETY. 10 TABLETS MONTHLY  multivitamin, therapeutic with minerals (MULTI-VITAMIN) TABS, Take 1 tablet by mouth daily  nitroGLYcerin (NITROSTAT) 0.4 MG sublingual tablet, For chest pain place 1 tablet under the tongue every 5 minutes for 3 doses. If symptoms persist 5 minutes after 1st dose call 911.  Omega-3 Fatty Acids (OMEGA-3 FISH OIL PO), Take 1.2 g by mouth daily   omeprazole (PRILOSEC) 40 MG DR capsule, Take 1  "capsule (40 mg) by mouth daily Take 30-60 minutes before a meal.  traZODone (DESYREL) 100 MG tablet, Take 2 tablets (200 mg) by mouth At Bedtime    No current facility-administered medications on file prior to visit.       ALLERGIES:   Allergies   Allergen Reactions     Codeine      Contrast Dye      PN: LW CM1: CONTRAST- nka Reaction :     Morphine Sulfate (Concentrate)      does not like side effects/\"hyper\"       PHYSICAL EXAM:  Vitals: /60 (BP Location: Right arm, Patient Position: Sitting, Cuff Size: Adult Large)   Pulse 70   Ht 1.638 m (5' 4.5\")   Wt 83.6 kg (184 lb 4.8 oz)   LMP  (LMP Unknown)   SpO2 96%   Breastfeeding No   BMI 31.15 kg/m    Constitutional:  cooperative, alert and oriented, well developed, well nourished, in no acute distress overweight;obese      Skin:  warm and dry to the touch, no apparent skin lesions or masses noted        Head:  normocephalic, no masses or lesions   atraumatic    Eyes:  pupils equal and round;conjunctivae and lids unremarkable;sclera white        ENT:  no pallor or cyanosis        Neck:  no carotid bruit;JVP normal        Respiratory:      clear without rales/wheezing    Cardiac: regular rhythm, normal S1/S2, no S3 or S4, apical impulse not displaced, no murmurs, gallops or rubs                  GI:  abdomen soft;BS normoactive obese non-tender    Vascular: not assessed this visit                                      Extremities and Musculoskeletal:  no deformities, clubbing, cyanosis, erythema observed;no edema        Neurological:  no gross motor deficits;affect appropriate            LABS/DATA:  I reviewed the following:  Component      Latest Ref Rng & Units 4/19/2021   Sodium      133 - 144 mmol/L 137   Potassium      3.4 - 5.3 mmol/L 3.6   Chloride      94 - 109 mmol/L 105   Carbon Dioxide      20 - 32 mmol/L 27   Anion Gap      3 - 14 mmol/L 5   Glucose      70 - 99 mg/dL 106 (H)   Urea Nitrogen      7 - 30 mg/dL 14   Creatinine      0.52 - 1.04 " "mg/dL 0.86   GFR Estimate      >60 mL/min/1.73:m2 67   GFR Estimate If Black      >60 mL/min/1.73:m2 77   Calcium      8.5 - 10.1 mg/dL 8.6   Cholesterol      <200 mg/dL 143   Triglycerides      <150 mg/dL 151 (H)   HDL Cholesterol      >49 mg/dL 81   LDL Cholesterol Calculated      <100 mg/dL 32   Non HDL Cholesterol      <130 mg/dL 62   ALT      0 - 50 U/L 46       Stress echo 4/27/21:  Interpretation Summary  Exercise was stopped due to dyspnea.  A low to moderate workload was achieved.  There was no chest pain or significant ST changes with exercise.  This was a normal stress echocardiogram with no evidence of stress-induced ischemia.  Right ventricular systolic pressure could not be approximated due to inadequate tricuspid regurgitation.  Very trvial (< 2m/s) \"shark's tooth\" doppler across LVOT in post exercise images. Unlikely to be clinically significant. Image quality in this area not sufficient to see MV/RAKEL etc  Note: Pt noted dyspnea before test started        ASSESSMENT/PLAN:  1.  Mild nonobstructive coronary artery disease based on coronary angiogram in 2005 at [facility].     - She apparently underwent stress testing in 2009 and 2011 at [facility], both of which by report were normal (we do not have the actual reports available).  Also negative stress echo 8/2017.   - Had some ALVARADO and chest discomfort (occurs after eating for many years).  Stress echo 4/27/21 negative for ischemia. Baseline LVEF 60-65%, no significant tricuspid valve or aortic valve disease  - Continue ASA, atenolol, atorvastatin, SL nitroglycerin PRN  - Weight loss, increased activity      2.  FLP 4/19/21: , HDL 81, LDL 32, .  Continue atorvastatin 40 mg daily and fish oil.  Needs some dietary changes to decrease TGs.      3.  Essential hypertension.  BP controlled.  Continue atenolol 50 mg, HCTZ 25 mg, lisinopril 40 mg, hydralazine 75 mg TID.  Continue to work on weight loss, increased activity, low sodium " diet.     4.  ALVARADO  - HR good  - BP controlled  - Stress echo negative as noted above, also no significant tricuspid or aortic valve disease was seen, and baseline LVEF was normal  - Does not appear fluid overloaded.  Continue hydrochlorothiazide 25 mg  - Weight loss  - Increase activity as deconditioning could be contributing   - Has untreated sleep apnea, which may be contributing  - Discuss further etiology with PCP, possibly PFTs        Follow up:  Dr. Garza 4/2022 with labs, sooner if further concerns    Thank you for allowing me to participate in the care of your patient.      Sincerely,     Jaelyn Lowry PA-C     [facility]  cc:   Sotero Garza MD  [address]  [place] [id number]

## 2021-08-09 NOTE — TELEPHONE ENCOUNTER
"Controlled Substance Refill Request for LORazepam (ATIVAN) 1 MG tablet  Problem List Complete:  No     PROVIDER TO CONSIDER COMPLETION OF PROBLEM LIST AND OVERVIEW/CONTROLLED SUBSTANCE AGREEMENT    Last Written Prescription Date:  11/27/2018  Last Fill Quantity: 30,   # refills: 0    Last Office Visit with [facility] primary care provider: 11/20/2018    Future Office visit:     Controlled substance agreement:   Encounter-Level CSA:    There are no encounter-level csa.     Patient-Level CSA:    There are no patient-level csa.         Last Urine Drug Screen: No results found for: CDAUT, No results found for: COMDAT, No results found for: THC13, PCP13, COC13, MAMP13, OPI13, AMP13, BZO13, TCA13, MTD13, BAR13, OXY13, PPX13, BUP13     Processing:  Fax Rx to [facility] pharmacy     [email or web address]       checked in past 3 months?  Yes 11/20/2018      Requested Prescriptions   Pending Prescriptions Disp Refills                        buPROPion (WELLBUTRIN SR) 150 MG 12 hr tablet 180 tablet 0    Last Written Prescription Date:  10/30/2018  Last Fill Quantity: 180 tablet,  # refills: 0   Last office visit: 5/24/2018 with prescribing provider:  05/24/2018   Future Office Visit:     Sig: Take 1 tablet (150 mg) by mouth 2 times daily    SSRIs Protocol Passed - 2/1/2019  3:10 PM       Passed - Recent (12 mo) or future (30 days) visit within the authorizing provider's specialty    Patient had office visit in the last 12 months or has a visit in the next 30 days with authorizing provider or within the authorizing provider's specialty.  See \"Patient Info\" tab in inbasket, or \"Choose Columns\" in Meds & Orders section of the refill encounter.             Passed - Medication is Bupropion    If the medication is Bupropion (Wellbutrin), and the patient is taking for smoking cessation; OK to refill.         Passed - Medication is active on med list       Passed - Patient is age 18 or older       Passed - No active pregnancy on " "record       Passed - No positive pregnancy test in last 12 months            DULoxetine (CYMBALTA) 60 MG capsule 90 capsule 0    Last Written Prescription Date:  10/30/2018  Last Fill Quantity: 90 capsule,  # refills: 0   Last office visit: 5/24/2018 with prescribing provider:  05/24/2018   Future Office Visit:     Sig: Take 1 capsule (60 mg) by mouth daily    Serotonin-Norepinephrine Reuptake Inhibitors  Passed - 2/1/2019  3:10 PM       Passed - Blood pressure under 140/90 in past 12 months    BP Readings from Last 3 Encounters:   10/24/18 112/58   05/24/18 136/68   04/20/18 118/70                Passed - Recent (12 mo) or future (30 days) visit within the authorizing provider's specialty    Patient had office visit in the last 12 months or has a visit in the next 30 days with authorizing provider or within the authorizing provider's specialty.  See \"Patient Info\" tab in inbasket, or \"Choose Columns\" in Meds & Orders section of the refill encounter.             Passed - Medication is active on med list       Passed - Patient is age 18 or older       Passed - No active pregnancy on record       Passed - No positive pregnancy test in past 12 months        " Topical Retinoid counseling:  Patient advised to apply a pea-sized amount only at bedtime and wait 30 minutes after washing their face before applying.  If too drying, patient may add a non-comedogenic moisturizer. The patient verbalized understanding of the proper use and possible adverse effects of retinoids.  All of the patient's questions and concerns were addressed.

## 2021-09-04 ENCOUNTER — HEALTH MAINTENANCE LETTER (OUTPATIENT)
Age: 76
End: 2021-09-04

## 2022-02-17 ENCOUNTER — HOSPITAL ENCOUNTER (OUTPATIENT)
Dept: MAMMOGRAPHY | Facility: CLINIC | Age: 77
Discharge: HOME | End: 2022-02-17
Attending: FAMILY MEDICINE | Admitting: FAMILY MEDICINE
Payer: COMMERCIAL

## 2022-02-17 DIAGNOSIS — Z12.31 SCREENING MAMMOGRAM, ENCOUNTER FOR: ICD-10-CM

## 2022-02-17 PROCEDURE — 77067 SCR MAMMO BI INCL CAD: CPT

## 2022-02-19 ENCOUNTER — HEALTH MAINTENANCE LETTER (OUTPATIENT)
Age: 77
End: 2022-02-19

## 2022-03-23 DIAGNOSIS — E78.2 MIXED HYPERLIPIDEMIA: ICD-10-CM

## 2022-03-23 DIAGNOSIS — I10 BENIGN ESSENTIAL HYPERTENSION: ICD-10-CM

## 2022-03-23 DIAGNOSIS — I25.10 CORONARY ARTERY DISEASE INVOLVING NATIVE CORONARY ARTERY OF NATIVE HEART WITHOUT ANGINA PECTORIS: Primary | ICD-10-CM

## 2022-03-23 DIAGNOSIS — E78.5 HYPERLIPIDEMIA LDL GOAL <100: ICD-10-CM

## 2022-04-06 ENCOUNTER — LAB (OUTPATIENT)
Dept: LAB | Facility: CLINIC | Age: 77
End: 2022-04-06
Payer: COMMERCIAL

## 2022-04-06 ENCOUNTER — OFFICE VISIT (OUTPATIENT)
Dept: CARDIOLOGY | Facility: CLINIC | Age: 77
End: 2022-04-06
Payer: COMMERCIAL

## 2022-04-06 VITALS
SYSTOLIC BLOOD PRESSURE: 144 MMHG | DIASTOLIC BLOOD PRESSURE: 76 MMHG | WEIGHT: 184.6 LBS | OXYGEN SATURATION: 99 % | HEART RATE: 77 BPM | BODY MASS INDEX: 30.75 KG/M2 | HEIGHT: 65 IN

## 2022-04-06 DIAGNOSIS — I10 BENIGN ESSENTIAL HYPERTENSION: ICD-10-CM

## 2022-04-06 DIAGNOSIS — I25.10 CORONARY ARTERY DISEASE INVOLVING NATIVE CORONARY ARTERY OF NATIVE HEART WITHOUT ANGINA PECTORIS: ICD-10-CM

## 2022-04-06 DIAGNOSIS — R06.09 DYSPNEA ON EXERTION: ICD-10-CM

## 2022-04-06 DIAGNOSIS — R07.9 CHEST PAIN, UNSPECIFIED TYPE: ICD-10-CM

## 2022-04-06 DIAGNOSIS — E78.2 MIXED HYPERLIPIDEMIA: ICD-10-CM

## 2022-04-06 LAB
ALT SERPL W P-5'-P-CCNC: 38 U/L (ref 0–50)
ANION GAP SERPL CALCULATED.3IONS-SCNC: 4 MMOL/L (ref 3–14)
BUN SERPL-MCNC: 18 MG/DL (ref 7–30)
CALCIUM SERPL-MCNC: 9.1 MG/DL (ref 8.5–10.1)
CHLORIDE BLD-SCNC: 104 MMOL/L (ref 94–109)
CO2 SERPL-SCNC: 28 MMOL/L (ref 20–32)
CREAT SERPL-MCNC: 1.1 MG/DL (ref 0.52–1.04)
GFR SERPL CREATININE-BSD FRML MDRD: 52 ML/MIN/1.73M2
GLUCOSE BLD-MCNC: 107 MG/DL (ref 70–99)
POTASSIUM BLD-SCNC: 4.1 MMOL/L (ref 3.4–5.3)
SODIUM SERPL-SCNC: 136 MMOL/L (ref 133–144)

## 2022-04-06 PROCEDURE — 99214 OFFICE O/P EST MOD 30 MIN: CPT | Performed by: PHYSICIAN ASSISTANT

## 2022-04-06 PROCEDURE — 36415 COLL VENOUS BLD VENIPUNCTURE: CPT | Performed by: INTERNAL MEDICINE

## 2022-04-06 PROCEDURE — 80048 BASIC METABOLIC PNL TOTAL CA: CPT | Performed by: INTERNAL MEDICINE

## 2022-04-06 PROCEDURE — 84460 ALANINE AMINO (ALT) (SGPT): CPT | Performed by: INTERNAL MEDICINE

## 2022-04-06 RX ORDER — HYDRALAZINE HYDROCHLORIDE 100 MG/1
100 TABLET, FILM COATED ORAL 3 TIMES DAILY
Qty: 270 TABLET | Refills: 3 | Status: SHIPPED | OUTPATIENT
Start: 2022-04-06

## 2022-04-06 NOTE — PATIENT INSTRUCTIONS
Due to the shortness of breath, we will go ahead and get an echocardiogram.  This can look at this like valve function, pumping function, and measure thickness of the heart muscle.   Additionally, your blood pressure is running too high.  This can also cause some shortness of breath. Increase your hydralazine to 100 mg three times a day.      Also, go ahead and continue with the evaluation of anemia.

## 2022-04-06 NOTE — LETTER
"2022    Marlys Dawson PA-C  [id number] [facility] [id number]    RE: Monse Peoples       Dear Colleague,     I had the pleasure of seeing Monse Peoples in the [facility].      CARDIOLOGY CLINIC PROGRESS NOTE    DOS: 2022      Monse Peoples  : 1945, 76 year old  MRN: [id number]      History:  I am following up with Monse Peoples today.  She is a patient of Dr. Garza.     Monse Peoples is a 76 year old with history of mild nonobstructive coronary artery disease based upon a coronary angiogram performed in  at [facility].  She has a history of hypertension, hyperlipidemia, PABLO (not on CPAP) and gastric bypass surgery.  Also anxiety.  According to past notes, Monse also underwent stress testing in  and  at [facility], both of which were normal.  She underwent a stress echo 2017 that was normal.       Seen 21 by Dr. Garza.  She had 6-8 months of ALVARADO and chest discomfort.  A stress echo was ordered. BP and lipids were controlled.     21 stress echo:   A low to moderate workload was achieved.  There was no chest pain or significant ST changes with exercise.  This was a normal stress echocardiogram with no evidence of stress-induced ischemia.  Right ventricular systolic pressure could not be approximated due to  inadequate tricuspid regurgitation.  Very trvial (< 2m/s) \"shark's tooth\" doppler across LVOT in post exercise images. Unlikely to be clinically significant. Image quality in this area not sufficient to see MV/RAKEL etc     Interval History:  PCP with [facility], last visit 22 for ALVARADO - will trial albuterol and obtain PFTs.  Also HGB is 9.3 could be causing symptoms. BP was slightly elevated.    She presents today for follow up.   She has not had PFTs.   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  Needs to see heme and GI.     She tells me that over the past 4-5 months, " "her ALVARADO has worsened. She went on a cruise and had to rest. She has to rest with stairs.  She is quite sedentary now.    Some chest pressure across her chest when she has SOB.   No edema.   No orthopnea, but typically sleeps with her head elevated for comfort.   She said she has sleep apnea, but did not tolerate the mask.  She has daytime fatigue.   SBP at home 140s/70s.   She comes in with some google \"research\" on hypertrophic cardiomyopathy. Father  age 61 suddenly.  He had cardiomyopathy and CHF. 41 year old nephew  suddenly, unknown.   2 brothers  at younger ages.         ROS:  Skin:  not assessed     Eyes:  not assessed    ENT:  not assessed    Respiratory:  Positive for shortness of breath;dyspnea on exertion  Cardiovascular:    chest pain;Positive for;fatigue;lightheadedness;dizziness  Gastroenterology: Negative    Genitourinary:  not assessed    Musculoskeletal:  Positive for back pain  Neurologic:  Positive for headaches  Psychiatric:  not assessed    Heme/Lymph/Imm:  not assessed    Endocrine:  not assessed      PAST MEDICAL HISTORY:  Past Medical History:   Diagnosis Date     Anxiety 2015     Asthma     no inhaler for 2-3 years     Benign essential hypertension 2016     Chest pain, unspecified type 2017     Coronary artery disease      Coronary artery disease involving native coronary artery of native heart without angina pectoris 2016     Depression      Esophageal reflux 3/19/2015     Former smoker - has had pulmonary nodules in the past 3/29/2017     History of blood transfusion      Hyperlipidemia LDL goal <100 3/19/2015     Osteoarthritis      Osteopenia      Pulmonary nodules 2015    Noted 4/14/15, f/u scan 2016 showed minimal change, needs repeat scan 2017        PAST SURGICAL HISTORY:  Past Surgical History:   Procedure Laterality Date     ANGIOGRAM  6/15/05     ARTHROPLASTY HIP  2013    Procedure: ARTHROPLASTY HIP;  Right total hip arthroplasty       " "ARTHROPLASTY HIP Left 2015    Procedure: ARTHROPLASTY HIP;  Surgeon: Tyrese Howell MD;  Location: [facility] OR     ARTHROPLASTY KNEE Left 2016    Procedure: ARTHROPLASTY KNEE;  Surgeon: Tyrese Howell MD;  Location: [facility] OR     CHOLECYSTECTOMY       COLONOSCOPY       COLONOSCOPY Left 2016    Procedure: COLONOSCOPY;  Surgeon: Moe Haney MD;  Location:  GI     GASTRIC BYPASS       tiff en y     GI SURGERY      fistulotomy     ORTHOPEDIC SURGERY      left knee \"green procedure\"     TONSILLECTOMY       TUBAL LIGATION         SOCIAL HISTORY:  Social History     Socioeconomic History     Marital status:      Spouse name: Not on file     Number of children: Not on file     Years of education: Not on file     Highest education level: Not on file   Occupational History     Not on file   Social Needs     Financial resource strain: Not on file     Food insecurity     Worry: Not on file     Inability: Not on file     Transportation needs     Medical: Not on file     Non-medical: Not on file   Tobacco Use     Smoking status: Former Smoker     Packs/day: 0.50     Years: 50.00     Pack years: 25.00     Types: Cigarettes     Quit date: 2013     Years since quittin.3     Smokeless tobacco: Never Used   Substance and Sexual Activity     Alcohol use: Yes     Alcohol/week: 0.0 standard drinks     Comment: 5 drinks a week     Drug use: No     Sexual activity: Yes     Partners: Male   Lifestyle     Physical activity     Days per week: Not on file     Minutes per session: Not on file     Stress: Not on file   Relationships     Social connections     Talks on phone: Not on file     Gets together: Not on file     Attends Faith service: Not on file     Active member of club or organization: Not on file     Attends meetings of clubs or organizations: Not on file     Relationship status: Not on file     Intimate partner violence     Fear of current or ex partner: Not on file     Emotionally abused: " Not on file     Physically abused: Not on file     Forced sexual activity: Not on file   Other Topics Concern     Parent/sibling w/ CABG, MI or angioplasty before 65F 55M? Yes     Comment: brother age 31      Service Not Asked     Blood Transfusions Not Asked     Caffeine Concern Yes     Comment: 1 cup of coffee      Occupational Exposure Not Asked     Hobby Hazards Not Asked     Sleep Concern Yes     Comment: lately it has been an issue     Stress Concern Not Asked     Weight Concern Not Asked     Special Diet No     Comment: watching what she eats      Back Care Not Asked     Exercise No     Bike Helmet Not Asked     Seat Belt Yes     Self-Exams Not Asked   Social History Narrative     Not on file       FAMILY HISTORY:  Family History   Problem Relation Age of Onset     Coronary Artery Disease Father      Hypertension Father      Coronary Artery Disease Sister      Hypertension Sister      Coronary Artery Disease Brother      Hypertension Brother      Heart Disease Nephew      Other Cancer Maternal Half-Sister      Breast Cancer Maternal Aunt 35     Ovarian Cancer No family hx of        MEDS: acetaminophen (TYLENOL) 325 MG tablet, Take 650 mg by mouth every morning   aspirin 81 MG tablet, Take 81 mg by mouth daily  atenolol (TENORMIN) 50 MG tablet, Take 1 tablet (50 mg) by mouth daily  atorvastatin (LIPITOR) 20 MG tablet, Take 1 tablet (20 mg) by mouth daily (Patient taking differently: Take 40 mg by mouth daily )  buPROPion (WELLBUTRIN SR) 150 MG 12 hr tablet, Take 1 tablet (150 mg) by mouth 2 times daily  calcium carbonate-vitamin D 600-400 MG-UNIT CHEW, Take 2 tablets by mouth every morning  Coenzyme Q10 (CO Q 10 PO), Take 100 mg by mouth every evening   CYANOCOBALAMIN SL, Place 1,000 mcg under the tongue daily  DULoxetine (CYMBALTA) 60 MG capsule, Take 1 capsule (60 mg) by mouth daily  FIBER SELECT GUMMIES CHEW, Take 1 chew tab by mouth daily   hydrochlorothiazide (HYDRODIURIL) 25 MG tablet, Take 1  "tablet (25 mg) by mouth daily  lisinopril (ZESTRIL) 40 MG tablet, Take 1 tablet (40 mg) by mouth daily  multivitamin, therapeutic with minerals (MULTI-VITAMIN) TABS, Take 1 tablet by mouth daily  Omega-3 Fatty Acids (OMEGA-3 FISH OIL PO), Take 1.2 g by mouth daily   omeprazole (PRILOSEC) 40 MG DR capsule, Take 1 capsule (40 mg) by mouth daily Take 30-60 minutes before a meal.  traZODone (DESYREL) 100 MG tablet, Take 2 tablets (200 mg) by mouth At Bedtime  LORazepam (ATIVAN) 1 MG tablet, TAKE 1 TABLET BY MOUTH WITH HIGH ANXIETY. 10 TABLETS MONTHLY (Patient not taking: Reported on 4/6/2022)  nitroGLYcerin (NITROSTAT) 0.4 MG sublingual tablet, For chest pain place 1 tablet under the tongue every 5 minutes for 3 doses. If symptoms persist 5 minutes after 1st dose call 911. (Patient not taking: Reported on 4/6/2022)    No current facility-administered medications on file prior to visit.      ALLERGIES:   Allergies   Allergen Reactions     Codeine      Contrast Dye      PN: LW CM1: CONTRAST- nka Reaction :     Morphine Sulfate (Concentrate)      does not like side effects/\"hyper\"       PHYSICAL EXAM:  Vitals: BP (!) 144/76   Pulse 77   Ht 1.638 m (5' 4.5\")   Wt 83.7 kg (184 lb 9.6 oz)   LMP  (LMP Unknown)   SpO2 99%   BMI 31.20 kg/m    Constitutional:  cooperative, alert and oriented, well developed, well nourished, in no acute distress obese      Skin:  warm and dry to the touch, no apparent skin lesions or masses noted        Head:  normocephalic, no masses or lesions        Eyes:  pupils equal and round;conjunctivae and lids unremarkable;sclera white        ENT:  no pallor or cyanosis        Neck:  no carotid bruit;JVP normal        Respiratory:      clear without rales/wheezing    Cardiac: regular rhythm, normal S1/S2, no S3 or S4, apical impulse not displaced, no murmurs, gallops or rubs                  GI:  abdomen soft;BS normoactive obese      Vascular: not assessed this visit                                " "      Extremities and Musculoskeletal:  no deformities, clubbing, cyanosis, erythema observed;no edema        Neurological:  no gross motor deficits;affect appropriate            LABS/DATA:  I reviewed the following:  Component      Latest Ref Rng & Units 4/6/2022   Sodium      133 - 144 mmol/L 136   Potassium      3.4 - 5.3 mmol/L 4.1   Chloride      94 - 109 mmol/L 104   Carbon Dioxide      20 - 32 mmol/L 28   Anion Gap      3 - 14 mmol/L 4   Urea Nitrogen      7 - 30 mg/dL 18   Creatinine      0.52 - 1.04 mg/dL 1.10 (H)   Calcium      8.5 - 10.1 mg/dL 9.1   Glucose      70 - 99 mg/dL 107 (H)   GFR Estimate      >60 mL/min/1.73m2 52 (L)   ALT      0 - 50 U/L 38       Stress echo 4/27/21:  Interpretation Summary  Exercise was stopped due to dyspnea.  A low to moderate workload was achieved.  There was no chest pain or significant ST changes with exercise.  This was a normal stress echocardiogram with no evidence of stress-induced ischemia.  Right ventricular systolic pressure could not be approximated due to inadequate tricuspid regurgitation.  Very trvial (< 2m/s) \"shark's tooth\" doppler across LVOT in post exercise images. Unlikely to be clinically significant. Image quality in this area not sufficient to see MV/RAKEL etc  Note: Pt noted dyspnea before test started        ASSESSMENT/PLAN:  Mild nonobstructive coronary artery disease based on coronary angiogram in 2005 at [facility].     - She apparently underwent stress testing in 2009 and 2011 at [facility], both of which by report were normal.  Also negative stress echo 8/2017 and 4/2021.  - She continues to have ALVARADO and some exertional chest discomfort  - Start by repeating an echo  - She is seeing Dr. Garza in June and I will defer any plan for cardiac catheterization to him    - Additionally, her BP is elevated. Increase hydralazine to 100 mg TID  - Continue ASA, atenolol, atorvastatin, SL nitroglycerin PRN  - Weight loss, increased activity "      Dyslipidemia  - FLP 4/19/21: , HDL 81, LDL 32, .    - Continue atorvastatin 40 mg daily and fish oil.  Needs some dietary changes to decrease TGs.        Essential hypertension.    - BP uncontrolled.    - Continue atenolol 50 mg, HCTZ 25 mg, lisinopril 40 mg. Increase hydralazine to 100 mg TID.  Continue to work on weight loss, increased activity, low sodium diet.       ALVARADO  - HR good  - Working on better BP control as noted above  - Stress echo negative in 2021 as noted above, also no significant tricuspid or aortic valve disease was seen, and baseline LVEF was normal  - Does not appear fluid overloaded.  Continue hydrochlorothiazide 25 mg  - Will get a complete echo for function, valves, WMAs.  Also patient worried about septal thickness  - Weight loss  - Increase activity as deconditioning could be contributing   - Has untreated sleep apnea, which may be contributing  - Eval with PCP, recs PFTs and anemia eval (heme eval for anemia and GI eval for anemia further etiology with PCP)         CJ  - Heme consult  - GI            Follow up:  Echo   Dr. Garza 6/2022      Jaelyn Lowry PA-C    Thank you for allowing me to participate in the care of your patient.      Sincerely,     Jaelyn Lowry PA-C     [facility]  cc:   Sotero Garza MD  [address] [place] [id number]

## 2022-04-06 NOTE — PROGRESS NOTES
"    CARDIOLOGY CLINIC PROGRESS NOTE    DOS: 2022      Monse Peoples  : 1945, 76 year old  MRN: [id number]      History:  I am following up with Monse Peoples today.  She is a patient of Dr. Garza.     Monse Peoples is a 76 year old with history of mild nonobstructive coronary artery disease based upon a coronary angiogram performed in  at [facility].  She has a history of hypertension, hyperlipidemia, PABLO (not on CPAP) and gastric bypass surgery.  Also anxiety.  According to past notes, Monse also underwent stress testing in  and  at [facility], both of which were normal.  She underwent a stress echo 2017 that was normal.       Seen 21 by Dr. Garza.  She had 6-8 months of ALVARADO and chest discomfort.  A stress echo was ordered. BP and lipids were controlled.     21 stress echo:   A low to moderate workload was achieved.  There was no chest pain or significant ST changes with exercise.  This was a normal stress echocardiogram with no evidence of stress-induced ischemia.  Right ventricular systolic pressure could not be approximated due to  inadequate tricuspid regurgitation.  Very trvial (< 2m/s) \"shark's tooth\" doppler across LVOT in post exercise images. Unlikely to be clinically significant. Image quality in this area not sufficient to see MV/RAKEL etc     Interval History:  PCP with [facility], last visit 22 for ALVARADO - will trial albuterol and obtain PFTs.  Also HGB is 9.3 could be causing symptoms. BP was slightly elevated.    She presents today for follow up.   She has not had PFTs.   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  Needs to see heme and GI.     She tells me that over the past 4-5 months, her ALVARADO has worsened. She went on a cruise and had to rest. She has to rest with stairs.  She is quite sedentary now.    Some chest pressure across her chest when she has SOB.   No edema.   No orthopnea, " "but typically sleeps with her head elevated for comfort.   She said she has sleep apnea, but did not tolerate the mask.  She has daytime fatigue.   SBP at home 140s/70s.   She comes in with some google \"research\" on hypertrophic cardiomyopathy. Father  age 61 suddenly.  He had cardiomyopathy and CHF. 41 year old nephew  suddenly, unknown.   2 brothers  at younger ages.         ROS:  Skin:  not assessed     Eyes:  not assessed    ENT:  not assessed    Respiratory:  Positive for shortness of breath;dyspnea on exertion  Cardiovascular:    chest pain;Positive for;fatigue;lightheadedness;dizziness  Gastroenterology: Negative    Genitourinary:  not assessed    Musculoskeletal:  Positive for back pain  Neurologic:  Positive for headaches  Psychiatric:  not assessed    Heme/Lymph/Imm:  not assessed    Endocrine:  not assessed      PAST MEDICAL HISTORY:  Past Medical History:   Diagnosis Date     Anxiety 2015     Asthma     no inhaler for 2-3 years     Benign essential hypertension 2016     Chest pain, unspecified type 2017     Coronary artery disease      Coronary artery disease involving native coronary artery of native heart without angina pectoris 2016     Depression      Esophageal reflux 3/19/2015     Former smoker - has had pulmonary nodules in the past 3/29/2017     History of blood transfusion      Hyperlipidemia LDL goal <100 3/19/2015     Osteoarthritis      Osteopenia      Pulmonary nodules 2015    Noted 4/14/15, f/u scan 2016 showed minimal change, needs repeat scan 2017        PAST SURGICAL HISTORY:  Past Surgical History:   Procedure Laterality Date     ANGIOGRAM  6/15/05     ARTHROPLASTY HIP  2013    Procedure: ARTHROPLASTY HIP;  Right total hip arthroplasty       ARTHROPLASTY HIP Left 2015    Procedure: ARTHROPLASTY HIP;  Surgeon: Tyrese Howell MD;  Location: [facility] OR     ARTHROPLASTY KNEE Left 2016    Procedure: ARTHROPLASTY KNEE;  Surgeon: Dante" "MD Tyrese;  Location: [facility] OR     CHOLECYSTECTOMY       COLONOSCOPY       COLONOSCOPY Left 2016    Procedure: COLONOSCOPY;  Surgeon: Moe Haney MD;  Location: [facility] GI     GASTRIC BYPASS       tiff en y     GI SURGERY      fistulotomy     ORTHOPEDIC SURGERY      left knee \"green procedure\"     TONSILLECTOMY       TUBAL LIGATION         SOCIAL HISTORY:  Social History     Socioeconomic History     Marital status:      Spouse name: Not on file     Number of children: Not on file     Years of education: Not on file     Highest education level: Not on file   Occupational History     Not on file   Social Needs     Financial resource strain: Not on file     Food insecurity     Worry: Not on file     Inability: Not on file     Transportation needs     Medical: Not on file     Non-medical: Not on file   Tobacco Use     Smoking status: Former Smoker     Packs/day: 0.50     Years: 50.00     Pack years: 25.00     Types: Cigarettes     Quit date: 2013     Years since quittin.3     Smokeless tobacco: Never Used   Substance and Sexual Activity     Alcohol use: Yes     Alcohol/week: 0.0 standard drinks     Comment: 5 drinks a week     Drug use: No     Sexual activity: Yes     Partners: Male   Lifestyle     Physical activity     Days per week: Not on file     Minutes per session: Not on file     Stress: Not on file   Relationships     Social connections     Talks on phone: Not on file     Gets together: Not on file     Attends Zoroastrian service: Not on file     Active member of club or organization: Not on file     Attends meetings of clubs or organizations: Not on file     Relationship status: Not on file     Intimate partner violence     Fear of current or ex partner: Not on file     Emotionally abused: Not on file     Physically abused: Not on file     Forced sexual activity: Not on file   Other Topics Concern     Parent/sibling w/ CABG, MI or angioplasty before 65F 55M? Yes     Comment: brother age " 31      Service Not Asked     Blood Transfusions Not Asked     Caffeine Concern Yes     Comment: 1 cup of coffee      Occupational Exposure Not Asked     Hobby Hazards Not Asked     Sleep Concern Yes     Comment: lately it has been an issue     Stress Concern Not Asked     Weight Concern Not Asked     Special Diet No     Comment: watching what she eats      Back Care Not Asked     Exercise No     Bike Helmet Not Asked     Seat Belt Yes     Self-Exams Not Asked   Social History Narrative     Not on file       FAMILY HISTORY:  Family History   Problem Relation Age of Onset     Coronary Artery Disease Father      Hypertension Father      Coronary Artery Disease Sister      Hypertension Sister      Coronary Artery Disease Brother      Hypertension Brother      Heart Disease Nephew      Other Cancer Maternal Half-Sister      Breast Cancer Maternal Aunt 35     Ovarian Cancer No family hx of        MEDS: acetaminophen (TYLENOL) 325 MG tablet, Take 650 mg by mouth every morning   aspirin 81 MG tablet, Take 81 mg by mouth daily  atenolol (TENORMIN) 50 MG tablet, Take 1 tablet (50 mg) by mouth daily  atorvastatin (LIPITOR) 20 MG tablet, Take 1 tablet (20 mg) by mouth daily (Patient taking differently: Take 40 mg by mouth daily )  buPROPion (WELLBUTRIN SR) 150 MG 12 hr tablet, Take 1 tablet (150 mg) by mouth 2 times daily  calcium carbonate-vitamin D 600-400 MG-UNIT CHEW, Take 2 tablets by mouth every morning  Coenzyme Q10 (CO Q 10 PO), Take 100 mg by mouth every evening   CYANOCOBALAMIN SL, Place 1,000 mcg under the tongue daily  DULoxetine (CYMBALTA) 60 MG capsule, Take 1 capsule (60 mg) by mouth daily  FIBER SELECT GUMMIES CHEW, Take 1 chew tab by mouth daily   hydrochlorothiazide (HYDRODIURIL) 25 MG tablet, Take 1 tablet (25 mg) by mouth daily  lisinopril (ZESTRIL) 40 MG tablet, Take 1 tablet (40 mg) by mouth daily  multivitamin, therapeutic with minerals (MULTI-VITAMIN) TABS, Take 1 tablet by mouth daily  Omega-3  "Fatty Acids (OMEGA-3 FISH OIL PO), Take 1.2 g by mouth daily   omeprazole (PRILOSEC) 40 MG DR capsule, Take 1 capsule (40 mg) by mouth daily Take 30-60 minutes before a meal.  traZODone (DESYREL) 100 MG tablet, Take 2 tablets (200 mg) by mouth At Bedtime  LORazepam (ATIVAN) 1 MG tablet, TAKE 1 TABLET BY MOUTH WITH HIGH ANXIETY. 10 TABLETS MONTHLY (Patient not taking: Reported on 4/6/2022)  nitroGLYcerin (NITROSTAT) 0.4 MG sublingual tablet, For chest pain place 1 tablet under the tongue every 5 minutes for 3 doses. If symptoms persist 5 minutes after 1st dose call 911. (Patient not taking: Reported on 4/6/2022)    No current facility-administered medications on file prior to visit.      ALLERGIES:   Allergies   Allergen Reactions     Codeine      Contrast Dye      PN: LW CM1: CONTRAST- nka Reaction :     Morphine Sulfate (Concentrate)      does not like side effects/\"hyper\"       PHYSICAL EXAM:  Vitals: BP (!) 144/76   Pulse 77   Ht 1.638 m (5' 4.5\")   Wt 83.7 kg (184 lb 9.6 oz)   LMP  (LMP Unknown)   SpO2 99%   BMI 31.20 kg/m    Constitutional:  cooperative, alert and oriented, well developed, well nourished, in no acute distress obese      Skin:  warm and dry to the touch, no apparent skin lesions or masses noted        Head:  normocephalic, no masses or lesions        Eyes:  pupils equal and round;conjunctivae and lids unremarkable;sclera white        ENT:  no pallor or cyanosis        Neck:  no carotid bruit;JVP normal        Respiratory:      clear without rales/wheezing    Cardiac: regular rhythm, normal S1/S2, no S3 or S4, apical impulse not displaced, no murmurs, gallops or rubs                  GI:  abdomen soft;BS normoactive obese      Vascular: not assessed this visit                                      Extremities and Musculoskeletal:  no deformities, clubbing, cyanosis, erythema observed;no edema        Neurological:  no gross motor deficits;affect appropriate            LABS/DATA:  I reviewed " "the following:  Component      Latest Ref Rng & Units 4/6/2022   Sodium      133 - 144 mmol/L 136   Potassium      3.4 - 5.3 mmol/L 4.1   Chloride      94 - 109 mmol/L 104   Carbon Dioxide      20 - 32 mmol/L 28   Anion Gap      3 - 14 mmol/L 4   Urea Nitrogen      7 - 30 mg/dL 18   Creatinine      0.52 - 1.04 mg/dL 1.10 (H)   Calcium      8.5 - 10.1 mg/dL 9.1   Glucose      70 - 99 mg/dL 107 (H)   GFR Estimate      >60 mL/min/1.73m2 52 (L)   ALT      0 - 50 U/L 38       Stress echo 4/27/21:  Interpretation Summary  Exercise was stopped due to dyspnea.  A low to moderate workload was achieved.  There was no chest pain or significant ST changes with exercise.  This was a normal stress echocardiogram with no evidence of stress-induced ischemia.  Right ventricular systolic pressure could not be approximated due to inadequate tricuspid regurgitation.  Very trvial (< 2m/s) \"shark's tooth\" doppler across LVOT in post exercise images. Unlikely to be clinically significant. Image quality in this area not sufficient to see MV/RAKEL etc  Note: Pt noted dyspnea before test started        ASSESSMENT/PLAN:  Mild nonobstructive coronary artery disease based on coronary angiogram in 2005 at [facility].     - She apparently underwent stress testing in 2009 and 2011 at [facility], both of which by report were normal.  Also negative stress echo 8/2017 and 4/2021.  - She continues to have ALVARADO and some exertional chest discomfort  - Start by repeating an echo  - She is seeing Dr. Garza in June and I will defer any plan for cardiac catheterization to him    - Additionally, her BP is elevated. Increase hydralazine to 100 mg TID  - Continue ASA, atenolol, atorvastatin, SL nitroglycerin PRN  - Weight loss, increased activity      Dyslipidemia  - FLP 4/19/21: , HDL 81, LDL 32, .    - Continue atorvastatin 40 mg daily and fish oil.  Needs some dietary changes to decrease TGs.        Essential hypertension.    - " BP uncontrolled.    - Continue atenolol 50 mg, HCTZ 25 mg, lisinopril 40 mg. Increase hydralazine to 100 mg TID.  Continue to work on weight loss, increased activity, low sodium diet.       ALVARADO  - HR good  - Working on better BP control as noted above  - Stress echo negative in 2021 as noted above, also no significant tricuspid or aortic valve disease was seen, and baseline LVEF was normal  - Does not appear fluid overloaded.  Continue hydrochlorothiazide 25 mg  - Will get a complete echo for function, valves, WMAs.  Also patient worried about septal thickness  - Weight loss  - Increase activity as deconditioning could be contributing   - Has untreated sleep apnea, which may be contributing  - Eval with PCP, recs PFTs and anemia eval (heme eval for anemia and GI eval for anemia further etiology with PCP)         CJ  - Heme consult  - GI            Follow up:  Echo   Dr. Garza 6/2022      Jaelyn Lowry PA-C

## 2022-05-11 ENCOUNTER — APPOINTMENT (OUTPATIENT)
Dept: GENERAL RADIOLOGY | Facility: CLINIC | Age: 77
End: 2022-05-11
Attending: EMERGENCY MEDICINE
Payer: COMMERCIAL

## 2022-05-11 ENCOUNTER — HOSPITAL ENCOUNTER (EMERGENCY)
Facility: CLINIC | Age: 77
Discharge: HOME | End: 2022-05-11
Attending: EMERGENCY MEDICINE | Admitting: EMERGENCY MEDICINE
Payer: COMMERCIAL

## 2022-05-11 VITALS
HEART RATE: 61 BPM | SYSTOLIC BLOOD PRESSURE: 130 MMHG | OXYGEN SATURATION: 99 % | RESPIRATION RATE: 18 BRPM | DIASTOLIC BLOOD PRESSURE: 55 MMHG | TEMPERATURE: 99.4 F

## 2022-05-11 DIAGNOSIS — R07.9 CHEST PAIN, UNSPECIFIED TYPE: ICD-10-CM

## 2022-05-11 DIAGNOSIS — R06.00 DYSPNEA, UNSPECIFIED TYPE: ICD-10-CM

## 2022-05-11 LAB
ANION GAP SERPL CALCULATED.3IONS-SCNC: 6 MMOL/L (ref 3–14)
BASOPHILS # BLD AUTO: 0 10E3/UL (ref 0–0.2)
BASOPHILS NFR BLD AUTO: 0 %
BUN SERPL-MCNC: 22 MG/DL (ref 7–30)
CALCIUM SERPL-MCNC: 8.8 MG/DL (ref 8.5–10.1)
CHLORIDE BLD-SCNC: 108 MMOL/L (ref 94–109)
CO2 SERPL-SCNC: 23 MMOL/L (ref 20–32)
CREAT SERPL-MCNC: 1.06 MG/DL (ref 0.52–1.04)
EOSINOPHIL # BLD AUTO: 0.2 10E3/UL (ref 0–0.7)
EOSINOPHIL NFR BLD AUTO: 3 %
ERYTHROCYTE [DISTWIDTH] IN BLOOD BY AUTOMATED COUNT: 17.5 % (ref 10–15)
GFR SERPL CREATININE-BSD FRML MDRD: 55 ML/MIN/1.73M2
GLUCOSE BLD-MCNC: 96 MG/DL (ref 70–99)
HCT VFR BLD AUTO: 31 % (ref 35–47)
HGB BLD-MCNC: 9.2 G/DL (ref 11.7–15.7)
HOLD SPECIMEN: NORMAL
IMM GRANULOCYTES # BLD: 0 10E3/UL
IMM GRANULOCYTES NFR BLD: 0 %
LYMPHOCYTES # BLD AUTO: 2.5 10E3/UL (ref 0.8–5.3)
LYMPHOCYTES NFR BLD AUTO: 32 %
MCH RBC QN AUTO: 24.1 PG (ref 26.5–33)
MCHC RBC AUTO-ENTMCNC: 29.7 G/DL (ref 31.5–36.5)
MCV RBC AUTO: 81 FL (ref 78–100)
MONOCYTES # BLD AUTO: 0.7 10E3/UL (ref 0–1.3)
MONOCYTES NFR BLD AUTO: 10 %
NEUTROPHILS # BLD AUTO: 4.2 10E3/UL (ref 1.6–8.3)
NEUTROPHILS NFR BLD AUTO: 55 %
NRBC # BLD AUTO: 0 10E3/UL
NRBC BLD AUTO-RTO: 0 /100
PLATELET # BLD AUTO: 264 10E3/UL (ref 150–450)
POTASSIUM BLD-SCNC: 3.7 MMOL/L (ref 3.4–5.3)
RBC # BLD AUTO: 3.82 10E6/UL (ref 3.8–5.2)
SODIUM SERPL-SCNC: 137 MMOL/L (ref 133–144)
TROPONIN I SERPL HS-MCNC: 5 NG/L
WBC # BLD AUTO: 7.7 10E3/UL (ref 4–11)

## 2022-05-11 PROCEDURE — 36415 COLL VENOUS BLD VENIPUNCTURE: CPT | Performed by: EMERGENCY MEDICINE

## 2022-05-11 PROCEDURE — 80048 BASIC METABOLIC PNL TOTAL CA: CPT | Performed by: EMERGENCY MEDICINE

## 2022-05-11 PROCEDURE — 84484 ASSAY OF TROPONIN QUANT: CPT | Performed by: EMERGENCY MEDICINE

## 2022-05-11 PROCEDURE — 85025 COMPLETE CBC W/AUTO DIFF WBC: CPT | Performed by: EMERGENCY MEDICINE

## 2022-05-11 PROCEDURE — 93005 ELECTROCARDIOGRAM TRACING: CPT

## 2022-05-11 PROCEDURE — 71046 X-RAY EXAM CHEST 2 VIEWS: CPT

## 2022-05-11 PROCEDURE — 99285 EMERGENCY DEPT VISIT HI MDM: CPT | Mod: 25

## 2022-05-11 RX ORDER — ACETAMINOPHEN 325 MG/1
650 TABLET ORAL ONCE
Status: DISCONTINUED | OUTPATIENT
Start: 2022-05-11 | End: 2022-05-11 | Stop reason: HOSPADM

## 2022-05-11 ASSESSMENT — ENCOUNTER SYMPTOMS
SHORTNESS OF BREATH: 1
CHILLS: 0
FEVER: 0
COUGH: 0
CHEST TIGHTNESS: 1
ABDOMINAL PAIN: 0

## 2022-05-11 NOTE — ED TRIAGE NOTES
Pt with chest pressure and ALVARADO for the past few days, given 324 ASA and 1 nitroglycerin tab by EMS. ABC's intact, alert and oriented X3.

## 2022-05-11 NOTE — ED PROVIDER NOTES
History   Chief Complaint:  Shortness of Breath and Chest Pain     The history is provided by the patient.      Monse Peoples is a 76 year old female with history of CAD, hypertension and hyperlipidemia who presents with shortness of breath and chest pain. The patient tells us that since August of last year, she has been having intermittent episodes of shortness of breath and chest pain that has worsened. The patient was supposed to see her cardiologist, Dr. Lan Woodson, in March however due to personal reasons the patient was seen by a practitioner. She now has an appointment scheduled with Dr. Lan Woodson on June 20th following her echocardiogram on June 2nd. She presents here to the ED today after her shortness of breath has become unbearable with any kind of exertion. She tells us that at rest, she has no shortness of breath but that her chest feels very tight. She describes the chest pain as coming on after exertion, radiating dull pain across her chest into her neck that always resolves after a few moments. The patient denies leg swelling, abdominal pain, fever, chills, cough or syncope.     To note, the patient takes an 81 mg Aspirin every morning.     Review of Systems   Constitutional: Negative for chills and fever.   Respiratory: Positive for chest tightness and shortness of breath. Negative for cough.    Cardiovascular: Positive for chest pain. Negative for leg swelling.   Gastrointestinal: Negative for abdominal pain.   Neurological: Negative for syncope.   All other systems reviewed and are negative.    Allergies:  Codeine  Contrast Dye  Morphine Sulfate     Medications:  Asprin 81 mg   Atorvastatin   Bupropion   Duloxetine  Cyanocobalamin   Hydralazine  Hydrochlorothiazide  Lisinopril   Lorazepam   Nitroglycerin   Omeprazole  Trazodone    Past Medical History:     Anxiety   Asthma  Hypertension   CAD  Depression   GERD  Hyperlipidemia  Osteoarthritis  Osteopenia  Pulmonary nodules    Past  "Surgical History:    Angiogram   Arthroplasty hip x 2   Cholecystectomy   Colonoscopy x 2    Gastric bypass  Fistulotomy   Left knee \"green procedure\"  Tonsillectomy  Tubal ligation      Family History:    CAD   Hypertension   MI  Heart disease    Social History:  The patient presents to the ED with her      Physical Exam     Patient Vitals for the past 24 hrs:   BP Temp Temp src Pulse Resp SpO2   05/11/22 1719 130/55 -- -- -- -- --   05/11/22 1717 -- 99.4  F (37.4  C) Oral 61 18 99 %     Physical Exam  Constitutional: Vital signs reviewed as above.   Head: No external signs of trauma noted.  Eyes: Pupils are equal, round, and reactive to light.   Neck: No JVD noted  Cardiovascular: Normal rate, regular rhythm and normal heart sounds.  No murmur heard. Equal B/L peripheral pulses.  Pulmonary/Chest: Effort normal and breath sounds normal. No respiratory distress. Patient has no wheezes. Patient has no rales.   Gastrointestinal: Soft. There is no tenderness.   Musculoskeletal/Extremities: No edema noted. Normal tone.  Neurological: Patient is alert and oriented to person, place, and time.   Skin: Skin is warm and dry. There is no diaphoresis noted.   Psychiatric: The patient appears calm.      Emergency Department Course   ECG  ECG obtained at 1722, ECG read at 1730  Normal sinus rhythm. Normal ECG.    No significant change as compared to prior, dated 08/27/2017.  Rate 74 bpm. MS interval 192 ms. QRS duration 82 ms. QT/QTc 410/455 ms. P-R-T axes 24 74 50.     Imaging:  Chest XR,  PA & LAT   Final Result   IMPRESSION: PA and lateral views of the chest were obtained. Cardiomediastinal silhouette is within normal limits. No suspicious focal pulmonary opacities. No significant pleural effusion or pneumothorax.             Report per radiology    Laboratory:  Labs Ordered and Resulted from Time of ED Arrival to Time of ED Departure   BASIC METABOLIC PANEL - Abnormal       Result Value    Sodium 137      Potassium " 3.7      Chloride 108      Carbon Dioxide (CO2) 23      Anion Gap 6      Urea Nitrogen 22      Creatinine 1.06 (*)     Calcium 8.8      Glucose 96      GFR Estimate 55 (*)    CBC WITH PLATELETS AND DIFFERENTIAL - Abnormal    WBC Count 7.7      RBC Count 3.82      Hemoglobin 9.2 (*)     Hematocrit 31.0 (*)     MCV 81      MCH 24.1 (*)     MCHC 29.7 (*)     RDW 17.5 (*)     Platelet Count 264      % Neutrophils 55      % Lymphocytes 32      % Monocytes 10      % Eosinophils 3      % Basophils 0      % Immature Granulocytes 0      NRBCs per 100 WBC 0      Absolute Neutrophils 4.2      Absolute Lymphocytes 2.5      Absolute Monocytes 0.7      Absolute Eosinophils 0.2      Absolute Basophils 0.0      Absolute Immature Granulocytes 0.0      Absolute NRBCs 0.0     TROPONIN I - Normal    Troponin I High Sensitivity 5        Procedures      Emergency Department Course:    Reviewed:  I reviewed nursing notes, vitals, past medical history, Care Everywhere and [facility]    ED Course as of 05/11/22 2303   Wed May 11, 2022   1732 I evaluated the patient at this time    1944 Rechecked and updated. She doesn't want her 2nd troponin.     Disposition:  The patient was discharged to home.     Impression & Plan     Medical Decision Making:  Monse Peoples is a 76 year old female presented to the Emergency Department with a complaint of chest pain. Fortunately the workup in the ED has been unremarkable and at this time I am not concerned for ACS. The EKG shows NSR without changes from previous. The troponin is negative, and given the overall duration of symptoms (months) this should rule out the patient.  I had planned on getting a second troponin, but the patient wanted to be discharged.    I considered other possible causes of chest pain including PE (low risk wells criteria), infection, pneumothorax, aortic dissection, and even more benign causes such as reflux and esophageal motility issues. The physical exam, laboratory, and  radiological findings listed above make life threatening conditions less likely. At this time I believe the patient is stable for discharge. I have encouraged close Primary Care Physician follow up. Anticipatory guidance given prior to discharge.       Diagnosis:    ICD-10-CM    1. Chest pain, unspecified type  R07.9    2. Dyspnea, unspecified type  R06.00      Scribe Disclosure:  I, Thierno Martins, am serving as a scribe at 5:24 PM on 5/11/2022 to document services personally performed by Jaya Mccallum DO, based on my observations and the provider's statements to me.            Jaya Mccallum DO  05/11/22 0455

## 2022-05-12 LAB
ATRIAL RATE - MUSE: 74 BPM
DIASTOLIC BLOOD PRESSURE - MUSE: NORMAL MMHG
INTERPRETATION ECG - MUSE: NORMAL
P AXIS - MUSE: 24 DEGREES
PR INTERVAL - MUSE: 192 MS
QRS DURATION - MUSE: 82 MS
QT - MUSE: 410 MS
QTC - MUSE: 455 MS
R AXIS - MUSE: 74 DEGREES
SYSTOLIC BLOOD PRESSURE - MUSE: NORMAL MMHG
T AXIS - MUSE: 50 DEGREES
VENTRICULAR RATE- MUSE: 74 BPM

## 2022-05-12 NOTE — DISCHARGE INSTRUCTIONS
What do you do next:   Continue your home medications unless we have specifically changed them  We discussed getting a second blood test on your heart to further risk stratify your symptoms but you would rather be discharged.  Follow up as indicated below    When do you return: If you have uncontrollable chest pain, severe shortness of breath, lightheadedness or fainting, or any other symptoms that concern you, please return to the ED for reevaluation.    Thank you for allowing us to care for you today.

## 2022-06-02 ENCOUNTER — HOSPITAL ENCOUNTER (OUTPATIENT)
Dept: CARDIOLOGY | Facility: CLINIC | Age: 77
Discharge: HOME | End: 2022-06-02
Attending: PHYSICIAN ASSISTANT | Admitting: PHYSICIAN ASSISTANT
Payer: COMMERCIAL

## 2022-06-02 DIAGNOSIS — R06.09 DYSPNEA ON EXERTION: ICD-10-CM

## 2022-06-02 LAB — LVEF ECHO: NORMAL

## 2022-06-02 PROCEDURE — 999N000208 ECHOCARDIOGRAM COMPLETE

## 2022-06-02 PROCEDURE — 255N000002 HC RX 255 OP 636: Performed by: PHYSICIAN ASSISTANT

## 2022-06-02 PROCEDURE — 93306 TTE W/DOPPLER COMPLETE: CPT | Mod: 26 | Performed by: INTERNAL MEDICINE

## 2022-06-02 RX ADMIN — HUMAN ALBUMIN MICROSPHERES AND PERFLUTREN 3 ML: 10; .22 INJECTION, SOLUTION INTRAVENOUS at 09:26

## 2022-06-20 ENCOUNTER — OFFICE VISIT (OUTPATIENT)
Dept: CARDIOLOGY | Facility: CLINIC | Age: 77
End: 2022-06-20
Payer: COMMERCIAL

## 2022-06-20 VITALS
HEART RATE: 68 BPM | BODY MASS INDEX: 30.91 KG/M2 | SYSTOLIC BLOOD PRESSURE: 146 MMHG | WEIGHT: 185.5 LBS | DIASTOLIC BLOOD PRESSURE: 68 MMHG | HEIGHT: 65 IN

## 2022-06-20 DIAGNOSIS — R03.0 ELEVATED BLOOD PRESSURE READING WITHOUT DIAGNOSIS OF HYPERTENSION: Primary | ICD-10-CM

## 2022-06-20 DIAGNOSIS — E78.5 HYPERLIPIDEMIA LDL GOAL <100: ICD-10-CM

## 2022-06-20 DIAGNOSIS — I25.10 CORONARY ARTERY DISEASE INVOLVING NATIVE CORONARY ARTERY OF NATIVE HEART WITHOUT ANGINA PECTORIS: ICD-10-CM

## 2022-06-20 DIAGNOSIS — R07.9 CHEST PAIN, UNSPECIFIED TYPE: ICD-10-CM

## 2022-06-20 DIAGNOSIS — R06.09 DYSPNEA ON EXERTION: ICD-10-CM

## 2022-06-20 DIAGNOSIS — I10 BENIGN ESSENTIAL HYPERTENSION: ICD-10-CM

## 2022-06-20 PROCEDURE — 99214 OFFICE O/P EST MOD 30 MIN: CPT | Performed by: INTERNAL MEDICINE

## 2022-06-20 NOTE — PROGRESS NOTES
HPI and Plan:   See dictation          Orders Placed This Encounter   Procedures     Follow-Up with Cardiology     24 Hour Blood Pressure Monitor - Adult       No orders of the defined types were placed in this encounter.      There are no discontinued medications.      Encounter Diagnoses   Name Primary?     Elevated blood pressure reading without diagnosis of hypertension Yes     Dyspnea on exertion      Chest pain, unspecified type      Coronary artery disease involving native coronary artery of native heart without angina pectoris      Benign essential hypertension      Hyperlipidemia LDL goal <100        CURRENT MEDICATIONS:  Current Outpatient Medications   Medication Sig Dispense Refill     acetaminophen (TYLENOL) 325 MG tablet Take 650 mg by mouth every morning 100 tablet 0     aspirin 81 MG tablet Take 81 mg by mouth daily       atenolol (TENORMIN) 50 MG tablet Take 1 tablet (50 mg) by mouth daily 180 tablet 0     atorvastatin (LIPITOR) 20 MG tablet Take 1 tablet (20 mg) by mouth daily (Patient taking differently: Take 40 mg by mouth daily) 90 tablet 0     buPROPion (WELLBUTRIN SR) 150 MG 12 hr tablet Take 1 tablet (150 mg) by mouth 2 times daily 180 tablet 0     calcium carbonate-vitamin D 600-400 MG-UNIT CHEW Take 2 tablets by mouth every morning       Coenzyme Q10 (CO Q 10 PO) Take 100 mg by mouth every evening        CYANOCOBALAMIN SL Place 1,000 mcg under the tongue daily       DULoxetine (CYMBALTA) 60 MG capsule Take 1 capsule (60 mg) by mouth daily 90 capsule 0     FIBER SELECT GUMMIES CHEW Take 1 chew tab by mouth daily        hydrALAZINE (APRESOLINE) 100 MG tablet Take 1 tablet (100 mg) by mouth 3 times daily 270 tablet 3     hydrochlorothiazide (HYDRODIURIL) 25 MG tablet Take 1 tablet (25 mg) by mouth daily 30 tablet 0     lisinopril (ZESTRIL) 40 MG tablet Take 1 tablet (40 mg) by mouth daily 90 tablet 0     multivitamin w/minerals (THERA-VIT-M) tablet Take 1 tablet by mouth daily        "nitroGLYcerin (NITROSTAT) 0.4 MG sublingual tablet For chest pain place 1 tablet under the tongue every 5 minutes for 3 doses. If symptoms persist 5 minutes after 1st dose call 911. 25 tablet 0     Omega-3 Fatty Acids (OMEGA-3 FISH OIL PO) Take 1.2 g by mouth daily        omeprazole (PRILOSEC) 40 MG DR capsule Take 1 capsule (40 mg) by mouth daily Take 30-60 minutes before a meal. 90 capsule 0     traZODone (DESYREL) 100 MG tablet Take 2 tablets (200 mg) by mouth At Bedtime 180 tablet 3     LORazepam (ATIVAN) 1 MG tablet TAKE 1 TABLET BY MOUTH WITH HIGH ANXIETY. 10 TABLETS MONTHLY (Patient not taking: No sig reported) 10 tablet 0       ALLERGIES     Allergies   Allergen Reactions     Codeine      Contrast Dye      PN: LW CM1: CONTRAST- nka Reaction :     Morphine Sulfate (Concentrate)      does not like side effects/\"hyper\"       PAST MEDICAL HISTORY:  Past Medical History:   Diagnosis Date     Anxiety 4/2/2015     Asthma     no inhaler for 2-3 years     Benign essential hypertension 8/2/2016     Chest pain, unspecified type 8/27/2017     Coronary artery disease      Coronary artery disease involving native coronary artery of native heart without angina pectoris 8/2/2016     Depression      Esophageal reflux 3/19/2015     Former smoker - has had pulmonary nodules in the past 3/29/2017     History of blood transfusion      Hyperlipidemia LDL goal <100 3/19/2015     Osteoarthritis      Osteopenia      Pulmonary nodules 4/22/2015    Noted 4/14/15, f/u scan 03/2016 showed minimal change, needs repeat scan 03/2017        PAST SURGICAL HISTORY:  Past Surgical History:   Procedure Laterality Date     ANGIOGRAM  6/15/05     ARTHROPLASTY HIP  9/23/2013    Procedure: ARTHROPLASTY HIP;  Right total hip arthroplasty       ARTHROPLASTY HIP Left 5/19/2015    Procedure: ARTHROPLASTY HIP;  Surgeon: Tyrese Howell MD;  Location: [facility]     ARTHROPLASTY KNEE Left 8/22/2016    Procedure: ARTHROPLASTY KNEE;  Surgeon: Tyrese Howell, " "MD;  Location: [facility]     CHOLECYSTECTOMY       COLONOSCOPY       COLONOSCOPY Left 2016    Procedure: COLONOSCOPY;  Surgeon: Moe Haney MD;  Location: [facility]     GASTRIC BYPASS       tiff en y     GI SURGERY      fistulotomy     ORTHOPEDIC SURGERY      left knee \"green procedure\"     TONSILLECTOMY       TUBAL LIGATION         FAMILY HISTORY:  Family History   Problem Relation Age of Onset     Coronary Artery Disease Father      Hypertension Father      Coronary Artery Disease Sister      Hypertension Sister      Coronary Artery Disease Brother      Hypertension Brother      Heart Disease Nephew      Other Cancer Maternal Half-Sister      Breast Cancer Maternal Aunt 35     Ovarian Cancer No family hx of        SOCIAL HISTORY:  Social History     Socioeconomic History     Marital status:      Spouse name: None     Number of children: None     Years of education: None     Highest education level: None   Tobacco Use     Smoking status: Former Smoker     Packs/day: 0.50     Years: 50.00     Pack years: 25.00     Types: Cigarettes     Quit date: 2013     Years since quittin.4     Smokeless tobacco: Never Used   Substance and Sexual Activity     Alcohol use: Yes     Alcohol/week: 0.0 standard drinks     Comment: 5 drinks a week     Drug use: No     Sexual activity: Yes     Partners: Male   Other Topics Concern     Parent/sibling w/ CABG, MI or angioplasty before 65F 55M? Yes     Comment: brother age 31     Caffeine Concern Yes     Comment: 1 cup of coffee      Sleep Concern Yes     Comment: lately it has been an issue     Special Diet No     Comment: watching what she eats      Exercise No     Seat Belt Yes       Review of Systems:  Skin:  not assessed       Eyes:  not assessed      ENT:  not assessed      Respiratory:  Positive for dyspnea on exertion SOB w/minimal movement - ie: making the bed, carrying groceries   Cardiovascular:    Positive for;palpitations;chest " "pain;heaviness;fatigue;lightheadedness;dizziness palpitations with SOB; chest pain after eating  Gastroenterology: not assessed      Genitourinary:  not assessed      Musculoskeletal:  not assessed      Neurologic:  not assessed      Psychiatric:  not assessed      Heme/Lymph/Imm:  not assessed      Endocrine:  not assessed        Physical Exam:  Vitals: BP (!) 146/68   Pulse 68   Ht 1.638 m (5' 4.5\")   Wt 84.1 kg (185 lb 8 oz)   LMP  (LMP Unknown)   BMI 31.35 kg/m      Constitutional:  cooperative, alert and oriented, well developed, well nourished, in no acute distress obese mildly anxious    Skin:  warm and dry to the touch, no apparent skin lesions or masses noted     warm and dry    Head:  normocephalic, no masses or lesions   atraumatic    Eyes:  pupils equal and round;conjunctivae and lids unremarkable;sclera white   EOMI, pupils round and equal    Lymph:      ENT:  no pallor or cyanosis   speech normal, tongue midline    Neck:  no carotid bruit;JVP normal;carotid pulses are full and equal bilaterally   supple    Respiratory:  normal breath sounds, clear to auscultation, normal A-P diameter, normal symmetry, normal respiratory excursion, no use of accessory muscles    clear without rales/wheezing    Cardiac: regular rhythm, normal S1/S2, no S3 or S4, apical impulse not displaced, no murmurs, gallops or rubs             RRR without murmur/lift/thrill  not assessed this visit                                   UE pulses full and equal    GI:    obese non-tender    Extremities and Muscular Skeletal:  no deformities, clubbing, cyanosis, erythema observed;no edema         no edema    Neurological:  no gross motor deficits;affect appropriate   motor grossly intact    Psych:  Alert and Oriented x 3 Anxious      CC  Sotero Garza MD  [id number]  [place] [id number]            HPI and Plan:   See dictation    Today's clinic visit entailed:            Orders Placed This Encounter   Procedures "     Follow-Up with Cardiology     24 Hour Blood Pressure Monitor - Adult       No orders of the defined types were placed in this encounter.      There are no discontinued medications.      Encounter Diagnoses   Name Primary?     Elevated blood pressure reading without diagnosis of hypertension Yes     Dyspnea on exertion      Chest pain, unspecified type      Coronary artery disease involving native coronary artery of native heart without angina pectoris      Benign essential hypertension      Hyperlipidemia LDL goal <100        CURRENT MEDICATIONS:  Current Outpatient Medications   Medication Sig Dispense Refill     acetaminophen (TYLENOL) 325 MG tablet Take 650 mg by mouth every morning 100 tablet 0     aspirin 81 MG tablet Take 81 mg by mouth daily       atenolol (TENORMIN) 50 MG tablet Take 1 tablet (50 mg) by mouth daily 180 tablet 0     atorvastatin (LIPITOR) 20 MG tablet Take 1 tablet (20 mg) by mouth daily (Patient taking differently: Take 40 mg by mouth daily) 90 tablet 0     buPROPion (WELLBUTRIN SR) 150 MG 12 hr tablet Take 1 tablet (150 mg) by mouth 2 times daily 180 tablet 0     calcium carbonate-vitamin D 600-400 MG-UNIT CHEW Take 2 tablets by mouth every morning       Coenzyme Q10 (CO Q 10 PO) Take 100 mg by mouth every evening        CYANOCOBALAMIN SL Place 1,000 mcg under the tongue daily       DULoxetine (CYMBALTA) 60 MG capsule Take 1 capsule (60 mg) by mouth daily 90 capsule 0     FIBER SELECT GUMMIES CHEW Take 1 chew tab by mouth daily        hydrALAZINE (APRESOLINE) 100 MG tablet Take 1 tablet (100 mg) by mouth 3 times daily 270 tablet 3     hydrochlorothiazide (HYDRODIURIL) 25 MG tablet Take 1 tablet (25 mg) by mouth daily 30 tablet 0     lisinopril (ZESTRIL) 40 MG tablet Take 1 tablet (40 mg) by mouth daily 90 tablet 0     multivitamin w/minerals (THERA-VIT-M) tablet Take 1 tablet by mouth daily       nitroGLYcerin (NITROSTAT) 0.4 MG sublingual tablet For chest pain place 1 tablet under the  "tongue every 5 minutes for 3 doses. If symptoms persist 5 minutes after 1st dose call 911. 25 tablet 0     Omega-3 Fatty Acids (OMEGA-3 FISH OIL PO) Take 1.2 g by mouth daily        omeprazole (PRILOSEC) 40 MG DR capsule Take 1 capsule (40 mg) by mouth daily Take 30-60 minutes before a meal. 90 capsule 0     traZODone (DESYREL) 100 MG tablet Take 2 tablets (200 mg) by mouth At Bedtime 180 tablet 3     LORazepam (ATIVAN) 1 MG tablet TAKE 1 TABLET BY MOUTH WITH HIGH ANXIETY. 10 TABLETS MONTHLY (Patient not taking: No sig reported) 10 tablet 0       ALLERGIES     Allergies   Allergen Reactions     Codeine      Contrast Dye      PN: LW CM1: CONTRAST- nka Reaction :     Morphine Sulfate (Concentrate)      does not like side effects/\"hyper\"       PAST MEDICAL HISTORY:  Past Medical History:   Diagnosis Date     Anxiety 4/2/2015     Asthma     no inhaler for 2-3 years     Benign essential hypertension 8/2/2016     Chest pain, unspecified type 8/27/2017     Coronary artery disease      Coronary artery disease involving native coronary artery of native heart without angina pectoris 8/2/2016     Depression      Esophageal reflux 3/19/2015     Former smoker - has had pulmonary nodules in the past 3/29/2017     History of blood transfusion      Hyperlipidemia LDL goal <100 3/19/2015     Osteoarthritis      Osteopenia      Pulmonary nodules 4/22/2015    Noted 4/14/15, f/u scan 03/2016 showed minimal change, needs repeat scan 03/2017        PAST SURGICAL HISTORY:  Past Surgical History:   Procedure Laterality Date     ANGIOGRAM  6/15/05     ARTHROPLASTY HIP  9/23/2013    Procedure: ARTHROPLASTY HIP;  Right total hip arthroplasty       ARTHROPLASTY HIP Left 5/19/2015    Procedure: ARTHROPLASTY HIP;  Surgeon: Tyrese Howell MD;  Location: [facility]     ARTHROPLASTY KNEE Left 8/22/2016    Procedure: ARTHROPLASTY KNEE;  Surgeon: Tyrese Howell MD;  Location: [facility]     CHOLECYSTECTOMY       COLONOSCOPY       COLONOSCOPY Left 4/19/2016 " "   Procedure: COLONOSCOPY;  Surgeon: Moe Haney MD;  Location:  GI     GASTRIC BYPASS       tiff en y     GI SURGERY      fistulotomy     ORTHOPEDIC SURGERY      left knee \"green procedure\"     TONSILLECTOMY       TUBAL LIGATION         FAMILY HISTORY:  Family History   Problem Relation Age of Onset     Coronary Artery Disease Father      Hypertension Father      Coronary Artery Disease Sister      Hypertension Sister      Coronary Artery Disease Brother      Hypertension Brother      Heart Disease Nephew      Other Cancer Maternal Half-Sister      Breast Cancer Maternal Aunt 35     Ovarian Cancer No family hx of        SOCIAL HISTORY:  Social History     Socioeconomic History     Marital status:      Spouse name: None     Number of children: None     Years of education: None     Highest education level: None   Tobacco Use     Smoking status: Former Smoker     Packs/day: 0.50     Years: 50.00     Pack years: 25.00     Types: Cigarettes     Quit date: 2013     Years since quittin.4     Smokeless tobacco: Never Used   Substance and Sexual Activity     Alcohol use: Yes     Alcohol/week: 0.0 standard drinks     Comment: 5 drinks a week     Drug use: No     Sexual activity: Yes     Partners: Male   Other Topics Concern     Parent/sibling w/ CABG, MI or angioplasty before 65F 55M? Yes     Comment: brother age 31     Caffeine Concern Yes     Comment: 1 cup of coffee      Sleep Concern Yes     Comment: lately it has been an issue     Special Diet No     Comment: watching what she eats      Exercise No     Seat Belt Yes       Review of Systems:  Skin:  not assessed       Eyes:  not assessed      ENT:  not assessed      Respiratory:  Positive for dyspnea on exertion SOB w/minimal movement - ie: making the bed, carrying groceries   Cardiovascular:    Positive for;palpitations;chest pain;heaviness;fatigue;lightheadedness;dizziness palpitations with SOB; chest pain after eating  Gastroenterology: " "not assessed      Genitourinary:  not assessed      Musculoskeletal:  not assessed      Neurologic:  not assessed      Psychiatric:  not assessed      Heme/Lymph/Imm:  not assessed      Endocrine:  not assessed        Physical Exam:  Vitals: BP (!) 146/68   Pulse 68   Ht 1.638 m (5' 4.5\")   Wt 84.1 kg (185 lb 8 oz)   LMP  (LMP Unknown)   BMI 31.35 kg/m      Constitutional:  cooperative, alert and oriented, well developed, well nourished, in no acute distress obese mildly anxious    Skin:  warm and dry to the touch, no apparent skin lesions or masses noted     warm and dry    Head:  normocephalic, no masses or lesions   atraumatic    Eyes:  pupils equal and round;conjunctivae and lids unremarkable;sclera white   EOMI, pupils round and equal    Lymph:      ENT:  no pallor or cyanosis   speech normal, tongue midline    Neck:  no carotid bruit;JVP normal;carotid pulses are full and equal bilaterally   supple    Respiratory:  normal breath sounds, clear to auscultation, normal A-P diameter, normal symmetry, normal respiratory excursion, no use of accessory muscles    clear without rales/wheezing    Cardiac: regular rhythm, normal S1/S2, no S3 or S4, apical impulse not displaced, no murmurs, gallops or rubs             RRR without murmur/lift/thrill  not assessed this visit                                   UE pulses full and equal    GI:    obese non-tender    Extremities and Muscular Skeletal:  no deformities, clubbing, cyanosis, erythema observed;no edema         no edema    Neurological:  no gross motor deficits;affect appropriate   motor grossly intact    Psych:  Alert and Oriented x 3 Anxious      CC  Sotero Garza MD  [id number]  [place] [id number]            HPI and Plan:   See dictation    Orders Placed This Encounter   Procedures     Follow-Up with Cardiology     24 Hour Blood Pressure Monitor - Adult       No orders of the defined types were placed in this encounter.      There are " no discontinued medications.      Encounter Diagnoses   Name Primary?     Elevated blood pressure reading without diagnosis of hypertension Yes     Dyspnea on exertion      Chest pain, unspecified type      Coronary artery disease involving native coronary artery of native heart without angina pectoris      Benign essential hypertension      Hyperlipidemia LDL goal <100        CURRENT MEDICATIONS:  Current Outpatient Medications   Medication Sig Dispense Refill     acetaminophen (TYLENOL) 325 MG tablet Take 650 mg by mouth every morning 100 tablet 0     aspirin 81 MG tablet Take 81 mg by mouth daily       atenolol (TENORMIN) 50 MG tablet Take 1 tablet (50 mg) by mouth daily 180 tablet 0     atorvastatin (LIPITOR) 20 MG tablet Take 1 tablet (20 mg) by mouth daily (Patient taking differently: Take 40 mg by mouth daily) 90 tablet 0     buPROPion (WELLBUTRIN SR) 150 MG 12 hr tablet Take 1 tablet (150 mg) by mouth 2 times daily 180 tablet 0     calcium carbonate-vitamin D 600-400 MG-UNIT CHEW Take 2 tablets by mouth every morning       Coenzyme Q10 (CO Q 10 PO) Take 100 mg by mouth every evening        CYANOCOBALAMIN SL Place 1,000 mcg under the tongue daily       DULoxetine (CYMBALTA) 60 MG capsule Take 1 capsule (60 mg) by mouth daily 90 capsule 0     FIBER SELECT GUMMIES CHEW Take 1 chew tab by mouth daily        hydrALAZINE (APRESOLINE) 100 MG tablet Take 1 tablet (100 mg) by mouth 3 times daily 270 tablet 3     hydrochlorothiazide (HYDRODIURIL) 25 MG tablet Take 1 tablet (25 mg) by mouth daily 30 tablet 0     lisinopril (ZESTRIL) 40 MG tablet Take 1 tablet (40 mg) by mouth daily 90 tablet 0     multivitamin w/minerals (THERA-VIT-M) tablet Take 1 tablet by mouth daily       nitroGLYcerin (NITROSTAT) 0.4 MG sublingual tablet For chest pain place 1 tablet under the tongue every 5 minutes for 3 doses. If symptoms persist 5 minutes after 1st dose call 911. 25 tablet 0     Omega-3 Fatty Acids (OMEGA-3 FISH OIL PO) Take  "1.2 g by mouth daily        omeprazole (PRILOSEC) 40 MG DR capsule Take 1 capsule (40 mg) by mouth daily Take 30-60 minutes before a meal. 90 capsule 0     traZODone (DESYREL) 100 MG tablet Take 2 tablets (200 mg) by mouth At Bedtime 180 tablet 3     LORazepam (ATIVAN) 1 MG tablet TAKE 1 TABLET BY MOUTH WITH HIGH ANXIETY. 10 TABLETS MONTHLY (Patient not taking: No sig reported) 10 tablet 0       ALLERGIES     Allergies   Allergen Reactions     Codeine      Contrast Dye      PN: LW CM1: CONTRAST- nka Reaction :     Morphine Sulfate (Concentrate)      does not like side effects/\"hyper\"       PAST MEDICAL HISTORY:  Past Medical History:   Diagnosis Date     Anxiety 4/2/2015     Asthma     no inhaler for 2-3 years     Benign essential hypertension 8/2/2016     Chest pain, unspecified type 8/27/2017     Coronary artery disease      Coronary artery disease involving native coronary artery of native heart without angina pectoris 8/2/2016     Depression      Esophageal reflux 3/19/2015     Former smoker - has had pulmonary nodules in the past 3/29/2017     History of blood transfusion      Hyperlipidemia LDL goal <100 3/19/2015     Osteoarthritis      Osteopenia      Pulmonary nodules 4/22/2015    Noted 4/14/15, f/u scan 03/2016 showed minimal change, needs repeat scan 03/2017        PAST SURGICAL HISTORY:  Past Surgical History:   Procedure Laterality Date     ANGIOGRAM  6/15/05     ARTHROPLASTY HIP  9/23/2013    Procedure: ARTHROPLASTY HIP;  Right total hip arthroplasty       ARTHROPLASTY HIP Left 5/19/2015    Procedure: ARTHROPLASTY HIP;  Surgeon: Tyrese Howell MD;  Location: [facility]     ARTHROPLASTY KNEE Left 8/22/2016    Procedure: ARTHROPLASTY KNEE;  Surgeon: Tyrese Howell MD;  Location:  OR     CHOLECYSTECTOMY       COLONOSCOPY       COLONOSCOPY Left 4/19/2016    Procedure: COLONOSCOPY;  Surgeon: Moe Haney MD;  Location:  GI     GASTRIC BYPASS      2011 tiff en y     GI SURGERY      fistulotomy     " "ORTHOPEDIC SURGERY      left knee \"green procedure\"     TONSILLECTOMY       TUBAL LIGATION         FAMILY HISTORY:  Family History   Problem Relation Age of Onset     Coronary Artery Disease Father      Hypertension Father      Coronary Artery Disease Sister      Hypertension Sister      Coronary Artery Disease Brother      Hypertension Brother      Heart Disease Nephew      Other Cancer Maternal Half-Sister      Breast Cancer Maternal Aunt 35     Ovarian Cancer No family hx of        SOCIAL HISTORY:  Social History     Socioeconomic History     Marital status:      Spouse name: None     Number of children: None     Years of education: None     Highest education level: None   Tobacco Use     Smoking status: Former Smoker     Packs/day: 0.50     Years: 50.00     Pack years: 25.00     Types: Cigarettes     Quit date: 2013     Years since quittin.4     Smokeless tobacco: Never Used   Substance and Sexual Activity     Alcohol use: Yes     Alcohol/week: 0.0 standard drinks     Comment: 5 drinks a week     Drug use: No     Sexual activity: Yes     Partners: Male   Other Topics Concern     Parent/sibling w/ CABG, MI or angioplasty before 65F 55M? Yes     Comment: brother age 31     Caffeine Concern Yes     Comment: 1 cup of coffee      Sleep Concern Yes     Comment: lately it has been an issue     Special Diet No     Comment: watching what she eats      Exercise No     Seat Belt Yes       Review of Systems:  Skin:  not assessed       Eyes:  not assessed      ENT:  not assessed      Respiratory:  Positive for dyspnea on exertion SOB w/minimal movement - ie: making the bed, carrying groceries   Cardiovascular:    Positive for;palpitations;chest pain;heaviness;fatigue;lightheadedness;dizziness palpitations with SOB; chest pain after eating  Gastroenterology: not assessed      Genitourinary:  not assessed      Musculoskeletal:  not assessed      Neurologic:  not assessed      Psychiatric:  not assessed    " "  Heme/Lymph/Imm:  not assessed      Endocrine:  not assessed        Physical Exam:  Vitals: BP (!) 146/68   Pulse 68   Ht 1.638 m (5' 4.5\")   Wt 84.1 kg (185 lb 8 oz)   LMP  (LMP Unknown)   BMI 31.35 kg/m      Constitutional:  cooperative, alert and oriented, well developed, well nourished, in no acute distress obese mildly anxious    Skin:  warm and dry to the touch, no apparent skin lesions or masses noted     warm and dry    Head:  normocephalic, no masses or lesions   atraumatic    Eyes:  pupils equal and round;conjunctivae and lids unremarkable;sclera white   EOMI, pupils round and equal    Lymph:      ENT:  no pallor or cyanosis   speech normal, tongue midline    Neck:  no carotid bruit;JVP normal;carotid pulses are full and equal bilaterally   supple    Respiratory:  normal breath sounds, clear to auscultation, normal A-P diameter, normal symmetry, normal respiratory excursion, no use of accessory muscles    clear without rales/wheezing    Cardiac: regular rhythm, normal S1/S2, no S3 or S4, apical impulse not displaced, no murmurs, gallops or rubs             RRR without murmur/lift/thrill  not assessed this visit                                   UE pulses full and equal    GI:    obese non-tender    Extremities and Muscular Skeletal:  no deformities, clubbing, cyanosis, erythema observed;no edema         no edema    Neurological:  no gross motor deficits;affect appropriate   motor grossly intact    Psych:  Alert and Oriented x 3 Anxious        CC  Sotero Garza MD  [id number]  [place] [id number]                  "

## 2022-06-20 NOTE — LETTER
6/20/2022    Marlys Dawson PA-C  [id number] [facility] [address]    RE: Monse Peoples       Dear Colleague,     I had the pleasure of seeing Monse Peoples in the [facility].  HPI and Plan:   See dictation          Orders Placed This Encounter   Procedures     Follow-Up with Cardiology     24 Hour Blood Pressure Monitor - Adult       No orders of the defined types were placed in this encounter.      There are no discontinued medications.      Encounter Diagnoses   Name Primary?     Elevated blood pressure reading without diagnosis of hypertension Yes     Dyspnea on exertion      Chest pain, unspecified type      Coronary artery disease involving native coronary artery of native heart without angina pectoris      Benign essential hypertension      Hyperlipidemia LDL goal <100        CURRENT MEDICATIONS:  Current Outpatient Medications   Medication Sig Dispense Refill     acetaminophen (TYLENOL) 325 MG tablet Take 650 mg by mouth every morning 100 tablet 0     aspirin 81 MG tablet Take 81 mg by mouth daily       atenolol (TENORMIN) 50 MG tablet Take 1 tablet (50 mg) by mouth daily 180 tablet 0     atorvastatin (LIPITOR) 20 MG tablet Take 1 tablet (20 mg) by mouth daily (Patient taking differently: Take 40 mg by mouth daily) 90 tablet 0     buPROPion (WELLBUTRIN SR) 150 MG 12 hr tablet Take 1 tablet (150 mg) by mouth 2 times daily 180 tablet 0     calcium carbonate-vitamin D 600-400 MG-UNIT CHEW Take 2 tablets by mouth every morning       Coenzyme Q10 (CO Q 10 PO) Take 100 mg by mouth every evening        CYANOCOBALAMIN SL Place 1,000 mcg under the tongue daily       DULoxetine (CYMBALTA) 60 MG capsule Take 1 capsule (60 mg) by mouth daily 90 capsule 0     FIBER SELECT GUMMIES CHEW Take 1 chew tab by mouth daily        hydrALAZINE (APRESOLINE) 100 MG tablet Take 1 tablet (100 mg) by mouth 3 times daily 270 tablet 3     hydrochlorothiazide (HYDRODIURIL) 25 MG tablet Take 1 tablet (25 mg) by  "mouth daily 30 tablet 0     lisinopril (ZESTRIL) 40 MG tablet Take 1 tablet (40 mg) by mouth daily 90 tablet 0     multivitamin w/minerals (THERA-VIT-M) tablet Take 1 tablet by mouth daily       nitroGLYcerin (NITROSTAT) 0.4 MG sublingual tablet For chest pain place 1 tablet under the tongue every 5 minutes for 3 doses. If symptoms persist 5 minutes after 1st dose call 911. 25 tablet 0     Omega-3 Fatty Acids (OMEGA-3 FISH OIL PO) Take 1.2 g by mouth daily        omeprazole (PRILOSEC) 40 MG DR capsule Take 1 capsule (40 mg) by mouth daily Take 30-60 minutes before a meal. 90 capsule 0     traZODone (DESYREL) 100 MG tablet Take 2 tablets (200 mg) by mouth At Bedtime 180 tablet 3     LORazepam (ATIVAN) 1 MG tablet TAKE 1 TABLET BY MOUTH WITH HIGH ANXIETY. 10 TABLETS MONTHLY (Patient not taking: No sig reported) 10 tablet 0       ALLERGIES     Allergies   Allergen Reactions     Codeine      Contrast Dye      PN: LW CM1: CONTRAST- nka Reaction :     Morphine Sulfate (Concentrate)      does not like side effects/\"hyper\"       PAST MEDICAL HISTORY:  Past Medical History:   Diagnosis Date     Anxiety 4/2/2015     Asthma     no inhaler for 2-3 years     Benign essential hypertension 8/2/2016     Chest pain, unspecified type 8/27/2017     Coronary artery disease      Coronary artery disease involving native coronary artery of native heart without angina pectoris 8/2/2016     Depression      Esophageal reflux 3/19/2015     Former smoker - has had pulmonary nodules in the past 3/29/2017     History of blood transfusion      Hyperlipidemia LDL goal <100 3/19/2015     Osteoarthritis      Osteopenia      Pulmonary nodules 4/22/2015    Noted 4/14/15, f/u scan 03/2016 showed minimal change, needs repeat scan 03/2017        PAST SURGICAL HISTORY:  Past Surgical History:   Procedure Laterality Date     ANGIOGRAM  6/15/05     ARTHROPLASTY HIP  9/23/2013    Procedure: ARTHROPLASTY HIP;  Right total hip arthroplasty       ARTHROPLASTY " "HIP Left 2015    Procedure: ARTHROPLASTY HIP;  Surgeon: Tyrese Howell MD;  Location: [facility]     ARTHROPLASTY KNEE Left 2016    Procedure: ARTHROPLASTY KNEE;  Surgeon: Tyrese Howell MD;  Location: [facility] OR     CHOLECYSTECTOMY       COLONOSCOPY       COLONOSCOPY Left 2016    Procedure: COLONOSCOPY;  Surgeon: Moe Haney MD;  Location: [facility] GI     GASTRIC BYPASS       tiff en y     GI SURGERY      fistulotomy     ORTHOPEDIC SURGERY      left knee \"green procedure\"     TONSILLECTOMY       TUBAL LIGATION         FAMILY HISTORY:  Family History   Problem Relation Age of Onset     Coronary Artery Disease Father      Hypertension Father      Coronary Artery Disease Sister      Hypertension Sister      Coronary Artery Disease Brother      Hypertension Brother      Heart Disease Nephew      Other Cancer Maternal Half-Sister      Breast Cancer Maternal Aunt 35     Ovarian Cancer No family hx of        SOCIAL HISTORY:  Social History     Socioeconomic History     Marital status:      Spouse name: None     Number of children: None     Years of education: None     Highest education level: None   Tobacco Use     Smoking status: Former Smoker     Packs/day: 0.50     Years: 50.00     Pack years: 25.00     Types: Cigarettes     Quit date: 2013     Years since quittin.4     Smokeless tobacco: Never Used   Substance and Sexual Activity     Alcohol use: Yes     Alcohol/week: 0.0 standard drinks     Comment: 5 drinks a week     Drug use: No     Sexual activity: Yes     Partners: Male   Other Topics Concern     Parent/sibling w/ CABG, MI or angioplasty before 65F 55M? Yes     Comment: brother age 31     Caffeine Concern Yes     Comment: 1 cup of coffee      Sleep Concern Yes     Comment: lately it has been an issue     Special Diet No     Comment: watching what she eats      Exercise No     Seat Belt Yes       Review of Systems:  Skin:  not assessed       Eyes:  not assessed      ENT:  not " "assessed      Respiratory:  Positive for dyspnea on exertion SOB w/minimal movement - ie: making the bed, carrying groceries   Cardiovascular:    Positive for;palpitations;chest pain;heaviness;fatigue;lightheadedness;dizziness palpitations with SOB; chest pain after eating  Gastroenterology: not assessed      Genitourinary:  not assessed      Musculoskeletal:  not assessed      Neurologic:  not assessed      Psychiatric:  not assessed      Heme/Lymph/Imm:  not assessed      Endocrine:  not assessed        Physical Exam:  Vitals: BP (!) 146/68   Pulse 68   Ht 1.638 m (5' 4.5\")   Wt 84.1 kg (185 lb 8 oz)   LMP  (LMP Unknown)   BMI 31.35 kg/m      Constitutional:  cooperative, alert and oriented, well developed, well nourished, in no acute distress obese mildly anxious    Skin:  warm and dry to the touch, no apparent skin lesions or masses noted     warm and dry    Head:  normocephalic, no masses or lesions   atraumatic    Eyes:  pupils equal and round;conjunctivae and lids unremarkable;sclera white   EOMI, pupils round and equal    Lymph:      ENT:  no pallor or cyanosis   speech normal, tongue midline    Neck:  no carotid bruit;JVP normal;carotid pulses are full and equal bilaterally   supple    Respiratory:  normal breath sounds, clear to auscultation, normal A-P diameter, normal symmetry, normal respiratory excursion, no use of accessory muscles    clear without rales/wheezing    Cardiac: regular rhythm, normal S1/S2, no S3 or S4, apical impulse not displaced, no murmurs, gallops or rubs             RRR without murmur/lift/thrill  not assessed this visit                                   UE pulses full and equal    GI:    obese non-tender    Extremities and Muscular Skeletal:  no deformities, clubbing, cyanosis, erythema observed;no edema         no edema    Neurological:  no gross motor deficits;affect appropriate   motor grossly intact    Psych:  Alert and Oriented x 3 Anxious      CHEMA Rae " MD Kayla  [address]  [place] [address]            HPI and Plan:   See dictation    Today's clinic visit entailed:            Orders Placed This Encounter   Procedures     Follow-Up with Cardiology     24 Hour Blood Pressure Monitor - Adult       No orders of the defined types were placed in this encounter.      There are no discontinued medications.      Encounter Diagnoses   Name Primary?     Elevated blood pressure reading without diagnosis of hypertension Yes     Dyspnea on exertion      Chest pain, unspecified type      Coronary artery disease involving native coronary artery of native heart without angina pectoris      Benign essential hypertension      Hyperlipidemia LDL goal <100        CURRENT MEDICATIONS:  Current Outpatient Medications   Medication Sig Dispense Refill     acetaminophen (TYLENOL) 325 MG tablet Take 650 mg by mouth every morning 100 tablet 0     aspirin 81 MG tablet Take 81 mg by mouth daily       atenolol (TENORMIN) 50 MG tablet Take 1 tablet (50 mg) by mouth daily 180 tablet 0     atorvastatin (LIPITOR) 20 MG tablet Take 1 tablet (20 mg) by mouth daily (Patient taking differently: Take 40 mg by mouth daily) 90 tablet 0     buPROPion (WELLBUTRIN SR) 150 MG 12 hr tablet Take 1 tablet (150 mg) by mouth 2 times daily 180 tablet 0     calcium carbonate-vitamin D 600-400 MG-UNIT CHEW Take 2 tablets by mouth every morning       Coenzyme Q10 (CO Q 10 PO) Take 100 mg by mouth every evening        CYANOCOBALAMIN SL Place 1,000 mcg under the tongue daily       DULoxetine (CYMBALTA) 60 MG capsule Take 1 capsule (60 mg) by mouth daily 90 capsule 0     FIBER SELECT GUMMIES CHEW Take 1 chew tab by mouth daily        hydrALAZINE (APRESOLINE) 100 MG tablet Take 1 tablet (100 mg) by mouth 3 times daily 270 tablet 3     hydrochlorothiazide (HYDRODIURIL) 25 MG tablet Take 1 tablet (25 mg) by mouth daily 30 tablet 0     lisinopril (ZESTRIL) 40 MG tablet Take 1 tablet (40 mg) by mouth daily  "90 tablet 0     multivitamin w/minerals (THERA-VIT-M) tablet Take 1 tablet by mouth daily       nitroGLYcerin (NITROSTAT) 0.4 MG sublingual tablet For chest pain place 1 tablet under the tongue every 5 minutes for 3 doses. If symptoms persist 5 minutes after 1st dose call 911. 25 tablet 0     Omega-3 Fatty Acids (OMEGA-3 FISH OIL PO) Take 1.2 g by mouth daily        omeprazole (PRILOSEC) 40 MG DR capsule Take 1 capsule (40 mg) by mouth daily Take 30-60 minutes before a meal. 90 capsule 0     traZODone (DESYREL) 100 MG tablet Take 2 tablets (200 mg) by mouth At Bedtime 180 tablet 3     LORazepam (ATIVAN) 1 MG tablet TAKE 1 TABLET BY MOUTH WITH HIGH ANXIETY. 10 TABLETS MONTHLY (Patient not taking: No sig reported) 10 tablet 0       ALLERGIES     Allergies   Allergen Reactions     Codeine      Contrast Dye      PN: LW CM1: CONTRAST- nka Reaction :     Morphine Sulfate (Concentrate)      does not like side effects/\"hyper\"       PAST MEDICAL HISTORY:  Past Medical History:   Diagnosis Date     Anxiety 4/2/2015     Asthma     no inhaler for 2-3 years     Benign essential hypertension 8/2/2016     Chest pain, unspecified type 8/27/2017     Coronary artery disease      Coronary artery disease involving native coronary artery of native heart without angina pectoris 8/2/2016     Depression      Esophageal reflux 3/19/2015     Former smoker - has had pulmonary nodules in the past 3/29/2017     History of blood transfusion      Hyperlipidemia LDL goal <100 3/19/2015     Osteoarthritis      Osteopenia      Pulmonary nodules 4/22/2015    Noted 4/14/15, f/u scan 03/2016 showed minimal change, needs repeat scan 03/2017        PAST SURGICAL HISTORY:  Past Surgical History:   Procedure Laterality Date     ANGIOGRAM  6/15/05     ARTHROPLASTY HIP  9/23/2013    Procedure: ARTHROPLASTY HIP;  Right total hip arthroplasty       ARTHROPLASTY HIP Left 5/19/2015    Procedure: ARTHROPLASTY HIP;  Surgeon: Tyrese Howell MD;  Location:  OR " "    ARTHROPLASTY KNEE Left 2016    Procedure: ARTHROPLASTY KNEE;  Surgeon: Tyrese Howell MD;  Location: [facility] OR     CHOLECYSTECTOMY       COLONOSCOPY       COLONOSCOPY Left 2016    Procedure: COLONOSCOPY;  Surgeon: Moe Haney MD;  Location:  GI     GASTRIC BYPASS       tiff en y     GI SURGERY      fistulotomy     ORTHOPEDIC SURGERY      left knee \"green procedure\"     TONSILLECTOMY       TUBAL LIGATION         FAMILY HISTORY:  Family History   Problem Relation Age of Onset     Coronary Artery Disease Father      Hypertension Father      Coronary Artery Disease Sister      Hypertension Sister      Coronary Artery Disease Brother      Hypertension Brother      Heart Disease Nephew      Other Cancer Maternal Half-Sister      Breast Cancer Maternal Aunt 35     Ovarian Cancer No family hx of        SOCIAL HISTORY:  Social History     Socioeconomic History     Marital status:      Spouse name: None     Number of children: None     Years of education: None     Highest education level: None   Tobacco Use     Smoking status: Former Smoker     Packs/day: 0.50     Years: 50.00     Pack years: 25.00     Types: Cigarettes     Quit date: 2013     Years since quittin.4     Smokeless tobacco: Never Used   Substance and Sexual Activity     Alcohol use: Yes     Alcohol/week: 0.0 standard drinks     Comment: 5 drinks a week     Drug use: No     Sexual activity: Yes     Partners: Male   Other Topics Concern     Parent/sibling w/ CABG, MI or angioplasty before 65F 55M? Yes     Comment: brother age 31     Caffeine Concern Yes     Comment: 1 cup of coffee      Sleep Concern Yes     Comment: lately it has been an issue     Special Diet No     Comment: watching what she eats      Exercise No     Seat Belt Yes       Review of Systems:  Skin:  not assessed       Eyes:  not assessed      ENT:  not assessed      Respiratory:  Positive for dyspnea on exertion SOB w/minimal movement - ie: making the bed, " "carrying groceries   Cardiovascular:    Positive for;palpitations;chest pain;heaviness;fatigue;lightheadedness;dizziness palpitations with SOB; chest pain after eating  Gastroenterology: not assessed      Genitourinary:  not assessed      Musculoskeletal:  not assessed      Neurologic:  not assessed      Psychiatric:  not assessed      Heme/Lymph/Imm:  not assessed      Endocrine:  not assessed        Physical Exam:  Vitals: BP (!) 146/68   Pulse 68   Ht 1.638 m (5' 4.5\")   Wt 84.1 kg (185 lb 8 oz)   LMP  (LMP Unknown)   BMI 31.35 kg/m      Constitutional:  cooperative, alert and oriented, well developed, well nourished, in no acute distress obese mildly anxious    Skin:  warm and dry to the touch, no apparent skin lesions or masses noted     warm and dry    Head:  normocephalic, no masses or lesions   atraumatic    Eyes:  pupils equal and round;conjunctivae and lids unremarkable;sclera white   EOMI, pupils round and equal    Lymph:      ENT:  no pallor or cyanosis   speech normal, tongue midline    Neck:  no carotid bruit;JVP normal;carotid pulses are full and equal bilaterally   supple    Respiratory:  normal breath sounds, clear to auscultation, normal A-P diameter, normal symmetry, normal respiratory excursion, no use of accessory muscles    clear without rales/wheezing    Cardiac: regular rhythm, normal S1/S2, no S3 or S4, apical impulse not displaced, no murmurs, gallops or rubs             RRR without murmur/lift/thrill  not assessed this visit                                   UE pulses full and equal    GI:    obese non-tender    Extremities and Muscular Skeletal:  no deformities, clubbing, cyanosis, erythema observed;no edema         no edema    Neurological:  no gross motor deficits;affect appropriate   motor grossly intact    Psych:  Alert and Oriented x 3 Anxious      CC  Sotero Garza MD  [address]  [place] [address]            HPI and Plan:   See dictation    Orders " Placed This Encounter   Procedures     Follow-Up with Cardiology     24 Hour Blood Pressure Monitor - Adult       No orders of the defined types were placed in this encounter.      There are no discontinued medications.      Encounter Diagnoses   Name Primary?     Elevated blood pressure reading without diagnosis of hypertension Yes     Dyspnea on exertion      Chest pain, unspecified type      Coronary artery disease involving native coronary artery of native heart without angina pectoris      Benign essential hypertension      Hyperlipidemia LDL goal <100        CURRENT MEDICATIONS:  Current Outpatient Medications   Medication Sig Dispense Refill     acetaminophen (TYLENOL) 325 MG tablet Take 650 mg by mouth every morning 100 tablet 0     aspirin 81 MG tablet Take 81 mg by mouth daily       atenolol (TENORMIN) 50 MG tablet Take 1 tablet (50 mg) by mouth daily 180 tablet 0     atorvastatin (LIPITOR) 20 MG tablet Take 1 tablet (20 mg) by mouth daily (Patient taking differently: Take 40 mg by mouth daily) 90 tablet 0     buPROPion (WELLBUTRIN SR) 150 MG 12 hr tablet Take 1 tablet (150 mg) by mouth 2 times daily 180 tablet 0     calcium carbonate-vitamin D 600-400 MG-UNIT CHEW Take 2 tablets by mouth every morning       Coenzyme Q10 (CO Q 10 PO) Take 100 mg by mouth every evening        CYANOCOBALAMIN SL Place 1,000 mcg under the tongue daily       DULoxetine (CYMBALTA) 60 MG capsule Take 1 capsule (60 mg) by mouth daily 90 capsule 0     FIBER SELECT GUMMIES CHEW Take 1 chew tab by mouth daily        hydrALAZINE (APRESOLINE) 100 MG tablet Take 1 tablet (100 mg) by mouth 3 times daily 270 tablet 3     hydrochlorothiazide (HYDRODIURIL) 25 MG tablet Take 1 tablet (25 mg) by mouth daily 30 tablet 0     lisinopril (ZESTRIL) 40 MG tablet Take 1 tablet (40 mg) by mouth daily 90 tablet 0     multivitamin w/minerals (THERA-VIT-M) tablet Take 1 tablet by mouth daily       nitroGLYcerin (NITROSTAT) 0.4 MG sublingual tablet For  "chest pain place 1 tablet under the tongue every 5 minutes for 3 doses. If symptoms persist 5 minutes after 1st dose call 911. 25 tablet 0     Omega-3 Fatty Acids (OMEGA-3 FISH OIL PO) Take 1.2 g by mouth daily        omeprazole (PRILOSEC) 40 MG DR capsule Take 1 capsule (40 mg) by mouth daily Take 30-60 minutes before a meal. 90 capsule 0     traZODone (DESYREL) 100 MG tablet Take 2 tablets (200 mg) by mouth At Bedtime 180 tablet 3     LORazepam (ATIVAN) 1 MG tablet TAKE 1 TABLET BY MOUTH WITH HIGH ANXIETY. 10 TABLETS MONTHLY (Patient not taking: No sig reported) 10 tablet 0       ALLERGIES     Allergies   Allergen Reactions     Codeine      Contrast Dye      PN: LW CM1: CONTRAST- nka Reaction :     Morphine Sulfate (Concentrate)      does not like side effects/\"hyper\"       PAST MEDICAL HISTORY:  Past Medical History:   Diagnosis Date     Anxiety 4/2/2015     Asthma     no inhaler for 2-3 years     Benign essential hypertension 8/2/2016     Chest pain, unspecified type 8/27/2017     Coronary artery disease      Coronary artery disease involving native coronary artery of native heart without angina pectoris 8/2/2016     Depression      Esophageal reflux 3/19/2015     Former smoker - has had pulmonary nodules in the past 3/29/2017     History of blood transfusion      Hyperlipidemia LDL goal <100 3/19/2015     Osteoarthritis      Osteopenia      Pulmonary nodules 4/22/2015    Noted 4/14/15, f/u scan 03/2016 showed minimal change, needs repeat scan 03/2017        PAST SURGICAL HISTORY:  Past Surgical History:   Procedure Laterality Date     ANGIOGRAM  6/15/05     ARTHROPLASTY HIP  9/23/2013    Procedure: ARTHROPLASTY HIP;  Right total hip arthroplasty       ARTHROPLASTY HIP Left 5/19/2015    Procedure: ARTHROPLASTY HIP;  Surgeon: Tyrese Howell MD;  Location: [facility]     ARTHROPLASTY KNEE Left 8/22/2016    Procedure: ARTHROPLASTY KNEE;  Surgeon: Tyrese Howell MD;  Location: [facility]     CHOLECYSTECTOMY       " "COLONOSCOPY       COLONOSCOPY Left 2016    Procedure: COLONOSCOPY;  Surgeon: Moe Haney MD;  Location:  GI     GASTRIC BYPASS       tiff en y     GI SURGERY      fistulotomy     ORTHOPEDIC SURGERY      left knee \"green procedure\"     TONSILLECTOMY       TUBAL LIGATION         FAMILY HISTORY:  Family History   Problem Relation Age of Onset     Coronary Artery Disease Father      Hypertension Father      Coronary Artery Disease Sister      Hypertension Sister      Coronary Artery Disease Brother      Hypertension Brother      Heart Disease Nephew      Other Cancer Maternal Half-Sister      Breast Cancer Maternal Aunt 35     Ovarian Cancer No family hx of        SOCIAL HISTORY:  Social History     Socioeconomic History     Marital status:      Spouse name: None     Number of children: None     Years of education: None     Highest education level: None   Tobacco Use     Smoking status: Former Smoker     Packs/day: 0.50     Years: 50.00     Pack years: 25.00     Types: Cigarettes     Quit date: 2013     Years since quittin.4     Smokeless tobacco: Never Used   Substance and Sexual Activity     Alcohol use: Yes     Alcohol/week: 0.0 standard drinks     Comment: 5 drinks a week     Drug use: No     Sexual activity: Yes     Partners: Male   Other Topics Concern     Parent/sibling w/ CABG, MI or angioplasty before 65F 55M? Yes     Comment: brother age 31     Caffeine Concern Yes     Comment: 1 cup of coffee      Sleep Concern Yes     Comment: lately it has been an issue     Special Diet No     Comment: watching what she eats      Exercise No     Seat Belt Yes       Review of Systems:  Skin:  not assessed       Eyes:  not assessed      ENT:  not assessed      Respiratory:  Positive for dyspnea on exertion SOB w/minimal movement - ie: making the bed, carrying groceries   Cardiovascular:    Positive for;palpitations;chest pain;heaviness;fatigue;lightheadedness;dizziness palpitations with SOB; " "chest pain after eating  Gastroenterology: not assessed      Genitourinary:  not assessed      Musculoskeletal:  not assessed      Neurologic:  not assessed      Psychiatric:  not assessed      Heme/Lymph/Imm:  not assessed      Endocrine:  not assessed        Physical Exam:  Vitals: BP (!) 146/68   Pulse 68   Ht 1.638 m (5' 4.5\")   Wt 84.1 kg (185 lb 8 oz)   LMP  (LMP Unknown)   BMI 31.35 kg/m      Constitutional:  cooperative, alert and oriented, well developed, well nourished, in no acute distress obese mildly anxious    Skin:  warm and dry to the touch, no apparent skin lesions or masses noted     warm and dry    Head:  normocephalic, no masses or lesions   atraumatic    Eyes:  pupils equal and round;conjunctivae and lids unremarkable;sclera white   EOMI, pupils round and equal    Lymph:      ENT:  no pallor or cyanosis   speech normal, tongue midline    Neck:  no carotid bruit;JVP normal;carotid pulses are full and equal bilaterally   supple    Respiratory:  normal breath sounds, clear to auscultation, normal A-P diameter, normal symmetry, normal respiratory excursion, no use of accessory muscles    clear without rales/wheezing    Cardiac: regular rhythm, normal S1/S2, no S3 or S4, apical impulse not displaced, no murmurs, gallops or rubs             RRR without murmur/lift/thrill  not assessed this visit                                   UE pulses full and equal    GI:    obese non-tender    Extremities and Muscular Skeletal:  no deformities, clubbing, cyanosis, erythema observed;no edema         no edema    Neurological:  no gross motor deficits;affect appropriate   motor grossly intact    Psych:  Alert and Oriented x 3 Anxious        CC  Sotero Garza MD  [address]  [place] [address]                    Service Date: 06/20/2022    CARDIOLOGY FOLLOWUP VISIT     REFERRING HEALTHCARE PROVIDER:  Marlys Dawson PA-C    HISTORY OF PRESENT ILLNESS:  It was my pleasure to see your patient " Monse Peoples in followup.  If you remember, this is a 76-year-old patient that I saw in April of last year when she was complaining of atypical abdominal and left-sided chest discomfort, which was mainly postprandial.  This patient did not particularly notice this with exertion.  It lasted, also, only about 1-2 minutes.  We did perform a stress echocardiogram, and this did not show any evidence of ischemia.  The patient, however, was found to have a significantly low hemoglobin, and on 05/11, her hemoglobin was 9.2, which is more than sufficient to cause shortness of breath on exertion.  She was given 2 intravenous iron infusions.  However, the source of her iron deficiency anemia has not been fully investigated yet.  I believe that she has been advised to see the gastroenterologist, which I strongly reaffirmed with her again today.  She was doing research on causes of chest discomfort, and there is a family history of sudden death in her family.  However, in the past, echocardiography did not show evidence of hypertrophic obstructive cardiomyopathy.  She had her echocardiogram repeated approximately 2-1/2 weeks ago.  This does not have the stigmata of hypertrophic cardiomyopathy. She does have mild concentric left ventricular hypertrophy, but she does not have systolic anterior motion of the mitral valve.  She does not have asymmetric septal hypertrophy, so it is highly unlikely that this patient has hypertrophic cardiomyopathy given the findings on the echocardiogram.  Her ejection fraction is also normal.  Her tissue Doppler analysis,  which is one of the assessments of diastolic dysfunction, was indeterminate.  Her blood pressure is on the higher side today at 146/68, but typically her blood pressure at home is well controlled.  She is not complaining of ankle edema, orthopnea or PND.    IMPRESSION:    1.  Chest discomfort.  All of the objective findings suggest that this is noncardiac, and the history also  suggests that this is noncardiac with short-lived discomfort in the left side of the chest, but mainly epigastric, which is nonexertional and postprandial.  2.  Negative stress echocardiogram with no evidence of ischemia.  3.  No evidence of hypertrophic obstructive cardiomyopathy on most recent echocardiogram.  It was a reasonable suggestion by the patient given the fact that she has a history of sudden death in her family, but this, of course, could be due to other causes, and the echocardiogram is against her having this condition.  At 76 years of age, also, if there is familial hypertrophic obstructive cardiomyopathy, we would expect to see the echo findings by now.  4.  Raised blood pressure, but unclear whether this is white-coat hypertension or not.  5.  Iron deficiency anemia with a most recent hemoglobin about a month ago of 9.2 requiring iron infusions.  The patient was considering seeing the gastroenterologist and I strongly supported her on that.    PLAN:  At this particular time, we will obtain a 24 hour blood pressure monitor to determine if her blood pressure is under good control.  I will have the patient return to see me in 6 months' time, and the patient is considering seeing the gastroenterologist, and I strongly recommended that to her.    It has been my pleasure being involved in the care of this nice patient.    cc:  Marlys Dawson PA-C  [facility] [place] [facility] [address] [place] [address]    Sotero Woodson MD, [facility]        D: 2022   T: 2022   MT: rell    Name:     AMINTA GOEL  MRN:      [id number]        Account:      [id number]   :      1945           Service Date: 2022       Document: [id number]      Thank you for allowing me to participate in the care of your patient.      Sincerely,     Sotero Garza MD, MD     [facility]  cc:   Sotero Garza,  MD  [address]  [place] [id number]

## 2022-06-20 NOTE — PROGRESS NOTES
Service Date: 06/20/2022    CARDIOLOGY FOLLOWUP VISIT     REFERRING HEALTHCARE PROVIDER:  Marlys Dawson PA-C    HISTORY OF PRESENT ILLNESS:  It was my pleasure to see your patient Monse Peoples in followup.  If you remember, this is a 76-year-old patient that I saw in April of last year when she was complaining of atypical abdominal and left-sided chest discomfort, which was mainly postprandial.  This patient did not particularly notice this with exertion.  It lasted, also, only about 1-2 minutes.  We did perform a stress echocardiogram, and this did not show any evidence of ischemia.  The patient, however, was found to have a significantly low hemoglobin, and on 05/11, her hemoglobin was 9.2, which is more than sufficient to cause shortness of breath on exertion.  She was given 2 intravenous iron infusions.  However, the source of her iron deficiency anemia has not been fully investigated yet.  I believe that she has been advised to see the gastroenterologist, which I strongly reaffirmed with her again today.  She was doing research on causes of chest discomfort, and there is a family history of sudden death in her family.  However, in the past, echocardiography did not show evidence of hypertrophic obstructive cardiomyopathy.  She had her echocardiogram repeated approximately 2-1/2 weeks ago.  This does not have the stigmata of hypertrophic cardiomyopathy. She does have mild concentric left ventricular hypertrophy, but she does not have systolic anterior motion of the mitral valve.  She does not have asymmetric septal hypertrophy, so it is highly unlikely that this patient has hypertrophic cardiomyopathy given the findings on the echocardiogram.  Her ejection fraction is also normal.  Her tissue Doppler analysis,  which is one of the assessments of diastolic dysfunction, was indeterminate.  Her blood pressure is on the higher side today at 146/68, but typically her blood pressure at home is well controlled.  She  is not complaining of ankle edema, orthopnea or PND.    IMPRESSION:    1.  Chest discomfort.  All of the objective findings suggest that this is noncardiac, and the history also suggests that this is noncardiac with short-lived discomfort in the left side of the chest, but mainly epigastric, which is nonexertional and postprandial.  2.  Negative stress echocardiogram with no evidence of ischemia.  3.  No evidence of hypertrophic obstructive cardiomyopathy on most recent echocardiogram.  It was a reasonable suggestion by the patient given the fact that she has a history of sudden death in her family, but this, of course, could be due to other causes, and the echocardiogram is against her having this condition.  At 76 years of age, also, if there is familial hypertrophic obstructive cardiomyopathy, we would expect to see the echo findings by now.  4.  Raised blood pressure, but unclear whether this is white-coat hypertension or not.  5.  Iron deficiency anemia with a most recent hemoglobin about a month ago of 9.2 requiring iron infusions.  The patient was considering seeing the gastroenterologist and I strongly supported her on that.    PLAN:  At this particular time, we will obtain a 24 hour blood pressure monitor to determine if her blood pressure is under good control.  I will have the patient return to see me in 6 months' time, and the patient is considering seeing the gastroenterologist, and I strongly recommended that to her.    It has been my pleasure being involved in the care of this nice patient.    cc:  Marlys Dawson PA-C  [facility] [place] [facility] [address] [place] [address]    Sotero Woodson MD, [facility]        D: 2022   T: 2022   MT: rell    Name:     AMINTA GOEL  MRN:      [id number]        Account:      [id number]   :      1945           Service Date: 2022       Document: [id number]

## 2022-07-14 ENCOUNTER — HOSPITAL ENCOUNTER (OUTPATIENT)
Dept: CARDIOLOGY | Facility: CLINIC | Age: 77
Discharge: HOME | End: 2022-07-14
Attending: INTERNAL MEDICINE | Admitting: INTERNAL MEDICINE
Payer: COMMERCIAL

## 2022-07-14 DIAGNOSIS — R03.0 ELEVATED BLOOD PRESSURE READING WITHOUT DIAGNOSIS OF HYPERTENSION: ICD-10-CM

## 2022-07-14 PROCEDURE — 93790 AMBL BP MNTR W/SW I&R: CPT | Performed by: INTERNAL MEDICINE

## 2022-07-14 PROCEDURE — 93786 AMBL BP MNTR W/SW REC ONLY: CPT

## 2022-07-22 ENCOUNTER — TELEPHONE (OUTPATIENT)
Dept: CARDIOLOGY | Facility: CLINIC | Age: 77
End: 2022-07-22

## 2022-07-22 NOTE — TELEPHONE ENCOUNTER
Reviewed 24 hour BP monitor showing         Called pt with results. Pt has appt to see Dr. Garza 11/14/22. Will message Dr. Garza to review. [place] LIGHT

## 2022-10-16 ENCOUNTER — HEALTH MAINTENANCE LETTER (OUTPATIENT)
Age: 77
End: 2022-10-16

## 2023-04-01 ENCOUNTER — HEALTH MAINTENANCE LETTER (OUTPATIENT)
Age: 78
End: 2023-04-01

## 2023-05-08 ENCOUNTER — OFFICE VISIT (OUTPATIENT)
Dept: CARDIOLOGY | Facility: CLINIC | Age: 78
End: 2023-05-08
Attending: INTERNAL MEDICINE
Payer: COMMERCIAL

## 2023-05-08 VITALS
DIASTOLIC BLOOD PRESSURE: 70 MMHG | BODY MASS INDEX: 29.54 KG/M2 | SYSTOLIC BLOOD PRESSURE: 130 MMHG | HEIGHT: 65 IN | WEIGHT: 177.3 LBS | OXYGEN SATURATION: 98 % | HEART RATE: 79 BPM

## 2023-05-08 DIAGNOSIS — R07.9 CHEST PAIN, UNSPECIFIED TYPE: ICD-10-CM

## 2023-05-08 DIAGNOSIS — R06.09 DYSPNEA ON EXERTION: ICD-10-CM

## 2023-05-08 DIAGNOSIS — R03.0 ELEVATED BLOOD PRESSURE READING WITHOUT DIAGNOSIS OF HYPERTENSION: ICD-10-CM

## 2023-05-08 DIAGNOSIS — I25.10 CORONARY ARTERY DISEASE INVOLVING NATIVE CORONARY ARTERY OF NATIVE HEART WITHOUT ANGINA PECTORIS: ICD-10-CM

## 2023-05-08 DIAGNOSIS — E78.5 HYPERLIPIDEMIA LDL GOAL <100: ICD-10-CM

## 2023-05-08 DIAGNOSIS — I10 BENIGN ESSENTIAL HYPERTENSION: ICD-10-CM

## 2023-05-08 PROCEDURE — 99214 OFFICE O/P EST MOD 30 MIN: CPT | Performed by: INTERNAL MEDICINE

## 2023-05-08 ASSESSMENT — PATIENT HEALTH QUESTIONNAIRE - PHQ9: SUM OF ALL RESPONSES TO PHQ QUESTIONS 1-9: 3

## 2023-05-08 NOTE — LETTER
5/8/2023    Christiano Taylor MD  [facility] [address] [facility] [address]    RE: Monse Peoples       Dear Colleague,     I had the pleasure of seeing Monse Peoples in the [facility].  HPI and Plan:   See dictation          Orders Placed This Encounter   Procedures    Lipid Profile    ALT    Lipid Profile    ALT    Basic metabolic panel    Follow-Up with Cardiology       No orders of the defined types were placed in this encounter.      There are no discontinued medications.      Encounter Diagnoses   Name Primary?    Elevated blood pressure reading without diagnosis of hypertension     Dyspnea on exertion     Chest pain, unspecified type     Coronary artery disease involving native coronary artery of native heart without angina pectoris     Benign essential hypertension     Hyperlipidemia LDL goal <100        CURRENT MEDICATIONS:  Current Outpatient Medications   Medication Sig Dispense Refill    acetaminophen (TYLENOL) 325 MG tablet Take 650 mg by mouth every morning 100 tablet 0    aspirin 81 MG tablet Take 81 mg by mouth daily      atenolol (TENORMIN) 50 MG tablet Take 1 tablet (50 mg) by mouth daily 180 tablet 0    atorvastatin (LIPITOR) 20 MG tablet Take 1 tablet (20 mg) by mouth daily (Patient taking differently: Take 40 mg by mouth daily) 90 tablet 0    buPROPion (WELLBUTRIN SR) 150 MG 12 hr tablet Take 1 tablet (150 mg) by mouth 2 times daily 180 tablet 0    calcium carbonate-vitamin D 600-400 MG-UNIT CHEW Take 2 tablets by mouth every morning      Coenzyme Q10 (CO Q 10 PO) Take 100 mg by mouth every evening       CYANOCOBALAMIN SL Place 1,000 mcg under the tongue daily      DULoxetine (CYMBALTA) 60 MG capsule Take 1 capsule (60 mg) by mouth daily 90 capsule 0    FIBER SELECT GUMMIES CHEW Take 1 chew tab by mouth daily       hydrALAZINE (APRESOLINE) 100 MG tablet Take 1 tablet (100 mg) by mouth 3 times daily 270 tablet 3    hydrochlorothiazide (HYDRODIURIL) 25 MG  "tablet Take 1 tablet (25 mg) by mouth daily 30 tablet 0    lisinopril (ZESTRIL) 40 MG tablet Take 1 tablet (40 mg) by mouth daily 90 tablet 0    multivitamin w/minerals (THERA-VIT-M) tablet Take 1 tablet by mouth daily      nitroGLYcerin (NITROSTAT) 0.4 MG sublingual tablet For chest pain place 1 tablet under the tongue every 5 minutes for 3 doses. If symptoms persist 5 minutes after 1st dose call 911. 25 tablet 0    Omega-3 Fatty Acids (OMEGA-3 FISH OIL PO) Take 1.2 g by mouth daily       omeprazole (PRILOSEC) 40 MG DR capsule Take 1 capsule (40 mg) by mouth daily Take 30-60 minutes before a meal. 90 capsule 0    traZODone (DESYREL) 100 MG tablet Take 2 tablets (200 mg) by mouth At Bedtime 180 tablet 3    LORazepam (ATIVAN) 1 MG tablet TAKE 1 TABLET BY MOUTH WITH HIGH ANXIETY. 10 TABLETS MONTHLY (Patient not taking: Reported on 4/6/2022) 10 tablet 0       ALLERGIES     Allergies   Allergen Reactions    Codeine     Contrast Dye      PN: PARUL CM1: CONTRAST- nka Reaction :    Morphine Sulfate (Concentrate)      does not like side effects/\"hyper\"       PAST MEDICAL HISTORY:  Past Medical History:   Diagnosis Date    Anxiety 4/2/2015    Asthma     no inhaler for 2-3 years    Benign essential hypertension 8/2/2016    Chest pain, unspecified type 8/27/2017    Coronary artery disease     Coronary artery disease involving native coronary artery of native heart without angina pectoris 8/2/2016    Depression     Esophageal reflux 3/19/2015    Former smoker - has had pulmonary nodules in the past 3/29/2017    History of blood transfusion     Hyperlipidemia LDL goal <100 3/19/2015    Osteoarthritis     Osteopenia     Pulmonary nodules 4/22/2015    Noted 4/14/15, f/u scan 03/2016 showed minimal change, needs repeat scan 03/2017        PAST SURGICAL HISTORY:  Past Surgical History:   Procedure Laterality Date    ANGIOGRAM  6/15/05    ARTHROPLASTY HIP  9/23/2013    Procedure: ARTHROPLASTY HIP;  Right total hip arthroplasty      " "ARTHROPLASTY HIP Left 5/19/2015    Procedure: ARTHROPLASTY HIP;  Surgeon: Tyrese Howell MD;  Location: [facility] OR    ARTHROPLASTY KNEE Left 8/22/2016    Procedure: ARTHROPLASTY KNEE;  Surgeon: Tyrese Howell MD;  Location: [facility] OR    CHOLECYSTECTOMY      COLONOSCOPY      COLONOSCOPY Left 4/19/2016    Procedure: COLONOSCOPY;  Surgeon: Moe Haney MD;  Location:  GI    GASTRIC BYPASS      2011 tiff en y    GI SURGERY      fistulotomy    ORTHOPEDIC SURGERY      left knee \"green procedure\"    TONSILLECTOMY      TUBAL LIGATION         FAMILY HISTORY:  Family History   Problem Relation Age of Onset    Coronary Artery Disease Father     Hypertension Father     Coronary Artery Disease Sister     Hypertension Sister     Coronary Artery Disease Brother     Hypertension Brother     Heart Disease Nephew     Other Cancer Maternal Half-Sister     Breast Cancer Maternal Aunt 35    Ovarian Cancer No family hx of        SOCIAL HISTORY:  Social History     Socioeconomic History    Marital status:      Spouse name: None    Number of children: None    Years of education: None    Highest education level: None   Tobacco Use    Smoking status: Former     Packs/day: 0.50     Years: 50.00     Pack years: 25.00     Types: Cigarettes     Quit date: 1/19/2013     Years since quitting: 10.3    Smokeless tobacco: Never   Substance and Sexual Activity    Alcohol use: Yes     Comment: 5 drinks a week (wine)    Drug use: No    Sexual activity: Yes     Partners: Male   Other Topics Concern    Parent/sibling w/ CABG, MI or angioplasty before 65F 55M? Yes     Comment: brother age 31    Caffeine Concern Yes     Comment: 1 cup of coffee     Sleep Concern Yes     Comment: lately it has been an issue    Special Diet No     Comment: watching what she eats     Exercise No    Seat Belt Yes       Review of Systems:  Skin:  not assessed       Eyes:  not assessed      ENT:  not assessed      Respiratory:  Positive for dyspnea on exertion;shortness " "of breath on daily acvtivities;followed up with sweating as well. \"just the little things,I get winded\"   Cardiovascular:    Positive for;chest pain pain up the side shoots down rib cage;sometimes wraps around breast  Gastroenterology: not assessed      Genitourinary:  not assessed      Musculoskeletal:  not assessed      Neurologic:  not assessed      Psychiatric:  not assessed      Heme/Lymph/Imm:  not assessed      Endocrine:  not assessed        Physical Exam:  Vitals: /70   Pulse 79   Ht 1.638 m (5' 4.5\")   Wt 80.4 kg (177 lb 4.8 oz)   LMP  (LMP Unknown)   SpO2 98%   BMI 29.96 kg/m      Constitutional:  cooperative, alert and oriented, well developed, well nourished, in no acute distress overweight mildly anxious    Skin:  warm and dry to the touch, no apparent skin lesions or masses noted     warm and dry    Head:  normocephalic, no masses or lesions   atraumatic    Eyes:  pupils equal and round;conjunctivae and lids unremarkable;sclera white   EOMI, pupils round and equal    Lymph:      ENT:  no pallor or cyanosis   speech normal, tongue midline    Neck:  no carotid bruit;JVP normal;carotid pulses are full and equal bilaterally   supple    Respiratory:  normal breath sounds, clear to auscultation, normal A-P diameter, normal symmetry, normal respiratory excursion, no use of accessory muscles    clear without rales/wheezing    Cardiac: regular rhythm, normal S1/S2, no S3 or S4, apical impulse not displaced, no murmurs, gallops or rubs             RRR without murmur/lift/thrill  not assessed this visit                                   UE pulses full and equal    GI:    obese non-tender    Extremities and Muscular Skeletal:  no deformities, clubbing, cyanosis, erythema observed;no edema         no edema    Neurological:  no gross motor deficits;affect appropriate   motor grossly intact    Psych:  Alert and Oriented x 3        CC  Sotero Garza MD  [address]  [place] " [id number]                Service Date: 05/08/2023    CARDIOLOGY FOLLOWUP VISIT     REFERRING PHYSICIAN:  Christiano Taylor MD    HISTORY OF PRESENT ILLNESS:  It is my pleasure to see your patient Monse Peoples, who is a very pleasant, 77-year-old patient with a past history of mild coronary artery disease based upon coronary angiography in 2005 and who also has a past history of noncardiac chest discomfort and some shortness of breath.  Since I last saw her, she has been doing well.  She does have classic white-coat hypertension, and her blood pressure was raised here today. Her blood pressure typically at home was 130/70.  She has not had a lipid profile drawn in approximately 2 years, and so that will need to be performed.  Also, her basic metabolic profile has not been drawn for approximately a year.  She gets occasional chest discomfort along the left side of her chest along the left mid axillary line.  This discomfort lasts between 1-3 minutes.  It does not occur with exertion, it is not relieved by rest, and she usually thinks that it comes on when she sits down.  It is distinctly noncardiac sounding, as I said.  She is under a lot of stress at present. Her sister who is about 15 months younger than her appears to have significant peripheral arterial disease and was in danger of having a toe amputation.  However, her greatest stress is from her son, who is 53 years of age and who has bipolar disorder and who is an alcoholic.  He recently was in the hospital for over a week and a half with atrial fibrillation.    IMPRESSION:    1.  Coronary artery disease.  The patient does not have classic angina pectoris.  2.  Chest pain.  This sounds distinctly noncardiac as described above.  3.  White-coat hypertension.  This has been proven as recently as last year on 24 hour blood pressure monitoring.  Her blood pressure typically at home is very well controlled at 130/70.  4.  Under a significant amount of stress with her  sister and her son, as I described above.  5.  Has not had a lipid profile drawn in 2 years.    PLAN:  We will have lipids drawn in a week's time fasting.  I will also check her basic metabolic profile at that stage. We will see the patient back again in 1 year's time, but as always, she has been told to contact me if she has any questions or any concerns. In particular, if her chest discomfort comes on with exertion and is relieved by rest or if she has left-sided discomfort which is continuous and unrelenting, she should go to the emergency room should that occur.    It is my pleasure to be involved in the care of this nice patient.    Sotero Woodson MD      cc:  Christiano Taylor MD  [facility] [place] [facility] [address] [place] [address]    Sotero Woodson MD, [facility]        D: 2023   T: 2023   MT: rell    Name:     AMINTA GOEL  MRN:      [id number]        Account:      [id number]   :      1945           Service Date: 2023       Document: [id number]     Thank you for allowing me to participate in the care of your patient.      Sincerely,     Sotero Garza MD, MD     [facility]

## 2023-05-08 NOTE — PROGRESS NOTES
HPI and Plan:   See dictation          Orders Placed This Encounter   Procedures     Lipid Profile     ALT     Lipid Profile     ALT     Basic metabolic panel     Follow-Up with Cardiology       No orders of the defined types were placed in this encounter.      There are no discontinued medications.      Encounter Diagnoses   Name Primary?     Elevated blood pressure reading without diagnosis of hypertension      Dyspnea on exertion      Chest pain, unspecified type      Coronary artery disease involving native coronary artery of native heart without angina pectoris      Benign essential hypertension      Hyperlipidemia LDL goal <100        CURRENT MEDICATIONS:  Current Outpatient Medications   Medication Sig Dispense Refill     acetaminophen (TYLENOL) 325 MG tablet Take 650 mg by mouth every morning 100 tablet 0     aspirin 81 MG tablet Take 81 mg by mouth daily       atenolol (TENORMIN) 50 MG tablet Take 1 tablet (50 mg) by mouth daily 180 tablet 0     atorvastatin (LIPITOR) 20 MG tablet Take 1 tablet (20 mg) by mouth daily (Patient taking differently: Take 40 mg by mouth daily) 90 tablet 0     buPROPion (WELLBUTRIN SR) 150 MG 12 hr tablet Take 1 tablet (150 mg) by mouth 2 times daily 180 tablet 0     calcium carbonate-vitamin D 600-400 MG-UNIT CHEW Take 2 tablets by mouth every morning       Coenzyme Q10 (CO Q 10 PO) Take 100 mg by mouth every evening        CYANOCOBALAMIN SL Place 1,000 mcg under the tongue daily       DULoxetine (CYMBALTA) 60 MG capsule Take 1 capsule (60 mg) by mouth daily 90 capsule 0     FIBER SELECT GUMMIES CHEW Take 1 chew tab by mouth daily        hydrALAZINE (APRESOLINE) 100 MG tablet Take 1 tablet (100 mg) by mouth 3 times daily 270 tablet 3     hydrochlorothiazide (HYDRODIURIL) 25 MG tablet Take 1 tablet (25 mg) by mouth daily 30 tablet 0     lisinopril (ZESTRIL) 40 MG tablet Take 1 tablet (40 mg) by mouth daily 90 tablet 0     multivitamin w/minerals (THERA-VIT-M) tablet Take 1 tablet  "by mouth daily       nitroGLYcerin (NITROSTAT) 0.4 MG sublingual tablet For chest pain place 1 tablet under the tongue every 5 minutes for 3 doses. If symptoms persist 5 minutes after 1st dose call 911. 25 tablet 0     Omega-3 Fatty Acids (OMEGA-3 FISH OIL PO) Take 1.2 g by mouth daily        omeprazole (PRILOSEC) 40 MG DR capsule Take 1 capsule (40 mg) by mouth daily Take 30-60 minutes before a meal. 90 capsule 0     traZODone (DESYREL) 100 MG tablet Take 2 tablets (200 mg) by mouth At Bedtime 180 tablet 3     LORazepam (ATIVAN) 1 MG tablet TAKE 1 TABLET BY MOUTH WITH HIGH ANXIETY. 10 TABLETS MONTHLY (Patient not taking: Reported on 4/6/2022) 10 tablet 0       ALLERGIES     Allergies   Allergen Reactions     Codeine      Contrast Dye      PN: PARUL CM1: CONTRAST- nka Reaction :     Morphine Sulfate (Concentrate)      does not like side effects/\"hyper\"       PAST MEDICAL HISTORY:  Past Medical History:   Diagnosis Date     Anxiety 4/2/2015     Asthma     no inhaler for 2-3 years     Benign essential hypertension 8/2/2016     Chest pain, unspecified type 8/27/2017     Coronary artery disease      Coronary artery disease involving native coronary artery of native heart without angina pectoris 8/2/2016     Depression      Esophageal reflux 3/19/2015     Former smoker - has had pulmonary nodules in the past 3/29/2017     History of blood transfusion      Hyperlipidemia LDL goal <100 3/19/2015     Osteoarthritis      Osteopenia      Pulmonary nodules 4/22/2015    Noted 4/14/15, f/u scan 03/2016 showed minimal change, needs repeat scan 03/2017        PAST SURGICAL HISTORY:  Past Surgical History:   Procedure Laterality Date     ANGIOGRAM  6/15/05     ARTHROPLASTY HIP  9/23/2013    Procedure: ARTHROPLASTY HIP;  Right total hip arthroplasty       ARTHROPLASTY HIP Left 5/19/2015    Procedure: ARTHROPLASTY HIP;  Surgeon: Tyrese Howell MD;  Location: [facility] OR     ARTHROPLASTY KNEE Left 8/22/2016    Procedure: ARTHROPLASTY KNEE;  " "Surgeon: Tyrese Howell MD;  Location: [facility] OR     CHOLECYSTECTOMY       COLONOSCOPY       COLONOSCOPY Left 4/19/2016    Procedure: COLONOSCOPY;  Surgeon: Moe Haney MD;  Location: [facility] GI     GASTRIC BYPASS      2011 tiff en y     GI SURGERY      fistulotomy     ORTHOPEDIC SURGERY      left knee \"green procedure\"     TONSILLECTOMY       TUBAL LIGATION         FAMILY HISTORY:  Family History   Problem Relation Age of Onset     Coronary Artery Disease Father      Hypertension Father      Coronary Artery Disease Sister      Hypertension Sister      Coronary Artery Disease Brother      Hypertension Brother      Heart Disease Nephew      Other Cancer Maternal Half-Sister      Breast Cancer Maternal Aunt 35     Ovarian Cancer No family hx of        SOCIAL HISTORY:  Social History     Socioeconomic History     Marital status:      Spouse name: None     Number of children: None     Years of education: None     Highest education level: None   Tobacco Use     Smoking status: Former     Packs/day: 0.50     Years: 50.00     Pack years: 25.00     Types: Cigarettes     Quit date: 1/19/2013     Years since quitting: 10.3     Smokeless tobacco: Never   Substance and Sexual Activity     Alcohol use: Yes     Comment: 5 drinks a week (wine)     Drug use: No     Sexual activity: Yes     Partners: Male   Other Topics Concern     Parent/sibling w/ CABG, MI or angioplasty before 65F 55M? Yes     Comment: brother age 31     Caffeine Concern Yes     Comment: 1 cup of coffee      Sleep Concern Yes     Comment: lately it has been an issue     Special Diet No     Comment: watching what she eats      Exercise No     Seat Belt Yes       Review of Systems:  Skin:  not assessed       Eyes:  not assessed      ENT:  not assessed      Respiratory:  Positive for dyspnea on exertion;shortness of breath on daily acvtivities;followed up with sweating as well. \"just the little things,I get winded\"   Cardiovascular:    Positive for;chest pain " "pain up the side shoots down rib cage;sometimes wraps around breast  Gastroenterology: not assessed      Genitourinary:  not assessed      Musculoskeletal:  not assessed      Neurologic:  not assessed      Psychiatric:  not assessed      Heme/Lymph/Imm:  not assessed      Endocrine:  not assessed        Physical Exam:  Vitals: /70   Pulse 79   Ht 1.638 m (5' 4.5\")   Wt 80.4 kg (177 lb 4.8 oz)   LMP  (LMP Unknown)   SpO2 98%   BMI 29.96 kg/m      Constitutional:  cooperative, alert and oriented, well developed, well nourished, in no acute distress overweight mildly anxious    Skin:  warm and dry to the touch, no apparent skin lesions or masses noted     warm and dry    Head:  normocephalic, no masses or lesions   atraumatic    Eyes:  pupils equal and round;conjunctivae and lids unremarkable;sclera white   EOMI, pupils round and equal    Lymph:      ENT:  no pallor or cyanosis   speech normal, tongue midline    Neck:  no carotid bruit;JVP normal;carotid pulses are full and equal bilaterally   supple    Respiratory:  normal breath sounds, clear to auscultation, normal A-P diameter, normal symmetry, normal respiratory excursion, no use of accessory muscles    clear without rales/wheezing    Cardiac: regular rhythm, normal S1/S2, no S3 or S4, apical impulse not displaced, no murmurs, gallops or rubs             RRR without murmur/lift/thrill  not assessed this visit                                   UE pulses full and equal    GI:    obese non-tender    Extremities and Muscular Skeletal:  no deformities, clubbing, cyanosis, erythema observed;no edema         no edema    Neurological:  no gross motor deficits;affect appropriate   motor grossly intact    Psych:  Alert and Oriented x 3        CC  Sotero Garza MD  [id number]  [place] [id number]              "

## 2023-05-08 NOTE — PATIENT INSTRUCTIONS
Your blood pressure was elevated at your appointment today.  Elevated blood pressure can increase your risk of a heart attack, stroke and heart failure.  For this reason, we feel it is important to monitor your blood pressure closely.  If you have access to a home blood pressure monitor or are able to check your blood pressure at a local pharmacy in the next week we would like you to do so and call our clinic with those readings. Please call [phone] ([place]) and leave a message with your name, date of birth, blood pressure reading, date and location it was completed. If your blood pressure remains elevated your care team will be notified.  We appreciate being a part of your healthcare team and look forward to hearing from you soon.

## 2023-05-08 NOTE — PROGRESS NOTES
Service Date: 05/08/2023    CARDIOLOGY FOLLOWUP VISIT     REFERRING PHYSICIAN:  Christiano Taylor MD    HISTORY OF PRESENT ILLNESS:  It is my pleasure to see your patient Monse Peoples, who is a very pleasant, 77-year-old patient with a past history of mild coronary artery disease based upon coronary angiography in 2005 and who also has a past history of noncardiac chest discomfort and some shortness of breath.  Since I last saw her, she has been doing well.  She does have classic white-coat hypertension, and her blood pressure was raised here today. Her blood pressure typically at home was 130/70.  She has not had a lipid profile drawn in approximately 2 years, and so that will need to be performed.  Also, her basic metabolic profile has not been drawn for approximately a year.  She gets occasional chest discomfort along the left side of her chest along the left mid axillary line.  This discomfort lasts between 1-3 minutes.  It does not occur with exertion, it is not relieved by rest, and she usually thinks that it comes on when she sits down.  It is distinctly noncardiac sounding, as I said.  She is under a lot of stress at present. Her sister who is about 15 months younger than her appears to have significant peripheral arterial disease and was in danger of having a toe amputation.  However, her greatest stress is from her son, who is 53 years of age and who has bipolar disorder and who is an alcoholic.  He recently was in the hospital for over a week and a half with atrial fibrillation.    IMPRESSION:    1.  Coronary artery disease.  The patient does not have classic angina pectoris.  2.  Chest pain.  This sounds distinctly noncardiac as described above.  3.  White-coat hypertension.  This has been proven as recently as last year on 24 hour blood pressure monitoring.  Her blood pressure typically at home is very well controlled at 130/70.  4.  Under a significant amount of stress with her sister and her son, as  I described above.  5.  Has not had a lipid profile drawn in 2 years.    PLAN:  We will have lipids drawn in a week's time fasting.  I will also check her basic metabolic profile at that stage. We will see the patient back again in 1 year's time, but as always, she has been told to contact me if she has any questions or any concerns. In particular, if her chest discomfort comes on with exertion and is relieved by rest or if she has left-sided discomfort which is continuous and unrelenting, she should go to the emergency room should that occur.    It is my pleasure to be involved in the care of this nice patient.    Sotero Woodson MD      cc:  Christiano Taylor MD  [facility] [place] [facility] [address] [place] [address]    Sotero Woodson MD, [facility]        D: 2023   T: 2023   MT: rell    Name:     AMINTA GOEL  MRN:      [id number]        Account:      [id number]   :      1945           Service Date: 2023       Document: [id number]

## 2023-05-25 ENCOUNTER — DOCUMENTATION ONLY (OUTPATIENT)
Dept: CARDIOLOGY | Facility: CLINIC | Age: 78
End: 2023-05-25
Payer: COMMERCIAL

## 2023-05-25 ENCOUNTER — TELEPHONE (OUTPATIENT)
Dept: CARDIOLOGY | Facility: CLINIC | Age: 78
End: 2023-05-25
Payer: COMMERCIAL

## 2023-05-25 DIAGNOSIS — R03.0 ELEVATED BLOOD PRESSURE READING WITHOUT DIAGNOSIS OF HYPERTENSION: Primary | ICD-10-CM

## 2023-05-25 NOTE — TELEPHONE ENCOUNTER
Patient returned call and left voicemail message with update blood pressure reading.      Last Blood Pressure: 130/70  Last Heart Rate: 79  Date: 5/8/23  Location: [facility] Cardiology    Today's Blood Pressure: 179/84  Today's Heart Rate:   Location: Home BP    Patient reported blood pressure updated in Epic. Blood pressure continues to fall outside of the [place] Community Measures guidelines.  Message sent to primary cardiology team for further review.

## 2023-05-26 ENCOUNTER — LAB (OUTPATIENT)
Dept: LAB | Facility: CLINIC | Age: 78
End: 2023-05-26
Payer: COMMERCIAL

## 2023-05-26 DIAGNOSIS — E78.5 HYPERLIPIDEMIA LDL GOAL <100: ICD-10-CM

## 2023-05-26 DIAGNOSIS — I25.10 CORONARY ARTERY DISEASE INVOLVING NATIVE CORONARY ARTERY OF NATIVE HEART WITHOUT ANGINA PECTORIS: ICD-10-CM

## 2023-05-26 LAB
ALT SERPL W P-5'-P-CCNC: 35 U/L (ref 10–35)
CHOLEST SERPL-MCNC: 134 MG/DL
HDLC SERPL-MCNC: 72 MG/DL
LDLC SERPL CALC-MCNC: 36 MG/DL
NONHDLC SERPL-MCNC: 62 MG/DL
TRIGL SERPL-MCNC: 131 MG/DL

## 2023-05-26 PROCEDURE — 80061 LIPID PANEL: CPT

## 2023-05-26 PROCEDURE — 84460 ALANINE AMINO (ALT) (SGPT): CPT

## 2023-05-26 PROCEDURE — 36415 COLL VENOUS BLD VENIPUNCTURE: CPT

## 2023-06-08 NOTE — TELEPHONE ENCOUNTER
Called patient with recommendations from Dr. Lan Woodson, order in chart and will message scheduling to call patient to arrange.  HOLDEN Escamilla RN

## 2023-06-19 ENCOUNTER — TELEPHONE (OUTPATIENT)
Dept: CARDIOLOGY | Facility: CLINIC | Age: 78
End: 2023-06-19
Payer: COMMERCIAL

## 2023-06-19 DIAGNOSIS — I10 BENIGN ESSENTIAL HYPERTENSION: ICD-10-CM

## 2023-06-19 NOTE — TELEPHONE ENCOUNTER
Called patient back as requested.  Patient states she took her blood pressure yesterday and got blood pressures of 162/90 1 in the morning and in the evening it was 144/81.  Patient states she had it checked at [facility] pharmacy and got a reading of 163/83. She also went to [facility] pharmacy and got a reading of 177/87.  Patient states she took her blood pressure this morning and got a reading of 144/81 and states it was at least an hour after her morning medications.  Patient has a 24-hour BP monitor scheduled 7/18/2023 and states that was the soonest she was able to get the 24-hour BP monitor placed in [place].    Patient also complaining of some slight chest pain, states she has had 2 episodes in a 24-hour period of time states it is in the upper left side of her chest, states it is a sharp pain lasting 1 to 2 minutes right near her left collarbone.  Patient states 1 episode occurred shortly after eating and another 1 occurred while she was sleeping and woke her up from sleep.  Patient states she did get a little short of breath and a little nauseated with this, patient denies any radiation to her jaw or arm.  Patient does describe it as a moderate sharp pain again lasting 1 to 2 minutes.  Patient also complaining of a little lightheadedness and occasional headache.    Will message Dr. Lan Woodson to review.  HOLDEN Escamilla RN

## 2023-06-19 NOTE — TELEPHONE ENCOUNTER
[facility] Call Center    Phone Message    May a detailed message be left on voicemail: yes     Reason for Call: Other: Pt would like to discuss high bp readings with care team.     Action Taken: Other: Cardiology    Travel Screening: Not Applicable     Thank you!  [facility]

## 2023-06-20 RX ORDER — ATENOLOL 50 MG/1
75 TABLET ORAL DAILY
Qty: 135 TABLET | Refills: 3 | Status: SHIPPED | OUTPATIENT
Start: 2023-06-20 | End: 2024-07-07

## 2023-06-20 NOTE — TELEPHONE ENCOUNTER
Called patient with recommendations from Dr. Lan Woodson, prescription for atenolol E scripted to [facility] as requested and patient advised to see Dr. Taylor regarding headache and lightheadedness and to go to the ER if chest pain continues or worsens.  Patient advised to she can always call me back with further questions or concerns.  HOLDEN Escamilla RN

## 2023-06-20 NOTE — TELEPHONE ENCOUNTER
Would increase atenolol to 75 mg per day. Has a hx of atypical CP. BP machines in shops etc are not so accurate. Would have her F/unit(s) with PMD re headache delisa lightheadidness. If chest pain becomes prolonged or exertional would go to ER. Thx

## 2023-07-18 ENCOUNTER — HOSPITAL ENCOUNTER (OUTPATIENT)
Dept: CARDIOLOGY | Facility: CLINIC | Age: 78
Discharge: HOME | End: 2023-07-18
Attending: INTERNAL MEDICINE | Admitting: INTERNAL MEDICINE
Payer: COMMERCIAL

## 2023-07-18 DIAGNOSIS — R03.0 ELEVATED BLOOD PRESSURE READING WITHOUT DIAGNOSIS OF HYPERTENSION: ICD-10-CM

## 2023-07-18 PROCEDURE — 93790 AMBL BP MNTR W/SW I&R: CPT | Performed by: INTERNAL MEDICINE

## 2023-07-18 PROCEDURE — 93786 AMBL BP MNTR W/SW REC ONLY: CPT

## 2023-07-24 ENCOUNTER — TELEPHONE (OUTPATIENT)
Dept: CARDIOLOGY | Facility: CLINIC | Age: 78
End: 2023-07-24
Payer: COMMERCIAL

## 2023-07-24 DIAGNOSIS — E78.5 HYPERLIPIDEMIA LDL GOAL <100: ICD-10-CM

## 2023-07-24 DIAGNOSIS — R07.9 CHEST PAIN, UNSPECIFIED TYPE: ICD-10-CM

## 2023-07-24 DIAGNOSIS — E78.2 MIXED HYPERLIPIDEMIA: ICD-10-CM

## 2023-07-24 DIAGNOSIS — R03.0 ELEVATED BLOOD PRESSURE READING WITHOUT DIAGNOSIS OF HYPERTENSION: Primary | ICD-10-CM

## 2023-07-24 DIAGNOSIS — I10 BENIGN ESSENTIAL HYPERTENSION: ICD-10-CM

## 2023-07-24 DIAGNOSIS — R06.09 DYSPNEA ON EXERTION: ICD-10-CM

## 2023-07-24 DIAGNOSIS — I25.10 CORONARY ARTERY DISEASE INVOLVING NATIVE CORONARY ARTERY OF NATIVE HEART WITHOUT ANGINA PECTORIS: ICD-10-CM

## 2023-07-24 NOTE — TELEPHONE ENCOUNTER
BP monitor results received. No future F/U apt scheduled. RN will send to Dr. Garza for review and recommendation.         7/24/23 24 hour BP monitor

## 2023-07-25 ENCOUNTER — MYC MEDICAL ADVICE (OUTPATIENT)
Dept: CARDIOLOGY | Facility: CLINIC | Age: 78
End: 2023-07-25
Payer: COMMERCIAL

## 2023-07-25 DIAGNOSIS — I10 BENIGN ESSENTIAL HYPERTENSION: Primary | ICD-10-CM

## 2023-07-25 NOTE — TELEPHONE ENCOUNTER
RN updated patient via [employer]. Will confirm she is in agreement with plan and then send in new prescription.

## 2023-07-26 RX ORDER — AMLODIPINE BESYLATE 5 MG/1
5 TABLET ORAL DAILY
Qty: 90 TABLET | Refills: 3 | Status: SHIPPED | OUTPATIENT
Start: 2023-07-26

## 2024-02-21 ENCOUNTER — HOSPITAL ENCOUNTER (OUTPATIENT)
Dept: MAMMOGRAPHY | Facility: CLINIC | Age: 79
Discharge: HOME | End: 2024-02-21
Attending: FAMILY MEDICINE | Admitting: FAMILY MEDICINE
Payer: COMMERCIAL

## 2024-02-21 DIAGNOSIS — Z12.31 VISIT FOR SCREENING MAMMOGRAM: ICD-10-CM

## 2024-02-21 PROCEDURE — 77063 BREAST TOMOSYNTHESIS BI: CPT

## 2024-03-23 ENCOUNTER — HEALTH MAINTENANCE LETTER (OUTPATIENT)
Age: 79
End: 2024-03-23

## 2024-06-03 ENCOUNTER — LAB (OUTPATIENT)
Dept: LAB | Facility: CLINIC | Age: 79
End: 2024-06-03
Attending: INTERNAL MEDICINE
Payer: COMMERCIAL

## 2024-06-03 DIAGNOSIS — I25.10 CORONARY ARTERY DISEASE INVOLVING NATIVE CORONARY ARTERY OF NATIVE HEART WITHOUT ANGINA PECTORIS: ICD-10-CM

## 2024-06-03 DIAGNOSIS — I10 BENIGN ESSENTIAL HYPERTENSION: ICD-10-CM

## 2024-06-03 DIAGNOSIS — E78.5 HYPERLIPIDEMIA LDL GOAL <100: ICD-10-CM

## 2024-06-03 DIAGNOSIS — I10 BENIGN ESSENTIAL HYPERTENSION: Primary | ICD-10-CM

## 2024-06-03 LAB
ALT SERPL W P-5'-P-CCNC: 35 U/L (ref 0–50)
ANION GAP SERPL CALCULATED.3IONS-SCNC: 14 MMOL/L (ref 7–15)
BUN SERPL-MCNC: 12.8 MG/DL (ref 8–23)
CALCIUM SERPL-MCNC: 9.2 MG/DL (ref 8.8–10.2)
CHLORIDE SERPL-SCNC: 101 MMOL/L (ref 98–107)
CHOLEST SERPL-MCNC: 144 MG/DL
CREAT SERPL-MCNC: 0.88 MG/DL (ref 0.51–0.95)
DEPRECATED HCO3 PLAS-SCNC: 23 MMOL/L (ref 22–29)
EGFRCR SERPLBLD CKD-EPI 2021: 67 ML/MIN/1.73M2
FASTING STATUS PATIENT QL REPORTED: YES
GLUCOSE SERPL-MCNC: 106 MG/DL (ref 70–99)
HDLC SERPL-MCNC: 75 MG/DL
LDLC SERPL CALC-MCNC: 45 MG/DL
NONHDLC SERPL-MCNC: 69 MG/DL
POTASSIUM SERPL-SCNC: 4 MMOL/L (ref 3.4–5.3)
SODIUM SERPL-SCNC: 138 MMOL/L (ref 135–145)
TRIGL SERPL-MCNC: 119 MG/DL

## 2024-06-03 PROCEDURE — 80061 LIPID PANEL: CPT | Performed by: INTERNAL MEDICINE

## 2024-06-03 PROCEDURE — 84460 ALANINE AMINO (ALT) (SGPT): CPT | Performed by: INTERNAL MEDICINE

## 2024-06-03 PROCEDURE — 80048 BASIC METABOLIC PNL TOTAL CA: CPT | Performed by: INTERNAL MEDICINE

## 2024-06-03 PROCEDURE — 36415 COLL VENOUS BLD VENIPUNCTURE: CPT | Performed by: INTERNAL MEDICINE

## 2024-07-07 ENCOUNTER — HOSPITAL ENCOUNTER (OUTPATIENT)
Facility: CLINIC | Age: 79
Setting detail: OBSERVATION
Discharge: HOME | End: 2024-07-08
Attending: EMERGENCY MEDICINE | Admitting: INTERNAL MEDICINE
Payer: COMMERCIAL

## 2024-07-07 ENCOUNTER — APPOINTMENT (OUTPATIENT)
Dept: GENERAL RADIOLOGY | Facility: CLINIC | Age: 79
End: 2024-07-07
Attending: EMERGENCY MEDICINE
Payer: COMMERCIAL

## 2024-07-07 DIAGNOSIS — E87.1 HYPONATREMIA: ICD-10-CM

## 2024-07-07 DIAGNOSIS — R79.89 ELEVATED BRAIN NATRIURETIC PEPTIDE (BNP) LEVEL: ICD-10-CM

## 2024-07-07 DIAGNOSIS — R79.89 ELEVATED TROPONIN: ICD-10-CM

## 2024-07-07 DIAGNOSIS — D64.9 CHRONIC ANEMIA: ICD-10-CM

## 2024-07-07 DIAGNOSIS — N17.9 ACUTE RENAL FAILURE, UNSPECIFIED ACUTE RENAL FAILURE TYPE (H): ICD-10-CM

## 2024-07-07 DIAGNOSIS — E87.8 HYPOCHLOREMIA: ICD-10-CM

## 2024-07-07 DIAGNOSIS — K52.9 GASTROENTERITIS: ICD-10-CM

## 2024-07-07 LAB
ALBUMIN UR-MCNC: NEGATIVE MG/DL
ANION GAP SERPL CALCULATED.3IONS-SCNC: 15 MMOL/L (ref 7–15)
APPEARANCE UR: CLEAR
BACTERIA #/AREA URNS HPF: ABNORMAL /HPF
BASOPHILS # BLD AUTO: 0 10E3/UL (ref 0–0.2)
BASOPHILS NFR BLD AUTO: 0 %
BILIRUB UR QL STRIP: NEGATIVE
BUN SERPL-MCNC: 32 MG/DL (ref 8–23)
CALCIUM SERPL-MCNC: 8.4 MG/DL (ref 8.8–10.2)
CHLORIDE SERPL-SCNC: 94 MMOL/L (ref 98–107)
COLOR UR AUTO: ABNORMAL
CREAT SERPL-MCNC: 1.63 MG/DL (ref 0.51–0.95)
D DIMER PPP FEU-MCNC: 3.12 UG/ML FEU (ref 0–0.5)
DEPRECATED HCO3 PLAS-SCNC: 22 MMOL/L (ref 22–29)
EGFRCR SERPLBLD CKD-EPI 2021: 32 ML/MIN/1.73M2
EOSINOPHIL # BLD AUTO: 0 10E3/UL (ref 0–0.7)
EOSINOPHIL NFR BLD AUTO: 1 %
ERYTHROCYTE [DISTWIDTH] IN BLOOD BY AUTOMATED COUNT: 13.3 % (ref 10–15)
FLUAV RNA SPEC QL NAA+PROBE: NEGATIVE
FLUBV RNA RESP QL NAA+PROBE: NEGATIVE
GLUCOSE SERPL-MCNC: 99 MG/DL (ref 70–99)
GLUCOSE UR STRIP-MCNC: NEGATIVE MG/DL
HCT VFR BLD AUTO: 30.5 % (ref 35–47)
HGB BLD-MCNC: 10 G/DL (ref 11.7–15.7)
HGB UR QL STRIP: NEGATIVE
HOLD SPECIMEN: NORMAL
HOLD SPECIMEN: NORMAL
IMM GRANULOCYTES # BLD: 0.1 10E3/UL
IMM GRANULOCYTES NFR BLD: 1 %
KETONES UR STRIP-MCNC: NEGATIVE MG/DL
LEUKOCYTE ESTERASE UR QL STRIP: NEGATIVE
LYMPHOCYTES # BLD AUTO: 0.6 10E3/UL (ref 0.8–5.3)
LYMPHOCYTES NFR BLD AUTO: 9 %
MCH RBC QN AUTO: 30.2 PG (ref 26.5–33)
MCHC RBC AUTO-ENTMCNC: 32.8 G/DL (ref 31.5–36.5)
MCV RBC AUTO: 92 FL (ref 78–100)
MONOCYTES # BLD AUTO: 1.1 10E3/UL (ref 0–1.3)
MONOCYTES NFR BLD AUTO: 17 %
MUCOUS THREADS #/AREA URNS LPF: PRESENT /LPF
NEUTROPHILS # BLD AUTO: 4.6 10E3/UL (ref 1.6–8.3)
NEUTROPHILS NFR BLD AUTO: 73 %
NITRATE UR QL: NEGATIVE
NRBC # BLD AUTO: 0 10E3/UL
NRBC BLD AUTO-RTO: 0 /100
NT-PROBNP SERPL-MCNC: 725 PG/ML (ref 0–1800)
PH UR STRIP: 5.5 [PH] (ref 5–7)
PLAT MORPH BLD: NORMAL
PLATELET # BLD AUTO: 260 10E3/UL (ref 150–450)
POTASSIUM SERPL-SCNC: 3.4 MMOL/L (ref 3.4–5.3)
RBC # BLD AUTO: 3.31 10E6/UL (ref 3.8–5.2)
RBC MORPH BLD: NORMAL
RBC URINE: <1 /HPF
RSV RNA SPEC NAA+PROBE: NEGATIVE
SARS-COV-2 RNA RESP QL NAA+PROBE: NEGATIVE
SODIUM SERPL-SCNC: 131 MMOL/L (ref 135–145)
SP GR UR STRIP: 1.01 (ref 1–1.03)
TROPONIN T SERPL HS-MCNC: 33 NG/L
TSH SERPL DL<=0.005 MIU/L-ACNC: 0.3 UIU/ML (ref 0.3–4.2)
UROBILINOGEN UR STRIP-MCNC: NORMAL MG/DL
WBC # BLD AUTO: 6.3 10E3/UL (ref 4–11)
WBC URINE: 2 /HPF

## 2024-07-07 PROCEDURE — 258N000003 HC RX IP 258 OP 636: Performed by: INTERNAL MEDICINE

## 2024-07-07 PROCEDURE — 99285 EMERGENCY DEPT VISIT HI MDM: CPT

## 2024-07-07 PROCEDURE — 80048 BASIC METABOLIC PNL TOTAL CA: CPT | Performed by: EMERGENCY MEDICINE

## 2024-07-07 PROCEDURE — 84443 ASSAY THYROID STIM HORMONE: CPT | Performed by: EMERGENCY MEDICINE

## 2024-07-07 PROCEDURE — 99223 1ST HOSP IP/OBS HIGH 75: CPT | Performed by: INTERNAL MEDICINE

## 2024-07-07 PROCEDURE — G0378 HOSPITAL OBSERVATION PER HR: HCPCS

## 2024-07-07 PROCEDURE — 96361 HYDRATE IV INFUSION ADD-ON: CPT

## 2024-07-07 PROCEDURE — 81001 URINALYSIS AUTO W/SCOPE: CPT | Performed by: INTERNAL MEDICINE

## 2024-07-07 PROCEDURE — 93005 ELECTROCARDIOGRAM TRACING: CPT

## 2024-07-07 PROCEDURE — 84484 ASSAY OF TROPONIN QUANT: CPT | Performed by: EMERGENCY MEDICINE

## 2024-07-07 PROCEDURE — 36415 COLL VENOUS BLD VENIPUNCTURE: CPT | Performed by: EMERGENCY MEDICINE

## 2024-07-07 PROCEDURE — 85025 COMPLETE CBC W/AUTO DIFF WBC: CPT | Performed by: EMERGENCY MEDICINE

## 2024-07-07 PROCEDURE — 250N000013 HC RX MED GY IP 250 OP 250 PS 637: Performed by: INTERNAL MEDICINE

## 2024-07-07 PROCEDURE — 87637 SARSCOV2&INF A&B&RSV AMP PRB: CPT | Performed by: EMERGENCY MEDICINE

## 2024-07-07 PROCEDURE — 85379 FIBRIN DEGRADATION QUANT: CPT | Performed by: INTERNAL MEDICINE

## 2024-07-07 PROCEDURE — 258N000003 HC RX IP 258 OP 636: Performed by: EMERGENCY MEDICINE

## 2024-07-07 PROCEDURE — 96360 HYDRATION IV INFUSION INIT: CPT

## 2024-07-07 PROCEDURE — 83880 ASSAY OF NATRIURETIC PEPTIDE: CPT | Performed by: EMERGENCY MEDICINE

## 2024-07-07 PROCEDURE — 71046 X-RAY EXAM CHEST 2 VIEWS: CPT

## 2024-07-07 RX ORDER — ONDANSETRON 2 MG/ML
4 INJECTION INTRAMUSCULAR; INTRAVENOUS EVERY 6 HOURS PRN
Status: DISCONTINUED | OUTPATIENT
Start: 2024-07-07 | End: 2024-07-08 | Stop reason: HOSPADM

## 2024-07-07 RX ORDER — ATENOLOL 50 MG/1
50 TABLET ORAL DAILY
COMMUNITY

## 2024-07-07 RX ORDER — FERROUS SULFATE 325(65) MG
325 TABLET, DELAYED RELEASE (ENTERIC COATED) ORAL DAILY
COMMUNITY

## 2024-07-07 RX ORDER — SENNOSIDES A AND B 8.6 MG/1
1 TABLET, FILM COATED ORAL 2 TIMES DAILY PRN
COMMUNITY

## 2024-07-07 RX ORDER — AMOXICILLIN 250 MG
2 CAPSULE ORAL 2 TIMES DAILY PRN
Status: DISCONTINUED | OUTPATIENT
Start: 2024-07-07 | End: 2024-07-08 | Stop reason: HOSPADM

## 2024-07-07 RX ORDER — ONDANSETRON 4 MG/1
4 TABLET, ORALLY DISINTEGRATING ORAL EVERY 6 HOURS PRN
Status: DISCONTINUED | OUTPATIENT
Start: 2024-07-07 | End: 2024-07-08 | Stop reason: HOSPADM

## 2024-07-07 RX ORDER — ATORVASTATIN CALCIUM 20 MG/1
20 TABLET, FILM COATED ORAL DAILY
Status: DISCONTINUED | OUTPATIENT
Start: 2024-07-07 | End: 2024-07-08 | Stop reason: HOSPADM

## 2024-07-07 RX ORDER — BUPROPION HYDROCHLORIDE 150 MG/1
150 TABLET, EXTENDED RELEASE ORAL 2 TIMES DAILY
Status: DISCONTINUED | OUTPATIENT
Start: 2024-07-07 | End: 2024-07-08 | Stop reason: HOSPADM

## 2024-07-07 RX ORDER — PANTOPRAZOLE SODIUM 40 MG/1
40 TABLET, DELAYED RELEASE ORAL
Status: DISCONTINUED | OUTPATIENT
Start: 2024-07-08 | End: 2024-07-08 | Stop reason: HOSPADM

## 2024-07-07 RX ORDER — POLYETHYLENE GLYCOL 3350 17 G/17G
17 POWDER, FOR SOLUTION ORAL 2 TIMES DAILY PRN
Status: DISCONTINUED | OUTPATIENT
Start: 2024-07-07 | End: 2024-07-08 | Stop reason: HOSPADM

## 2024-07-07 RX ORDER — ACETAMINOPHEN 325 MG/1
650 TABLET ORAL EVERY 4 HOURS PRN
Status: DISCONTINUED | OUTPATIENT
Start: 2024-07-07 | End: 2024-07-08 | Stop reason: HOSPADM

## 2024-07-07 RX ORDER — TRAZODONE HYDROCHLORIDE 100 MG/1
100-200 TABLET ORAL AT BEDTIME
COMMUNITY

## 2024-07-07 RX ORDER — SODIUM CHLORIDE, SODIUM LACTATE, POTASSIUM CHLORIDE, CALCIUM CHLORIDE 600; 310; 30; 20 MG/100ML; MG/100ML; MG/100ML; MG/100ML
INJECTION, SOLUTION INTRAVENOUS CONTINUOUS
Status: DISCONTINUED | OUTPATIENT
Start: 2024-07-07 | End: 2024-07-08

## 2024-07-07 RX ORDER — ASPIRIN 81 MG/1
81 TABLET ORAL DAILY
Status: DISCONTINUED | OUTPATIENT
Start: 2024-07-08 | End: 2024-07-08 | Stop reason: HOSPADM

## 2024-07-07 RX ORDER — TRAZODONE HYDROCHLORIDE 50 MG/1
100 TABLET, FILM COATED ORAL AT BEDTIME
Status: DISCONTINUED | OUTPATIENT
Start: 2024-07-07 | End: 2024-07-08 | Stop reason: HOSPADM

## 2024-07-07 RX ORDER — ATENOLOL 50 MG/1
50 TABLET ORAL DAILY
Status: DISCONTINUED | OUTPATIENT
Start: 2024-07-08 | End: 2024-07-08 | Stop reason: HOSPADM

## 2024-07-07 RX ORDER — ACETAMINOPHEN 650 MG/1
650 SUPPOSITORY RECTAL EVERY 4 HOURS PRN
Status: DISCONTINUED | OUTPATIENT
Start: 2024-07-07 | End: 2024-07-08 | Stop reason: HOSPADM

## 2024-07-07 RX ORDER — DULOXETIN HYDROCHLORIDE 30 MG/1
60 CAPSULE, DELAYED RELEASE ORAL DAILY
Status: DISCONTINUED | OUTPATIENT
Start: 2024-07-08 | End: 2024-07-08 | Stop reason: HOSPADM

## 2024-07-07 RX ORDER — AMOXICILLIN 250 MG
1 CAPSULE ORAL 2 TIMES DAILY PRN
Status: DISCONTINUED | OUTPATIENT
Start: 2024-07-07 | End: 2024-07-08 | Stop reason: HOSPADM

## 2024-07-07 RX ORDER — AMLODIPINE BESYLATE 5 MG/1
5 TABLET ORAL DAILY
Status: DISCONTINUED | OUTPATIENT
Start: 2024-07-07 | End: 2024-07-08 | Stop reason: HOSPADM

## 2024-07-07 RX ADMIN — SODIUM CHLORIDE, POTASSIUM CHLORIDE, SODIUM LACTATE AND CALCIUM CHLORIDE: 600; 310; 30; 20 INJECTION, SOLUTION INTRAVENOUS at 18:33

## 2024-07-07 RX ADMIN — BUPROPION HYDROCHLORIDE 150 MG: 150 TABLET, EXTENDED RELEASE ORAL at 20:07

## 2024-07-07 RX ADMIN — TRAZODONE HYDROCHLORIDE 100 MG: 50 TABLET ORAL at 21:44

## 2024-07-07 RX ADMIN — ATORVASTATIN CALCIUM 20 MG: 20 TABLET, FILM COATED ORAL at 20:07

## 2024-07-07 RX ADMIN — SODIUM CHLORIDE 1000 ML: 9 INJECTION, SOLUTION INTRAVENOUS at 15:56

## 2024-07-07 RX ADMIN — AMLODIPINE BESYLATE 5 MG: 5 TABLET ORAL at 20:07

## 2024-07-07 ASSESSMENT — COLUMBIA-SUICIDE SEVERITY RATING SCALE - C-SSRS
1. IN THE PAST MONTH, HAVE YOU WISHED YOU WERE DEAD OR WISHED YOU COULD GO TO SLEEP AND NOT WAKE UP?: NO
2. HAVE YOU ACTUALLY HAD ANY THOUGHTS OF KILLING YOURSELF IN THE PAST MONTH?: NO
6. HAVE YOU EVER DONE ANYTHING, STARTED TO DO ANYTHING, OR PREPARED TO DO ANYTHING TO END YOUR LIFE?: NO

## 2024-07-07 ASSESSMENT — ACTIVITIES OF DAILY LIVING (ADL)
ADLS_ACUITY_SCORE: 37
ADLS_ACUITY_SCORE: 35
ADLS_ACUITY_SCORE: 37
ADLS_ACUITY_SCORE: 34
ADLS_ACUITY_SCORE: 37
ADLS_ACUITY_SCORE: 34

## 2024-07-07 NOTE — ED TRIAGE NOTES
Shoulder surgery in June. Increasing fatigue and shortness of breath. In triage, able to speak in complete sentence. BP 84/70.

## 2024-07-07 NOTE — PHARMACY-ADMISSION MEDICATION HISTORY
"Pharmacy Intern Admission Medication History    Admission medication history is complete. The information provided in this note is only as accurate as the sources available at the time of the update.    Information Source(s): Patient via in-person    Pertinent Information: Pt unsure what dose of aspirin she is taking. Says it's the \"92 mg\" aspirin.    Changes made to PTA medication list:  Added: Ferrous sulfate, senna  Deleted: coenzyme Q10, cyanocobalamin, lorazepam  Changed:   Atenolol 75 mg daily > 50 mg daily  Trazodone 200 mg at bedtime > 100-200 mg at bedtime     Allergies reviewed with patient and updates made in EHR: yes    Medication History Completed By: Annette Herrera 7/7/2024 5:41 PM    PTA Med List   Medication Sig Last Dose    acetaminophen (TYLENOL) 325 MG tablet Take 650 mg by mouth every morning 7/7/2024 at AM    amLODIPine (NORVASC) 5 MG tablet Take 1 tablet (5 mg) by mouth daily 7/6/2024 at PM    aspirin 81 MG tablet Take 81 mg by mouth daily 7/7/2024    atenolol (TENORMIN) 50 MG tablet Take 50 mg by mouth daily 7/7/2024 at AM    atorvastatin (LIPITOR) 20 MG tablet Take 1 tablet (20 mg) by mouth daily 7/6/2024 at PM    buPROPion (WELLBUTRIN SR) 150 MG 12 hr tablet Take 1 tablet (150 mg) by mouth 2 times daily 7/7/2024 at AM    calcium carbonate-vitamin D 600-400 MG-UNIT CHEW Take 2 tablets by mouth every morning 7/7/2024 at AM    DULoxetine (CYMBALTA) 60 MG capsule Take 1 capsule (60 mg) by mouth daily 7/7/2024 at AM    ferrous sulfate (FE TABS) 325 (65 Fe) MG EC tablet Take 325 mg by mouth daily 7/7/2024 at AM    FIBER SELECT GUMMIES CHEW Take 1 chew tab by mouth daily  7/6/2024 at PM    hydrALAZINE (APRESOLINE) 100 MG tablet Take 1 tablet (100 mg) by mouth 3 times daily 7/7/2024 at X1 AM    hydrochlorothiazide (HYDRODIURIL) 25 MG tablet Take 1 tablet (25 mg) by mouth daily 7/7/2024 at AM    lisinopril (ZESTRIL) 40 MG tablet Take 1 tablet (40 mg) by mouth daily 7/6/2024 at PM    multivitamin " w/minerals (THERA-VIT-M) tablet Take 1 tablet by mouth daily 7/7/2024 at AM    nitroGLYcerin (NITROSTAT) 0.4 MG sublingual tablet For chest pain place 1 tablet under the tongue every 5 minutes for 3 doses. If symptoms persist 5 minutes after 1st dose call 911. Unknown    Omega-3 Fatty Acids (OMEGA-3 FISH OIL PO) Take 1.2 g by mouth daily  7/6/2024 at PM    omeprazole (PRILOSEC) 40 MG DR capsule Take 1 capsule (40 mg) by mouth daily Take 30-60 minutes before a meal. 7/7/2024 at AM    senna (SENOKOT) 8.6 MG tablet Take 1 tablet by mouth 2 times daily as needed for constipation 7/6/2024 at PM    traZODone (DESYREL) 100 MG tablet Take 100-200 mg by mouth at bedtime 7/6/2024 at PM

## 2024-07-07 NOTE — ED NOTES
"[facility]  ED Nurse Handoff Report    ED Chief complaint: Fatigue and Shortness of Breath  . ED Diagnosis:   Final diagnoses:   Acute renal failure, unspecified acute renal failure type (H24)   Hyponatremia   Hypochloremia   Elevated troponin   Elevated brain natriuretic peptide (BNP) level   Chronic anemia       Allergies:   Allergies   Allergen Reactions    Codeine     Contrast Dye Unknown     PN: LW CM1: CONTRAST- nka Reaction :    Morphine Sulfate (Concentrate)      does not like side effects/\"hyper\"       Code Status: Full Code    Activity level - Baseline/Home:  standby./independent  Activity Level - Current:   assist of 2.   Lift room needed: No.   Bariatric: No   Needed: No   Isolation: No.   Infection: Not Applicable.     Respiratory status: Room air    Vital Signs (within 30 minutes):   Vitals:    07/07/24 1706 07/07/24 1707 07/07/24 1708 07/07/24 1709   BP:       Pulse: 68 68 68 68   Resp: 17 16 17 18   Temp:       TempSrc:       SpO2: 98% 92% 96% 96%       Cardiac Rhythm:  ,   Cardiac  Cardiac Rhythm: Normal sinus rhythm (denies chest pain)  Pain level:    Patient confused: No.   Patient Falls Risk: patient and family education.   Elimination Status: Has voided     Patient Report - Initial Complaint: sob, fatigue.   Focused Assessment: Monse Peoples is a 78 year old female, not on anticoagulants, with a history of anemia and hypertension presenting with fatigue and shortness of breath. The patient has been experiencing these symptoms for 2 weeks, but it's been getting worse since Wednesday (07/03/24). Patient endorses weakness, especially with standing, and increased tightness in her chest. She has been drinking water, but has not eaten in 2-3 days due to vomiting. Vomiting has since stopped. No history of blood clots. On a low dose Asprin. Denies chest pain, abdominal pain, leg or arm swelling, fever, rash, redness, new cough, or diarrhea.          1704  Cardiac  NE "    Cardiac (Adult)Cardiac WDL: WDLCardiac Rhythm: NSR (denies chest pain)        1705  Respiratory  NE    RespiratoryAirway WDL: .WDL except  Respiratory WDLRespiratory WDL: .WDL except; rhythm/patternRhythm/Pattern, Respiratory: dyspnea upon exertion; shortness of breath (able to speak in full sentences, no shortness of breath while in bed)          Abnormal Results:   Labs Ordered and Resulted from Time of ED Arrival to Time of ED Departure   BASIC METABOLIC PANEL - Abnormal       Result Value    Sodium 131 (*)     Potassium 3.4      Chloride 94 (*)     Carbon Dioxide (CO2) 22      Anion Gap 15      Urea Nitrogen 32.0 (*)     Creatinine 1.63 (*)     GFR Estimate 32 (*)     Calcium 8.4 (*)     Glucose 99     CBC WITH PLATELETS AND DIFFERENTIAL - Abnormal    WBC Count 6.3      RBC Count 3.31 (*)     Hemoglobin 10.0 (*)     Hematocrit 30.5 (*)     MCV 92      MCH 30.2      MCHC 32.8      RDW 13.3      Platelet Count 260      % Neutrophils 73      % Lymphocytes 9      % Monocytes 17      % Eosinophils 1      % Basophils 0      % Immature Granulocytes 1      NRBCs per 100 WBC 0      Absolute Neutrophils 4.6      Absolute Lymphocytes 0.6 (*)     Absolute Monocytes 1.1      Absolute Eosinophils 0.0      Absolute Basophils 0.0      Absolute Immature Granulocytes 0.1      Absolute NRBCs 0.0     TROPONIN T, HIGH SENSITIVITY - Abnormal    Troponin T, High Sensitivity 33 (*)    INFLUENZA A/B, RSV, & SARS-COV2 PCR - Normal    Influenza A PCR Negative      Influenza B PCR Negative      RSV PCR Negative      SARS CoV2 PCR Negative     NT PROBNP INPATIENT - Normal    N terminal Pro BNP Inpatient 725     TSH WITH FREE T4 REFLEX - Normal    TSH 0.30     RBC AND PLATELET MORPHOLOGY    RBC Morphology Confirmed RBC Indices      Platelet Assessment        Value: Automated Count Confirmed. Platelet morphology is normal.   ROUTINE UA WITH MICROSCOPIC REFLEX TO CULTURE        XR Chest 2 Views   Final Result   IMPRESSION: No new  consolidation. No pleural effusions or pneumothorax. Unchanged cardiac size. Mild aortic atherosclerosis. Surgical clips in the upper abdomen. Degenerative changes in the right shoulder. Left reverse total shoulder arthroplasty.          Treatments provided: labs, imaging  Family Comments: per pt.   OBS brochure/video discussed/provided to patient:  No  ED Medications:   Medications   sodium chloride 0.9% BOLUS 1,000 mL (0 mLs Intravenous Stopped 7/7/24 7943)       Drips infusing:  No  For the majority of the shift this patient was Green.   Interventions performed were n/a.    Sepsis treatment initiated: No    Cares/treatment/interventions/medications to be completed following ED care: none    ED Nurse Name: Kathy Ortega RN  5:19 PM  RECEIVING UNIT ED HANDOFF REVIEW    Above ED Nurse Handoff Report was reviewed: Yes  Reviewed by: Savanna Barrera RN on July 7, 2024 at 6:08 PM   KAREY Taylor called the ED to inform them the note was read: Yes

## 2024-07-07 NOTE — H&P
[facility]  History and Physical  Hospitalist - Gutierrez Parry DO       Date of Admission:  7/7/2024    Chief Complaint   Shortness of breath    History is obtained from the patient, the patient's  at bedside, the emergency department physician, as well as electronic medical record.    History of Present Illness   Monse Peoples is a 78 year old female with past medical history of coronary artery disease, hypertension, hyperlipidemia, asthma, GERD who presented on 7/7/2024 with chief complaint of shortness of breath.  The patient presents with her  who is at bedside.  The patient was treated at [facility] from 6/4 to 6/7 after a fall with resultant left humeral neck fracture.  The patient underwent left reverse total shoulder arthroplasty at that time he was found to have anemia postoperatively and started on oral iron.  The patient states that since discharge she has noticed progressive shortness of breath.  She also reports some chest pressure, but notes she had been dealing with that prior to the hospitalization and attributes it to her GERD.  She reports that shortness of breath is worse with exertion.  She reports an episode of vomiting on Wednesday 7/3.  She also reports progressive fatigue and has been sleeping more more to the point that last night she slept 12 hours.  She became concerned about her hemoglobin being low again so came to the emergency department for evaluation.  She does report some diarrhea, but also attributes this to starting oral iron after her hospitalization.  She denies any chest pain or tightness.  She does report a significant family history of coronary artery disease with 2 brothers dying in their 30s of cardiac arrest.    In the emergency department, the patient was found to have a temperature of 96.3  F, heart rate 86, blood pressure as low as 84/70, respiratory rate 16, SpO2 96% on room air.  Initial lab work showed sodium  131, chloride 94, BUN/creatinine 32.0/1.63, high-sensitivity troponin 33, proBNP 725, TSH 0.30, hemoglobin 10.0, WBC 6.3.  COVID-19, influenza, RSV were negative.  Chest x-ray showed no acute process.  EKG showed sinus rhythm.  The patient was started on IV fluids in the setting of acute kidney injury and suspected dehydration.  The patient was admitted for observation.    ASSESSMENT/PLAN    Dyspnea on Exertion  NSTEMI  Coronary Artery Disease  - Symptoms for at least 2 weeks, if not longer.  Strong family hx of heart disease  - Repeat troponin in AM  - Check echocardiogram  - Telemetry overnight  - Check d-dimer given her recent surgery.  Discussed with pt, if Cr improves tomorrow with IV, would benefit from CT PE.  If unable to do CT PE, check perfusion scan  - PT/OT  ADDENDUM: D-dimer elevated at 3.12.  Consider CT PE vs lung perfusion scan in AM    Acute Kidney Injury  - Baseline Cr = 0.8  - Hold lisinopril and hydrochlorothiazide  - IVF overnight  - UA pending  - Daily BMP    Hypertension  - Pt with soft BP in the ED.  Hold hydralazine for now.  Continue atenolol and amlodipine with parameters    Mild Hyponatremia  - IVF  - Daily BMP    Iron Deficiency Anemia  - Hold oral iron as hgb improved and clearly causing GI side effects  - Daily CBC    Recent L Reverse Total Shoulder Arthroplasty on 6/4/2024  - PT/OT    Chronic Medical Problems:  Hyperlipidemia  Asthma  GERD    PLAN: Resume home medications as appropriate once confirmed by pharmacy.     I spent 55 minutes in reviewing this patient's labs, imaging, medications, medical history.  In addition time was spent interviewing the patient, communicating with family, and medical decision making.  Time was also spent reviewing notes of recent discharge summary.    DVT Prophylaxis: Pneumatic Compression Devices  Code Status: Full Code  Disposition: Medically Ready for Discharge: Anticipated Tomorrow (1-2 days)  Goals to discharge include: cardiac work up negative,  "kidney function improved    Gutierrez Parry DO  Securely message with [employer] (more info)  Text page via [employer] Paging/Directory     Primary Care Physician   Christiano S Brandon    -----------------------------------------------------------------------------------------------------------------------------------------------------------------------------------------------------    Past Medical History    I have reviewed this patient's medical history and updated it with pertinent information if needed.   Past Medical History:   Diagnosis Date    Anxiety 4/2/2015    Asthma     no inhaler for 2-3 years    Benign essential hypertension 8/2/2016    Chest pain, unspecified type 8/27/2017    Coronary artery disease     Coronary artery disease involving native coronary artery of native heart without angina pectoris 8/2/2016    Depression     Esophageal reflux 3/19/2015    Former smoker - has had pulmonary nodules in the past 3/29/2017    History of blood transfusion     Hyperlipidemia LDL goal <100 3/19/2015    Osteoarthritis     Osteopenia     Pulmonary nodules 4/22/2015    Noted 4/14/15, f/u scan 03/2016 showed minimal change, needs repeat scan 03/2017        Past Surgical History   I have reviewed this patient's surgical history and updated it with pertinent information if needed.  Past Surgical History:   Procedure Laterality Date    ANGIOGRAM  6/15/05    ARTHROPLASTY HIP  9/23/2013    Procedure: ARTHROPLASTY HIP;  Right total hip arthroplasty      ARTHROPLASTY HIP Left 5/19/2015    Procedure: ARTHROPLASTY HIP;  Surgeon: Tyrese Howell MD;  Location: [facility] OR    ARTHROPLASTY KNEE Left 8/22/2016    Procedure: ARTHROPLASTY KNEE;  Surgeon: Tyrese Howell MD;  Location: [facility] OR    CHOLECYSTECTOMY      COLONOSCOPY      COLONOSCOPY Left 4/19/2016    Procedure: COLONOSCOPY;  Surgeon: Moe Haney MD;  Location:  GI    GASTRIC BYPASS      2011 tiff en y    GI SURGERY      fistulotomy    ORTHOPEDIC SURGERY      left knee \"green " "procedure\"    TONSILLECTOMY      TUBAL LIGATION         Prior to Admission Medications   Prior to Admission Medications   Prescriptions Last Dose Informant Patient Reported? Taking?   DULoxetine (CYMBALTA) 60 MG capsule 7/7/2024 at AM  No Yes   Sig: Take 1 capsule (60 mg) by mouth daily   FIBER SELECT GUMMIES CHEW 7/6/2024 at PM  Yes Yes   Sig: Take 1 chew tab by mouth daily    Omega-3 Fatty Acids (OMEGA-3 FISH OIL PO) 7/6/2024 at PM  Yes Yes   Sig: Take 1.2 g by mouth daily    acetaminophen (TYLENOL) 325 MG tablet 7/7/2024 at AM  Yes Yes   Sig: Take 650 mg by mouth every morning   amLODIPine (NORVASC) 5 MG tablet 7/6/2024 at PM  No Yes   Sig: Take 1 tablet (5 mg) by mouth daily   aspirin 81 MG tablet Unknown  Yes Yes   Sig: Take 81 mg by mouth daily   atenolol (TENORMIN) 50 MG tablet 7/7/2024 at AM  Yes Yes   Sig: Take 50 mg by mouth daily   atorvastatin (LIPITOR) 20 MG tablet 7/6/2024 at PM  No Yes   Sig: Take 1 tablet (20 mg) by mouth daily   buPROPion (WELLBUTRIN SR) 150 MG 12 hr tablet 7/7/2024 at AM  No Yes   Sig: Take 1 tablet (150 mg) by mouth 2 times daily   calcium carbonate-vitamin D 600-400 MG-UNIT CHEW 7/7/2024 at AM  Yes Yes   Sig: Take 2 tablets by mouth every morning   ferrous sulfate (FE TABS) 325 (65 Fe) MG EC tablet 7/7/2024 at AM  Yes Yes   Sig: Take 325 mg by mouth daily   hydrALAZINE (APRESOLINE) 100 MG tablet 7/7/2024 at X1 AM  No Yes   Sig: Take 1 tablet (100 mg) by mouth 3 times daily   hydrochlorothiazide (HYDRODIURIL) 25 MG tablet 7/7/2024 at AM  No Yes   Sig: Take 1 tablet (25 mg) by mouth daily   lisinopril (ZESTRIL) 40 MG tablet 7/6/2024 at PM  No Yes   Sig: Take 1 tablet (40 mg) by mouth daily   multivitamin w/minerals (THERA-VIT-M) tablet 7/7/2024 at AM  Yes Yes   Sig: Take 1 tablet by mouth daily   nitroGLYcerin (NITROSTAT) 0.4 MG sublingual tablet Unknown  No Yes   Sig: For chest pain place 1 tablet under the tongue every 5 minutes for 3 doses. If symptoms persist 5 minutes after " "1st dose call 911.   omeprazole (PRILOSEC) 40 MG DR capsule 7/7/2024 at AM  No Yes   Sig: Take 1 capsule (40 mg) by mouth daily Take 30-60 minutes before a meal.   senna (SENOKOT) 8.6 MG tablet 7/6/2024 at PM  Yes Yes   Sig: Take 1 tablet by mouth 2 times daily as needed for constipation   traZODone (DESYREL) 100 MG tablet 7/6/2024 at PM  Yes Yes   Sig: Take 100-200 mg by mouth at bedtime      Facility-Administered Medications: None     Allergies   Allergies   Allergen Reactions    Codeine     Contrast Dye Unknown     PN: LW CM1: CONTRAST- nka Reaction :    Morphine Sulfate (Concentrate)      does not like side effects/\"hyper\"       Social History   I have reviewed this patient's social history and updated it with pertinent information if needed. Monse Peoples  reports that she quit smoking about 11 years ago. Her smoking use included cigarettes. She started smoking about 61 years ago. She has a 25 pack-year smoking history. She has never used smokeless tobacco. She reports current alcohol use. She reports that she does not use drugs.    Family History   I have reviewed this patient's family history and updated it with pertinent information if needed.   Family History   Problem Relation Age of Onset    Coronary Artery Disease Father     Hypertension Father     Coronary Artery Disease Sister     Hypertension Sister     Coronary Artery Disease Brother     Hypertension Brother     Heart Disease Nephew     Other Cancer Maternal Half-Sister     Breast Cancer Maternal Aunt 35    Ovarian Cancer No family hx of        -----------------------------------------------------------------------------------------------------------------------------------------------------------------------------------------------------    Review of Systems   The 10 point Review of Systems is negative other than noted in the HPI or here.     Physical Exam   Temp: (!) 96.3  F (35.7  C) Temp src: Temporal BP: 111/56 Pulse: 73   Resp: 17 SpO2: 97 % "      Vital Signs with Ranges  Temp:  [96.3  F (35.7  C)] 96.3  F (35.7  C)  Pulse:  [66-86] 73  Resp:  [11-28] 17  BP: ()/(51-70) 111/56  SpO2:  [90 %-100 %] 97 %  0 lbs 0 oz    Constitutional: Awake, alert, cooperative, no apparent distress.  Eyes: Conjunctiva and pupils examined and normal.  HEENT: Moist mucous membranes, normal dentition.  Respiratory: Clear to auscultation bilaterally, no crackles or wheezing.  Cardiovascular: Regular rate and rhythm, normal S1 and S2, and no murmur noted.  GI: Soft, non-distended, non-tender, normal bowel sounds.  Lymph/Hematologic: No anterior cervical or supraclavicular adenopathy.  Skin: No rashes, no cyanosis, no edema.  Musculoskeletal: No joint swelling, erythema or tenderness.  Neurologic: Cranial nerves 2-12 intact, normal strength and sensation.  Psychiatric: Alert, oriented to person, place and time, no obvious anxiety or depression.     Data     Recent Labs   Lab 07/07/24  1539   WBC 6.3   HGB 10.0*   MCV 92      *   POTASSIUM 3.4   CHLORIDE 94*   CO2 22   BUN 32.0*   CR 1.63*   ANIONGAP 15   HATTIE 8.4*   GLC 99       Recent Results (from the past 24 hour(s))   XR Chest 2 Views    Narrative    EXAM: XR CHEST 2 VIEWS  LOCATION: [facility]  DATE: 7/7/2024    INDICATION: SOB  COMPARISON: 5/11/2022      Impression    IMPRESSION: No new consolidation. No pleural effusions or pneumothorax. Unchanged cardiac size. Mild aortic atherosclerosis. Surgical clips in the upper abdomen. Degenerative changes in the right shoulder. Left reverse total shoulder arthroplasty.
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
## 2024-07-08 ENCOUNTER — APPOINTMENT (OUTPATIENT)
Dept: PHYSICAL THERAPY | Facility: CLINIC | Age: 79
End: 2024-07-08
Attending: INTERNAL MEDICINE
Payer: COMMERCIAL

## 2024-07-08 ENCOUNTER — APPOINTMENT (OUTPATIENT)
Dept: CARDIOLOGY | Facility: CLINIC | Age: 79
End: 2024-07-08
Attending: INTERNAL MEDICINE
Payer: COMMERCIAL

## 2024-07-08 ENCOUNTER — APPOINTMENT (OUTPATIENT)
Dept: CT IMAGING | Facility: CLINIC | Age: 79
End: 2024-07-08
Attending: PHYSICIAN ASSISTANT
Payer: COMMERCIAL

## 2024-07-08 VITALS
HEART RATE: 83 BPM | BODY MASS INDEX: 28.23 KG/M2 | RESPIRATION RATE: 17 BRPM | TEMPERATURE: 98.2 F | HEIGHT: 64 IN | SYSTOLIC BLOOD PRESSURE: 144 MMHG | OXYGEN SATURATION: 98 % | WEIGHT: 165.34 LBS | DIASTOLIC BLOOD PRESSURE: 66 MMHG

## 2024-07-08 LAB
ANION GAP SERPL CALCULATED.3IONS-SCNC: 12 MMOL/L (ref 7–15)
ATRIAL RATE - MUSE: 80 BPM
BUN SERPL-MCNC: 21.2 MG/DL (ref 8–23)
CALCIUM SERPL-MCNC: 8.7 MG/DL (ref 8.8–10.2)
CHLORIDE SERPL-SCNC: 100 MMOL/L (ref 98–107)
CREAT SERPL-MCNC: 1.15 MG/DL (ref 0.51–0.95)
DEPRECATED HCO3 PLAS-SCNC: 23 MMOL/L (ref 22–29)
DIASTOLIC BLOOD PRESSURE - MUSE: NORMAL MMHG
EGFRCR SERPLBLD CKD-EPI 2021: 49 ML/MIN/1.73M2
ERYTHROCYTE [DISTWIDTH] IN BLOOD BY AUTOMATED COUNT: 13.2 % (ref 10–15)
GLUCOSE SERPL-MCNC: 94 MG/DL (ref 70–99)
HCT VFR BLD AUTO: 27.3 % (ref 35–47)
HGB BLD-MCNC: 9 G/DL (ref 11.7–15.7)
INTERPRETATION ECG - MUSE: NORMAL
LVEF ECHO: NORMAL
MCH RBC QN AUTO: 30.8 PG (ref 26.5–33)
MCHC RBC AUTO-ENTMCNC: 33 G/DL (ref 31.5–36.5)
MCV RBC AUTO: 94 FL (ref 78–100)
P AXIS - MUSE: 22 DEGREES
PLATELET # BLD AUTO: 210 10E3/UL (ref 150–450)
POTASSIUM SERPL-SCNC: 3.1 MMOL/L (ref 3.4–5.3)
PR INTERVAL - MUSE: 198 MS
QRS DURATION - MUSE: 94 MS
QT - MUSE: 404 MS
QTC - MUSE: 465 MS
R AXIS - MUSE: 73 DEGREES
RBC # BLD AUTO: 2.92 10E6/UL (ref 3.8–5.2)
SODIUM SERPL-SCNC: 135 MMOL/L (ref 135–145)
SYSTOLIC BLOOD PRESSURE - MUSE: NORMAL MMHG
T AXIS - MUSE: 35 DEGREES
TROPONIN T SERPL HS-MCNC: 24 NG/L
VENTRICULAR RATE- MUSE: 80 BPM
WBC # BLD AUTO: 5.2 10E3/UL (ref 4–11)

## 2024-07-08 PROCEDURE — G0378 HOSPITAL OBSERVATION PER HR: HCPCS

## 2024-07-08 PROCEDURE — 71275 CT ANGIOGRAPHY CHEST: CPT

## 2024-07-08 PROCEDURE — 80048 BASIC METABOLIC PNL TOTAL CA: CPT | Performed by: INTERNAL MEDICINE

## 2024-07-08 PROCEDURE — 97530 THERAPEUTIC ACTIVITIES: CPT | Mod: GP | Performed by: PHYSICAL THERAPIST

## 2024-07-08 PROCEDURE — 258N000003 HC RX IP 258 OP 636: Performed by: INTERNAL MEDICINE

## 2024-07-08 PROCEDURE — 36415 COLL VENOUS BLD VENIPUNCTURE: CPT | Performed by: INTERNAL MEDICINE

## 2024-07-08 PROCEDURE — 85027 COMPLETE CBC AUTOMATED: CPT | Performed by: INTERNAL MEDICINE

## 2024-07-08 PROCEDURE — 250N000011 HC RX IP 250 OP 636: Performed by: INTERNAL MEDICINE

## 2024-07-08 PROCEDURE — 99238 HOSP IP/OBS DSCHRG MGMT 30/<: CPT | Performed by: PHYSICIAN ASSISTANT

## 2024-07-08 PROCEDURE — 999N000111 HC STATISTIC OT IP EVAL DEFER

## 2024-07-08 PROCEDURE — 250N000013 HC RX MED GY IP 250 OP 250 PS 637: Performed by: INTERNAL MEDICINE

## 2024-07-08 PROCEDURE — 250N000009 HC RX 250: Performed by: INTERNAL MEDICINE

## 2024-07-08 PROCEDURE — 96361 HYDRATE IV INFUSION ADD-ON: CPT

## 2024-07-08 PROCEDURE — 93306 TTE W/DOPPLER COMPLETE: CPT

## 2024-07-08 PROCEDURE — 97161 PT EVAL LOW COMPLEX 20 MIN: CPT | Mod: GP | Performed by: PHYSICAL THERAPIST

## 2024-07-08 PROCEDURE — 93306 TTE W/DOPPLER COMPLETE: CPT | Mod: 26 | Performed by: INTERNAL MEDICINE

## 2024-07-08 PROCEDURE — 84484 ASSAY OF TROPONIN QUANT: CPT | Performed by: INTERNAL MEDICINE

## 2024-07-08 PROCEDURE — 250N000013 HC RX MED GY IP 250 OP 250 PS 637: Performed by: PHYSICIAN ASSISTANT

## 2024-07-08 RX ORDER — POTASSIUM CHLORIDE 1.5 G/1.58G
40 POWDER, FOR SOLUTION ORAL ONCE
Status: COMPLETED | OUTPATIENT
Start: 2024-07-08 | End: 2024-07-08

## 2024-07-08 RX ORDER — FERROUS SULFATE 325(65) MG
325 TABLET ORAL DAILY
Status: DISCONTINUED | OUTPATIENT
Start: 2024-07-08 | End: 2024-07-08 | Stop reason: HOSPADM

## 2024-07-08 RX ORDER — IOPAMIDOL 755 MG/ML
500 INJECTION, SOLUTION INTRAVASCULAR ONCE
Status: COMPLETED | OUTPATIENT
Start: 2024-07-08 | End: 2024-07-08

## 2024-07-08 RX ADMIN — SODIUM CHLORIDE, POTASSIUM CHLORIDE, SODIUM LACTATE AND CALCIUM CHLORIDE: 600; 310; 30; 20 INJECTION, SOLUTION INTRAVENOUS at 14:28

## 2024-07-08 RX ADMIN — ACETAMINOPHEN 650 MG: 325 TABLET, FILM COATED ORAL at 01:23

## 2024-07-08 RX ADMIN — DULOXETINE HYDROCHLORIDE 60 MG: 30 CAPSULE, DELAYED RELEASE ORAL at 08:54

## 2024-07-08 RX ADMIN — ATENOLOL 50 MG: 50 TABLET ORAL at 08:54

## 2024-07-08 RX ADMIN — PANTOPRAZOLE SODIUM 40 MG: 40 TABLET, DELAYED RELEASE ORAL at 08:54

## 2024-07-08 RX ADMIN — POTASSIUM CHLORIDE 40 MEQ: 1.5 POWDER, FOR SOLUTION ORAL at 08:54

## 2024-07-08 RX ADMIN — ASPIRIN 81 MG: 81 TABLET, COATED ORAL at 08:54

## 2024-07-08 RX ADMIN — SODIUM CHLORIDE, POTASSIUM CHLORIDE, SODIUM LACTATE AND CALCIUM CHLORIDE: 600; 310; 30; 20 INJECTION, SOLUTION INTRAVENOUS at 03:25

## 2024-07-08 RX ADMIN — IOPAMIDOL 59 ML: 755 INJECTION, SOLUTION INTRAVENOUS at 12:00

## 2024-07-08 RX ADMIN — SODIUM CHLORIDE 100 ML: 9 INJECTION, SOLUTION INTRAVENOUS at 12:00

## 2024-07-08 RX ADMIN — BUPROPION HYDROCHLORIDE 150 MG: 150 TABLET, EXTENDED RELEASE ORAL at 08:54

## 2024-07-08 RX ADMIN — FERROUS SULFATE TAB 325 MG (65 MG ELEMENTAL FE) 325 MG: 325 (65 FE) TAB at 08:54

## 2024-07-08 ASSESSMENT — ACTIVITIES OF DAILY LIVING (ADL)
ADLS_ACUITY_SCORE: 36
ADLS_ACUITY_SCORE: 36
ADLS_ACUITY_SCORE: 37
DEPENDENT_IADLS:: CLEANING;COOKING;LAUNDRY;SHOPPING;MEAL PREPARATION;MONEY MANAGEMENT;TRANSPORTATION
ADLS_ACUITY_SCORE: 37
ADLS_ACUITY_SCORE: 36
ADLS_ACUITY_SCORE: 36
ADLS_ACUITY_SCORE: 37
ADLS_ACUITY_SCORE: 36
ADLS_ACUITY_SCORE: 42
ADLS_ACUITY_SCORE: 36
ADLS_ACUITY_SCORE: 37
ADLS_ACUITY_SCORE: 37
ADLS_ACUITY_SCORE: 36
ADLS_ACUITY_SCORE: 37
ADLS_ACUITY_SCORE: 36
ADLS_ACUITY_SCORE: 36

## 2024-07-08 NOTE — PLAN OF CARE
ROOM # 204-1    Living Situation (if not independent, order SW consult): Home w/ spouse   Facility name:  : [facility]     Activity level at baseline: IND w/ cane   Activity level on admit: Ax2 pivot     Who will be transporting you at discharge: Tyrese, spouse     Patient registered to observation; given Patient Bill of Rights; given the opportunity to ask questions about observation status and their plan of care.  Patient has been oriented to the observation room, bathroom and call light is in place.    Discussed discharge goals and expectations with patient/family.

## 2024-07-08 NOTE — CONSULTS
Care Management Initial Consult    General Information  Assessment completed with: Patient,    Type of CM/SW Visit: Initial Assessment    Primary Care Provider verified and updated as needed: Yes   Readmission within the last 30 days:        Reason for Consult: discharge planning       Communication Assessment  Patient's communication style: spoken language (English or Bilingual)    Hearing Difficulty or Deaf: no      Cognitive  Cognitive/Neuro/Behavioral: WDL                      Living Environment:   People in home: spouse     Current living Arrangements: house      Able to return to prior arrangements: yes     Family/Social Support:  Care provided by: self, spouse/significant other  Provides care for: no one  Marital Status:             Description of Support System: Supportive, Involved    Support Assessment: Adequate family and caregiver support    Current Resources:   Patient receiving home care services: Yes  Skilled Home Care Services: Physical Therapy, Occupational Therapy, Home Health Aid  Community Resources: Home Care  Equipment currently used at home: cane, quad  Supplies currently used at home:      Does the patient's insurance plan have a 3 day qualifying hospital stay waiver?  Yes     Which insurance plan 3 day waiver is available? Alternative insurance waiver    Will the waiver be used for post-acute placement? No    Lifestyle & Psychosocial Needs:  Social Determinants of Health     Food Insecurity: Not on file   Depression: Not at risk (2/20/2024)    Received from [facility]    PHQ-2     PHQ-2 Score: 2   Housing Stability: Not on file   Tobacco Use: Medium Risk (6/17/2024)    Received from [facility]    Patient History     Smoking Tobacco Use: Former     Smokeless Tobacco Use: Never     Passive Exposure: Not on file   Financial Resource Strain: Not on file   Alcohol Use: Not on file   Transportation Needs: Not on file   Physical Activity: Not on file   Interpersonal Safety:  Not on file   Stress: Not on file   Social Connections: Unknown (6/5/2024)    [facility]    Social Connections     Frequency of Communication with Friends and Family: Not on file   Health Literacy: Not on file       Functional Status:  Prior to admission patient needed assistance:   Dependent ADLs:: Ambulation-cane, Bathing, Dressing, Toileting  Dependent IADLs:: Cleaning, Cooking, Laundry, Shopping, Meal Preparation, Money Management, Transportation     Care Management Discharge Note    Discharge Date: 07/09/2024       Discharge Disposition: Home Care    Discharge Services: Home Care    Discharge DME:      Discharge Transportation: family or friend will provide    Private pay costs discussed: Not applicable    Does the patient's insurance plan have a 3 day qualifying hospital stay waiver?  Yes     Which insurance plan 3 day waiver is available? Alternative insurance waiver    Will the waiver be used for post-acute placement? No    Education Provided on the Discharge Plan: Yes  Persons Notified of Discharge Plans: [facility], provider  Patient/Family in Agreement with the Plan: yes    Handoff Referral Completed: No    Additional Information:  CM/SW consulted for discharge planning. Pt admitted with dyspnea on exertion, NSTEMI, CAD. Met with pt and spouse at bedside. Pt lives with spouse in a [facility].   Prior to left reverse total shoulder a month ago, pt was independent with ADLs and IADLs. Now spouse is having to assist with personal cares and IADLs. Pt has been using a cane post surgery.  Pt is a current pt with [facility] for PT/OT/HHA visits. [facility] is aware pt is discharging home today.   Pt denies having any additional discharge needs.  Spouse will be providing transportation home.  Please call if discharge needs arise.    Ai Miller RN   Inpatient Care Coordination  [facility]   Phone: [phone]

## 2024-07-08 NOTE — PLAN OF CARE
Occupational Therapy: Orders received. Chart reviewed and discussed with care team.? Occupational Therapy not indicated as pt mobilizing well with PT who recommends home with home therapies. Can defer OT to next level of care with HHOT.? Defer discharge recommendations to PT/care team.? Will complete orders.

## 2024-07-08 NOTE — PLAN OF CARE
PRIMARY DIAGNOSIS: GENERALIZED WEAKNESS/SOB    OUTPATIENT/OBSERVATION GOALS TO BE MET BEFORE DISCHARGE  1. Orthostatic performed: N/A    2. Tolerating PO medications: Yes    3. Return to near baseline physical activity: No    4. Cleared for discharge by consultants (if involved): No    Discharge Planner Nurse   Safe discharge environment identified: No  Barriers to discharge: Yes       Entered by: Milagros Pollack RN 07/08/2024 1:45 AM     Please review provider order for any additional goals.   Nurse to notify provider when observation goals have been met and patient is ready for discharge.    Vitals are Temp: 97.6  F (36.4  C) Temp src: Oral BP: 123/60 Pulse: 78   Resp: 16 SpO2: 94 %.  Patient is Alert and Oriented x4. They are 2 assist with pivot.  Pt is a Regular diet.  They are complaining of 4/10 headahce pain.  Tylenol given for pain.  Patient has Lactated Ringer's running at 100 mL per hour. Denies nausea/dizziness. Dyspnea on exertion. Purewick in place. Steri strips to L shoulder incision. Plan is for PT/OT, SW consults, repeat labs and echo in the morning.

## 2024-07-08 NOTE — PLAN OF CARE
PRIMARY DIAGNOSIS: GENERALIZED WEAKNESS/SOB    OUTPATIENT/OBSERVATION GOALS TO BE MET BEFORE DISCHARGE  1. Orthostatic performed: N/A    2. Tolerating PO medications: Yes    3. Return to near baseline physical activity: No    4. Cleared for discharge by consultants (if involved): No    Discharge Planner Nurse   Safe discharge environment identified: No  Barriers to discharge: Yes       Entered by: Milagros Pollack RN 07/08/2024 4:25 AM     Please review provider order for any additional goals.   Nurse to notify provider when observation goals have been met and patient is ready for discharge.    Vitals are Temp: 97.8  F (36.6  C) Temp src: Oral BP: 109/55 Pulse: 77   Resp: 16 SpO2: 93 %.  Patient is Alert and Oriented x4. They are 2 assist with pivot.  Pt is a Regular diet.  Patient has Lactated Ringer's running at 100 mL per hour. Purewick in place. Plan is for PT/OT, SW consults, repeat labs and echo in the morning.

## 2024-07-08 NOTE — UTILIZATION REVIEW
"Admission Status; Secondary Review Determination     Admission Date: 7/7/2024  3:29 PM       Under the authority of the Utilization Management Committee, the utilization review process indicated a secondary review on the above patient.  The review outcome is based on review of the medical records, discussions with staff, and applying clinical experience noted on the date of the review.          (x) Observation Status Appropriate - This patient does not meet hospital inpatient criteria and is placed in observation status. If this patient's primary payer is Medicare and was admitted as an inpatient, Condition Code 44 should be used and patient status changed to \"observation\".       RATIONALE FOR DETERMINATION      Brief clinical presentation, information copied from the chart, abbreviated and edited for relevant content:       Discussed with attending. Awaiting CT to r/o PE due to elevated D dimer. If negative, will be discharged.     A 78-year-old female with a history of coronary artery disease, hypertension, hyperlipidemia, asthma, and GERD presented on 7/7/2024 with progressive shortness of breath, chest pressure, fatigue, and concerns about anemia. She recently had a left reverse total shoulder arthroplasty with postoperative anemia managed with oral iron. In the ED, she was found to have a temperature of 96.3 F, heart rate 86, BP 84/70, and SpO2 96% on room air. Labs revealed sodium 131, BUN/creatinine 32.0/1.63, high-sensitivity troponin 33, proBNP 725, TSH 0.30, and hemoglobin 10.0. Imaging showed no acute process, and EKG showed sinus rhythm. She was diagnosed with NSTEMI, acute kidney injury, and mild hyponatremia, and started on IV fluids. Elevated d-dimer (3.12) necessitates further evaluation for pulmonary embolism.    This patient is expected to need a short hospitalization. The NSTEMI and acute kidney injury are being managed with IV fluids, holding certain medications, and monitoring renal function and " cardiac markers. The mild hyponatremia is also being corrected with IV fluids.        The severity of illness, intensity of cares provided, risk for adverse outcome, and expected LOS make the care appropriate for observation.       The information on this document is developed by the utilization review team in order for the business office to ensure compliance.  This only denotes the appropriateness of proper admission status and does not reflect the quality of care rendered.         The definitions of Inpatient Status and Observation Status used in making the determination above are those provided in the CMS Coverage Manual, Chapter 1 and Chapter 6, section 70.4.      Sincerely,     Nelly Hernandez MD   Utilization Review/ Case Management  [facility].

## 2024-07-08 NOTE — PROGRESS NOTES
[facility]    ED Boarding Nurse Handoff Addendum Report:    Date/time: 7/7/2024, 7:06 PM    Activity Level: assist of 1    Fall Risk: Yes:  nonskid shoes/slippers when out of bed, activity supervised, and mobility aid in reach    Active Infusions:  LR      Current Meds Due: several     Current care needs: UA needed    Oxygen requirements (liters/min and/or FiO2): 0    Respiratory status: Room air    Vital signs (within last 30 minutes):    Vitals:    07/07/24 1746 07/07/24 1747 07/07/24 1748 07/07/24 1749   BP:       Pulse: 72 72 71 73   Resp: 15 12 16 17   Temp:       TempSrc:       SpO2: 96% 95% 96% 97%       Focused assessment within last 30 minutes:    Yes      ED Boarding Nurse name: Iron Spaulding RN

## 2024-07-08 NOTE — DISCHARGE SUMMARY
"I was present with the student who participated in the service and in the documentation of the note. I have verified the history and personally performed the physical exam and medical decision-making. I agree with the assessment and plan of care as documented in the note. Genesis Vasquez PA-C     [facility]  Hospitalist Discharge Summary      Date of Admission:  7/7/2024  Date of Discharge:  7/8/2024  Discharging Provider: Genesis Vasquez PA-C  Discharge Service: Hospitalist Service    Discharge Diagnoses   Dyspnea on Exertion  Acute kidney injury   Hypertension   Mild Hyponatremia   Anemia   Gastroenteritis     Clinically Significant Risk Factors     # Overweight: Estimated body mass index is 28.38 kg/m  as calculated from the following:    Height as of this encounter: 1.626 m (5' 4\").    Weight as of this encounter: 75 kg (165 lb 5.5 oz).       Follow-ups Needed After Discharge   Follow-up Appointments     Follow-up and recommended labs and tests       Follow up with primary care provider, Christiano Taylor, within 7 days for   hospital follow- up.  The following labs/tests are recommended: recheck   hemoglobin.            Unresulted Labs Ordered in the Past 30 Days of this Admission       No orders found for last 31 day(s).            Discharge Disposition   Discharged to home  Condition at discharge: Stable    Hospital Course   Monse Peoples is a 78 year old female with past medical history of coronary artery disease, hypertension, hyperlipidemia, asthma, GERD who presented on 7/7/2024 with chief complaint of shortness of breath.  She was admitted at [facility] beginning June after a fall and had a reverse left total shoulder arthroplasty.  While hospitalized she was found to have anemia postoperatively and started on oral iron.  The patient states that since discharge she has noticed progressive shortness of breath.  She also reports some chest pressure, but notes she had " been dealing with that prior to the hospitalization and attributes it to her GERD.  She reports that shortness of breath is worse with exertion.  She became concerned about her hemoglobin being low again so came to the emergency department for evaluation.  She does report some diarrhea, but also attributes this to starting oral iron after her hospitalization.    In the emergency department, the patient was found to have a temperature of 96.3  F, heart rate 86, blood pressure as low as 84/70, respiratory rate 16, SpO2 96% on room air.  Initial lab work showed sodium 131, chloride 94, BUN/creatinine 32.0/1.63, high-sensitivity troponin 33, proBNP 725, TSH 0.30, hemoglobin 10.0, WBC 6.3.  COVID-19, influenza, RSV were negative.  Chest x-ray showed no acute process.  EKG showed sinus rhythm.  The patient was started on IV fluids in the setting of acute kidney injury and suspected dehydration.    The patient was admitted for observation.  RADHA improved with IV fluids.  Troponin recheck was trending down. Echo showed ejection fraction of 55 to 60%. D-dimer was elevated at 3.12 and given recent surgery as well as patients increasing dyspnea, CT Chest PE was performed which showed no emboli, but did show mild groundglass opacities in the upper lobes.  Recommend using incentive spirometry at home after discharge.  Follow up with PCP, especially if SOB persists but currently is much improved.     Consultations This Hospital Stay   CARE MANAGEMENT / SOCIAL WORK IP CONSULT  PHYSICAL THERAPY ADULT IP CONSULT  OCCUPATIONAL THERAPY ADULT IP CONSULT    Code Status   Full Code    Time Spent on this Encounter        Mike BOJORQUEZ-S2  [facility] OBSERVATION DEPT  [address] [place] [address]  Phone: [phone]  ______________________________________________________________________    Physical Exam   Vital Signs: Temp: 98.2  F (36.8  C) Temp src: Oral BP: (!) 144/66 Pulse: 83   Resp: 17 SpO2: 98  % O2 Device: None (Room air)    Weight: 165 lbs 5.52 oz    Physical Exam  Constitutional:       General: She is not in acute distress.     Appearance: Normal appearance. She is not ill-appearing.   Cardiovascular:      Rate and Rhythm: Normal rate and regular rhythm.      Heart sounds: No murmur heard.  Pulmonary:      Effort: Pulmonary effort is normal.      Breath sounds: Normal breath sounds. No wheezing, rhonchi or rales.   Abdominal:      General: Bowel sounds are normal. There is no distension.      Tenderness: There is no abdominal tenderness.   Musculoskeletal:      Right lower leg: No edema.      Left lower leg: No edema.   Skin:     General: Skin is warm and dry.      Findings: No rash.   Neurological:      General: No focal deficit present.      Mental Status: She is alert and oriented to person, place, and time.   Psychiatric:         Mood and Affect: Mood normal.         Behavior: Behavior normal.              Primary Care Physician   Christiano Taylor    Discharge Orders      Reason for your hospital stay    You were hospitalized for increasing shortness of breath, nausea/vomiting, acute kidney injury, and low blood pressure. While in the hospital, an ultrasound of your heart was performed which showed normal heart function. A CT scan of your chest was performed to evaluate for a blood clot in your lungs and the results of this showed there were no clots. A blood test called a troponin was performed to evaluate for damage to the heart muscle (heart attack), however these levels did not show there was any signs of a heart attack. Your kidney function significantly improved while you were hospitalized. While in the hospital your home blood pressure medications were held, but you can start taking these again tomorrow. Physical therapy worked with you and recommend you have a physical therapist perform home therapy.     Follow-up and recommended labs and tests     Follow up with primary care provider, Christiano  NAUN Taylor within 7 days for hospital follow- up.  The following labs/tests are recommended: recheck hemoglobin.     Activity    Your activity upon discharge: activity as tolerated     When to contact your care team    Call your primary doctor if you have any of the following: temperature greater than 101, increased shortness of breath, or increased pain.     Diet    Follow this diet upon discharge: regular diet       Significant Results and Procedures   Most Recent 3 CBC's:  Recent Labs   Lab Test 07/08/24  0619 07/07/24  1539 05/11/22  1746   WBC 5.2 6.3 7.7   HGB 9.0* 10.0* 9.2*   MCV 94 92 81    260 264     Most Recent 3 BMP's:  Recent Labs   Lab Test 07/08/24  0619 07/07/24  1539 06/03/24  1055    131* 138   POTASSIUM 3.1* 3.4 4.0   CHLORIDE 100 94* 101   CO2 23 22 23   BUN 21.2 32.0* 12.8   CR 1.15* 1.63* 0.88   ANIONGAP 12 15 14   HATTIE 8.7* 8.4* 9.2   GLC 94 99 106*     Most Recent 3 Troponin's:  Recent Labs   Lab Test 08/27/17  2315 08/27/17  1920   TROPI <0.015 <0.015     Most Recent 3 BNP's:  Recent Labs   Lab Test 07/07/24  1539   NTBNPI 725   ,   Results for orders placed or performed during the hospital encounter of 07/07/24   XR Chest 2 Views    Narrative    EXAM: XR CHEST 2 VIEWS  LOCATION: [facility]  DATE: 7/7/2024    INDICATION: SOB  COMPARISON: 5/11/2022      Impression    IMPRESSION: No new consolidation. No pleural effusions or pneumothorax. Unchanged cardiac size. Mild aortic atherosclerosis. Surgical clips in the upper abdomen. Degenerative changes in the right shoulder. Left reverse total shoulder arthroplasty.   CT Chest Pulmonary Embolism w Contrast    Narrative    CT CHEST PULMONARY EMBOLISM W CONTRAST 7/8/2024 12:09 PM    CLINICAL HISTORY: SOB, elevated D-dimer, recent surgery  TECHNIQUE: CT angiogram chest during arterial phase injection IV  contrast. 2D and 3D MIP reconstructions were performed by the CT  technologist. Dose reduction techniques were  used.     CONTRAST: 59mL Isovue-370    COMPARISON: Chest x-ray yesterday chest CT on 2016    FINDINGS:  ANGIOGRAM CHEST: Pulmonary arteries are normal caliber and negative  for pulmonary emboli. Thoracic aorta is negative for dissection. No CT  evidence of right heart strain.    LUNGS AND PLEURA: Very mild groundglass opacities in the upper lobes  bilaterally, may be due to mild inflammation or edema. No  consolidation or pleural effusion. Multiple stable small pulmonary  nodules, not significantly changed since 2016 and benign due to  stability. For example, a 5 mm right lower lobe nodule previously  measured 4 mm (series 7, image 206) and a 7 mm right upper lobe nodule  (/) previously measured 6 mm.    MEDIASTINUM/AXILLAE: No lymphadenopathy. No thoracic aortic aneurysm.  Severe coronary artery calcifications. No pericardial effusion.    UPPER ABDOMEN: Cholecystectomy. Rafat-en-Y gastric bypass. Few punctate  calcified splenic granuloma.    MUSCULOSKELETAL: Left shoulder arthroplasty. Old healed bilateral rib  fracture deformities and sternal fracture deformity. No suspicious  lesions in the bones.      Impression    IMPRESSION:  1.  No pulmonary emboli.  2.  Mild groundglass opacities in the upper lobes may either be due to  pneumonitis or mild pulmonary edema.  3.  Multiple stable small pulmonary nodules, benign due to stability  since 2016.    DESTINEY PRETTY MD         SYSTEM ID:  [id number]   Echocardiogram Complete     Value    LVEF  55-60%    Narrative    [id number]  [id number]  [id number]  [id number]^DONNA^SAMMI^DARYN     [facility]  Echocardiography Laboratory  [address] [place] [address]     Name: AMINTA GOEL  MRN: [id number]  : 1945  Study Date: 2024 11:15 AM  Age: 78 yrs  Gender: Female  Patient Location: [facility]  Reason For Study: MI  Ordering Physician: SAMMI THOMPSON  Performed By: Ethan Saleh RDCS     BSA: 1.8 m2  Height: 64 in  Weight: 165  lb  HR: 72  BP: 111/56 mmHg  ______________________________________________________________________________  Procedure  Complete Portable Echo Adult.  ______________________________________________________________________________  Interpretation Summary     The visual ejection fraction is 55-60%.  Regional wall motion abnormalities cannot be excluded on this study. The apex  was not well-visualized. Contrast would enhance image quality.  ______________________________________________________________________________  Left Ventricle  The left ventricle is normal in size. There is normal left ventricular wall  thickness. The visual ejection fraction is 55-60%. Diastolic Doppler findings  (E/E' ratio and/or other parameters) suggest left ventricular filling  pressures are increased. The left ventricular apex is not well visualized.     Right Ventricle  The right ventricle is normal in size and function.     Atria  Normal left atrial size. Right atrial size is normal. There is no color  Doppler evidence of an atrial shunt.     Mitral Valve  There is mild (1+) mitral regurgitation.     Tricuspid Valve  There is trace tricuspid regurgitation.     Aortic Valve  The aortic valve is trileaflet. There is mild trileaflet aortic sclerosis. No  aortic regurgitation is present. No hemodynamically significant valvular  aortic stenosis.     Pulmonic Valve  There is trace pulmonic valvular regurgitation. Normal pulmonic valve  velocity.     Vessels  The aortic root is normal size. IVC diameter <2.1 cm collapsing >50% with  sniff suggests a normal RA pressure of 3 mmHg.     Pericardium  There is no pericardial effusion.     Rhythm  Sinus rhythm was noted.  ______________________________________________________________________________  MMode/2D Measurements & Calculations     IVSd: 0.92 cm  LVIDd: 4.6 cm  LVIDs: 3.3 cm  LVPWd: 0.78 cm  IVC diam: 2.1 cm  FS: 28.0 %  LV mass(C)d: 127.9 grams  LV mass(C)dI: 71.0 grams/m2  Ao root diam:  3.2 cm  LA dimension: 3.8 cm  LA/Ao: 1.2  Ao root diam index Ht(cm/m): 2.0  Ao root diam index BSA (cm/m2): 1.8  LA Volume (BP): 55.2 ml  LA Volume Index (BP): 30.7 ml/m2     RV Base: 3.4 cm  RWT: 0.34  TAPSE: 2.2 cm     Doppler Measurements & Calculations  MV E max aj: 123.0 cm/sec  MV A max aj: 120.5 cm/sec  MV E/A: 1.0  MV dec slope: 634.7 cm/sec2  MV dec time: 0.19 sec  TR max aj: 256.4 cm/sec  TR max P.3 mmHg  E/E' av.9  Lateral E/e': 12.6  Medial E/e': 15.3  RV S Aj: 11.4 cm/sec     ______________________________________________________________________________  Report approved by: Amber Ford 2024 01:41 PM             Discharge Medications   Current Discharge Medication List        CONTINUE these medications which have NOT CHANGED    Details   acetaminophen (TYLENOL) 325 MG tablet Take 650 mg by mouth every morning  Qty: 100 tablet, Refills: 0      amLODIPine (NORVASC) 5 MG tablet Take 1 tablet (5 mg) by mouth daily  Qty: 90 tablet, Refills: 3    Associated Diagnoses: Benign essential hypertension      aspirin 81 MG tablet Take 81 mg by mouth daily      atenolol (TENORMIN) 50 MG tablet Take 50 mg by mouth daily      atorvastatin (LIPITOR) 20 MG tablet Take 1 tablet (20 mg) by mouth daily  Qty: 90 tablet, Refills: 0    Comments: Medication is being filled for 1 time refill only due to:  Patient needs to be seen because it has been more than one year since last visit.  Associated Diagnoses: Hyperlipidemia LDL goal <100      buPROPion (WELLBUTRIN SR) 150 MG 12 hr tablet Take 1 tablet (150 mg) by mouth 2 times daily  Qty: 180 tablet, Refills: 0    Comments: Medication is being filled for 1 time refill only due to:  Patient needs to be seen because it has been more than one year since last visit.  Associated Diagnoses: Anxiety      calcium carbonate-vitamin D 600-400 MG-UNIT CHEW Take 2 tablets by mouth every morning      DULoxetine (CYMBALTA) 60 MG capsule Take 1 capsule (60 mg) by  mouth daily  Qty: 90 capsule, Refills: 0    Comments: Medication is being filled for 1 time refill only due to:  Patient needs to be seen because it has been more than one year since last visit.  Associated Diagnoses: Anxiety      ferrous sulfate (FE TABS) 325 (65 Fe) MG EC tablet Take 325 mg by mouth daily      FIBER SELECT GUMMIES CHEW Take 1 chew tab by mouth daily       hydrALAZINE (APRESOLINE) 100 MG tablet Take 1 tablet (100 mg) by mouth 3 times daily  Qty: 270 tablet, Refills: 3    Associated Diagnoses: Benign essential hypertension      hydrochlorothiazide (HYDRODIURIL) 25 MG tablet Take 1 tablet (25 mg) by mouth daily  Qty: 30 tablet, Refills: 0    Associated Diagnoses: Benign essential hypertension      lisinopril (ZESTRIL) 40 MG tablet Take 1 tablet (40 mg) by mouth daily  Qty: 90 tablet, Refills: 0    Comments: Medication is being filled for 1 time refill only due to:  Patient needs to be seen because it has been more than one year since last visit.  Associated Diagnoses: Benign essential hypertension      multivitamin w/minerals (THERA-VIT-M) tablet Take 1 tablet by mouth daily      nitroGLYcerin (NITROSTAT) 0.4 MG sublingual tablet For chest pain place 1 tablet under the tongue every 5 minutes for 3 doses. If symptoms persist 5 minutes after 1st dose call 911.  Qty: 25 tablet, Refills: 0    Associated Diagnoses: ASHD (arteriosclerotic heart disease); Chest pain, unspecified type      Omega-3 Fatty Acids (OMEGA-3 FISH OIL PO) Take 1.2 g by mouth daily       omeprazole (PRILOSEC) 40 MG DR capsule Take 1 capsule (40 mg) by mouth daily Take 30-60 minutes before a meal.  Qty: 90 capsule, Refills: 0    Comments: Medication is being filled for 1 time refill only due to:  Patient needs to be seen because it has been more than one year since last visit.  Associated Diagnoses: Gastroesophageal reflux disease without esophagitis      senna (SENOKOT) 8.6 MG tablet Take 1 tablet by mouth 2 times daily as needed for  "constipation      traZODone (DESYREL) 100 MG tablet Take 100-200 mg by mouth at bedtime           Allergies   Allergies   Allergen Reactions    Codeine     Morphine Sulfate (Concentrate)      does not like side effects/\"hyper\"     "

## 2024-07-08 NOTE — PLAN OF CARE
"Patient's After Visit Summary was reviewed with patient and/or spouse.   Patient verbalized understanding of After Visit Summary, recommended follow up and was given an opportunity to ask questions.   Discharge medications sent home with patient/family: No   Discharged with spouse  OBSERVATION patient END time: 1815        Goal Outcome Evaluation:      Plan of Care Reviewed With: patient, spouse    Overall Patient Progress: improvingOverall Patient Progress: improving    Outcome Evaluation: Discharging      Problem: Adult Inpatient Plan of Care  Goal: Plan of Care Review  Description: The Plan of Care Review/Shift note should be completed every shift.  The Outcome Evaluation is a brief statement about your assessment that the patient is improving, declining, or no change.  This information will be displayed automatically on your shift  note.  Outcome: Met  Flowsheets (Taken 7/8/2024 1806)  Outcome Evaluation: Discharging  Plan of Care Reviewed With:   patient   spouse  Overall Patient Progress: improving  Goal: Patient-Specific Goal (Individualized)  Description: You can add care plan individualizations to a care plan. Examples of Individualization might be:  \"Parent requests to be called daily at 9am for status\", \"I have a hard time hearing out of my right ear\", or \"Do not touch me to wake me up as it startles  me\".  Outcome: Met  Goal: Absence of Hospital-Acquired Illness or Injury  Outcome: Met  Intervention: Identify and Manage Fall Risk  Recent Flowsheet Documentation  Taken 7/8/2024 0800 by Becky Bobby, RN  Safety Promotion/Fall Prevention:   activity supervised   clutter free environment maintained   nonskid shoes/slippers when out of bed   room near nurse's station   safety round/check completed   supervised activity  Intervention: Prevent Skin Injury  Recent Flowsheet Documentation  Taken 7/8/2024 0800 by Becky Bobby, RN  Body Position: position changed independently  Device Skin Pressure " Protection: absorbent pad utilized/changed  Intervention: Prevent and Manage VTE (Venous Thromboembolism) Risk  Recent Flowsheet Documentation  Taken 7/8/2024 0800 by Becky Bobby, RN  VTE Prevention/Management: SCDs off (sequential compression devices)  Intervention: Prevent Infection  Recent Flowsheet Documentation  Taken 7/8/2024 0800 by Becky Bobby, RN  Infection Prevention:   cohorting utilized   equipment surfaces disinfected   hand hygiene promoted   rest/sleep promoted  Goal: Optimal Comfort and Wellbeing  Outcome: Met  Goal: Readiness for Transition of Care  Outcome: Met

## 2024-07-08 NOTE — PROGRESS NOTES
"   07/08/24 0952   Appointment Info   Signing Clinician's Name / Credentials (PT) Theresa Wilkins DPT   Rehab Comments (PT) NWB LUE, recent reverse TSA   Quick Adds   Quick Adds Certification   Living Environment   People in Home spouse   Current Living Arrangements house   Home Accessibility stairs to enter home   Number of Stairs, Main Entrance 2   Stair Railings, Main Entrance   (states no railings but \"has things I can hold onto to\")   Transportation Anticipated car, drives self;family or friend will provide   Living Environment Comments Pt was IND with all mobility and ADLs, IADLs prior to her surgery ~1 month ago, since then has been using quad cane for mobility, IND with basic ADLs and spouse assists as needed with IADLs   Self-Care   Usual Activity Tolerance good   Current Activity Tolerance moderate   Equipment Currently Used at Home cane, quad   Fall history within last six months yes   Number of times patient has fallen within last six months 2   General Information   Onset of Illness/Injury or Date of Surgery 07/07/24   Referring Physician Gutierrez Parry, DO   Patient/Family Therapy Goals Statement (PT) to go home   Pertinent History of Current Problem (include personal factors and/or comorbidities that impact the POC) 78 year old female with past medical history of coronary artery disease, hypertension, hyperlipidemia, asthma, GERD who presented on 7/7/2024 with chief complaint of shortness of breath.   Existing Precautions/Restrictions fall;weight bearing   Weight-Bearing Status - LUE nonweight-bearing  (recent TSA)   General Observations Supine, NAD   Cognition   Affect/Mental Status (Cognition) WFL   Orientation Status (Cognition) oriented x 4   Follows Commands (Cognition) WFL   Cognitive Status Comments reports baseline \"word finding difficulty\" and feeling like she \"forgets things more often\" (not acute from past month, states she feels she's been noticing this for a while)   Pain Assessment "   Patient Currently in Pain No   Integumentary/Edema   Integumentary/Edema Comments steri strips over TSA incision   Posture    Posture Forward head position;Protracted shoulders   Range of Motion (ROM)   Range of Motion ROM deficits secondary to surgical procedure   Strength (Manual Muscle Testing)   Strength (Manual Muscle Testing) Deficits observed during functional mobility   Strength Comments slight decrease activity tolerance   Bed Mobility   Bed Mobility no deficits identified   Comment, (Bed Mobility) mod IND iwth HOB elevated (has elevating bed height at home)   Transfers   Transfers sit-stand transfer   Sit-Stand Transfer   Sit-Stand [place] (Transfers) supervision   Assistive Device (Sit-Stand Transfers) cane, quad   Gait/Stairs (Locomotion)   [place] Level (Gait) contact guard   Assistive Device (Gait) cane, quad   Distance in Feet (Gait) 5' for eval, add'l for treat   Pattern (Gait) step-through   Comment, (Gait/Stairs) min A to ascend x2 steps, CGA to descend x2 steps with R rail   Balance   Balance Comments Fair, no overt LOB but does occasionally have a scissor step with quad cane, able to self correct   Clinical Impression   Criteria for Skilled Therapeutic Intervention Yes, treatment indicated   PT Diagnosis (PT) decreased functional mobility   Influenced by the following impairments impaired ROM/strength LUE, impaired gait mechanics, impaired balance   Functional limitations due to impairments transfers, ambulation, stairs   Clinical Presentation (PT Evaluation Complexity) stable   Clinical Presentation Rationale clinical judgement   Clinical Decision Making (Complexity) low complexity   Planned Therapy Interventions (PT) balance training;bed mobility training;gait training;home exercise program;neuromuscular re-education;postural re-education;stair training;strengthening;stretching;transfer training;progressive activity/exercise;risk factor education;home program guidelines   Risk &  Benefits of therapy have been explained evaluation/treatment results reviewed;care plan/treatment goals reviewed;risks/benefits reviewed;current/potential barriers reviewed;participants voiced agreement with care plan;participants included;patient   PT Total Evaluation Time   PT Eval, Low Complexity Minutes (72249) 10   Therapy Certification   Start of care date 07/08/24   Certification date from 07/08/24   Certification date to 07/11/24   Medical Diagnosis weakness   Physical Therapy Goals   PT Frequency Daily   PT Predicted Duration/Target Date for Goal Attainment 07/11/24   PT Goals Gait;Stairs   PT: Gait Modified independent;Quad cane;150 feet   PT: Stairs 2 stairs;Supervision/stand-by assist;Assistive device   PT Discharge Planning   PT Plan inc amb distance, review stairs to maximize IND/safety   PT Discharge Recommendation (DC Rec) home with assist;home with home care physical therapy   PT Rationale for DC Rec Pt demos adequate mobility for safe discharge home.  Rec spouse provide inc assist/supervision initially especially on entry stairs, rec resumption of HHPT/OT   PT Brief overview of current status SBA with quad cane in hallway ambulation   Total Session Time   Timed Code Treatment Minutes 18   Total Session Time (sum of timed and untimed services) 28       [facility]  OUTPATIENT PHYSICAL THERAPY EVALUATION  PLAN OF TREATMENT FOR OUTPATIENT REHABILITATION  (COMPLETE FOR INITIAL CLAIMS ONLY)  Patient's Last Name, First Name, M.I.  YOB: 1945  Monse Peoples                        Provider's Name  [facility] Medical Record No.  [id number]                             Onset Date:  07/07/24   Start of Care Date:  07/08/24   Type:     _X_PT   ___OT   ___SLP Medical Diagnosis:  weakness              PT Diagnosis:  decreased functional mobility Visits from SOC:  1     See note for plan of treatment, functional goals and  certification details    I CERTIFY THE NEED FOR THESE SERVICES FURNISHED UNDER        THIS PLAN OF TREATMENT AND WHILE UNDER MY CARE     (Physician co-signature of this document indicates review and certification of the therapy plan).

## 2024-07-09 ENCOUNTER — PATIENT OUTREACH (OUTPATIENT)
Dept: CARE COORDINATION | Facility: CLINIC | Age: 79
End: 2024-07-09
Payer: COMMERCIAL

## 2024-07-09 NOTE — PLAN OF CARE
PRIMARY DIAGNOSIS: Hypochloremia, Gastroenteritis, Hyponatremia, Elevated troponin, chronic anemia, Elevated BNP, Acute renal Failure  OUTPATIENT/OBSERVATION GOALS TO BE MET BEFORE DISCHARGE:  1. Orthostatic performed: N/A    2. Diagnostic testing complete & at baseline neurologic testing: No    3. Cleared by consultants (if involved): No    4. Interpretation of cardiac rhythm per telemetry tech: SR - HR 78    5. Tolerating adequate PO diet and medications: Yes    6. Return to near baseline physical activity or neurologic status: Yes    Discharge Planner Nurse   Safe discharge environment identified: Yes  Barriers to discharge: Yes       Entered by: Becky Bobby RN 07/08/2024 8:00AM   A&Ox4, VSS, PIV - infusing LR at 100ml/hr. PW, hesitant to get out of bed. Will have PT this AM. Tolerating diet with improved appetite. Denies pain, SOB/cough, dizziness, feeling light headed, or N/V. Plan: Echo, CT- PE ordered for today; PT consult and SW following  Please review provider order for any additional goals.   Nurse to notify provider when observation goals have been met and patient is ready for discharge.  Goal Outcome Evaluation:      Plan of Care Reviewed With: patient, spouse    Overall Patient Progress: improving    Outcome Evaluation: A&Ox4, Tele - SR, Denies Pain; Echo and CT- PE ordered; Consults - PT/OT and SW

## 2024-07-09 NOTE — PLAN OF CARE
PRIMARY DIAGNOSIS: Hypochloremia, Gastroenteritis, Hyponatremia, Elevated troponin, chronic anemia, Elevated BNP, Acute renal Failure   OUTPATIENT/OBSERVATION GOALS TO BE MET BEFORE DISCHARGE:  1. Orthostatic performed: N/A    2. Diagnostic testing complete & at baseline neurologic testing: Yes    3. Cleared by consultants (if involved): No    4. Interpretation of cardiac rhythm per telemetry tech: SR    5. Tolerating adequate PO diet and medications: Yes    6. Return to near baseline physical activity or neurologic status: Yes    Discharge Planner Nurse   Safe discharge environment identified: Yes  Barriers to discharge: Yes       Entered by: Becky Bobby RN 07/08/2024 12:00 PM   A&Ox4, VSS, PIV - infusing LR at 100ml/hr. Ax1 with GB and cane. Denies pain, SOB/cough, dizziness, feeling light headed, or N/V. Echo and CT-PE completed and awaiting plan. PT recommended for patient to resume HC therapy at discharge. Plan: awaiting results and SW following.   Please review provider order for any additional goals.   Nurse to notify provider when observation goals have been met and patient is ready for discharge.    Goal Outcome Evaluation:     Plan of Care Reviewed With: patient, spouse     Overall Patient Progress: improving     Outcome Evaluation: A&Ox4, Tele - SR, Denies Pain; Echo and CT- PE completed; Consults - PT and SW

## 2024-07-09 NOTE — PLAN OF CARE
Physical Therapy Discharge Summary    Reason for therapy discharge:    Discharged to home with home therapy.    Progress towards therapy goal(s). See goals on Care Plan in [facility] electronic health record for goal details.  Goals not met.  Barriers to achieving goals:   discharge on same date as initial evaluation.    Therapy recommendation(s):    Continued therapy is recommended.  Rationale/Recommendations:  Home with assist and Home PT recommended.  *Not seen by discharging PT; information based on medical record

## 2024-07-09 NOTE — PROGRESS NOTES
Connected Care Resource Center:   [facility] Contact  [facility]/Voicemail     Clinical Data: Post-Discharge Outreach     Outreach attempted x 2.  Left message on patient's voicemail, providing [facility]'s central phone number of [phone] ([phone]) for questions/concerns and/or to schedule an appt with an [facility] provider, if they do not have a PCP.      Plan:  [facility] will do no further outreaches at this time.       LORENZO Roberts  Connected Care Resource Center, [facility]    *Connected Care Resource Team does NOT follow patient ongoing. Referrals are identified based on internal discharge reports and the outreach is to ensure patient has an understanding of their discharge instructions.

## 2024-07-09 NOTE — PLAN OF CARE
PRIMARY DIAGNOSIS: Hypochloremia, Gastroenteritis, Hyponatremia, Elevated troponin, chronic anemia, Elevated BNP, Acute renal Failure   OUTPATIENT/OBSERVATION GOALS TO BE MET BEFORE DISCHARGE:  1. Orthostatic performed: N/A    2. Diagnostic testing complete & at baseline neurologic testing: Yes    3. Cleared by consultants (if involved): No    4. Interpretation of cardiac rhythm per telemetry tech: SR    5. Tolerating adequate PO diet and medications: Yes    6. Return to near baseline physical activity or neurologic status: Yes    Discharge Planner Nurse   Safe discharge environment identified: Yes  Barriers to discharge: Yes       Entered by: Becky Bobby RN 07/08/2024 4:00PM   A&Ox4, VSS, PIV - S/L. Tele - SR. Ax1 with GB and cane. Frequent toileting to bathroom. Denies pain, SOB/cough, dizziness, feeling light headed, or N/V. Echo and CT-PE completed and awaiting plan. PT recommended for patient to resume HC therapy at discharge. Plan: awaiting results and SW following.   Please review provider order for any additional goals.   Nurse to notify provider when observation goals have been met and patient is ready for discharge.      Goal Outcome Evaluation:     Plan of Care Reviewed With: patient, spouse     Overall Patient Progress: improving     Outcome Evaluation: A&Ox4, Tele - SR, Denies Pain; Echo and CT- PE completed; Consulted - PT and SW

## 2024-11-27 ENCOUNTER — LAB (OUTPATIENT)
Dept: LAB | Facility: CLINIC | Age: 79
End: 2024-11-27
Payer: COMMERCIAL

## 2024-11-27 ENCOUNTER — OFFICE VISIT (OUTPATIENT)
Dept: CARDIOLOGY | Facility: CLINIC | Age: 79
End: 2024-11-27
Payer: COMMERCIAL

## 2024-11-27 VITALS
HEART RATE: 81 BPM | HEIGHT: 65 IN | WEIGHT: 166 LBS | DIASTOLIC BLOOD PRESSURE: 70 MMHG | SYSTOLIC BLOOD PRESSURE: 123 MMHG | BODY MASS INDEX: 27.66 KG/M2

## 2024-11-27 DIAGNOSIS — I10 BENIGN ESSENTIAL HYPERTENSION: Primary | ICD-10-CM

## 2024-11-27 DIAGNOSIS — R06.09 DYSPNEA ON EXERTION: ICD-10-CM

## 2024-11-27 DIAGNOSIS — E78.5 HYPERLIPIDEMIA LDL GOAL <100: ICD-10-CM

## 2024-11-27 DIAGNOSIS — E78.2 MIXED HYPERLIPIDEMIA: ICD-10-CM

## 2024-11-27 DIAGNOSIS — R03.0 ELEVATED BLOOD PRESSURE READING WITHOUT DIAGNOSIS OF HYPERTENSION: ICD-10-CM

## 2024-11-27 DIAGNOSIS — R07.9 CHEST PAIN, UNSPECIFIED TYPE: ICD-10-CM

## 2024-11-27 DIAGNOSIS — I25.10 CORONARY ARTERY DISEASE INVOLVING NATIVE CORONARY ARTERY OF NATIVE HEART WITHOUT ANGINA PECTORIS: ICD-10-CM

## 2024-11-27 LAB — NT-PROBNP SERPL-MCNC: 225 PG/ML (ref 0–1800)

## 2024-11-27 NOTE — PROGRESS NOTES
HPI and Plan:   It is my pleasure to see your patient Aaliyah Peoples in follow-up.  This is a 78-year-old patient with a past history of chronic chest discomfort and ultimately underwent coronary angiography which showed mild nonflow-limiting coronary artery disease.  At present she is unfortunately grieving the loss of her son who  in his 50s.  He had significant problems with alcoholism and atrial fibrillation and had cardiomegaly.  She does complain of some shortness of breath but she has not gained any weight and has not complained of ankle edema orthopnea or PND she will occasionally have discomfort in the left lower rib cage but had fallen previously in that area.  Echocardiography in July showed that she had no significant valvular heart disease and had a normal ejection fraction in the 55 to 60% range.  She does tend to have higher blood pressures here but usually when she checks her blood pressure at home this is very well-controlled.  Last blood pressure measurement recently was 123/70.  She does have evidence of mild renal insufficiency.  Her lipid profile in  was excellent with an LDL of 45 HDL of 75 and triglycerides of 119 with a total cholesterol of 144.    Impression:  1.  Coronary artery disease.  The patient is asymptomatic with SPECT coronary artery disease with no symptoms suggestive angina pectoris.  2.  Dyspnea on exertion.  The patient does not have evidence of congestive heart failure and her recent echocardiogram would be against that.  I did discuss with her the possibility of doing a stress test but at this time she would like to hold off on that.  3.  Excellent lipid profile well within secondary prevention guidelines  4.  Grieving the death of her son who  in his 50s as I described above.  5.  Anemia of 9.0 on  of this year.  That of course could cause shortness of breath also.    Plan:  1.  As I mentioned above, the patient at this stage would like to hold off on doing  a stress test.  2.  I will check a proBNP today to see if there is any evidence of heart failure.  3.  I will see the patient back again in 1 years time I will repeat her lipid profile and basic metabolic profile but I have warned her that if her shortness of breath starts to get worse or progressive she is to call us immediately.    As always the patient is been told to contact me if she is any questions or any concerns.    The longitudinal plan of care for the diagnosis(es)/condition(s) as documented were addressed during this visit. Due to the added complexity in care, I will continue to support Monse in the subsequent management and with ongoing continuity of care.     Sotero Garza MD, FACC, FRCPI      Orders Placed This Encounter   Procedures    N terminal pro BNP outpatient    Basic metabolic panel    Lipid Profile    ALT    Follow-Up with Cardiology       No orders of the defined types were placed in this encounter.      There are no discontinued medications.      Encounter Diagnoses   Name Primary?    Benign essential hypertension Yes    Coronary artery disease involving native coronary artery of native heart without angina pectoris     Elevated blood pressure reading without diagnosis of hypertension     Dyspnea on exertion     Chest pain, unspecified type     Hyperlipidemia LDL goal <100     Mixed hyperlipidemia        CURRENT MEDICATIONS:  Current Outpatient Medications   Medication Sig Dispense Refill    acetaminophen (TYLENOL) 325 MG tablet Take 650 mg by mouth every morning 100 tablet 0    amLODIPine (NORVASC) 5 MG tablet Take 1 tablet (5 mg) by mouth daily 90 tablet 3    aspirin 81 MG tablet Take 81 mg by mouth daily      atenolol (TENORMIN) 50 MG tablet Take 50 mg by mouth daily      atorvastatin (LIPITOR) 20 MG tablet Take 1 tablet (20 mg) by mouth daily 90 tablet 0    buPROPion (WELLBUTRIN SR) 150 MG 12 hr tablet Take 1 tablet (150 mg) by mouth 2 times daily 180 tablet 0    calcium  "carbonate-vitamin D 600-400 MG-UNIT CHEW Take 2 tablets by mouth every morning      DULoxetine (CYMBALTA) 60 MG capsule Take 1 capsule (60 mg) by mouth daily 90 capsule 0    FIBER SELECT GUMMIES CHEW Take 1 chew tab by mouth daily       hydrALAZINE (APRESOLINE) 100 MG tablet Take 1 tablet (100 mg) by mouth 3 times daily 270 tablet 3    hydrochlorothiazide (HYDRODIURIL) 25 MG tablet Take 1 tablet (25 mg) by mouth daily 30 tablet 0    lisinopril (ZESTRIL) 40 MG tablet Take 1 tablet (40 mg) by mouth daily 90 tablet 0    multivitamin w/minerals (THERA-VIT-M) tablet Take 1 tablet by mouth daily      nitroGLYcerin (NITROSTAT) 0.4 MG sublingual tablet For chest pain place 1 tablet under the tongue every 5 minutes for 3 doses. If symptoms persist 5 minutes after 1st dose call 911. 25 tablet 0    Omega-3 Fatty Acids (OMEGA-3 FISH OIL PO) Take 1.2 g by mouth daily       omeprazole (PRILOSEC) 40 MG DR capsule Take 1 capsule (40 mg) by mouth daily Take 30-60 minutes before a meal. 90 capsule 0    traZODone (DESYREL) 100 MG tablet Take 100-200 mg by mouth at bedtime      ferrous sulfate (FE TABS) 325 (65 Fe) MG EC tablet Take 325 mg by mouth daily      senna (SENOKOT) 8.6 MG tablet Take 1 tablet by mouth 2 times daily as needed for constipation         ALLERGIES     Allergies   Allergen Reactions    Codeine     Morphine Sulfate (Concentrate)      does not like side effects/\"hyper\"       PAST MEDICAL HISTORY:  Past Medical History:   Diagnosis Date    Anxiety 4/2/2015    Asthma     no inhaler for 2-3 years    Benign essential hypertension 8/2/2016    Chest pain, unspecified type 8/27/2017    Coronary artery disease     Coronary artery disease involving native coronary artery of native heart without angina pectoris 8/2/2016    Depression     Esophageal reflux 3/19/2015    Former smoker - has had pulmonary nodules in the past 3/29/2017    History of blood transfusion     Hyperlipidemia LDL goal <100 3/19/2015    Osteoarthritis     " "Osteopenia     Pulmonary nodules 2015    Noted 4/14/15, f/u scan 2016 showed minimal change, needs repeat scan 2017        PAST SURGICAL HISTORY:  Past Surgical History:   Procedure Laterality Date    ANGIOGRAM  6/15/05    ARTHROPLASTY HIP  2013    Procedure: ARTHROPLASTY HIP;  Right total hip arthroplasty      ARTHROPLASTY HIP Left 2015    Procedure: ARTHROPLASTY HIP;  Surgeon: Tyrese Howell MD;  Location: [facility]    ARTHROPLASTY KNEE Left 2016    Procedure: ARTHROPLASTY KNEE;  Surgeon: Tyrese Howell MD;  Location: [facility]    CHOLECYSTECTOMY      COLONOSCOPY      COLONOSCOPY Left 2016    Procedure: COLONOSCOPY;  Surgeon: Moe Haney MD;  Location: [facility]    GASTRIC BYPASS       tiff en y    GI SURGERY      fistulotomy    ORTHOPEDIC SURGERY      left knee \"green procedure\"    TONSILLECTOMY      TUBAL LIGATION         FAMILY HISTORY:  Family History   Problem Relation Age of Onset    Coronary Artery Disease Father     Hypertension Father     Coronary Artery Disease Sister     Hypertension Sister     Coronary Artery Disease Brother     Hypertension Brother     Heart Disease Nephew     Other Cancer Maternal Half-Sister     Breast Cancer Maternal Aunt 35    Ovarian Cancer No family hx of        SOCIAL HISTORY:  Social History     Socioeconomic History    Marital status:      Spouse name: None    Number of children: None    Years of education: None    Highest education level: None   Tobacco Use    Smoking status: Former     Current packs/day: 0.00     Average packs/day: 0.5 packs/day for 50.0 years (25.0 ttl pk-yrs)     Types: Cigarettes     Start date: 1963     Quit date: 2013     Years since quittin.8    Smokeless tobacco: Never   Substance and Sexual Activity    Alcohol use: Yes     Comment: 5 drinks a week (wine)    Drug use: No    Sexual activity: Yes     Partners: Male   Other Topics Concern    Parent/sibling w/ CABG, MI or angioplasty before 65F 55M? " "Yes     Comment: brother age 31    Caffeine Concern Yes     Comment: 1 cup of coffee     Sleep Concern Yes     Comment: lately it has been an issue    Special Diet No     Comment: watching what she eats     Exercise No    Seat Belt Yes     Social Drivers of Health      Received from [employer] [facility]    Social Connections       Review of Systems:  Skin:          Eyes:         ENT:         Respiratory:  Positive for dyspnea on exertion;shortness of breath     Cardiovascular:    fatigue;lightheadedness;Positive for    Gastroenterology:        Genitourinary:         Musculoskeletal:         Neurologic:         Psychiatric:         Heme/Lymph/Imm:         Endocrine:           Physical Exam:  Vitals: /70   Pulse 81   Ht 1.638 m (5' 4.5\")   Wt 75.3 kg (166 lb)   LMP  (LMP Unknown)   BMI 28.05 kg/m      Constitutional:  cooperative, alert and oriented, well developed, well nourished, in no acute distress overweight mildly anxious    Skin:  warm and dry to the touch, no apparent skin lesions or masses noted     warm and dry    Head:  normocephalic, no masses or lesions   atraumatic    Eyes:  pupils equal and round;conjunctivae and lids unremarkable;sclera white   EOMI, pupils round and equal    Lymph:      ENT:  no pallor or cyanosis   speech normal, tongue midline    Neck:  no carotid bruit;JVP normal;carotid pulses are full and equal bilaterally   supple    Respiratory:  normal breath sounds, clear to auscultation, normal A-P diameter, normal symmetry, normal respiratory excursion, no use of accessory muscles    clear without rales/wheezing    Cardiac: regular rhythm, normal S1/S2, no S3 or S4, apical impulse not displaced, no murmurs, gallops or rubs             RRR without murmur/lift/thrill  not assessed this visit                                   UE pulses full and equal    GI:    obese non-tender    Extremities and Muscular Skeletal:  no deformities, clubbing, cyanosis, erythema " observed;no edema         no edema    Neurological:  no gross motor deficits;affect appropriate   motor grossly intact    Psych:  Alert and Oriented x 3        CC  Sotero Garza MD  [id number]  [place] [id number]

## 2024-11-27 NOTE — LETTER
2024    Christiano Taylor MD  [facility] [address] [address] [facility] [address]    RE: Monse LO Richelle       Dear Colleague,     I had the pleasure of seeing Monse Peoples in the [facility].  HPI and Plan:   It is my pleasure to see your patient Aaliyah Peoples in follow-up.  This is a 78-year-old patient with a past history of chronic chest discomfort and ultimately underwent coronary angiography which showed mild nonflow-limiting coronary artery disease.  At present she is unfortunately grieving the loss of her son who  in his 50s.  He had significant problems with alcoholism and atrial fibrillation and had cardiomegaly.  She does complain of some shortness of breath but she has not gained any weight and has not complained of ankle edema orthopnea or PND she will occasionally have discomfort in the left lower rib cage but had fallen previously in that area.  Echocardiography in July showed that she had no significant valvular heart disease and had a normal ejection fraction in the 55 to 60% range.  She does tend to have higher blood pressures here but usually when she checks her blood pressure at home this is very well-controlled.  Last blood pressure measurement recently was 123/70.  She does have evidence of mild renal insufficiency.  Her lipid profile in  was excellent with an LDL of 45 HDL of 75 and triglycerides of 119 with a total cholesterol of 144.    Impression:  1.  Coronary artery disease.  The patient is asymptomatic with SPECT coronary artery disease with no symptoms suggestive angina pectoris.  2.  Dyspnea on exertion.  The patient does not have evidence of congestive heart failure and her recent echocardiogram would be against that.  I did discuss with her the possibility of doing a stress test but at this time she would like to hold off on that.  3.  Excellent lipid profile well within secondary prevention guidelines  4.  Grieving the death of her son  who  in his 50s as I described above.  5.  Anemia of 9.0 on  of this year.  That of course could cause shortness of breath also.    Plan:  1.  As I mentioned above, the patient at this stage would like to hold off on doing a stress test.  2.  I will check a proBNP today to see if there is any evidence of heart failure.  3.  I will see the patient back again in 1 years time I will repeat her lipid profile and basic metabolic profile but I have warned her that if her shortness of breath starts to get worse or progressive she is to call us immediately.    As always the patient is been told to contact me if she is any questions or any concerns.    The longitudinal plan of care for the diagnosis(es)/condition(s) as documented were addressed during this visit. Due to the added complexity in care, I will continue to support Monse in the subsequent management and with ongoing continuity of care.     Sotero Garza MD, FACC, FRCPI      Orders Placed This Encounter   Procedures     N terminal pro BNP outpatient     Basic metabolic panel     Lipid Profile     ALT     Follow-Up with Cardiology       No orders of the defined types were placed in this encounter.      There are no discontinued medications.      Encounter Diagnoses   Name Primary?     Benign essential hypertension Yes     Coronary artery disease involving native coronary artery of native heart without angina pectoris      Elevated blood pressure reading without diagnosis of hypertension      Dyspnea on exertion      Chest pain, unspecified type      Hyperlipidemia LDL goal <100      Mixed hyperlipidemia        CURRENT MEDICATIONS:  Current Outpatient Medications   Medication Sig Dispense Refill     acetaminophen (TYLENOL) 325 MG tablet Take 650 mg by mouth every morning 100 tablet 0     amLODIPine (NORVASC) 5 MG tablet Take 1 tablet (5 mg) by mouth daily 90 tablet 3     aspirin 81 MG tablet Take 81 mg by mouth daily       atenolol (TENORMIN) 50 MG  "tablet Take 50 mg by mouth daily       atorvastatin (LIPITOR) 20 MG tablet Take 1 tablet (20 mg) by mouth daily 90 tablet 0     buPROPion (WELLBUTRIN SR) 150 MG 12 hr tablet Take 1 tablet (150 mg) by mouth 2 times daily 180 tablet 0     calcium carbonate-vitamin D 600-400 MG-UNIT CHEW Take 2 tablets by mouth every morning       DULoxetine (CYMBALTA) 60 MG capsule Take 1 capsule (60 mg) by mouth daily 90 capsule 0     FIBER SELECT GUMMIES CHEW Take 1 chew tab by mouth daily        hydrALAZINE (APRESOLINE) 100 MG tablet Take 1 tablet (100 mg) by mouth 3 times daily 270 tablet 3     hydrochlorothiazide (HYDRODIURIL) 25 MG tablet Take 1 tablet (25 mg) by mouth daily 30 tablet 0     lisinopril (ZESTRIL) 40 MG tablet Take 1 tablet (40 mg) by mouth daily 90 tablet 0     multivitamin w/minerals (THERA-VIT-M) tablet Take 1 tablet by mouth daily       nitroGLYcerin (NITROSTAT) 0.4 MG sublingual tablet For chest pain place 1 tablet under the tongue every 5 minutes for 3 doses. If symptoms persist 5 minutes after 1st dose call 911. 25 tablet 0     Omega-3 Fatty Acids (OMEGA-3 FISH OIL PO) Take 1.2 g by mouth daily        omeprazole (PRILOSEC) 40 MG DR capsule Take 1 capsule (40 mg) by mouth daily Take 30-60 minutes before a meal. 90 capsule 0     traZODone (DESYREL) 100 MG tablet Take 100-200 mg by mouth at bedtime       ferrous sulfate (FE TABS) 325 (65 Fe) MG EC tablet Take 325 mg by mouth daily       senna (SENOKOT) 8.6 MG tablet Take 1 tablet by mouth 2 times daily as needed for constipation         ALLERGIES     Allergies   Allergen Reactions     Codeine      Morphine Sulfate (Concentrate)      does not like side effects/\"hyper\"       PAST MEDICAL HISTORY:  Past Medical History:   Diagnosis Date     Anxiety 4/2/2015     Asthma     no inhaler for 2-3 years     Benign essential hypertension 8/2/2016     Chest pain, unspecified type 8/27/2017     Coronary artery disease      Coronary artery disease involving native coronary " "artery of native heart without angina pectoris 8/2/2016     Depression      Esophageal reflux 3/19/2015     Former smoker - has had pulmonary nodules in the past 3/29/2017     History of blood transfusion      Hyperlipidemia LDL goal <100 3/19/2015     Osteoarthritis      Osteopenia      Pulmonary nodules 4/22/2015    Noted 4/14/15, f/u scan 03/2016 showed minimal change, needs repeat scan 03/2017        PAST SURGICAL HISTORY:  Past Surgical History:   Procedure Laterality Date     ANGIOGRAM  6/15/05     ARTHROPLASTY HIP  9/23/2013    Procedure: ARTHROPLASTY HIP;  Right total hip arthroplasty       ARTHROPLASTY HIP Left 5/19/2015    Procedure: ARTHROPLASTY HIP;  Surgeon: Tyrese Howell MD;  Location: [facility]     ARTHROPLASTY KNEE Left 8/22/2016    Procedure: ARTHROPLASTY KNEE;  Surgeon: Tyrese Howell MD;  Location: [facility]     CHOLECYSTECTOMY       COLONOSCOPY       COLONOSCOPY Left 4/19/2016    Procedure: COLONOSCOPY;  Surgeon: Moe Haney MD;  Location: [facility]     GASTRIC BYPASS      2011 tiff en y     GI SURGERY      fistulotomy     ORTHOPEDIC SURGERY      left knee \"green procedure\"     TONSILLECTOMY       TUBAL LIGATION         FAMILY HISTORY:  Family History   Problem Relation Age of Onset     Coronary Artery Disease Father      Hypertension Father      Coronary Artery Disease Sister      Hypertension Sister      Coronary Artery Disease Brother      Hypertension Brother      Heart Disease Nephew      Other Cancer Maternal Half-Sister      Breast Cancer Maternal Aunt 35     Ovarian Cancer No family hx of        SOCIAL HISTORY:  Social History     Socioeconomic History     Marital status:      Spouse name: None     Number of children: None     Years of education: None     Highest education level: None   Tobacco Use     Smoking status: Former     Current packs/day: 0.00     Average packs/day: 0.5 packs/day for 50.0 years (25.0 ttl pk-yrs)     Types: Cigarettes     Start date: 1/19/1963     Quit " "date: 2013     Years since quittin.8     Smokeless tobacco: Never   Substance and Sexual Activity     Alcohol use: Yes     Comment: 5 drinks a week (wine)     Drug use: No     Sexual activity: Yes     Partners: Male   Other Topics Concern     Parent/sibling w/ CABG, MI or angioplasty before 65F 55M? Yes     Comment: brother age 31     Caffeine Concern Yes     Comment: 1 cup of coffee      Sleep Concern Yes     Comment: lately it has been an issue     Special Diet No     Comment: watching what she eats      Exercise No     Seat Belt Yes     Social Drivers of Health      Received from [employer] [facility] [employer]       Review of Systems:  Skin:          Eyes:         ENT:         Respiratory:  Positive for dyspnea on exertion;shortness of breath     Cardiovascular:    fatigue;lightheadedness;Positive for    Gastroenterology:        Genitourinary:         Musculoskeletal:         Neurologic:         Psychiatric:         Heme/Lymph/Imm:         Endocrine:           Physical Exam:  Vitals: /70   Pulse 81   Ht 1.638 m (5' 4.5\")   Wt 75.3 kg (166 lb)   LMP  (LMP Unknown)   BMI 28.05 kg/m      Constitutional:  cooperative, alert and oriented, well developed, well nourished, in no acute distress overweight mildly anxious    Skin:  warm and dry to the touch, no apparent skin lesions or masses noted     warm and dry    Head:  normocephalic, no masses or lesions   atraumatic    Eyes:  pupils equal and round;conjunctivae and lids unremarkable;sclera white   EOMI, pupils round and equal    Lymph:      ENT:  no pallor or cyanosis   speech normal, tongue midline    Neck:  no carotid bruit;JVP normal;carotid pulses are full and equal bilaterally   supple    Respiratory:  normal breath sounds, clear to auscultation, normal A-P diameter, normal symmetry, normal respiratory excursion, no use of accessory muscles    clear without rales/wheezing    Cardiac: regular rhythm, normal " S1/S2, no S3 or S4, apical impulse not displaced, no murmurs, gallops or rubs             RRR without murmur/lift/thrill  not assessed this visit                                   UE pulses full and equal    GI:    obese non-tender    Extremities and Muscular Skeletal:  no deformities, clubbing, cyanosis, erythema observed;no edema         no edema    Neurological:  no gross motor deficits;affect appropriate   motor grossly intact    Psych:  Alert and Oriented x 3        CC  Sotero Garza MD  [id number]  [place] [id number]                  Thank you for allowing me to participate in the care of your patient.      Sincerely,     Sotero Garza MD, MD     [facility]  cc:   Sotero Garza MD  [address]  [place] [id number]

## 2025-02-25 ENCOUNTER — PATIENT OUTREACH (OUTPATIENT)
Dept: CARE COORDINATION | Facility: CLINIC | Age: 80
End: 2025-02-25
Payer: COMMERCIAL

## 2025-04-12 ENCOUNTER — HEALTH MAINTENANCE LETTER (OUTPATIENT)
Age: 80
End: 2025-04-12

## 2025-05-02 ENCOUNTER — APPOINTMENT (OUTPATIENT)
Dept: CARDIOLOGY | Facility: CLINIC | Age: 80
DRG: 494 | End: 2025-05-02
Attending: PHYSICIAN ASSISTANT
Payer: COMMERCIAL

## 2025-05-02 ENCOUNTER — APPOINTMENT (OUTPATIENT)
Dept: GENERAL RADIOLOGY | Facility: CLINIC | Age: 80
DRG: 494 | End: 2025-05-02
Attending: EMERGENCY MEDICINE
Payer: COMMERCIAL

## 2025-05-02 ENCOUNTER — HOSPITAL ENCOUNTER (INPATIENT)
Facility: CLINIC | Age: 80
LOS: 3 days | Discharge: NURSING, REHAB OR LONG-TERM CARE | DRG: 494 | End: 2025-05-05
Attending: EMERGENCY MEDICINE | Admitting: INTERNAL MEDICINE
Payer: COMMERCIAL

## 2025-05-02 DIAGNOSIS — K21.9 GASTROESOPHAGEAL REFLUX DISEASE WITHOUT ESOPHAGITIS: ICD-10-CM

## 2025-05-02 DIAGNOSIS — I10 PRIMARY HYPERTENSION: ICD-10-CM

## 2025-05-02 DIAGNOSIS — S93.05XA DISLOCATION OF LEFT ANKLE JOINT, INITIAL ENCOUNTER: ICD-10-CM

## 2025-05-02 DIAGNOSIS — S82.852A CLOSED TRIMALLEOLAR FRACTURE OF LEFT ANKLE, INITIAL ENCOUNTER: ICD-10-CM

## 2025-05-02 DIAGNOSIS — F41.9 ANXIETY: ICD-10-CM

## 2025-05-02 DIAGNOSIS — I10 BENIGN ESSENTIAL HYPERTENSION: ICD-10-CM

## 2025-05-02 DIAGNOSIS — R55 SYNCOPE, UNSPECIFIED SYNCOPE TYPE: Primary | ICD-10-CM

## 2025-05-02 DIAGNOSIS — K59.03 DRUG-INDUCED CONSTIPATION: ICD-10-CM

## 2025-05-02 DIAGNOSIS — I25.10 CORONARY ARTERY DISEASE INVOLVING NATIVE CORONARY ARTERY OF NATIVE HEART WITHOUT ANGINA PECTORIS: ICD-10-CM

## 2025-05-02 DIAGNOSIS — K90.9 INTESTINAL MALABSORPTION, UNSPECIFIED TYPE: ICD-10-CM

## 2025-05-02 LAB
ALBUMIN UR-MCNC: NEGATIVE MG/DL
ANION GAP SERPL CALCULATED.3IONS-SCNC: 11 MMOL/L (ref 7–15)
APPEARANCE UR: CLEAR
ATRIAL RATE - MUSE: 79 BPM
BASOPHILS # BLD AUTO: 0 10E3/UL (ref 0–0.2)
BASOPHILS NFR BLD AUTO: 0 %
BILIRUB UR QL STRIP: NEGATIVE
BUN SERPL-MCNC: 19.7 MG/DL (ref 8–23)
CALCIUM SERPL-MCNC: 9.2 MG/DL (ref 8.8–10.4)
CHLORIDE SERPL-SCNC: 100 MMOL/L (ref 98–107)
CK SERPL-CCNC: 215 U/L (ref 26–192)
COLOR UR AUTO: ABNORMAL
CREAT SERPL-MCNC: 1.05 MG/DL (ref 0.51–0.95)
DIASTOLIC BLOOD PRESSURE - MUSE: NORMAL MMHG
EGFRCR SERPLBLD CKD-EPI 2021: 54 ML/MIN/1.73M2
EOSINOPHIL # BLD AUTO: 0.2 10E3/UL (ref 0–0.7)
EOSINOPHIL NFR BLD AUTO: 1 %
ERYTHROCYTE [DISTWIDTH] IN BLOOD BY AUTOMATED COUNT: 12.6 % (ref 10–15)
GLUCOSE SERPL-MCNC: 123 MG/DL (ref 70–99)
GLUCOSE UR STRIP-MCNC: NEGATIVE MG/DL
HCO3 SERPL-SCNC: 24 MMOL/L (ref 22–29)
HCT VFR BLD AUTO: 33.9 % (ref 35–47)
HGB BLD-MCNC: 11.7 G/DL (ref 11.7–15.7)
HGB UR QL STRIP: NEGATIVE
HOLD SPECIMEN: NORMAL
IMM GRANULOCYTES # BLD: 0.1 10E3/UL
IMM GRANULOCYTES NFR BLD: 1 %
INTERPRETATION ECG - MUSE: NORMAL
KETONES UR STRIP-MCNC: NEGATIVE MG/DL
LEUKOCYTE ESTERASE UR QL STRIP: ABNORMAL
LVEF ECHO: NORMAL
LYMPHOCYTES # BLD AUTO: 1.6 10E3/UL (ref 0.8–5.3)
LYMPHOCYTES NFR BLD AUTO: 12 %
MCH RBC QN AUTO: 32.6 PG (ref 26.5–33)
MCHC RBC AUTO-ENTMCNC: 34.5 G/DL (ref 31.5–36.5)
MCV RBC AUTO: 94 FL (ref 78–100)
MONOCYTES # BLD AUTO: 0.9 10E3/UL (ref 0–1.3)
MONOCYTES NFR BLD AUTO: 7 %
NEUTROPHILS # BLD AUTO: 10.2 10E3/UL (ref 1.6–8.3)
NEUTROPHILS NFR BLD AUTO: 78 %
NITRATE UR QL: POSITIVE
NRBC # BLD AUTO: 0 10E3/UL
NRBC BLD AUTO-RTO: 0 /100
P AXIS - MUSE: 40 DEGREES
PH UR STRIP: 5.5 [PH] (ref 5–7)
PLATELET # BLD AUTO: 272 10E3/UL (ref 150–450)
POTASSIUM SERPL-SCNC: 4.6 MMOL/L (ref 3.4–5.3)
PR INTERVAL - MUSE: 226 MS
QRS DURATION - MUSE: 84 MS
QT - MUSE: 400 MS
QTC - MUSE: 458 MS
R AXIS - MUSE: 68 DEGREES
RBC # BLD AUTO: 3.59 10E6/UL (ref 3.8–5.2)
RBC URINE: <1 /HPF
SODIUM SERPL-SCNC: 135 MMOL/L (ref 135–145)
SP GR UR STRIP: 1.01 (ref 1–1.03)
SQUAMOUS EPITHELIAL: <1 /HPF
SYSTOLIC BLOOD PRESSURE - MUSE: NORMAL MMHG
T AXIS - MUSE: 55 DEGREES
TROPONIN T SERPL HS-MCNC: 12 NG/L
UROBILINOGEN UR STRIP-MCNC: NORMAL MG/DL
VENTRICULAR RATE- MUSE: 79 BPM
WBC # BLD AUTO: 13 10E3/UL (ref 4–11)
WBC URINE: 20 /HPF

## 2025-05-02 PROCEDURE — 73600 X-RAY EXAM OF ANKLE: CPT | Mod: LT

## 2025-05-02 PROCEDURE — 27818 TREATMENT OF ANKLE FRACTURE: CPT | Mod: LT

## 2025-05-02 PROCEDURE — 250N000013 HC RX MED GY IP 250 OP 250 PS 637: Performed by: PHYSICIAN ASSISTANT

## 2025-05-02 PROCEDURE — 250N000013 HC RX MED GY IP 250 OP 250 PS 637: Performed by: INTERNAL MEDICINE

## 2025-05-02 PROCEDURE — 93306 TTE W/DOPPLER COMPLETE: CPT | Mod: 26 | Performed by: INTERNAL MEDICINE

## 2025-05-02 PROCEDURE — 84484 ASSAY OF TROPONIN QUANT: CPT | Performed by: EMERGENCY MEDICINE

## 2025-05-02 PROCEDURE — 258N000003 HC RX IP 258 OP 636: Performed by: EMERGENCY MEDICINE

## 2025-05-02 PROCEDURE — 81001 URINALYSIS AUTO W/SCOPE: CPT | Performed by: PHYSICIAN ASSISTANT

## 2025-05-02 PROCEDURE — 999N000065 XR ANKLE LEFT 2 VIEWS: Mod: LT

## 2025-05-02 PROCEDURE — 258N000003 HC RX IP 258 OP 636: Performed by: PHYSICIAN ASSISTANT

## 2025-05-02 PROCEDURE — 0SSGXZZ REPOSITION LEFT ANKLE JOINT, EXTERNAL APPROACH: ICD-10-PCS

## 2025-05-02 PROCEDURE — 99285 EMERGENCY DEPT VISIT HI MDM: CPT | Mod: 25

## 2025-05-02 PROCEDURE — 80048 BASIC METABOLIC PNL TOTAL CA: CPT | Performed by: EMERGENCY MEDICINE

## 2025-05-02 PROCEDURE — 96374 THER/PROPH/DIAG INJ IV PUSH: CPT

## 2025-05-02 PROCEDURE — 93306 TTE W/DOPPLER COMPLETE: CPT

## 2025-05-02 PROCEDURE — 82550 ASSAY OF CK (CPK): CPT | Performed by: EMERGENCY MEDICINE

## 2025-05-02 PROCEDURE — 85004 AUTOMATED DIFF WBC COUNT: CPT | Performed by: EMERGENCY MEDICINE

## 2025-05-02 PROCEDURE — 93005 ELECTROCARDIOGRAM TRACING: CPT

## 2025-05-02 PROCEDURE — 250N000011 HC RX IP 250 OP 636: Mod: JZ | Performed by: PHYSICIAN ASSISTANT

## 2025-05-02 PROCEDURE — 36415 COLL VENOUS BLD VENIPUNCTURE: CPT | Performed by: EMERGENCY MEDICINE

## 2025-05-02 PROCEDURE — 87186 SC STD MICRODIL/AGAR DIL: CPT | Performed by: PHYSICIAN ASSISTANT

## 2025-05-02 PROCEDURE — 250N000011 HC RX IP 250 OP 636: Mod: JZ | Performed by: EMERGENCY MEDICINE

## 2025-05-02 PROCEDURE — 96375 TX/PRO/DX INJ NEW DRUG ADDON: CPT

## 2025-05-02 PROCEDURE — 120N000001 HC R&B MED SURG/OB

## 2025-05-02 PROCEDURE — 99223 1ST HOSP IP/OBS HIGH 75: CPT | Performed by: PHYSICIAN ASSISTANT

## 2025-05-02 RX ORDER — NALOXONE HYDROCHLORIDE 0.4 MG/ML
0.2 INJECTION, SOLUTION INTRAMUSCULAR; INTRAVENOUS; SUBCUTANEOUS
Status: DISCONTINUED | OUTPATIENT
Start: 2025-05-02 | End: 2025-05-05 | Stop reason: HOSPADM

## 2025-05-02 RX ORDER — ONDANSETRON 4 MG/1
4 TABLET, ORALLY DISINTEGRATING ORAL EVERY 6 HOURS PRN
Status: DISCONTINUED | OUTPATIENT
Start: 2025-05-02 | End: 2025-05-05 | Stop reason: HOSPADM

## 2025-05-02 RX ORDER — HYDROMORPHONE HCL IN WATER/PF 6 MG/30 ML
0.4 PATIENT CONTROLLED ANALGESIA SYRINGE INTRAVENOUS
Status: DISCONTINUED | OUTPATIENT
Start: 2025-05-02 | End: 2025-05-03

## 2025-05-02 RX ORDER — ONDANSETRON 2 MG/ML
4 INJECTION INTRAMUSCULAR; INTRAVENOUS
Status: DISCONTINUED | OUTPATIENT
Start: 2025-05-02 | End: 2025-05-02

## 2025-05-02 RX ORDER — CEFAZOLIN SODIUM 2 G/50ML
2 SOLUTION INTRAVENOUS
OUTPATIENT
Start: 2025-05-02

## 2025-05-02 RX ORDER — AMLODIPINE BESYLATE 10 MG/1
10 TABLET ORAL DAILY
Status: DISCONTINUED | OUTPATIENT
Start: 2025-05-02 | End: 2025-05-05 | Stop reason: HOSPADM

## 2025-05-02 RX ORDER — AMOXICILLIN 250 MG
1 CAPSULE ORAL 2 TIMES DAILY PRN
Status: DISCONTINUED | OUTPATIENT
Start: 2025-05-02 | End: 2025-05-05 | Stop reason: HOSPADM

## 2025-05-02 RX ORDER — ACETAMINOPHEN 650 MG/1
650 SUPPOSITORY RECTAL EVERY 4 HOURS PRN
Status: DISCONTINUED | OUTPATIENT
Start: 2025-05-02 | End: 2025-05-05 | Stop reason: HOSPADM

## 2025-05-02 RX ORDER — PROPOFOL 10 MG/ML
50 INJECTION, EMULSION INTRAVENOUS ONCE
Status: COMPLETED | OUTPATIENT
Start: 2025-05-02 | End: 2025-05-02

## 2025-05-02 RX ORDER — OXYCODONE HYDROCHLORIDE 5 MG/1
5 TABLET ORAL EVERY 4 HOURS PRN
Status: DISCONTINUED | OUTPATIENT
Start: 2025-05-02 | End: 2025-05-03

## 2025-05-02 RX ORDER — BUPROPION HYDROCHLORIDE 150 MG/1
150 TABLET, EXTENDED RELEASE ORAL 2 TIMES DAILY
Status: DISCONTINUED | OUTPATIENT
Start: 2025-05-02 | End: 2025-05-05 | Stop reason: HOSPADM

## 2025-05-02 RX ORDER — ACETAMINOPHEN 325 MG/1
650 TABLET ORAL EVERY 4 HOURS PRN
Status: DISCONTINUED | OUTPATIENT
Start: 2025-05-02 | End: 2025-05-05 | Stop reason: HOSPADM

## 2025-05-02 RX ORDER — PANTOPRAZOLE SODIUM 40 MG/1
40 TABLET, DELAYED RELEASE ORAL 2 TIMES DAILY
Status: DISCONTINUED | OUTPATIENT
Start: 2025-05-02 | End: 2025-05-05 | Stop reason: HOSPADM

## 2025-05-02 RX ORDER — ATORVASTATIN CALCIUM 40 MG/1
40 TABLET, FILM COATED ORAL AT BEDTIME
Status: ON HOLD | COMMUNITY
End: 2025-05-05

## 2025-05-02 RX ORDER — DULOXETIN HYDROCHLORIDE 60 MG/1
60 CAPSULE, DELAYED RELEASE ORAL DAILY
Status: DISCONTINUED | OUTPATIENT
Start: 2025-05-02 | End: 2025-05-05 | Stop reason: HOSPADM

## 2025-05-02 RX ORDER — HYDROMORPHONE HCL IN WATER/PF 6 MG/30 ML
0.2 PATIENT CONTROLLED ANALGESIA SYRINGE INTRAVENOUS
Status: DISCONTINUED | OUTPATIENT
Start: 2025-05-02 | End: 2025-05-03

## 2025-05-02 RX ORDER — AMOXICILLIN 250 MG
2 CAPSULE ORAL 2 TIMES DAILY PRN
Status: DISCONTINUED | OUTPATIENT
Start: 2025-05-02 | End: 2025-05-05 | Stop reason: HOSPADM

## 2025-05-02 RX ORDER — ATENOLOL 50 MG/1
50 TABLET ORAL DAILY
Status: DISCONTINUED | OUTPATIENT
Start: 2025-05-02 | End: 2025-05-05 | Stop reason: HOSPADM

## 2025-05-02 RX ORDER — HYDRALAZINE HYDROCHLORIDE 50 MG/1
100 TABLET, FILM COATED ORAL 3 TIMES DAILY
Status: DISCONTINUED | OUTPATIENT
Start: 2025-05-02 | End: 2025-05-05 | Stop reason: HOSPADM

## 2025-05-02 RX ORDER — ONDANSETRON 2 MG/ML
4 INJECTION INTRAMUSCULAR; INTRAVENOUS EVERY 6 HOURS PRN
Status: DISCONTINUED | OUTPATIENT
Start: 2025-05-02 | End: 2025-05-05 | Stop reason: HOSPADM

## 2025-05-02 RX ORDER — LIDOCAINE 40 MG/G
CREAM TOPICAL
Status: DISCONTINUED | OUTPATIENT
Start: 2025-05-02 | End: 2025-05-05 | Stop reason: HOSPADM

## 2025-05-02 RX ORDER — FENTANYL CITRATE 50 UG/ML
25 INJECTION, SOLUTION INTRAMUSCULAR; INTRAVENOUS
Status: COMPLETED | OUTPATIENT
Start: 2025-05-02 | End: 2025-05-02

## 2025-05-02 RX ORDER — CEFAZOLIN SODIUM 2 G/50ML
2 SOLUTION INTRAVENOUS SEE ADMIN INSTRUCTIONS
OUTPATIENT
Start: 2025-05-02

## 2025-05-02 RX ORDER — SODIUM CHLORIDE 9 MG/ML
INJECTION, SOLUTION INTRAVENOUS CONTINUOUS
Status: ACTIVE | OUTPATIENT
Start: 2025-05-02 | End: 2025-05-02

## 2025-05-02 RX ORDER — CEFTRIAXONE 1 G/1
1 INJECTION, POWDER, FOR SOLUTION INTRAMUSCULAR; INTRAVENOUS EVERY 24 HOURS
Status: DISCONTINUED | OUTPATIENT
Start: 2025-05-02 | End: 2025-05-05 | Stop reason: HOSPADM

## 2025-05-02 RX ORDER — NALOXONE HYDROCHLORIDE 0.4 MG/ML
0.4 INJECTION, SOLUTION INTRAMUSCULAR; INTRAVENOUS; SUBCUTANEOUS
Status: DISCONTINUED | OUTPATIENT
Start: 2025-05-02 | End: 2025-05-05 | Stop reason: HOSPADM

## 2025-05-02 RX ADMIN — HYDROMORPHONE HYDROCHLORIDE 0.2 MG: 0.2 INJECTION, SOLUTION INTRAMUSCULAR; INTRAVENOUS; SUBCUTANEOUS at 18:43

## 2025-05-02 RX ADMIN — ATENOLOL 50 MG: 50 TABLET ORAL at 11:45

## 2025-05-02 RX ADMIN — DULOXETINE 60 MG: 60 CAPSULE, DELAYED RELEASE ORAL at 11:45

## 2025-05-02 RX ADMIN — FENTANYL CITRATE 25 MCG: 50 INJECTION, SOLUTION INTRAMUSCULAR; INTRAVENOUS at 08:34

## 2025-05-02 RX ADMIN — SODIUM CHLORIDE: 9 INJECTION, SOLUTION INTRAVENOUS at 10:09

## 2025-05-02 RX ADMIN — OXYCODONE HYDROCHLORIDE 5 MG: 5 TABLET ORAL at 15:42

## 2025-05-02 RX ADMIN — SODIUM CHLORIDE 1000 ML: 0.9 INJECTION, SOLUTION INTRAVENOUS at 07:26

## 2025-05-02 RX ADMIN — HYDROMORPHONE HYDROCHLORIDE 0.4 MG: 0.2 INJECTION, SOLUTION INTRAMUSCULAR; INTRAVENOUS; SUBCUTANEOUS at 22:21

## 2025-05-02 RX ADMIN — PANTOPRAZOLE SODIUM 40 MG: 40 TABLET, DELAYED RELEASE ORAL at 11:45

## 2025-05-02 RX ADMIN — BUPROPION HYDROCHLORIDE 150 MG: 150 TABLET, FILM COATED, EXTENDED RELEASE ORAL at 11:55

## 2025-05-02 RX ADMIN — OXYCODONE HYDROCHLORIDE 5 MG: 5 TABLET ORAL at 10:57

## 2025-05-02 RX ADMIN — PROPOFOL 170 MG: 10 INJECTION, EMULSION INTRAVENOUS at 08:39

## 2025-05-02 RX ADMIN — PANTOPRAZOLE SODIUM 40 MG: 40 TABLET, DELAYED RELEASE ORAL at 15:42

## 2025-05-02 RX ADMIN — OXYCODONE HYDROCHLORIDE 5 MG: 5 TABLET ORAL at 20:23

## 2025-05-02 RX ADMIN — BUPROPION HYDROCHLORIDE 150 MG: 150 TABLET, FILM COATED, EXTENDED RELEASE ORAL at 20:23

## 2025-05-02 RX ADMIN — CEFTRIAXONE 1 G: 1 INJECTION, POWDER, FOR SOLUTION INTRAMUSCULAR; INTRAVENOUS at 16:08

## 2025-05-02 ASSESSMENT — ACTIVITIES OF DAILY LIVING (ADL)
ADLS_ACUITY_SCORE: 38
ADLS_ACUITY_SCORE: 45
ADLS_ACUITY_SCORE: 53
ADLS_ACUITY_SCORE: 45
ADLS_ACUITY_SCORE: 38
ADLS_ACUITY_SCORE: 38
ADLS_ACUITY_SCORE: 53
ADLS_ACUITY_SCORE: 38
ADLS_ACUITY_SCORE: 53

## 2025-05-02 ASSESSMENT — COLUMBIA-SUICIDE SEVERITY RATING SCALE - C-SSRS
2. HAVE YOU ACTUALLY HAD ANY THOUGHTS OF KILLING YOURSELF IN THE PAST MONTH?: NO
6. HAVE YOU EVER DONE ANYTHING, STARTED TO DO ANYTHING, OR PREPARED TO DO ANYTHING TO END YOUR LIFE?: NO
1. IN THE PAST MONTH, HAVE YOU WISHED YOU WERE DEAD OR WISHED YOU COULD GO TO SLEEP AND NOT WAKE UP?: NO

## 2025-05-02 NOTE — H&P
[facility]  Internal Medicine  History and Physical      Patient Name: Monse Peoples MRN# [id number]   Age: 79 year old YOB: 1945     Date of Admission:5/2/2025    Primary care provider: Christiano Taylor  Date of Service: 5/2/2025         Assessment and Plan:   Monse Peoples is a 79 year old female with a history of Anxiety, Asthma, HTN, CAD, Depression, GERD, Former Smoker, HLD, OA, Pulmonary Nodules, Iron Deficiency, Prediabetes, S/p Gastric Bypass who presents to the ED today with left ankle pain.    Patient reports she lives in a single level town home with her .  Last night around 1130pm, she got up to go to the bathroom and while she was up, felt lightheaded and fell.  She reports she lost consciousness for several seconds to minutes.  She had left ankle pain and was unable to bear weight.  She decided to sleep in the floor and see if her pain improved in the morning.  It did not so she presented to the ED today for further evaluation.  Patient reports a history of a syncopal episode last Summer as well.  She reports recently increasing her Amlodipine to 10mg daily about one month ago.  Denies any other medication changes.  Denies any chest pain, shortness of breath, abdominal pain or other injuries.      Left Trimalleolar Fracture Dislocation  Imaging reveals a Left Trimalleolar fracture dislocation. Displaced fractures of the distal fibula, medial malleolus, and posterior malleolus with anterior translation of the tibia relative talus. Plantar spur. Soft tissue swelling.    - non weight bearing  - npo, IVF, antiemetics, analgesics prn  - Orthopedic Surgery consult    Leukocytosis  WBC 13.0.  Afebrile.  No focal infectious symptoms on ROS.  May be stress induced.  - UA pending  - monitor for signs of fever    Syncope  Pt reports feeling lightheaded prior to falling while getting up in the middle of the night to use the bathroom.  Denies any chest pain or  palpitations.  EKG NSR with 1st degree AVB.  Negative troponin.  Vital signs all wnl.  Suspect orthostatic?  Pt reports a similar episode that happened last year.  - monitor on telemetry  - unable to check orthostatics at this time d/t non weight bearing as above  - polypharmacy may be playing a role?  Monitor BP.  Holding Trazodone for now.  Holding hydrochlorothiazide due to npo and Lisinopril d/t possible surgery.  - obtain echocardiogram    HTN  On a 5 drug regimen.  Amlodipine recently increased from 5mg to 10mg daily one month ago.  BP wnl on admission.    - continue Amlodipine, Atenolol, Hydralazine with hold parameters  - hold Lisinopril for possible surgery  - hold hydrochlorothiazide due to npo    CAD/HLD  Hx of mild nonobstructive coronary artery disease (based on coronary angiogram 2005 at [facility])   - continue Atorvastatin  - hold ASA for now due to possible surgery    GERD  - continue PPI    Depression/Anxiety  - continue Bupropion and Duloxetine  - hold Trazodone for now    CODE: full  Diet/IVF: npo  GI ppx:  PPI  DVT ppx: scd  Medically Ready for Discharge: Anticipated in 2-4 Days        Earline BOJORQUEZ-C  Physician Assistant   Hospitalist Service    Awaiting formal pharmacy med rec to confirm home medications.             Chief Complaint:   Left Ankle Pain         HPI:   79 year old female with a history of Anxiety, Asthma, HTN, CAD, Depression, GERD, Former Smoker, HLD, OA, Pulmonary Nodules, Iron Deficiency, Prediabetes, S/p Gastric Bypass who presents to the ED today with left ankle pain.    History obtained from patient, chart review and verbal discussion with the ED provider.    Patient reports she lives in a single level town home with her .  Last night around 1130pm, she got up to go to the bathroom and while she was up, felt lightheaded and fell.  She reports she lost consciousness for several seconds to minutes.  She had left ankle pain and was unable to bear weight.   "She decided to sleep in the floor and see if her pain improved in the morning.  It did not so she presented to the ED today for further evaluation.  Patient reports a history of a syncopal episode last Summer as well.  She reports recently increasing her Amlodipine to 10mg daily about one month ago.  Denies any other medication changes.  Denies any chest pain, shortness of breath, abdominal pain or other injuries.           Past Medical History:     Past Medical History:   Diagnosis Date    Anxiety 4/2/2015    Asthma     no inhaler for 2-3 years    Benign essential hypertension 8/2/2016    Chest pain, unspecified type 8/27/2017    Coronary artery disease     Coronary artery disease involving native coronary artery of native heart without angina pectoris 8/2/2016    Depression     Esophageal reflux 3/19/2015    Former smoker - has had pulmonary nodules in the past 3/29/2017    History of blood transfusion     Hyperlipidemia LDL goal <100 3/19/2015    Osteoarthritis     Osteopenia     Pulmonary nodules 4/22/2015    Noted 4/14/15, f/u scan 03/2016 showed minimal change, needs repeat scan 03/2017           Past Surgical History:     Past Surgical History:   Procedure Laterality Date    ANGIOGRAM  6/15/05    ARTHROPLASTY HIP  9/23/2013    Procedure: ARTHROPLASTY HIP;  Right total hip arthroplasty      ARTHROPLASTY HIP Left 5/19/2015    Procedure: ARTHROPLASTY HIP;  Surgeon: Tyrese Howell MD;  Location: [facility] OR    ARTHROPLASTY KNEE Left 8/22/2016    Procedure: ARTHROPLASTY KNEE;  Surgeon: Tyrese Howell MD;  Location: [facility] OR    CHOLECYSTECTOMY      COLONOSCOPY      COLONOSCOPY Left 4/19/2016    Procedure: COLONOSCOPY;  Surgeon: Moe Haney MD;  Location:  GI    GASTRIC BYPASS      2011 tiff en y    GI SURGERY      fistulotomy    ORTHOPEDIC SURGERY      left knee \"green procedure\"    TONSILLECTOMY      TUBAL LIGATION            Social History:     Social History     Socioeconomic History    Marital status: "      Spouse name: Not on file    Number of children: Not on file    Years of education: Not on file    Highest education level: Not on file   Occupational History    Not on file   Tobacco Use    Smoking status: Former     Current packs/day: 0.00     Average packs/day: 0.5 packs/day for 50.0 years (25.0 ttl pk-yrs)     Types: Cigarettes     Start date: 1963     Quit date: 2013     Years since quittin.2    Smokeless tobacco: Never   Substance and Sexual Activity    Alcohol use: Yes     Comment: 5 drinks a week (wine)    Drug use: No    Sexual activity: Yes     Partners: Male   Other Topics Concern    Parent/sibling w/ CABG, MI or angioplasty before 65F 55M? Yes     Comment: brother age 31     Service Not Asked    Blood Transfusions Not Asked    Caffeine Concern Yes     Comment: 1 cup of coffee     Occupational Exposure Not Asked    Hobby Hazards Not Asked    Sleep Concern Yes     Comment: lately it has been an issue    Stress Concern Not Asked    Weight Concern Not Asked    Special Diet No     Comment: watching what she eats     Back Care Not Asked    Exercise No    Bike Helmet Not Asked    Seat Belt Yes    Self-Exams Not Asked   Social History Narrative    Not on file     Social Drivers of Health     Financial Resource Strain: Not on file   Food Insecurity: Not on file   Transportation Needs: Not on file   Physical Activity: Not on file   Stress: Not on file   Social Connections: Unknown (2024)    [facility]    Social Connections     Frequency of Communication with Friends and Family: Not on file   Interpersonal Safety: Not on file   Housing Stability: Not on file          Family History:     Family History   Problem Relation Age of Onset    Coronary Artery Disease Father     Hypertension Father     Coronary Artery Disease Sister     Hypertension Sister     Coronary Artery Disease Brother     Hypertension Brother     Heart Disease  "Nephew     Other Cancer Maternal Half-Sister     Breast Cancer Maternal Aunt 35    Ovarian Cancer No family hx of           Allergies:      Allergies   Allergen Reactions    Codeine     Morphine Sulfate (Concentrate)      does not like side effects/\"hyper\"          Medications:     Prior to Admission medications    Medication Sig Last Dose Taking? Auth Provider Long Term End Date   amLODIPine (NORVASC) 10 MG tablet Take 1 tablet (10 mg) by mouth daily. 5/1/2025 Evening Yes Phuong Sanchez PA-C Yes    hydrALAZINE (APRESOLINE) 100 MG tablet Take 1 tablet (100 mg) by mouth 3 times daily 5/1/2025 Evening Yes Jaelyn Lowry PA-C Yes    lisinopril (ZESTRIL) 40 MG tablet Take 1 tablet (40 mg) by mouth daily 5/1/2025 Evening Yes Susan Camacho MD Yes    acetaminophen (TYLENOL) 325 MG tablet Take 650 mg by mouth every morning   Susan Camacho MD     aspirin 81 MG tablet Take 81 mg by mouth daily   Reported, Patient     atenolol (TENORMIN) 50 MG tablet Take 50 mg by mouth daily   Unknown, Entered By History No    atorvastatin (LIPITOR) 20 MG tablet Take 1 tablet (20 mg) by mouth daily   Susan Camacho MD Yes    buPROPion (WELLBUTRIN SR) 150 MG 12 hr tablet Take 1 tablet (150 mg) by mouth 2 times daily   Susan Camacho MD Yes    calcium carbonate-vitamin D 600-400 MG-UNIT CHEW Take 2 tablets by mouth every morning   Unknown, Entered By History     DULoxetine (CYMBALTA) 60 MG capsule Take 1 capsule (60 mg) by mouth daily   Susan Camacho MD Yes    FIBER SELECT GUMMIES CHEW Take 1 chew tab by mouth daily    Unknown, Entered By History     hydrochlorothiazide (HYDRODIURIL) 25 MG tablet Take 1 tablet (25 mg) by mouth daily   Susan Camacho MD Yes    multivitamin w/minerals (THERA-VIT-M) tablet Take 1 tablet by mouth daily   Reported, Patient     nitroGLYcerin (NITROSTAT) 0.4 MG sublingual tablet For chest pain place 1 tablet under the tongue every 5 minutes for 3 doses. If symptoms " "persist 5 minutes after 1st dose call 911.   Susan Camacho MD Yes    Omega-3 Fatty Acids (OMEGA-3 FISH OIL PO) Take 1.2 g by mouth daily    Reported, Patient     omeprazole (PRILOSEC) 40 MG DR capsule Take 1 capsule (40 mg) by mouth daily Take 30-60 minutes before a meal.   Susan Camacho MD     traZODone (DESYREL) 100 MG tablet Take 100-200 mg by mouth at bedtime   Unknown, Entered By History No           Review of Systems:   A complete ROS was performed and is negative other than what is stated in the HPI.       Physical Exam:   Blood pressure 118/79, pulse 80, temperature 98.2  F (36.8  C), temperature source Oral, resp. rate 11, height 1.626 m (5' 4\"), weight 84 kg (185 lb 3 oz), SpO2 95%, not currently breastfeeding.  General: Alert, interactive, NAD, lying in bed, pleasant and cooperative.  HEENT: AT/NC, dry mucous membranes  Neck: Supple, no JVD  Chest/Resp: clear to auscultation bilaterally, no crackles or wheezes  Heart/CV: regular rate and rhythm, no murmur  Abdomen/GI: Soft, nontender, nondistended. +BS.  No rebound or guarding.  Extremities/MSK: LLE in an ace wrap  Skin: Warm and dry  Neuro: Alert & oriented x 3         Labs:   ROUTINE ICU LABS (Last four results)  CMP  Recent Labs   Lab 05/02/25  0727      POTASSIUM 4.6   CHLORIDE 100   CO2 24   ANIONGAP 11   *   BUN 19.7   CR 1.05*   GFRESTIMATED 54*   HATTIE 9.2     CBC  Recent Labs   Lab 05/02/25  0727   WBC 13.0*   RBC 3.59*   HGB 11.7   HCT 33.9*   MCV 94   MCH 32.6   MCHC 34.5   RDW 12.6             Imaging/Procedures:     Results for orders placed or performed during the hospital encounter of 05/02/25   XR Ankle Port Left 2 Views    Narrative    EXAM: XR ANKLE PORT LEFT 2 VIEWS  LOCATION: [facility]  DATE: 5/2/2025    INDICATION: ankle pain  COMPARISON: None.      Impression    IMPRESSION:    Trimalleolar fracture dislocation. Displaced fractures of the distal fibula, medial malleolus, and " posterior malleolus with anterior translation of the tibia relative talus. Plantar spur. Soft tissue swelling.       NOTE: ABNORMAL REPORT    THE DICTATION ABOVE DESCRIBES AN ABNORMALITY FOR WHICH FOLLOW-UP IS NEEDED.

## 2025-05-02 NOTE — PHARMACY-ADMISSION MEDICATION HISTORY
Pharmacist Admission Medication History    Admission medication history is complete. The information provided in this note is only as accurate as the sources available at the time of the update.    Information Source(s): Patient via in-person    Pertinent Information: Patient has been taking 200mg of trazodone more recently due to the death of her son.    Changes made to PTA medication list:  Added: None  Deleted: None  Changed: atorvastatin    Allergies reviewed with patient and updates made in EHR: yes - really not allergic to any medications - previous intolerance    Medication History Completed By: Mikel Cole [facility] 5/2/2025 10:20 AM    PTA Med List   Medication Sig Note Last Dose/Taking    acetaminophen (TYLENOL) 325 MG tablet Take 650 mg by mouth every morning  5/1/2025 Morning    amLODIPine (NORVASC) 10 MG tablet Take 1 tablet (10 mg) by mouth daily.  5/1/2025 Bedtime    aspirin 81 MG tablet Take 81 mg by mouth daily  5/1/2025 Morning    atenolol (TENORMIN) 50 MG tablet Take 50 mg by mouth daily  5/1/2025 Morning    atorvastatin (LIPITOR) 40 MG tablet Take 40 mg by mouth at bedtime.  5/1/2025 Bedtime    buPROPion (WELLBUTRIN SR) 150 MG 12 hr tablet Take 1 tablet (150 mg) by mouth 2 times daily  5/1/2025 Bedtime    calcium carbonate-vitamin D 600-400 MG-UNIT CHEW Take 2 tablets by mouth every morning  5/1/2025 Morning    DULoxetine (CYMBALTA) 60 MG capsule Take 1 capsule (60 mg) by mouth daily  5/1/2025 Morning    FIBER SELECT GUMMIES CHEW Take 1 chew tab by mouth daily   5/1/2025 Morning    hydrALAZINE (APRESOLINE) 100 MG tablet Take 1 tablet (100 mg) by mouth 3 times daily  5/1/2025 Bedtime    hydrochlorothiazide (HYDRODIURIL) 25 MG tablet Take 1 tablet (25 mg) by mouth daily  5/1/2025 Morning    lisinopril (ZESTRIL) 40 MG tablet Take 1 tablet (40 mg) by mouth daily  5/1/2025 Bedtime    multivitamin w/minerals (THERA-VIT-M) tablet Take 1 tablet by mouth daily  5/1/2025 Morning    nitroGLYcerin  (NITROSTAT) 0.4 MG sublingual tablet For chest pain place 1 tablet under the tongue every 5 minutes for 3 doses. If symptoms persist 5 minutes after 1st dose call 911.  Taking    Omega-3 Fatty Acids (OMEGA-3 FISH OIL PO) Take 1.2 g by mouth daily   5/1/2025 Morning    omeprazole (PRILOSEC) 40 MG DR capsule Take 1 capsule (40 mg) by mouth daily Take 30-60 minutes before a meal.  5/1/2025 Morning    traZODone (DESYREL) 100 MG tablet Take 100-200 mg by mouth at bedtime 5/2/2025: 5/2/25 - has been taking 200mg more recently d/t the death of her son. 5/1/2025 Bedtime

## 2025-05-02 NOTE — LETTER
To: RAMIN Carrero Auth   URGENT- LEAVING AT 1400!!  From: Bobbi [facility] [phone]      CASE #[id number]

## 2025-05-02 NOTE — SEDATION DOCUMENTATION
Assisted with reduction of L ankle. Pt was given 170 mg of IV propofol & 25 mcg of IV fentanyl. Dislocation reduced, and fracture splinted, very unstable. Pedal pulse intact. Formal x rays to follow. PT will be consulted with Ortho. Pt is VSS and ABC intact.

## 2025-05-02 NOTE — CONSULTS
[facility]    Orthopedic Consultation    Monse Peoples MRN# [id number]   Age: 79 year old YOB: 1945     Date of Admission:  5/2/2025    Reason for consult: Trimalleolar ankle fracture dislocation, left ankle       Requesting provider: Earline Gomes PA-C       Level of consult: Consult, follow and place orders           Assessment and Plan:   Assessment:   Trimalleolar fracture dislocation, left ankle      Plan:   Patient was reduced and splinted in the ED, however mortise remains wide  Plan for placement of external fixator tomorrow morning with Dr Arnaud RODAS at midnight, patient may eat today  Non-weightbearing left ankle full time, likely for 6 weeks, depending on timing of definitive fixation  Elevate for swelling control  Continue current pain regimen  PT/OT consults for gait, transfers and ADL's  SW consult for discharge planning  Son is currently being treated for non-provoked DVT, patient has no history             Chief Complaint:   Left ankle fracture dislocation         History of Present Illness:   History obtained from the H & P:  Monse Peoples is a 79 year old female with a history of Anxiety, Asthma, HTN, CAD, Depression, GERD, Former Smoker, HLD, OA, Pulmonary Nodules, Iron Deficiency, Prediabetes, S/p Gastric Bypass who presents to the ED today with left ankle pain.  Patient reports she lives in a single level town home with her .  Last night around 1130pm, she got up to go to the bathroom and while she was up, felt lightheaded and fell.  She reports she lost consciousness for several seconds to minutes.  She had left ankle pain and was unable to bear weight.  She decided to sleep in the floor and see if her pain improved in the morning.  It did not so she presented to the ED today for further evaluation.  Patient reports a history of a syncopal episode last Summer as well.  She reports recently increasing her Amlodipine to 10mg daily about one  month ago.  Denies any other medication changes.  Denies any chest pain, shortness of breath, abdominal pain or other injuries.    Additional history obtained from the patient:  Patient lives in a single level town house with her .  She has 2 stairs at the front, but has a ramp available, due to a prior injury.  She uses canes with walking out of the house, but does not always use something at home.  She has had bilateral total hip arthroplasties, left total knee arthroplasty, left reverse total shoulder arthroplasty and a right rotator cuff repair, with Dr Howell at [facility].  She denies prior foot/ankle injuries, but reports being flat footed.  She is on an 81 mg aspirin daily, but no other blood thinners.            Past Medical History:     Past Medical History:   Diagnosis Date    Anxiety 4/2/2015    Asthma     no inhaler for 2-3 years    Benign essential hypertension 8/2/2016    Chest pain, unspecified type 8/27/2017    Coronary artery disease     Coronary artery disease involving native coronary artery of native heart without angina pectoris 8/2/2016    Depression     Esophageal reflux 3/19/2015    Former smoker - has had pulmonary nodules in the past 3/29/2017    History of blood transfusion     Hyperlipidemia LDL goal <100 3/19/2015    Osteoarthritis     Osteopenia     Pulmonary nodules 4/22/2015    Noted 4/14/15, f/u scan 03/2016 showed minimal change, needs repeat scan 03/2017              Past Surgical History:     Past Surgical History:   Procedure Laterality Date    ANGIOGRAM  6/15/05    ARTHROPLASTY HIP  9/23/2013    Procedure: ARTHROPLASTY HIP;  Right total hip arthroplasty      ARTHROPLASTY HIP Left 5/19/2015    Procedure: ARTHROPLASTY HIP;  Surgeon: Tyrese Howell MD;  Location: [facility] OR    ARTHROPLASTY KNEE Left 8/22/2016    Procedure: ARTHROPLASTY KNEE;  Surgeon: Tyrese Howell MD;  Location: [facility] OR    CHOLECYSTECTOMY      COLONOSCOPY      COLONOSCOPY Left 4/19/2016    Procedure: COLONOSCOPY;   "Surgeon: Moe Haney MD;  Location:  GI    GASTRIC BYPASS       tiff en y    GI SURGERY      fistulotomy    ORTHOPEDIC SURGERY      left knee \"green procedure\"    TONSILLECTOMY      TUBAL LIGATION               Social History:     Social History     Tobacco Use    Smoking status: Former     Current packs/day: 0.00     Average packs/day: 0.5 packs/day for 50.0 years (25.0 ttl pk-yrs)     Types: Cigarettes     Start date: 1963     Quit date: 2013     Years since quittin.2    Smokeless tobacco: Never   Substance Use Topics    Alcohol use: Yes     Comment: 5 drinks a week (wine)             Family History:     Family History   Problem Relation Age of Onset    Coronary Artery Disease Father     Hypertension Father     Coronary Artery Disease Sister     Hypertension Sister     Coronary Artery Disease Brother     Hypertension Brother     Heart Disease Nephew     Other Cancer Maternal Half-Sister     Breast Cancer Maternal Aunt 35    Ovarian Cancer No family hx of              Immunizations:     VACCINE/DOSE   Diptheria   DPT   DTAP   HBIG   Hepatitis A   Hepatitis B   HIB   Influenza   Measles   Meningococcal   MMR   Mumps   Pneumococcal   Polio   Rubella   Small Pox   TDAP   Varicella   Zoster             Allergies:     Allergies   Allergen Reactions    Codeine     Morphine Sulfate (Concentrate)      does not like side effects/\"hyper\"             Medications:   Outpatient medications:           Prior to Admission medications    Medication Sig Last Dose Taking? Auth Provider Long Term End Date   amLODIPine (NORVASC) 10 MG tablet Take 1 tablet (10 mg) by mouth daily. 2025 Evening Yes Phuong Sanchez PA-C Yes     hydrALAZINE (APRESOLINE) 100 MG tablet Take 1 tablet (100 mg) by mouth 3 times daily 2025 Evening Yes Jaelyn Lowry PA-C Yes     lisinopril (ZESTRIL) 40 MG tablet Take 1 tablet (40 mg) by mouth daily 2025 Evening Yes Susan Camacho MD Yes     acetaminophen (TYLENOL) " 325 MG tablet Take 650 mg by mouth every morning     Susan Camacho MD       aspirin 81 MG tablet Take 81 mg by mouth daily     Reported, Patient       atenolol (TENORMIN) 50 MG tablet Take 50 mg by mouth daily     Unknown, Entered By History No     atorvastatin (LIPITOR) 20 MG tablet Take 1 tablet (20 mg) by mouth daily     Susan Camacho MD Yes     buPROPion (WELLBUTRIN SR) 150 MG 12 hr tablet Take 1 tablet (150 mg) by mouth 2 times daily     Susan Camacho MD Yes     calcium carbonate-vitamin D 600-400 MG-UNIT CHEW Take 2 tablets by mouth every morning     Unknown, Entered By History       DULoxetine (CYMBALTA) 60 MG capsule Take 1 capsule (60 mg) by mouth daily     Susan Camacho MD Yes     FIBER SELECT GUMMIES CHEW Take 1 chew tab by mouth daily      Unknown, Entered By History       hydrochlorothiazide (HYDRODIURIL) 25 MG tablet Take 1 tablet (25 mg) by mouth daily     Susan Camacho MD Yes     multivitamin w/minerals (THERA-VIT-M) tablet Take 1 tablet by mouth daily     Reported, Patient       nitroGLYcerin (NITROSTAT) 0.4 MG sublingual tablet For chest pain place 1 tablet under the tongue every 5 minutes for 3 doses. If symptoms persist 5 minutes after 1st dose call 911.     Susan Camcaho MD Yes     Omega-3 Fatty Acids (OMEGA-3 FISH OIL PO) Take 1.2 g by mouth daily      Reported, Patient       omeprazole (PRILOSEC) 40 MG DR capsule Take 1 capsule (40 mg) by mouth daily Take 30-60 minutes before a meal.     Susan Camacho MD       traZODone (DESYREL) 100 MG tablet Take 100-200 mg by mouth at bedtime     Unknown, Entered By History No        Current Facility-Administered Medications   Medication Dose Route Frequency Provider Last Rate Last Admin    acetaminophen (TYLENOL) tablet 650 mg  650 mg Oral Q4H PRN Earline Gomes PA-C        Or    acetaminophen (TYLENOL) Suppository 650 mg  650 mg Rectal Q4H PRN Earline Gomes PA-C        amLODIPine (NORVASC)  tablet 10 mg  10 mg Oral Daily Sotero Krishnan MD        atenolol (TENORMIN) tablet 50 mg  50 mg Oral Daily Sotero Krishnan MD   50 mg at 05/02/25 1145    buPROPion (WELLBUTRIN SR) 12 hr tablet 150 mg  150 mg Oral BID Sotero Krishnan MD        DULoxetine (CYMBALTA) DR capsule 60 mg  60 mg Oral Daily Sotero Krishnan MD   60 mg at 05/02/25 1145    hydrALAZINE (APRESOLINE) tablet 100 mg  100 mg Oral TID Sotero Krishnan MD        HYDROmorphone (DILAUDID) injection 0.2 mg  0.2 mg Intravenous Q2H PRN Earline Gomes PA-C        HYDROmorphone (DILAUDID) injection 0.4 mg  0.4 mg Intravenous Q2H PRN Earline Gomes PA-C        lidocaine (LMX4) cream   Topical Q1H PRN Earline Gomes PA-C        lidocaine 1 % 0.1-1 mL  0.1-1 mL Other Q1H PRN Earline Gomes PA-C        naloxone (NARCAN) injection 0.2 mg  0.2 mg Intravenous Q2 Min PRN Sotero Krishnan MD        Or    naloxone (NARCAN) injection 0.4 mg  0.4 mg Intravenous Q2 Min PRN Sotero Krishnan MD        Or    naloxone (NARCAN) injection 0.2 mg  0.2 mg Intramuscular Q2 Min PRN Sotero Krishnan MD        Or    naloxone (NARCAN) injection 0.4 mg  0.4 mg Intramuscular Q2 Min PRN Sotero Krishnan MD        ondansetron (ZOFRAN ODT) ODT tab 4 mg  4 mg Oral Q6H PRN Earline Gomes PA-C        Or    ondansetron (ZOFRAN) injection 4 mg  4 mg Intravenous Q6H PRN Earline Gomes PA-C        oxyCODONE (ROXICODONE) tablet 5 mg  5 mg Oral Q4H PRN Earline Gomes PA-C   5 mg at 05/02/25 1057    oxyCODONE IR (ROXICODONE) half-tab 2.5 mg  2.5 mg Oral Q4H PRN Earline Gomes PA-C        pantoprazole (PROTONIX) EC tablet 40 mg  40 mg Oral BID Sotero Krishnan MD   40 mg at 05/02/25 1145    senna-docusate (SENOKOT-S/PERICOLACE) 8.6-50 MG per tablet 1 tablet  1 tablet Oral BID PRN Earline Gomes PA-C        Or    senna-docusate (SENOKOT-S/PERICOLACE) 8.6-50 MG per tablet 2 tablet  2 tablet Oral BID PRN Earline Gomes PA-C        sodium chloride (PF) 0.9% PF flush 3  "mL  3 mL Intracatheter Q8H JOSE Earline Gomes PA-C        sodium chloride (PF) 0.9% PF flush 3 mL  3 mL Intracatheter q1 min prn Earline Gomes PA-C        sodium chloride 0.9 % infusion   Intravenous Continuous Earline Gomes PA-C 100 mL/hr at 05/02/25 1009 New Bag at 05/02/25 1009             Review of Systems:   ROS:  10 point ROS neg other than the symptoms noted above in the HPI.            Physical Exam:   All vitals have been reviewed  Patient Vitals for the past 24 hrs:   BP Temp Temp src Pulse Resp SpO2 Height Weight   05/02/25 0955 (!) 150/67 97  F (36.1  C) Temporal 83 20 97 % 1.626 m (5' 4\") 83.3 kg (183 lb 10.3 oz)   05/02/25 0930 136/66 -- -- 90 20 96 % -- --   05/02/25 0915 130/61 -- -- 88 16 96 % -- --   05/02/25 0852 118/79 -- -- 80 11 95 % -- --   05/02/25 0845 118/67 -- -- -- -- -- -- --   05/02/25 0845 -- -- -- 81 12 97 % -- --   05/02/25 0840 120/63 -- -- 85 16 95 % -- --   05/02/25 0837 120/64 -- -- 86 16 95 % -- --   05/02/25 0835 131/78 -- -- 91 16 96 % -- --   05/02/25 0831 124/76 -- -- 76 14 97 % -- --   05/02/25 0830 124/66 -- -- -- -- -- -- --   05/02/25 0829 -- -- -- 85 18 96 % -- --   05/02/25 0829 133/70 -- -- 85 15 99 % -- --   05/02/25 0825 108/75 -- -- 89 15 99 % -- --   05/02/25 0823 137/68 -- -- 89 16 98 % -- --   05/02/25 0815 (!) 143/105 -- -- -- -- -- -- --   05/02/25 0814 -- -- -- 86 22 99 % -- --   05/02/25 0800 -- -- -- 85 -- -- -- --   05/02/25 0800 (!) 147/71 -- -- 85 15 97 % -- --   05/02/25 0755 (!) 145/74 -- -- 87 17 97 % -- --   05/02/25 0745 (!) 145/74 -- -- 81 10 98 % -- --   05/02/25 0730 134/73 -- -- 81 16 98 % -- --   05/02/25 0721 -- -- -- -- -- 98 % 1.626 m (5' 4\") 84 kg (185 lb 3 oz)   05/02/25 0718 118/88 98.2  F (36.8  C) Oral 89 16 97 % -- --       Intake/Output Summary (Last 24 hours) at 5/2/2025 1146  Last data filed at 5/2/2025 1041  Gross per 24 hour   Intake --   Output 400 ml   Net -400 ml         Physical Exam   Temp: 97  F (36.1  C) Temp " src: Temporal BP: (!) 150/67 Pulse: 83   Resp: 20 SpO2: 97 % O2 Device: None (Room air) Oxygen Delivery: 2 LPM  Vital Signs with Ranges  Temp:  [97  F (36.1  C)-98.2  F (36.8  C)] 97  F (36.1  C)  Pulse:  [76-91] 83  Resp:  [10-22] 20  BP: (108-150)/() 150/67  SpO2:  [95 %-99 %] 97 %  183 lbs 10.29 oz    Constitutional: Pleasant, alert, appropriate, following commands.  HEENT: Head atraumatic normocephalic.  Respiratory: Unlabored breathing, no audible wheeze  Cardiovascular: Regular rate and rhythm per pulses  GI: Abdomen non-distended.  Lymph/Hematologic: No lymphadenopathy in areas examined  Genitourinary:  No cummings  Skin: No rashes, no cyanosis, no edema.  Musculoskeletal: Patient is lying supine in bed with her left leg elevated on pillows.  Her left leg is splinted and the skin cannot be visualized.  Her toes are visible.  They are warm to touch and well perfused.  Capillary refill is appropriate.  Sensation is intact to the toes.  She is able to lift the leg, bend the knee and wiggle the toes without difficulty.  Her right calf is soft and non-tender to palpation.  Neurologic: normal without focal findings, mental status, speech normal, alert and oriented   Neuropsychiatric: calm, but appropriately concerned            Data:   All laboratory data reviewed  Results for orders placed or performed during the hospital encounter of 05/02/25   XR Ankle Port Left 2 Views     Status: None    Narrative    EXAM: XR ANKLE PORT LEFT 2 VIEWS  LOCATION: [facility]  DATE: 5/2/2025    INDICATION: ankle pain  COMPARISON: None.      Impression    IMPRESSION:    Trimalleolar fracture dislocation. Displaced fractures of the distal fibula, medial malleolus, and posterior malleolus with anterior translation of the tibia relative talus. Plantar spur. Soft tissue swelling.       NOTE: ABNORMAL REPORT    THE DICTATION ABOVE DESCRIBES AN ABNORMALITY FOR WHICH FOLLOW-UP IS NEEDED.    XR Ankle Left 2 Views      Status: None    Narrative    EXAM: XR ANKLE LEFT 2 VIEWS  LOCATION: [facility]  DATE: 5/2/2025    INDICATION: post reduction  COMPARISON: None.      Impression    IMPRESSION: Interval reduction of the trimalleolar fracture-dislocation with improved, now near anatomic alignment at the tibiotalar joint. Soft tissue swelling about the left ankle. Degenerative arthritis midfoot. Plantar and Achilles calcaneal spurs.   Basic metabolic panel (BMP)     Status: Abnormal   Result Value Ref Range    Sodium 135 135 - 145 mmol/L    Potassium 4.6 3.4 - 5.3 mmol/L    Chloride 100 98 - 107 mmol/L    Carbon Dioxide (CO2) 24 22 - 29 mmol/L    Anion Gap 11 7 - 15 mmol/L    Urea Nitrogen 19.7 8.0 - 23.0 mg/dL    Creatinine 1.05 (H) 0.51 - 0.95 mg/dL    GFR Estimate 54 (L) >60 mL/min/1.73m2    Calcium 9.2 8.8 - 10.4 mg/dL    Glucose 123 (H) 70 - 99 mg/dL   Troponin T, High Sensitivity     Status: Normal   Result Value Ref Range    Troponin T, High Sensitivity 12 <=14 ng/L   CK total     Status: Abnormal   Result Value Ref Range     (H) 26 - 192 U/L   [place] Draw     Status: None    Narrative    The following orders were created for panel order [place] Draw.  Procedure                               Abnormality         Status                     ---------                               -----------         ------                     Extra Red Top Tube[[id number]]                              Final result                 Please view results for these tests on the individual orders.   CBC with platelets and differential     Status: Abnormal   Result Value Ref Range    WBC Count 13.0 (H) 4.0 - 11.0 10e3/uL    RBC Count 3.59 (L) 3.80 - 5.20 10e6/uL    Hemoglobin 11.7 11.7 - 15.7 g/dL    Hematocrit 33.9 (L) 35.0 - 47.0 %    MCV 94 78 - 100 fL    MCH 32.6 26.5 - 33.0 pg    MCHC 34.5 31.5 - 36.5 g/dL    RDW 12.6 10.0 - 15.0 %    Platelet Count 272 150 - 450 10e3/uL    % Neutrophils 78 %    % Lymphocytes 12 %    %  Monocytes 7 %    % Eosinophils 1 %    % Basophils 0 %    % Immature Granulocytes 1 %    NRBCs per 100 WBC 0 <1 /100    Absolute Neutrophils 10.2 (H) 1.6 - 8.3 10e3/uL    Absolute Lymphocytes 1.6 0.8 - 5.3 10e3/uL    Absolute Monocytes 0.9 0.0 - 1.3 10e3/uL    Absolute Eosinophils 0.2 0.0 - 0.7 10e3/uL    Absolute Basophils 0.0 0.0 - 0.2 10e3/uL    Absolute Immature Granulocytes 0.1 <=0.4 10e3/uL    Absolute NRBCs 0.0 10e3/uL   Extra Red Top Tube     Status: None   Result Value Ref Range    Hold Specimen JIC    EKG 12 lead     Status: None   Result Value Ref Range    Systolic Blood Pressure  mmHg    Diastolic Blood Pressure  mmHg    Ventricular Rate 79 BPM    Atrial Rate 79 BPM    AK Interval 226 ms    QRS Duration 84 ms     ms    QTc 458 ms    P Axis 40 degrees    R AXIS 68 degrees    T Axis 55 degrees    Interpretation ECG       Sinus rhythm with 1st degree A-V block  Otherwise normal ECG  When compared with ECG of 07-Jul-2024 15:34,  No significant change was found  Unconfirmed report - interpretation of this ECG is computer generated - see medical record for final interpretation  Confirmed by - EMERGENCY ROOM, PHYSICIAN ([id number]),  Santiago Moreno ([id number]) on 5/2/2025 7:37:58 AM     CBC with differential     Status: Abnormal    Narrative    The following orders were created for panel order CBC with differential.  Procedure                               Abnormality         Status                     ---------                               -----------         ------                     CBC with platelets and ...[[id number]]  Abnormal            Final result                 Please view results for these tests on the individual orders.          Attestation:  I have reviewed today's vital signs, notes, medications, labs and imaging with Dr. Lares.  Amount of time performed on this consult: 45 minutes.    Lilli Chun PA-C  [facility]

## 2025-05-02 NOTE — ED NOTES
"[facility]  ED Nurse Handoff Report    ED Chief complaint: Ankle Pain  . ED Diagnosis:   Final diagnoses:   Closed trimalleolar fracture of left ankle, initial encounter   Dislocation of left ankle joint, initial encounter       Allergies:   Allergies   Allergen Reactions    Codeine     Morphine Sulfate (Concentrate)      does not like side effects/\"hyper\"       Code Status: Full Code    Activity level - Baseline/Home:  independent.  Activity Level - Current:   in bed.   Lift room needed: No.   Bariatric: No   Needed: No   Isolation: No.   Infection: Not Applicable.     Respiratory status: Room air    Vital Signs (within 30 minutes):   Vitals:    05/02/25 0840 05/02/25 0845 05/02/25 0845 05/02/25 0852   BP: 120/63  118/67 118/79   BP Location:    Right arm   Pulse: 85 81  80   Resp: 16 12  11   Temp:       TempSrc:       SpO2: 95% 97%  95%   Weight:       Height:           Cardiac Rhythm:  ,      Pain level:    Patient confused: No.   Patient Falls Risk: patient and family education.   Elimination Status: Has voided     Patient Report - Initial Complaint: Patient arrives via EMS from home. Per pt syncopal episode last evening. Unsure what happened but reports left ankle pain. 4mg Zofran and 50mcg of fentanyl given by EMS. A&Ox4   Focused Assessment:     Musculoskeletal      MusculoskeletalMusculoskeletal WDL: .WDL except; extremity movementExtremity Movement: LLELLE Extremity Movement: active ROM severely impaired        Neuro Cognitive      Neuro CognitiveCognitive/Neuro/Behavioral WDL: WDL          Abnormal Results:   Labs Ordered and Resulted from Time of ED Arrival to Time of ED Departure   BASIC METABOLIC PANEL - Abnormal       Result Value    Sodium 135      Potassium 4.6      Chloride 100      Carbon Dioxide (CO2) 24      Anion Gap 11      Urea Nitrogen 19.7      Creatinine 1.05 (*)     GFR Estimate 54 (*)     Calcium 9.2      Glucose 123 (*)    CK TOTAL - Abnormal     " (*)    CBC WITH PLATELETS AND DIFFERENTIAL - Abnormal    WBC Count 13.0 (*)     RBC Count 3.59 (*)     Hemoglobin 11.7      Hematocrit 33.9 (*)     MCV 94      MCH 32.6      MCHC 34.5      RDW 12.6      Platelet Count 272      % Neutrophils 78      % Lymphocytes 12      % Monocytes 7      % Eosinophils 1      % Basophils 0      % Immature Granulocytes 1      NRBCs per 100 WBC 0      Absolute Neutrophils 10.2 (*)     Absolute Lymphocytes 1.6      Absolute Monocytes 0.9      Absolute Eosinophils 0.2      Absolute Basophils 0.0      Absolute Immature Granulocytes 0.1      Absolute NRBCs 0.0     TROPONIN T, HIGH SENSITIVITY - Normal    Troponin T, High Sensitivity 12     TROPONIN T, HIGH SENSITIVITY        XR Ankle Left 2 Views   Final Result   IMPRESSION: Interval reduction of the trimalleolar fracture-dislocation with improved, now near anatomic alignment at the tibiotalar joint. Soft tissue swelling about the left ankle. Degenerative arthritis midfoot. Plantar and Achilles calcaneal spurs.      XR Ankle Port Left 2 Views   Final Result   IMPRESSION:      Trimalleolar fracture dislocation. Displaced fractures of the distal fibula, medial malleolus, and posterior malleolus with anterior translation of the tibia relative talus. Plantar spur. Soft tissue swelling.          NOTE: ABNORMAL REPORT      THE DICTATION ABOVE DESCRIBES AN ABNORMALITY FOR WHICH FOLLOW-UP IS NEEDED.           Treatments provided: See MAR  Family Comments: None at bedside  OBS brochure/video discussed/provided to patient:  Yes  ED Medications:   Medications   ondansetron (ZOFRAN) injection 4 mg (has no administration in time range)   sodium chloride 0.9% BOLUS 1,000 mL (1,000 mLs Intravenous $New Bag 5/2/25 4714)   propofol (DIPRIVAN) injection 10 mg/mL vial (170 mg Intravenous $Given 5/2/25 6581)   fentaNYL (PF) (SUBLIMAZE) injection 25 mcg (25 mcg Intravenous $Given 5/2/25 3756)       Drips infusing:  No  For the majority of the shift this  patient was Green.   Interventions performed were N/A.    Sepsis treatment initiated: No    Cares/treatment/interventions/medications to be completed following ED care: N/A    ED Nurse Name: Merced Casanova RN  9:13 AM     RECEIVING UNIT ED HANDOFF REVIEW    Above ED Nurse Handoff Report was reviewed: Yes  Reviewed by: Lara Ramirez RN on May 2, 2025 at 9:31 AM   I Claudia called the ED to inform them the note was read: Yes

## 2025-05-02 NOTE — ED TRIAGE NOTES
Patient arrives via EMS from home.  Per pt syncopal episode last evening.  Unsure what happened but reports left ankle pain.  4mg Zofran and 50mcg of fentanyl given by EMS.  A&Ox4     Triage Assessment (Adult)       Row Name 05/02/25 0718          Triage Assessment    Airway WDL WDL        Respiratory WDL    Respiratory WDL WDL        Skin Circulation/Temperature WDL    Skin Circulation/Temperature WDL WDL        Cardiac WDL    Cardiac WDL WDL        Peripheral/Neurovascular WDL    Peripheral Neurovascular WDL WDL        Cognitive/Neuro/Behavioral WDL    Cognitive/Neuro/Behavioral WDL WDL

## 2025-05-02 NOTE — ED PROVIDER NOTES
Emergency Department Note      History of Present Illness     Chief Complaint   Ankle Pain      HPI   Monse Peoples is a 79 year old female presenting via EMS with a left ankle injury. She reports having lightheadedness last night which resulted in her falling and injuring her ankle. She denies hurting anything else. She endorses losing consciousness. She lives at home with her . She was unable to bear weight and spent the night on the floor. EMS administered 50 of fentanyl for pain. She denies chest pain, shortness of breath, numbness/tingling of the extremities.       Independent Historian   EMS as detailed above.    Review of External Notes   3/28/25: Clinic note reviewed    Past Medical History     Medical History and Problem List   Past Medical History:   Diagnosis Date    Anxiety 4/2/2015    Asthma     Benign essential hypertension 8/2/2016    Chest pain, unspecified type 8/27/2017    Coronary artery disease     Coronary artery disease involving native coronary artery of native heart without angina pectoris 8/2/2016    Depression     Esophageal reflux 3/19/2015    Former smoker - has had pulmonary nodules in the past 3/29/2017    History of blood transfusion     Hyperlipidemia LDL goal <100 3/19/2015    Osteoarthritis     Osteopenia     Pulmonary nodules 4/22/2015   Asthma   Hypertension    Coronary artery disease   Depression   Esophageal reflux   Osteoarthritis   Osteropnea     Medications   No current outpatient medications on file.      Surgical History   Past Surgical History:   Procedure Laterality Date    ANGIOGRAM  6/15/05    ARTHROPLASTY HIP  9/23/2013    Procedure: ARTHROPLASTY HIP;  Right total hip arthroplasty      ARTHROPLASTY HIP Left 5/19/2015    Procedure: ARTHROPLASTY HIP;  Surgeon: Tyrese Howell MD;  Location: [facility] OR    ARTHROPLASTY KNEE Left 8/22/2016    Procedure: ARTHROPLASTY KNEE;  Surgeon: Tyrese Howell MD;  Location: [facility] OR    CHOLECYSTECTOMY      COLONOSCOPY       "COLONOSCOPY Left 4/19/2016    Procedure: COLONOSCOPY;  Surgeon: Moe Haney MD;  Location:  GI    GASTRIC BYPASS      2011 tiff en y    GI SURGERY      fistulotomy    ORTHOPEDIC SURGERY      left knee \"green procedure\"    TONSILLECTOMY      TUBAL LIGATION         Physical Exam     Patient Vitals for the past 24 hrs:   BP Temp Temp src Pulse Resp SpO2 Height Weight   05/02/25 0955 (!) 150/67 97  F (36.1  C) Temporal 83 20 97 % 1.626 m (5' 4\") 83.3 kg (183 lb 10.3 oz)   05/02/25 0930 136/66 -- -- 90 20 96 % -- --   05/02/25 0915 130/61 -- -- 88 16 96 % -- --   05/02/25 0852 118/79 -- -- 80 11 95 % -- --   05/02/25 0845 118/67 -- -- -- -- -- -- --   05/02/25 0845 -- -- -- 81 12 97 % -- --   05/02/25 0840 120/63 -- -- 85 16 95 % -- --   05/02/25 0837 120/64 -- -- 86 16 95 % -- --   05/02/25 0835 131/78 -- -- 91 16 96 % -- --   05/02/25 0831 124/76 -- -- 76 14 97 % -- --   05/02/25 0830 124/66 -- -- -- -- -- -- --   05/02/25 0829 -- -- -- 85 18 96 % -- --   05/02/25 0829 133/70 -- -- 85 15 99 % -- --   05/02/25 0825 108/75 -- -- 89 15 99 % -- --   05/02/25 0823 137/68 -- -- 89 16 98 % -- --   05/02/25 0815 (!) 143/105 -- -- -- -- -- -- --   05/02/25 0814 -- -- -- 86 22 99 % -- --   05/02/25 0800 -- -- -- 85 -- -- -- --   05/02/25 0800 (!) 147/71 -- -- 85 15 97 % -- --   05/02/25 0755 (!) 145/74 -- -- 87 17 97 % -- --   05/02/25 0745 (!) 145/74 -- -- 81 10 98 % -- --   05/02/25 0730 134/73 -- -- 81 16 98 % -- --   05/02/25 0721 -- -- -- -- -- 98 % 1.626 m (5' 4\") 84 kg (185 lb 3 oz)   05/02/25 0718 118/88 98.2  F (36.8  C) Oral 89 16 97 % -- --     Physical Exam  General: No acute distress  Head: No obvious trauma to head.  Ears, Nose, Throat:  External ears normal.  Nose normal.  No pharyngeal erythema, swelling or exudate.  Midline uvula. Moist mucus membranes.  Eyes:  Conjunctivae clear. EOMI. PERRL.  Neck: Normal range of motion.  Neck supple. No tenderness to palpation  CV: Regular rate and rhythm.  No " murmurs. Left PT dopplerable, left DP unable to be dopplered. Cap refill <2 sec in the left toes.     Respiratory: Effort normal and breath sounds normal.  No wheezing or crackles.   Gastrointestinal: Soft.  No distension. There is no tenderness.  There is no rigidity, no rebound and no guarding.   Musculoskeletal: Gross deformity of the left ankle with tenderness to palpation.  Neuro: Alert. Moving all extremities appropriately.  Normal speech. Sensation grossly intake in the bilateral lower extremities.   Skin: Skin is warm and dry.  Bruising to the anterior left ankle.  Psych: Normal mood and affect. Behavior is normal.       Diagnostics     Lab Results   Labs Ordered and Resulted from Time of ED Arrival to Time of ED Departure   BASIC METABOLIC PANEL - Abnormal       Result Value    Sodium 135      Potassium 4.6      Chloride 100      Carbon Dioxide (CO2) 24      Anion Gap 11      Urea Nitrogen 19.7      Creatinine 1.05 (*)     GFR Estimate 54 (*)     Calcium 9.2      Glucose 123 (*)    CK TOTAL - Abnormal     (*)    CBC WITH PLATELETS AND DIFFERENTIAL - Abnormal    WBC Count 13.0 (*)     RBC Count 3.59 (*)     Hemoglobin 11.7      Hematocrit 33.9 (*)     MCV 94      MCH 32.6      MCHC 34.5      RDW 12.6      Platelet Count 272      % Neutrophils 78      % Lymphocytes 12      % Monocytes 7      % Eosinophils 1      % Basophils 0      % Immature Granulocytes 1      NRBCs per 100 WBC 0      Absolute Neutrophils 10.2 (*)     Absolute Lymphocytes 1.6      Absolute Monocytes 0.9      Absolute Eosinophils 0.2      Absolute Basophils 0.0      Absolute Immature Granulocytes 0.1      Absolute NRBCs 0.0     TROPONIN T, HIGH SENSITIVITY - Normal    Troponin T, High Sensitivity 12         Imaging   Echocardiogram Complete   Final Result      XR Ankle Left 2 Views   Final Result   IMPRESSION: Interval reduction of the trimalleolar fracture-dislocation with improved, now near anatomic alignment at the tibiotalar joint.  Soft tissue swelling about the left ankle. Degenerative arthritis midfoot. Plantar and Achilles calcaneal spurs.      XR Ankle Port Left 2 Views   Final Result   IMPRESSION:      Trimalleolar fracture dislocation. Displaced fractures of the distal fibula, medial malleolus, and posterior malleolus with anterior translation of the tibia relative talus. Plantar spur. Soft tissue swelling.          NOTE: ABNORMAL REPORT      THE DICTATION ABOVE DESCRIBES AN ABNORMALITY FOR WHICH FOLLOW-UP IS NEEDED.           EKG   ECG results from 05/02/25   EKG 12 lead     Value    Systolic Blood Pressure     Diastolic Blood Pressure     Ventricular Rate 79    Atrial Rate 79    ND Interval 226    QRS Duration 84        QTc 458    P Axis 40    R AXIS 68    T Axis 55    Interpretation ECG      Sinus rhythm with 1st degree A-V block  When compared with ECG of 07-Jul-2024 15:34,  No significant change was found           Independent Interpretation   X-ray left ankle shows trimalleolar fracture with dislocation    ED Course      Medications Administered   Medications   lidocaine 1 % 0.1-1 mL (has no administration in time range)   lidocaine (LMX4) cream (has no administration in time range)   sodium chloride (PF) 0.9% PF flush 3 mL (has no administration in time range)   sodium chloride (PF) 0.9% PF flush 3 mL (has no administration in time range)   senna-docusate (SENOKOT-S/PERICOLACE) 8.6-50 MG per tablet 1 tablet (has no administration in time range)     Or   senna-docusate (SENOKOT-S/PERICOLACE) 8.6-50 MG per tablet 2 tablet (has no administration in time range)   sodium chloride 0.9 % infusion ( Intravenous $New Bag 5/2/25 1009)   acetaminophen (TYLENOL) tablet 650 mg (has no administration in time range)     Or   acetaminophen (TYLENOL) Suppository 650 mg (has no administration in time range)   oxyCODONE IR (ROXICODONE) half-tab 2.5 mg (has no administration in time range)   oxyCODONE (ROXICODONE) tablet 5 mg (5 mg Oral $Given  5/2/25 1057)   HYDROmorphone (DILAUDID) injection 0.2 mg (has no administration in time range)   HYDROmorphone (DILAUDID) injection 0.4 mg (has no administration in time range)   ondansetron (ZOFRAN ODT) ODT tab 4 mg (has no administration in time range)     Or   ondansetron (ZOFRAN) injection 4 mg (has no administration in time range)   naloxone (NARCAN) injection 0.2 mg (has no administration in time range)     Or   naloxone (NARCAN) injection 0.4 mg (has no administration in time range)     Or   naloxone (NARCAN) injection 0.2 mg (has no administration in time range)     Or   naloxone (NARCAN) injection 0.4 mg (has no administration in time range)   amLODIPine (NORVASC) tablet 10 mg (has no administration in time range)   atenolol (TENORMIN) tablet 50 mg (50 mg Oral $Given 5/2/25 1145)   buPROPion (WELLBUTRIN SR) 12 hr tablet 150 mg (150 mg Oral $Given 5/2/25 1155)   DULoxetine (CYMBALTA) DR capsule 60 mg (60 mg Oral $Given 5/2/25 1145)   hydrALAZINE (APRESOLINE) tablet 100 mg (has no administration in time range)   pantoprazole (PROTONIX) EC tablet 40 mg (40 mg Oral $Given 5/2/25 1145)   sodium chloride 0.9% BOLUS 1,000 mL (1,000 mLs Intravenous $New Bag 5/2/25 9461)   propofol (DIPRIVAN) injection 10 mg/mL vial (170 mg Intravenous $Given 5/2/25 7881)   fentaNYL (PF) (SUBLIMAZE) injection 25 mcg (25 mcg Intravenous $Given 5/2/25 9259)       [facility]    -Dislocation - Lower Extremity    Date/Time: 5/2/2025 7:50 AM    Performed by: Yaya Chau MD  Authorized by: Yaya Chau MD    Risks, benefits and alternatives discussed.    ED EVALUATION:      Assessment Time: 5/2/2025 7:50 AM      I have performed an Emergency Department Evaluation including taking a history and physical examination, this evaluation will be documented in the electronic medical record for this ED encounter.      ASA Class: Class 2- mild systemic disease, no acute problems, no functional limitations     Mallampati: Grade 1- soft palate, uvula, tonsillar pillars, and posterior pharyngeal wall visible    NPO Status: appropriately NPO for procedure    UNIVERSAL PROTOCOL   Site Marked: NA  Prior Images Obtained and Reviewed:  Yes  Required items: Required blood products, implants, devices and special equipment available    Patient identity confirmed:  Verbally with patient  Patient was reevaluated immediately before administering moderate or deep sedation or anesthesia  Confirmation Checklist:  Patient's identity using two indicators  Time out: Immediately prior to the procedure a time out was called    Universal Protocol: the Joint Commission Universal Protocol was followed    Preparation: Patient was prepped and draped in usual sterile fashion      LOCATION     Location:  Ankle    Ankle injury location:  L ankle    Ankle dislocation type: posterior      PRE PROCEDURE DETAILS:     Distal perfusion: diminished      Range of motion: reduced      SEDATION  Patient Sedated: Yes    Sedation Type:  Moderate (conscious) sedation  Sedation:  Propofol  Vital signs: Vital signs monitored during sedation      ANESTHESIA (see MAR for exact dosages)      Anesthesia method:  None    PROCEDURE DETAILS      Manipulation performed: yes      Ankle reduction method:  Traction and counter traction    Reduction successful: yes      Reduction confirmed with imaging: yes      Immobilization:  Splint    Splint type:  Ankle stirrup    Supplies used:  Ortho-Glass    POST PROCEDURE DETAILS      Neurological function: normal      Distal perfusion: normal      Range of motion: unchanged        PROCEDURE    Length of time physician/provider present for 1:1 monitoring during sedation: 10  [facility]    Procedure: Sedation    Date/Time: 5/2/2025 8:01 PM    Performed by: Yaya Chau MD  Authorized by: Yaya Chau MD    Risks, benefits and alternatives discussed.    ED EVALUATION:      Assessment Time: 5/2/2025 8:20  AM      I have performed an Emergency Department Evaluation including taking a history and physical examination, this evaluation will be documented in the electronic medical record for this ED encounter.     Indication: ankle dislocation    ASA Class: Class 2- mild systemic disease, no acute problems, no functional limitations    Mallampati: Grade 1- soft palate, uvula, tonsillar pillars, and posterior pharyngeal wall visible    NPO Status: appropriately NPO for procedure    UNIVERSAL PROTOCOL   Site Marked: NA  Prior Images Obtained and Reviewed:  Yes  Required items: Required blood products, implants, devices and special equipment available    Patient identity confirmed:  Verbally with patient  Patient was reevaluated immediately before administering moderate or deep sedation or anesthesia  Confirmation Checklist:  Patient's identity using two indicators  Time out: Immediately prior to the procedure a time out was called    Universal Protocol: the Joint Commission Universal Protocol was followed    Preparation: Patient was prepped and draped in usual sterile fashion      SEDATION  Patient Sedated: Yes    Sedation Type:  Moderate (conscious) sedation  Sedation:  Propofol  Vital signs: Vital signs monitored during sedation      PROCEDURE    Patient Tolerance:  Patient tolerated the procedure well with no immediate complications  Length of time physician/provider present for 1:1 monitoring during sedation: 10       Discussion of Management   Admitting Hospitalist, Earline Gomes PA-C  Orthopedics, [facility]    ED Course   ED Course as of 05/02/25 1348   Fri May 02, 2025   0857 I consulted with Earline Gomes PA-C hospitalist admitting patient.        Additional Documentation  None    Medical Decision Making / Diagnosis     CMS Diagnoses: None    MIPS       None    MDM   Monse Peoples is a 79 year old female presenting via EMS with a left ankle injury. She has no other signs of injury. Sensation is intact  in the LLE and she can move her left toes. Left PT is detected by doppler but the left DP is unable to be dopplered. Portable xray reveals a trimalleolar fracture with a posterior dislocation. Procedural sedation is conducted and the ankle is successfully reduced. The left DP is then dopplerable. A short leg and stirrup splint is placed. Post reduction plain films are obtained to confirm the reduction. I discussed the patient with orthopedic surgery, who report they will evaluation the patient, she will require surgery. I discussed the patient with the hospitalist, who agrees to admit the patient to their service. She remains in stable condition.         Disposition   The patient was admitted to the hospital.     Diagnosis     ICD-10-CM    1. Closed trimalleolar fracture of left ankle, initial encounter  S82.852A Case Request: EXTERNAL FIXATION, LOWER EXTREMITY     Case Request: EXTERNAL FIXATION, LOWER EXTREMITY     Case Request: EXTERNAL FIXATION, LOWER EXTREMITY     Case Request: EXTERNAL FIXATION, LOWER EXTREMITY      2. Dislocation of left ankle joint, initial encounter  S93.05XA            Discharge Medications   Current Discharge Medication List            MD Isac Feliciano Stephen, MD  05/02/25 2012

## 2025-05-03 ENCOUNTER — ANESTHESIA EVENT (OUTPATIENT)
Dept: SURGERY | Facility: CLINIC | Age: 80
DRG: 494 | End: 2025-05-03
Payer: COMMERCIAL

## 2025-05-03 ENCOUNTER — ANESTHESIA (OUTPATIENT)
Dept: SURGERY | Facility: CLINIC | Age: 80
DRG: 494 | End: 2025-05-03
Payer: COMMERCIAL

## 2025-05-03 ENCOUNTER — APPOINTMENT (OUTPATIENT)
Dept: GENERAL RADIOLOGY | Facility: CLINIC | Age: 80
DRG: 494 | End: 2025-05-03
Attending: ORTHOPAEDIC SURGERY
Payer: COMMERCIAL

## 2025-05-03 LAB
ANION GAP SERPL CALCULATED.3IONS-SCNC: 12 MMOL/L (ref 7–15)
BACTERIA UR CULT: ABNORMAL
BUN SERPL-MCNC: 11.7 MG/DL (ref 8–23)
CALCIUM SERPL-MCNC: 8.5 MG/DL (ref 8.8–10.4)
CHLORIDE SERPL-SCNC: 105 MMOL/L (ref 98–107)
CREAT SERPL-MCNC: 0.95 MG/DL (ref 0.51–0.95)
EGFRCR SERPLBLD CKD-EPI 2021: 61 ML/MIN/1.73M2
ERYTHROCYTE [DISTWIDTH] IN BLOOD BY AUTOMATED COUNT: 12.8 % (ref 10–15)
GLUCOSE SERPL-MCNC: 107 MG/DL (ref 70–99)
HCO3 SERPL-SCNC: 24 MMOL/L (ref 22–29)
HCT VFR BLD AUTO: 29.9 % (ref 35–47)
HGB BLD-MCNC: 9.9 G/DL (ref 11.7–15.7)
MAGNESIUM SERPL-MCNC: 2 MG/DL (ref 1.7–2.3)
MCH RBC QN AUTO: 31.9 PG (ref 26.5–33)
MCHC RBC AUTO-ENTMCNC: 33.1 G/DL (ref 31.5–36.5)
MCV RBC AUTO: 97 FL (ref 78–100)
PHOSPHATE SERPL-MCNC: 4 MG/DL (ref 2.5–4.5)
PLATELET # BLD AUTO: 201 10E3/UL (ref 150–450)
POTASSIUM SERPL-SCNC: 4.3 MMOL/L (ref 3.4–5.3)
RBC # BLD AUTO: 3.1 10E6/UL (ref 3.8–5.2)
SODIUM SERPL-SCNC: 141 MMOL/L (ref 135–145)
WBC # BLD AUTO: 7.1 10E3/UL (ref 4–11)

## 2025-05-03 PROCEDURE — 250N000009 HC RX 250

## 2025-05-03 PROCEDURE — 250N000013 HC RX MED GY IP 250 OP 250 PS 637: Performed by: INTERNAL MEDICINE

## 2025-05-03 PROCEDURE — 85014 HEMATOCRIT: CPT | Performed by: INTERNAL MEDICINE

## 2025-05-03 PROCEDURE — 710N000009 HC RECOVERY PHASE 1, LEVEL 1, PER MIN: Performed by: ORTHOPAEDIC SURGERY

## 2025-05-03 PROCEDURE — 80048 BASIC METABOLIC PNL TOTAL CA: CPT | Performed by: INTERNAL MEDICINE

## 2025-05-03 PROCEDURE — 258N000003 HC RX IP 258 OP 636

## 2025-05-03 PROCEDURE — 250N000011 HC RX IP 250 OP 636: Performed by: PHYSICIAN ASSISTANT

## 2025-05-03 PROCEDURE — 250N000013 HC RX MED GY IP 250 OP 250 PS 637

## 2025-05-03 PROCEDURE — 250N000011 HC RX IP 250 OP 636: Mod: JZ | Performed by: ANESTHESIOLOGY

## 2025-05-03 PROCEDURE — 370N000017 HC ANESTHESIA TECHNICAL FEE, PER MIN: Performed by: ORTHOPAEDIC SURGERY

## 2025-05-03 PROCEDURE — 250N000025 HC SEVOFLURANE, PER MIN: Performed by: ORTHOPAEDIC SURGERY

## 2025-05-03 PROCEDURE — 999N000180 XR SURGERY CARM FLUORO LESS THAN 5 MIN

## 2025-05-03 PROCEDURE — 84100 ASSAY OF PHOSPHORUS: CPT | Performed by: INTERNAL MEDICINE

## 2025-05-03 PROCEDURE — 99232 SBSQ HOSP IP/OBS MODERATE 35: CPT | Performed by: INTERNAL MEDICINE

## 2025-05-03 PROCEDURE — 272N000001 HC OR GENERAL SUPPLY STERILE: Performed by: ORTHOPAEDIC SURGERY

## 2025-05-03 PROCEDURE — 360N000084 HC SURGERY LEVEL 4 W/ FLUORO, PER MIN: Performed by: ORTHOPAEDIC SURGERY

## 2025-05-03 PROCEDURE — 250N000011 HC RX IP 250 OP 636

## 2025-05-03 PROCEDURE — 83735 ASSAY OF MAGNESIUM: CPT | Performed by: INTERNAL MEDICINE

## 2025-05-03 PROCEDURE — C1713 ANCHOR/SCREW BN/BN,TIS/BN: HCPCS | Performed by: ORTHOPAEDIC SURGERY

## 2025-05-03 PROCEDURE — 0QSH35Z REPOSITION LEFT TIBIA WITH EXTERNAL FIXATION DEVICE, PERCUTANEOUS APPROACH: ICD-10-PCS | Performed by: ORTHOPAEDIC SURGERY

## 2025-05-03 PROCEDURE — 120N000001 HC R&B MED SURG/OB

## 2025-05-03 PROCEDURE — 36415 COLL VENOUS BLD VENIPUNCTURE: CPT | Performed by: INTERNAL MEDICINE

## 2025-05-03 PROCEDURE — 999N000141 HC STATISTIC PRE-PROCEDURE NURSING ASSESSMENT: Performed by: ORTHOPAEDIC SURGERY

## 2025-05-03 DEVICE — IMPLANTABLE DEVICE: Type: IMPLANTABLE DEVICE | Site: ANKLE | Status: FUNCTIONAL

## 2025-05-03 RX ORDER — SODIUM CHLORIDE, SODIUM LACTATE, POTASSIUM CHLORIDE, CALCIUM CHLORIDE 600; 310; 30; 20 MG/100ML; MG/100ML; MG/100ML; MG/100ML
INJECTION, SOLUTION INTRAVENOUS CONTINUOUS PRN
Status: DISCONTINUED | OUTPATIENT
Start: 2025-05-03 | End: 2025-05-03

## 2025-05-03 RX ORDER — HYDRALAZINE HYDROCHLORIDE 20 MG/ML
2.5-5 INJECTION INTRAMUSCULAR; INTRAVENOUS EVERY 10 MIN PRN
Status: DISCONTINUED | OUTPATIENT
Start: 2025-05-03 | End: 2025-05-03 | Stop reason: HOSPADM

## 2025-05-03 RX ORDER — CEFAZOLIN SODIUM/WATER 2 G/20 ML
2 SYRINGE (ML) INTRAVENOUS
Status: COMPLETED | OUTPATIENT
Start: 2025-05-03 | End: 2025-05-03

## 2025-05-03 RX ORDER — METOPROLOL TARTRATE 1 MG/ML
1-2 INJECTION, SOLUTION INTRAVENOUS EVERY 5 MIN PRN
Status: DISCONTINUED | OUTPATIENT
Start: 2025-05-03 | End: 2025-05-03 | Stop reason: HOSPADM

## 2025-05-03 RX ORDER — SODIUM CHLORIDE, SODIUM LACTATE, POTASSIUM CHLORIDE, CALCIUM CHLORIDE 600; 310; 30; 20 MG/100ML; MG/100ML; MG/100ML; MG/100ML
INJECTION, SOLUTION INTRAVENOUS CONTINUOUS
Status: DISCONTINUED | OUTPATIENT
Start: 2025-05-03 | End: 2025-05-05 | Stop reason: HOSPADM

## 2025-05-03 RX ORDER — HYDROXYZINE HYDROCHLORIDE 10 MG/1
10 TABLET, FILM COATED ORAL EVERY 6 HOURS PRN
Status: DISCONTINUED | OUTPATIENT
Start: 2025-05-03 | End: 2025-05-05 | Stop reason: HOSPADM

## 2025-05-03 RX ORDER — FENTANYL CITRATE 50 UG/ML
INJECTION, SOLUTION INTRAMUSCULAR; INTRAVENOUS PRN
Status: DISCONTINUED | OUTPATIENT
Start: 2025-05-03 | End: 2025-05-03

## 2025-05-03 RX ORDER — FAMOTIDINE 20 MG/1
20 TABLET, FILM COATED ORAL DAILY
Status: DISCONTINUED | OUTPATIENT
Start: 2025-05-03 | End: 2025-05-05 | Stop reason: HOSPADM

## 2025-05-03 RX ORDER — ONDANSETRON 4 MG/1
4 TABLET, ORALLY DISINTEGRATING ORAL EVERY 6 HOURS PRN
Status: DISCONTINUED | OUTPATIENT
Start: 2025-05-03 | End: 2025-05-03

## 2025-05-03 RX ORDER — HYDROMORPHONE HCL IN WATER/PF 6 MG/30 ML
0.2 PATIENT CONTROLLED ANALGESIA SYRINGE INTRAVENOUS EVERY 5 MIN PRN
Status: DISCONTINUED | OUTPATIENT
Start: 2025-05-03 | End: 2025-05-03 | Stop reason: HOSPADM

## 2025-05-03 RX ORDER — CEFAZOLIN SODIUM 2 G/50ML
2 SOLUTION INTRAVENOUS EVERY 8 HOURS
Status: COMPLETED | OUTPATIENT
Start: 2025-05-03 | End: 2025-05-04

## 2025-05-03 RX ORDER — LIDOCAINE 40 MG/G
CREAM TOPICAL
Status: DISCONTINUED | OUTPATIENT
Start: 2025-05-03 | End: 2025-05-05 | Stop reason: HOSPADM

## 2025-05-03 RX ORDER — CEFAZOLIN SODIUM/WATER 2 G/20 ML
2 SYRINGE (ML) INTRAVENOUS SEE ADMIN INSTRUCTIONS
Status: DISCONTINUED | OUTPATIENT
Start: 2025-05-03 | End: 2025-05-03 | Stop reason: HOSPADM

## 2025-05-03 RX ORDER — OXYCODONE HYDROCHLORIDE 5 MG/1
5 TABLET ORAL EVERY 4 HOURS PRN
Status: DISCONTINUED | OUTPATIENT
Start: 2025-05-03 | End: 2025-05-05 | Stop reason: HOSPADM

## 2025-05-03 RX ORDER — NALOXONE HYDROCHLORIDE 0.4 MG/ML
0.1 INJECTION, SOLUTION INTRAMUSCULAR; INTRAVENOUS; SUBCUTANEOUS
Status: DISCONTINUED | OUTPATIENT
Start: 2025-05-03 | End: 2025-05-03 | Stop reason: HOSPADM

## 2025-05-03 RX ORDER — SODIUM CHLORIDE, SODIUM LACTATE, POTASSIUM CHLORIDE, CALCIUM CHLORIDE 600; 310; 30; 20 MG/100ML; MG/100ML; MG/100ML; MG/100ML
INJECTION, SOLUTION INTRAVENOUS CONTINUOUS
Status: DISCONTINUED | OUTPATIENT
Start: 2025-05-03 | End: 2025-05-03 | Stop reason: HOSPADM

## 2025-05-03 RX ORDER — FENTANYL CITRATE 50 UG/ML
50 INJECTION, SOLUTION INTRAMUSCULAR; INTRAVENOUS EVERY 5 MIN PRN
Status: DISCONTINUED | OUTPATIENT
Start: 2025-05-03 | End: 2025-05-03 | Stop reason: HOSPADM

## 2025-05-03 RX ORDER — FENTANYL CITRATE 50 UG/ML
25 INJECTION, SOLUTION INTRAMUSCULAR; INTRAVENOUS EVERY 5 MIN PRN
Status: DISCONTINUED | OUTPATIENT
Start: 2025-05-03 | End: 2025-05-03 | Stop reason: HOSPADM

## 2025-05-03 RX ORDER — LIDOCAINE HYDROCHLORIDE 20 MG/ML
INJECTION, SOLUTION INFILTRATION; PERINEURAL PRN
Status: DISCONTINUED | OUTPATIENT
Start: 2025-05-03 | End: 2025-05-03

## 2025-05-03 RX ORDER — ONDANSETRON 2 MG/ML
4 INJECTION INTRAMUSCULAR; INTRAVENOUS EVERY 6 HOURS PRN
Status: DISCONTINUED | OUTPATIENT
Start: 2025-05-03 | End: 2025-05-03

## 2025-05-03 RX ORDER — DEXAMETHASONE SODIUM PHOSPHATE 4 MG/ML
INJECTION, SOLUTION INTRA-ARTICULAR; INTRALESIONAL; INTRAMUSCULAR; INTRAVENOUS; SOFT TISSUE PRN
Status: DISCONTINUED | OUTPATIENT
Start: 2025-05-03 | End: 2025-05-03

## 2025-05-03 RX ORDER — HYDROMORPHONE HCL IN WATER/PF 6 MG/30 ML
0.4 PATIENT CONTROLLED ANALGESIA SYRINGE INTRAVENOUS
Status: DISCONTINUED | OUTPATIENT
Start: 2025-05-03 | End: 2025-05-05 | Stop reason: HOSPADM

## 2025-05-03 RX ORDER — PROPOFOL 10 MG/ML
INJECTION, EMULSION INTRAVENOUS CONTINUOUS PRN
Status: DISCONTINUED | OUTPATIENT
Start: 2025-05-03 | End: 2025-05-03

## 2025-05-03 RX ORDER — POLYETHYLENE GLYCOL 3350 17 G/17G
17 POWDER, FOR SOLUTION ORAL DAILY
Status: DISCONTINUED | OUTPATIENT
Start: 2025-05-04 | End: 2025-05-05 | Stop reason: HOSPADM

## 2025-05-03 RX ORDER — ONDANSETRON 4 MG/1
4 TABLET, ORALLY DISINTEGRATING ORAL EVERY 30 MIN PRN
Status: DISCONTINUED | OUTPATIENT
Start: 2025-05-03 | End: 2025-05-03 | Stop reason: HOSPADM

## 2025-05-03 RX ORDER — PROCHLORPERAZINE MALEATE 5 MG/1
5 TABLET ORAL EVERY 6 HOURS PRN
Status: DISCONTINUED | OUTPATIENT
Start: 2025-05-03 | End: 2025-05-05 | Stop reason: HOSPADM

## 2025-05-03 RX ORDER — ASPIRIN 81 MG/1
81 TABLET ORAL 2 TIMES DAILY
Status: DISCONTINUED | OUTPATIENT
Start: 2025-05-03 | End: 2025-05-05 | Stop reason: HOSPADM

## 2025-05-03 RX ORDER — AMOXICILLIN 250 MG
1 CAPSULE ORAL 2 TIMES DAILY
Status: DISCONTINUED | OUTPATIENT
Start: 2025-05-03 | End: 2025-05-05 | Stop reason: HOSPADM

## 2025-05-03 RX ORDER — ONDANSETRON 2 MG/ML
4 INJECTION INTRAMUSCULAR; INTRAVENOUS EVERY 30 MIN PRN
Status: DISCONTINUED | OUTPATIENT
Start: 2025-05-03 | End: 2025-05-03 | Stop reason: HOSPADM

## 2025-05-03 RX ORDER — ACETAMINOPHEN 325 MG/1
975 TABLET ORAL EVERY 8 HOURS
Status: DISCONTINUED | OUTPATIENT
Start: 2025-05-03 | End: 2025-05-05 | Stop reason: HOSPADM

## 2025-05-03 RX ORDER — BISACODYL 10 MG
10 SUPPOSITORY, RECTAL RECTAL DAILY PRN
Status: DISCONTINUED | OUTPATIENT
Start: 2025-05-03 | End: 2025-05-05 | Stop reason: HOSPADM

## 2025-05-03 RX ORDER — OXYCODONE HYDROCHLORIDE 5 MG/1
10 TABLET ORAL EVERY 4 HOURS PRN
Status: DISCONTINUED | OUTPATIENT
Start: 2025-05-03 | End: 2025-05-05 | Stop reason: HOSPADM

## 2025-05-03 RX ORDER — ACETAMINOPHEN 325 MG/1
975 TABLET ORAL ONCE
Status: DISCONTINUED | OUTPATIENT
Start: 2025-05-03 | End: 2025-05-03 | Stop reason: HOSPADM

## 2025-05-03 RX ORDER — HYDROMORPHONE HCL IN WATER/PF 6 MG/30 ML
0.4 PATIENT CONTROLLED ANALGESIA SYRINGE INTRAVENOUS EVERY 5 MIN PRN
Status: DISCONTINUED | OUTPATIENT
Start: 2025-05-03 | End: 2025-05-03 | Stop reason: HOSPADM

## 2025-05-03 RX ORDER — PROPOFOL 10 MG/ML
INJECTION, EMULSION INTRAVENOUS PRN
Status: DISCONTINUED | OUTPATIENT
Start: 2025-05-03 | End: 2025-05-03

## 2025-05-03 RX ORDER — LISINOPRIL 40 MG/1
40 TABLET ORAL DAILY
Status: DISCONTINUED | OUTPATIENT
Start: 2025-05-04 | End: 2025-05-05 | Stop reason: HOSPADM

## 2025-05-03 RX ORDER — KETOROLAC TROMETHAMINE 15 MG/ML
15 INJECTION, SOLUTION INTRAMUSCULAR; INTRAVENOUS
Status: DISCONTINUED | OUTPATIENT
Start: 2025-05-03 | End: 2025-05-03 | Stop reason: HOSPADM

## 2025-05-03 RX ORDER — DEXAMETHASONE SODIUM PHOSPHATE 4 MG/ML
4 INJECTION, SOLUTION INTRA-ARTICULAR; INTRALESIONAL; INTRAMUSCULAR; INTRAVENOUS; SOFT TISSUE
Status: DISCONTINUED | OUTPATIENT
Start: 2025-05-03 | End: 2025-05-03 | Stop reason: HOSPADM

## 2025-05-03 RX ORDER — ONDANSETRON 2 MG/ML
INJECTION INTRAMUSCULAR; INTRAVENOUS PRN
Status: DISCONTINUED | OUTPATIENT
Start: 2025-05-03 | End: 2025-05-03

## 2025-05-03 RX ORDER — HYDROMORPHONE HCL IN WATER/PF 6 MG/30 ML
0.2 PATIENT CONTROLLED ANALGESIA SYRINGE INTRAVENOUS
Status: DISCONTINUED | OUTPATIENT
Start: 2025-05-03 | End: 2025-05-05 | Stop reason: HOSPADM

## 2025-05-03 RX ADMIN — ACETAMINOPHEN 975 MG: 325 TABLET, FILM COATED ORAL at 11:32

## 2025-05-03 RX ADMIN — HYDROMORPHONE HYDROCHLORIDE 0.5 MG: 1 INJECTION, SOLUTION INTRAMUSCULAR; INTRAVENOUS; SUBCUTANEOUS at 08:22

## 2025-05-03 RX ADMIN — SENNOSIDES AND DOCUSATE SODIUM 1 TABLET: 50; 8.6 TABLET ORAL at 20:40

## 2025-05-03 RX ADMIN — ASPIRIN 81 MG: 81 TABLET, COATED ORAL at 20:40

## 2025-05-03 RX ADMIN — PROPOFOL 50 MCG/KG/MIN: 10 INJECTION, EMULSION INTRAVENOUS at 08:06

## 2025-05-03 RX ADMIN — ONDANSETRON 4 MG: 2 INJECTION INTRAMUSCULAR; INTRAVENOUS at 08:37

## 2025-05-03 RX ADMIN — OXYCODONE HYDROCHLORIDE 5 MG: 5 TABLET ORAL at 20:39

## 2025-05-03 RX ADMIN — Medication 2 G: at 07:58

## 2025-05-03 RX ADMIN — LIDOCAINE HYDROCHLORIDE 100 MG: 20 INJECTION, SOLUTION INFILTRATION; PERINEURAL at 08:05

## 2025-05-03 RX ADMIN — ACETAMINOPHEN 975 MG: 325 TABLET, FILM COATED ORAL at 18:28

## 2025-05-03 RX ADMIN — SODIUM CHLORIDE, SODIUM LACTATE, POTASSIUM CHLORIDE, AND CALCIUM CHLORIDE: .6; .31; .03; .02 INJECTION, SOLUTION INTRAVENOUS at 07:55

## 2025-05-03 RX ADMIN — SUGAMMADEX 200 MG: 100 INJECTION, SOLUTION INTRAVENOUS at 08:37

## 2025-05-03 RX ADMIN — ONDANSETRON 4 MG: 2 INJECTION, SOLUTION INTRAMUSCULAR; INTRAVENOUS at 20:40

## 2025-05-03 RX ADMIN — ROCURONIUM BROMIDE 50 MG: 50 INJECTION, SOLUTION INTRAVENOUS at 08:05

## 2025-05-03 RX ADMIN — PANTOPRAZOLE SODIUM 40 MG: 40 TABLET, DELAYED RELEASE ORAL at 16:06

## 2025-05-03 RX ADMIN — FENTANYL CITRATE 50 MCG: 50 INJECTION, SOLUTION INTRAMUSCULAR; INTRAVENOUS at 09:12

## 2025-05-03 RX ADMIN — DEXAMETHASONE SODIUM PHOSPHATE 4 MG: 4 INJECTION, SOLUTION INTRA-ARTICULAR; INTRALESIONAL; INTRAMUSCULAR; INTRAVENOUS; SOFT TISSUE at 08:06

## 2025-05-03 RX ADMIN — CEFTRIAXONE 1 G: 1 INJECTION, POWDER, FOR SOLUTION INTRAMUSCULAR; INTRAVENOUS at 17:11

## 2025-05-03 RX ADMIN — CEFAZOLIN SODIUM 2 G: 2 SOLUTION INTRAVENOUS at 23:53

## 2025-05-03 RX ADMIN — ATENOLOL 50 MG: 50 TABLET ORAL at 14:55

## 2025-05-03 RX ADMIN — FENTANYL CITRATE 100 MCG: 50 INJECTION INTRAMUSCULAR; INTRAVENOUS at 08:05

## 2025-05-03 RX ADMIN — BUPROPION HYDROCHLORIDE 150 MG: 150 TABLET, FILM COATED, EXTENDED RELEASE ORAL at 14:54

## 2025-05-03 RX ADMIN — SODIUM CHLORIDE, SODIUM LACTATE, POTASSIUM CHLORIDE, AND CALCIUM CHLORIDE: .6; .31; .03; .02 INJECTION, SOLUTION INTRAVENOUS at 14:50

## 2025-05-03 RX ADMIN — PROPOFOL 150 MG: 10 INJECTION, EMULSION INTRAVENOUS at 08:05

## 2025-05-03 RX ADMIN — FAMOTIDINE 20 MG: 20 TABLET, FILM COATED ORAL at 20:40

## 2025-05-03 RX ADMIN — HYDROMORPHONE HYDROCHLORIDE 0.4 MG: 0.2 INJECTION, SOLUTION INTRAMUSCULAR; INTRAVENOUS; SUBCUTANEOUS at 05:43

## 2025-05-03 RX ADMIN — BUPROPION HYDROCHLORIDE 150 MG: 150 TABLET, FILM COATED, EXTENDED RELEASE ORAL at 20:40

## 2025-05-03 RX ADMIN — HYDROMORPHONE HYDROCHLORIDE 0.4 MG: 0.2 INJECTION, SOLUTION INTRAMUSCULAR; INTRAVENOUS; SUBCUTANEOUS at 02:52

## 2025-05-03 RX ADMIN — DULOXETINE 60 MG: 60 CAPSULE, DELAYED RELEASE ORAL at 14:54

## 2025-05-03 RX ADMIN — HYDRALAZINE HYDROCHLORIDE 100 MG: 50 TABLET ORAL at 14:50

## 2025-05-03 RX ADMIN — HYDROXYZINE HYDROCHLORIDE 10 MG: 10 TABLET, FILM COATED ORAL at 18:32

## 2025-05-03 RX ADMIN — CEFAZOLIN SODIUM 2 G: 2 SOLUTION INTRAVENOUS at 16:06

## 2025-05-03 ASSESSMENT — ACTIVITIES OF DAILY LIVING (ADL)
ADLS_ACUITY_SCORE: 45

## 2025-05-03 ASSESSMENT — LIFESTYLE VARIABLES: TOBACCO_USE: 1

## 2025-05-03 NOTE — PLAN OF CARE
"Goal Outcome Evaluation:      Plan of Care Reviewed With: patient    Overall Patient Progress: improvingOverall Patient Progress: improving    Outcome Evaluation: Pt A/Ox4. VSS. PIV infusing. Pain manage with PRN oxycodone and IV dilaudid. Tolerating a regular diet. Will NPO at midnight. Bedrest. Voiding via external cath. Splint in place. CDI. CMS intact. Plan is OR at 08:00      Problem: Adult Inpatient Plan of Care  Goal: Plan of Care Review  Description: The Plan of Care Review/Shift note should be completed every shift.  The Outcome Evaluation is a brief statement about your assessment that the patient is improving, declining, or no change.  This information will be displayed automatically on your shiftnote.  5/2/2025 1911 by Lara Ramirez RN  Outcome: Progressing  Flowsheets (Taken 5/2/2025 1911)  Outcome Evaluation: Pt A/Ox4. VSS. PIV infusing. Pain manage with PRN oxycodone and IV dilaudid. Tolerating a regular diet. Will NPO at midnight. Bedrest. Voiding via external cath. Splint in place. CDI. CMS intact. Plan is OR at 08:00  Plan of Care Reviewed With: patient  Overall Patient Progress: improving  5/2/2025 1509 by Lara Ramirez RN  Outcome: Progressing  Goal: Patient-Specific Goal (Individualized)  Description: You can add care plan individualizations to a care plan. Examples of Individualization might be:  \"Parent requests to be called daily at 9am for status\", \"I have a hard time hearing out of my right ear\", or \"Do not touch me to wake me up as it startlesme\".  5/2/2025 1911 by Lara Ramirez, RN  Outcome: Progressing  5/2/2025 1509 by Lara Ramirez, RN  Outcome: Progressing  Goal: Absence of Hospital-Acquired Illness or Injury  5/2/2025 1911 by Lara Ramirez, RN  Outcome: Progressing  5/2/2025 1509 by Lara Ramirez, RN  Outcome: Progressing  Intervention: Identify and Manage Fall Risk  Recent Flowsheet Documentation  Taken 5/2/2025 0984 by Lara Ramirez, KULDEEP  Safety " Promotion/Fall Prevention:   activity supervised   clutter free environment maintained   nonskid shoes/slippers when out of bed   room near nurse's station  Intervention: Prevent Skin Injury  Recent Flowsheet Documentation  Taken 5/2/2025 0955 by Lara Ramirez RN  Body Position: supine, head elevated  Intervention: Prevent and Manage VTE (Venous Thromboembolism) Risk  Recent Flowsheet Documentation  Taken 5/2/2025 0955 by Lara Ramirez RN  VTE Prevention/Management: SCDs off (sequential compression devices)  Intervention: Prevent Infection  Recent Flowsheet Documentation  Taken 5/2/2025 0955 by Lara Ramirez RN  Infection Prevention: rest/sleep promoted  Goal: Optimal Comfort and Wellbeing  5/2/2025 1911 by Lara Ramirez RN  Outcome: Progressing  5/2/2025 1509 by Lara Ramirez RN  Outcome: Progressing  Intervention: Monitor Pain and Promote Comfort  Recent Flowsheet Documentation  Taken 5/2/2025 1542 by Lara Ramirez RN  Pain Management Interventions: medication (see MAR)  Taken 5/2/2025 1145 by Lara Ramirez RN  Pain Management Interventions: medication (see MAR)  Taken 5/2/2025 1057 by Lara Ramirez RN  Pain Management Interventions: medication (see MAR)  Taken 5/2/2025 0955 by Lara Ramirez RN  Pain Management Interventions: declines  Goal: Readiness for Transition of Care  5/2/2025 1911 by Lara Ramirez RN  Outcome: Progressing  5/2/2025 1509 by Lara Ramirez RN  Outcome: Progressing  Intervention: Mutually Develop Transition Plan  Recent Flowsheet Documentation  Taken 5/2/2025 1000 by Lara Ramirez RN  Equipment Currently Used at Home:   cane, straight   grab bar, toilet   grab bar, tub/shower   walker, standard     Problem: Delirium  Goal: Optimal Coping  5/2/2025 1911 by Lara Ramirez RN  Outcome: Progressing  5/2/2025 1509 by Lara Ramirez RN  Outcome: Progressing  Goal: Improved Behavioral Control  5/2/2025 1911 by James  Lara PHILLIPS RN  Outcome: Progressing  5/2/2025 1509 by Lara Ramirez RN  Outcome: Progressing  Intervention: Minimize Safety Risk  Recent Flowsheet Documentation  Taken 5/2/2025 0955 by Lara Ramirez RN  Enhanced Safety Measures: room near unit station  Goal: Improved Attention and Thought Clarity  5/2/2025 1911 by Lara Ramirez, RN  Outcome: Progressing  5/2/2025 1509 by Lara Ramirez RN  Outcome: Progressing  Goal: Improved Sleep  5/2/2025 1911 by Lara Ramirez RN  Outcome: Progressing  5/2/2025 1509 by Lara Ramirez RN  Outcome: Progressing

## 2025-05-03 NOTE — ANESTHESIA PREPROCEDURE EVALUATION
"Anesthesia Pre-Procedure Evaluation    Patient: Monse Peoples   MRN: [id number] : 1945        Procedure : Procedure(s):  EXTERNAL FIXATION, LOWER EXTREMITY          Past Medical History:   Diagnosis Date    Anxiety 2015    Asthma     no inhaler for 2-3 years    Benign essential hypertension 2016    Chest pain, unspecified type 2017    Coronary artery disease     Coronary artery disease involving native coronary artery of native heart without angina pectoris 2016    Depression     Esophageal reflux 3/19/2015    Former smoker - has had pulmonary nodules in the past 3/29/2017    History of blood transfusion     Hyperlipidemia LDL goal <100 3/19/2015    Osteoarthritis     Osteopenia     Pulmonary nodules 2015    Noted 4/14/15, f/u scan 2016 showed minimal change, needs repeat scan 2017       Past Surgical History:   Procedure Laterality Date    ANGIOGRAM  6/15/05    ARTHROPLASTY HIP  2013    Procedure: ARTHROPLASTY HIP;  Right total hip arthroplasty      ARTHROPLASTY HIP Left 2015    Procedure: ARTHROPLASTY HIP;  Surgeon: Tyrese Howell MD;  Location: [facility] OR    ARTHROPLASTY KNEE Left 2016    Procedure: ARTHROPLASTY KNEE;  Surgeon: Tyrese Howell MD;  Location:  OR    CHOLECYSTECTOMY      COLONOSCOPY      COLONOSCOPY Left 2016    Procedure: COLONOSCOPY;  Surgeon: Moe Haney MD;  Location:  GI    GASTRIC BYPASS       tiff en y    GI SURGERY      fistulotomy    ORTHOPEDIC SURGERY      left knee \"green procedure\"    TONSILLECTOMY      TUBAL LIGATION        Allergies   Allergen Reactions    Codeine     Morphine Sulfate (Concentrate)      does not like side effects/\"hyper\"      Social History     Tobacco Use    Smoking status: Former     Current packs/day: 0.00     Average packs/day: 0.5 packs/day for 50.0 years (25.0 ttl pk-yrs)     Types: Cigarettes     Start date: 1963     Quit date: 2013     Years since quittin.2    Smokeless " tobacco: Never   Substance Use Topics    Alcohol use: Yes     Comment: 5 drinks a week (wine)      Wt Readings from Last 1 Encounters:   05/02/25 83.3 kg (183 lb 10.3 oz)        Anesthesia Evaluation   Pt has had prior anesthetic. Type: General.        ROS/MED HX  ENT/Pulmonary: Comment: Pulmonary nodules 4/22/2015  Noted 4/14/15, f/u scan 03/2016 showed minimal change, needs repeat scan 03/2017       (+)                tobacco use, Past use,    Intermittent, asthma  Treatment: Inhaler prn,                 Neurologic:       Cardiovascular:     (+)  hypertension- -  CAD -  - -                                      METS/Exercise Tolerance:     Hematologic: Comments: Lab Test        05/03/25 05/02/25 07/08/24                       0400          0727          0619          WBC          7.1          13.0*        5.2           HGB          9.9*         11.7         9.0*          MCV          97           94           94            PLT          201          272          210            Lab Test        05/03/25 05/02/25 02/03/25 07/08/24                       0400          0727          1631          0619          NA           141          135           --          135           POTASSIUM    4.3          4.6           --          3.1*          CHLORIDE     105          100          104          100           CO2          24           24            --          23            BUN          11.7         19.7          --          21.2          CR           0.95         1.05*         --          1.15*         ANIONGAP     12           11            --          12            HATTIE          8.5*         9.2           --          8.7*          GLC          107*         123*          --          94                (+)      anemia,          Musculoskeletal:   (+)  arthritis,   fracture, Fracture location: LLE,         GI/Hepatic:     (+) GERD, Asymptomatic on medication,                  Renal/Genitourinary:     (+)  "renal disease,             Endo:     (+)               Obesity,       Psychiatric/Substance Use:     (+) psychiatric history anxiety and depression       Infectious Disease:       Malignancy:       Other:            Physical Exam    Airway        Mallampati: II   TM distance: > 3 FB   Neck ROM: full   Mouth opening: > 3 cm    Respiratory Devices and Support         Dental       (+) Minor Abnormalities - some fillings, tiny chips      Cardiovascular   cardiovascular exam normal          Pulmonary   pulmonary exam normal                OUTSIDE LABS:  CBC:   Lab Results   Component Value Date    WBC 7.1 05/03/2025    WBC 13.0 (H) 05/02/2025    HGB 9.9 (L) 05/03/2025    HGB 11.7 05/02/2025    HCT 29.9 (L) 05/03/2025    HCT 33.9 (L) 05/02/2025     05/03/2025     05/02/2025     BMP:   Lab Results   Component Value Date     05/03/2025     05/02/2025    POTASSIUM 4.3 05/03/2025    POTASSIUM 4.6 05/02/2025    CHLORIDE 105 05/03/2025    CHLORIDE 100 05/02/2025    CO2 24 05/03/2025    CO2 24 05/02/2025    BUN 11.7 05/03/2025    BUN 19.7 05/02/2025    CR 0.95 05/03/2025    CR 1.05 (H) 05/02/2025     (H) 05/03/2025     (H) 05/02/2025     COAGS:   Lab Results   Component Value Date    PTT 28 04/14/2015    INR 1.91 (H) 08/25/2016     POC: No results found for: \"BGM\", \"HCG\", \"HCGS\"  HEPATIC:   Lab Results   Component Value Date    ALBUMIN 3.6 05/24/2018    PROTTOTAL 7.2 05/24/2018    ALT 35 06/03/2024    AST 43 05/24/2018    ALKPHOS 53 05/24/2018    BILITOTAL 0.4 05/24/2018     OTHER:   Lab Results   Component Value Date    A1C 5.5 03/19/2015    HATTIE 8.5 (L) 05/03/2025    LIPASE 129 05/08/2014    TSH 0.30 07/07/2024    CRP <2.9 05/24/2018       Anesthesia Plan    ASA Status:  3    NPO Status:  NPO Appropriate    Anesthesia Type: General.     - Airway: ETT   Induction: Intravenous, Propofol.   Maintenance: Balanced.        Consents    Anesthesia Plan(s) and associated risks, benefits, and " "realistic alternatives discussed. Questions answered and patient/representative(s) expressed understanding.     - Discussed:     - Discussed with:  Patient      - Extended Intubation/Ventilatory Support Discussed: No.      - Patient is DNR/DNI Status: No     Use of blood products discussed: No .     Postoperative Care    Pain management: IV analgesics.   PONV prophylaxis: Dexamethasone or Solumedrol, Ondansetron (or other 5HT-3)     Comments:               Robel Rogers MD    Clinically Significant Risk Factors Present on Admission                 # Drug Induced Platelet Defect: home medication list includes an antiplatelet medication   # Hypertension: Noted on problem list      # Anemia: based on hgb <11       # Obesity: Estimated body mass index is 31.52 kg/m  as calculated from the following:    Height as of this encounter: 1.626 m (5' 4\").    Weight as of this encounter: 83.3 kg (183 lb 10.3 oz).       # Asthma: noted on problem list          "

## 2025-05-03 NOTE — PLAN OF CARE
"Goal Outcome Evaluation:      Plan of Care Reviewed With: patient    Overall Patient Progress: improvingOverall Patient Progress: improving    Outcome Evaluation: Patient alert and oriented x 4. VSS on room air. Pain managed with tylenol and atarax. Voiding in purewick. Small BM today, Up assist of 2 pivot to commode. Patient having difficultly balancing and NWB. PT to evaluate. Most likely TCU on discharge. CMS in tact. Dressing/splint/pins WDL. Plans pending.          Problem: Adult Inpatient Plan of Care  Goal: Plan of Care Review  Description: The Plan of Care Review/Shift note should be completed every shift.  The Outcome Evaluation is a brief statement about your assessment that the patient is improving, declining, or no change.  This information will be displayed automatically on your shiftnote.  Outcome: Progressing  Flowsheets (Taken 5/3/2025 1845)  Outcome Evaluation: Patient alert and oriented x 4. VSS on room air.  Plan of Care Reviewed With: patient  Overall Patient Progress: improving  Goal: Patient-Specific Goal (Individualized)  Description: You can add care plan individualizations to a care plan. Examples of Individualization might be:  \"Parent requests to be called daily at 9am for status\", \"I have a hard time hearing out of my right ear\", or \"Do not touch me to wake me up as it startlesme\".  Outcome: Progressing  Goal: Absence of Hospital-Acquired Illness or Injury  Outcome: Progressing  Intervention: Identify and Manage Fall Risk  Recent Flowsheet Documentation  Taken 5/3/2025 1008 by Sailaja Stoner, RN  Safety Promotion/Fall Prevention:   activity supervised   assistive device/personal items within reach   clutter free environment maintained   mobility aid in reach   nonskid shoes/slippers when out of bed   patient and family education   safety round/check completed  Intervention: Prevent and Manage VTE (Venous Thromboembolism) Risk  Recent Flowsheet Documentation  Taken 5/3/2025 1008 by " Purdum, Sailaja, RN  VTE Prevention/Management: SCDs on (sequential compression devices)  Intervention: Prevent Infection  Recent Flowsheet Documentation  Taken 5/3/2025 1008 by Sailaja Stoner RN  Infection Prevention:   rest/sleep promoted   single patient room provided  Goal: Optimal Comfort and Wellbeing  Outcome: Progressing  Intervention: Monitor Pain and Promote Comfort  Recent Flowsheet Documentation  Taken 5/3/2025 1132 by Sailaja Stoner RN  Pain Management Interventions: medication (see MAR)  Goal: Readiness for Transition of Care  Outcome: Progressing     Problem: Delirium  Goal: Optimal Coping  Outcome: Progressing  Goal: Improved Behavioral Control  Outcome: Progressing  Goal: Improved Attention and Thought Clarity  Outcome: Progressing  Goal: Improved Sleep  Outcome: Progressing     Problem: Orthopaedic Fracture  Goal: Absence of Bleeding  Outcome: Progressing  Intervention: Monitor and Manage Fracture Bleeding  Recent Flowsheet Documentation  Taken 5/3/2025 1008 by Sailaja Stoner RN  Bleeding Management: dressing monitored  Goal: Bowel Elimination  Outcome: Progressing  Goal: Absence of Embolism Signs and Symptoms  Outcome: Progressing  Intervention: Prevent or Manage Embolism Risk  Recent Flowsheet Documentation  Taken 5/3/2025 1008 by Sailaja Stoner RN  VTE Prevention/Management: SCDs on (sequential compression devices)  Goal: Fracture Stability  Outcome: Progressing  Goal: Optimal Functional Ability  Outcome: Progressing  Intervention: Optimize Functional Ability  Recent Flowsheet Documentation  Taken 5/3/2025 1054 by Sailaja Stoner RN  Activity Management:   standing at bedside   back to bed  Goal: Absence of Infection Signs and Symptoms  Outcome: Progressing  Intervention: Prevent or Manage Infection  Recent Flowsheet Documentation  Taken 5/3/2025 1008 by Sailaja Stoner RN  Infection Management: aseptic technique maintained  Goal: Effective Tissue Perfusion  Outcome: Progressing  Goal:  Optimal Pain Control and Function  Outcome: Progressing  Intervention: Manage Acute Orthopaedic-Related Pain  Recent Flowsheet Documentation  Taken 5/3/2025 1132 by Sailaja Stoner, RN  Pain Management Interventions: medication (see MAR)  Goal: Effective Oxygenation and Ventilation  Outcome: Progressing  Intervention: Promote Airway Secretion Clearance  Recent Flowsheet Documentation  Taken 5/3/2025 1054 by Sailaja Stoner, RN  Activity Management:   standing at bedside   back to bed  Taken 5/3/2025 1008 by Sailaja Stoner, RN  Cough And Deep Breathing: done independently per patient

## 2025-05-03 NOTE — ANESTHESIA POSTPROCEDURE EVALUATION
Patient: Monse Peoples    Procedure: Procedure(s):  APPLICATION EXTERNAL FIXATION, LOWER EXTREMITY, ANKLE       Anesthesia Type:  General    Note:  Disposition: Inpatient   Postop Pain Control: Uneventful            Sign Out: Well controlled pain   PONV: No   Neuro/Psych: Uneventful            Sign Out: Acceptable/Baseline neuro status   Airway/Respiratory: Uneventful            Sign Out: Acceptable/Baseline resp. status   CV/Hemodynamics: Uneventful            Sign Out: Acceptable CV status; No obvious hypovolemia; No obvious fluid overload   Other NRE: NONE   DID A NON-ROUTINE EVENT OCCUR? No           Last vitals:  Vitals Value Taken Time   /74 05/03/25 0935   Temp 97.2  F (36.2  C) 05/03/25 0900   Pulse 82 05/03/25 0945   Resp 23 05/03/25 0945   SpO2 97 % 05/03/25 0954   Vitals shown include unfiled device data.    Electronically Signed By: Robel Rogers MD  May 3, 2025  10:50 AM

## 2025-05-03 NOTE — BRIEF OP NOTE
[facility]    Brief Operative Note    Pre-operative diagnosis: Closed trimalleolar fracture of left ankle, initial encounter [J22.562V]  Post-operative diagnosis Same as pre-operative diagnosis    Procedure: APPLICATION EXTERNAL FIXATION, LOWER EXTREMITY, ANKLE, Left - Ankle    Surgeon: Surgeons and Role:     * Dillon Lares MD - Primary     * Mone Wang PA-C - Assisting  Anesthesia: General   Estimated Blood Loss: 5 mL from 5/3/2025  7:58 AM to 5/3/2025  8:54 AM      Drains: None  Specimens: * No specimens in log *  Findings:   None.  Complications: None.  Implants:   Implant Name Type Inv. Item Serial No.  Lot No. LRB No. Used Action   PIN FX 300MM 5/6MM APX TRNFX SLFDRL THRD 40MM - [id number] Metallic Hardware/Bryce PIN FX 300MM 5/6MM APX TRNFX SLFDRL THRD 40MM  [employer] [facility] 8006 15 APR 2025 Left 1 Implanted   PIN APEX S/D HALF 5X180 50 THR - [id number] Wire PIN APEX S/D HALF 5X180 50 THR  [employer] 8006 15 APR 2025 Left 2 Implanted   IMP MARGARETH EXTERNAL FIX SNOWDEN [id number] 4922-8-300 - [id number] Metallic Hardware/Bryce IMP MARGARETH EXTERNAL FIX SNOWDEN [id number] 4922-8-300  [employer] 8006 15 APR 2025 Left 2 Implanted   POST 30DEG [id number] - [id number] Metallic Hardware/Bryce POST 30DEG [id number]  [employer] 8006 15 APR 2025 Left 2 Implanted   CLAMP PIN 5H - [id number] Metallic Hardware/Bryce CLAMP PIN 5H  [employer] 8006 15 APR 2025 Left 1 Implanted   COUPLING MARGARETH TO MARGARETH - [id number] Metallic Hardware/Bryce COUPLING MARGARETH TO MARGARETH  [employer] 8006 15 APR 2025 Left 4 Implanted

## 2025-05-03 NOTE — ANESTHESIA CARE TRANSFER NOTE
Patient: Monse Peoples    Procedure: Procedure(s):  APPLICATION EXTERNAL FIXATION, LOWER EXTREMITY, ANKLE       Diagnosis: Closed trimalleolar fracture of left ankle, initial encounter [S89.752L]  Diagnosis Additional Information: No value filed.    Anesthesia Type:   General     Note:    Oropharynx: oropharynx clear of all foreign objects  Level of Consciousness: awake  Oxygen Supplementation: face mask  Level of Supplemental Oxygen (L/min / FiO2): 6  Independent Airway: airway patency satisfactory and stable  Dentition: dentition unchanged  Vital Signs Stable: post-procedure vital signs reviewed and stable  Report to RN Given: handoff report given  Patient transferred to: PACU    Handoff Report: Identifed the Patient, Identified the Reponsible Provider, Reviewed the pertinent medical history, Discussed the surgical course, Reviewed Intra-OP anesthesia mangement and issues during anesthesia, Set expectations for post-procedure period and Allowed opportunity for questions and acknowledgement of understanding      Vitals:  Vitals Value Taken Time   BP     Temp     Pulse 86 05/03/25 0858   Resp 20 05/03/25 0858   SpO2 99 % 05/03/25 0858   Vitals shown include unfiled device data.    Electronically Signed By: MIRTA Hernandez CRNA  May 3, 2025  8:59 AM

## 2025-05-03 NOTE — PROGRESS NOTES
"[facility]    Medicine Progress Note - Hospitalist Service    Date of Admission:  5/2/2025    Assessment & Plan   Monse Peoples is a 79 year old female admitted on 5/2/2025 after having fallen at home. She apparently felt light headed and fell with reported LOC. She sustained a left trimalleolar fracture.    Medical history of Anxiety, Asthma, HTN, CAD, Depression, GERD, Former Smoker, HLD, OA, Pulmonary Nodules, Iron Deficiency, Prediabetes, S/p Gastric Bypass who presents to the ED today with left ankle pain.     Fam hx is very strong for early death related to cardiac issues. She clearly endorses LOC with the fall and is unable to give me more information about the event.     On presntation to the ED, VS: 118/88, HR 89, RR 16 with O2 sat 97% breathing RA. Afeb.  Labs: creat 1.05, Normal electrolytes, glucose 123.Hgb 11.7, WBC 13.0, Plt 272. UA (clean catch shows Nit and LE positive with 20 WBC/HPF.    DX:  Trimalleolar fracture dislocation, left. POD #0, s/p APPLICATION EXTERNAL FIXATION, LOWER EXTREMITY, ANKLE, Left Ankle.  Suspected syncope.  On multiple meds that may have contributed but also has a UTI. Echo is normal.   E coli UTI. Susceptibilities pending.   HTN. On Amlodipine - recently increased from 5 to 10 mg every day, Atenolol 20 mg daily, Hydralazine 100 mg TID, Lisinopril 40 mg daily and hydrochlorothiazide 25 every day. Note that Echo shows normal EF with only mild \"grade 1\" diastolic dysfunction.  CAD/HLD. Non-obstructive CAD.  GERD on PPI.   Depression/anxiety managed with bupropion, Duloxetine and Trazodone.  PLAN:  Cont tele.  Will plan a ZIO patch on discharge.   RN managed K, Mg and Phos protocols.  BP meds reviewed and resumed with parameters to hold.   Will need TCU.  Continues ceftriaxone for UTI.   Will discuss with Cards, but without any identified abnormality, I don't think that a consult is indicated.           Diet: NPO for Procedure/Surgery per Anesthesia " "Guidelines Except for: Meds; Clear liquids before procedure/surgery: ADULT (Age GREATER than or Equal to 18 years) - Clear liquids 2 hours before procedure/surgery    DVT Prophylaxis: Pneumatic Compression Devices  Garces Catheter: Not present  Lines: None     Cardiac Monitoring: ACTIVE order. Indication: Syncope- high cardiac risk (48 hours)  Code Status: Full Code      Clinically Significant Risk Factors Present on Admission                 # Drug Induced Platelet Defect: home medication list includes an antiplatelet medication   # Hypertension: Noted on problem list      # Anemia: based on hgb <11       # Obesity: Estimated body mass index is 31.52 kg/m  as calculated from the following:    Height as of this encounter: 1.626 m (5' 4\").    Weight as of this encounter: 83.3 kg (183 lb 10.3 oz).       # Asthma: noted on problem list        Social Drivers of Health    Tobacco Use: Medium Risk (5/3/2025)    Patient History     Smoking Tobacco Use: Former     Smokeless Tobacco Use: Never    Received from [employer] [facility]    Social Connections          Disposition Plan     Medically Ready for Discharge: Anticipated in 2-4 Days             Sotero Krishnan MD  Hospitalist Service  [facility]  Securely message with [employer] (more info)  Text page via [employer] Paging/Directory   ______________________________________________________________________    Interval History   Chart reviewed, pt interviewed.    Generally feeling much better.   Lives with , but will be non-weight bearing with this fracture. She [place] [facility] [place]    Physical Exam   Vital Signs: Temp: 97  F (36.1  C) Temp src: Temporal BP: (!) 142/74 Pulse: 76   Resp: 18 SpO2: 94 % O2 Device: None (Room air) Oxygen Delivery: 2 LPM  Weight: 183 lbs 10.29 oz    General Appearance: Alert, coherent in NAD.  Respiratory: CTA, normal WOB  Cardiovascular: RRR without murmur  GI: soft, NTND  Skin: no edema or " lesions.   Other: Left ankle external fixators in place.      Medical Decision Making       40 MINUTES SPENT BY ME on the date of service doing chart review, history, exam, documentation & further activities per the note.      Data   Results for orders placed or performed during the hospital encounter of 05/02/25 (from the past 24 hours)   Sedation    Narrative    Yaya Chau MD     5/2/2025  8:12 PM  [facility]    Procedure: Sedation    Date/Time: 5/2/2025 8:01 PM    Performed by: Yaya Chau MD  Authorized by: Yaya Chau MD    Risks, benefits and alternatives discussed.    ED EVALUATION:      Assessment Time: 5/2/2025 8:20 AM      I have performed an Emergency Department Evaluation including taking a   history and physical examination, this evaluation will be documented in   the electronic medical record for this ED encounter.     Indication: ankle dislocation    ASA Class: Class 2- mild systemic disease, no acute problems, no   functional limitations    Mallampati: Grade 1- soft palate, uvula, tonsillar pillars, and   posterior pharyngeal wall visible    NPO Status: appropriately NPO for procedure    UNIVERSAL PROTOCOL   Site Marked: NA  Prior Images Obtained and Reviewed:  Yes  Required items: Required blood products, implants, devices and special   equipment available    Patient identity confirmed:  Verbally with patient  Patient was reevaluated immediately before administering moderate or deep   sedation or anesthesia  Confirmation Checklist:  Patient's identity using two indicators  Time out: Immediately prior to the procedure a time out was called    Universal Protocol: the Joint Commission Universal Protocol was followed    Preparation: Patient was prepped and draped in usual sterile fashion      SEDATION  Patient Sedated: Yes    Sedation Type:  Moderate (conscious) sedation  Sedation:  Propofol  Vital signs: Vital signs monitored during sedation      PROCEDURE    Patient  Tolerance:  Patient tolerated the procedure well with no immediate   complications  Length of time physician/provider present for 1:1 monitoring during   sedation: 10   Basic metabolic panel   Result Value Ref Range    Sodium 141 135 - 145 mmol/L    Potassium 4.3 3.4 - 5.3 mmol/L    Chloride 105 98 - 107 mmol/L    Carbon Dioxide (CO2) 24 22 - 29 mmol/L    Anion Gap 12 7 - 15 mmol/L    Urea Nitrogen 11.7 8.0 - 23.0 mg/dL    Creatinine 0.95 0.51 - 0.95 mg/dL    GFR Estimate 61 >60 mL/min/1.73m2    Calcium 8.5 (L) 8.8 - 10.4 mg/dL    Glucose 107 (H) 70 - 99 mg/dL   CBC with platelets   Result Value Ref Range    WBC Count 7.1 4.0 - 11.0 10e3/uL    RBC Count 3.10 (L) 3.80 - 5.20 10e6/uL    Hemoglobin 9.9 (L) 11.7 - 15.7 g/dL    Hematocrit 29.9 (L) 35.0 - 47.0 %    MCV 97 78 - 100 fL    MCH 31.9 26.5 - 33.0 pg    MCHC 33.1 31.5 - 36.5 g/dL    RDW 12.8 10.0 - 15.0 %    Platelet Count 201 150 - 450 10e3/uL   Magnesium Lab   Result Value Ref Range    Magnesium 2.0 1.7 - 2.3 mg/dL   Phosphorus Lab   Result Value Ref Range    Phosphorus 4.0 2.5 - 4.5 mg/dL   XR Surgery DEVENDRA L/T 5 Min Fluoro    Narrative    This exam was marked as non-reportable because it will not be read by a   radiologist or a [place] non-radiologist provider.

## 2025-05-03 NOTE — ANESTHESIA PROCEDURE NOTES
Airway       Patient location during procedure: OR       Procedure Start/Stop Times: 5/3/2025 8:05 AM  Staff -        CRNA: Roseline Morales APRN CRNA       Performed By: CRNA  Consent for Airway        Urgency: elective  Indications and Patient Condition       Indications for airway management: gray-procedural       Induction type:intravenous       Mask difficulty assessment: 1 - vent by mask    Final Airway Details       Final airway type: endotracheal airway       Successful airway: ETT - single  Endotracheal Airway Details        ETT size (mm): 7.0       Cuffed: yes       Successful intubation technique: video laryngoscopy       VL Blade Size: Glidescope 3       Grade View of Cords: 1       Adjucts: stylet       Position: Right       Measured from: gums/teeth       Secured at (cm): 21       Bite block used: None    Post intubation assessment        Placement verified by: capnometry, equal breath sounds and chest rise        Number of attempts at approach: 1       Number of other approaches attempted: 0       Secured with: tape       Ease of procedure: easy       Dentition: Intact and Unchanged    Medication(s) Administered   Medication Administration Time: 5/3/2025 8:05 AM

## 2025-05-03 NOTE — PLAN OF CARE
"Goal Outcome Evaluation:      Plan of Care Reviewed With: patient    Overall Patient Progress: no changeOverall Patient Progress: no change    Outcome Evaluation: Surgical repair of left ankle fx today    A&O x 4. NPO since midnight. VSS on RA. Tele monitoring- SR with 1st degree AVB. Voiding via purewick. Bedrest/ NWB LLE. CMS intact. Left ankle in splint. Tylenol, Oxy, and IV Dilaudid for pain. On IV rocephin for UTI. Pt to pre-op at 0715.       Problem: Adult Inpatient Plan of Care  Goal: Plan of Care Review  Description: The Plan of Care Review/Shift note should be completed every shift.  The Outcome Evaluation is a brief statement about your assessment that the patient is improving, declining, or no change.  This information will be displayed automatically on your shiftnote.  Outcome: Progressing  Flowsheets (Taken 5/3/2025 0755)  Outcome Evaluation: Surgical repair of left ankle fx today  Plan of Care Reviewed With: patient  Overall Patient Progress: no change  Goal: Patient-Specific Goal (Individualized)  Description: You can add care plan individualizations to a care plan. Examples of Individualization might be:  \"Parent requests to be called daily at 9am for status\", \"I have a hard time hearing out of my right ear\", or \"Do not touch me to wake me up as it startlesme\".  Outcome: Progressing  Goal: Absence of Hospital-Acquired Illness or Injury  Outcome: Progressing  Intervention: Identify and Manage Fall Risk  Recent Flowsheet Documentation  Taken 5/2/2025 2100 by Marga Marlow, RN  Safety Promotion/Fall Prevention:   assistive device/personal items within reach   safety round/check completed  Intervention: Prevent Infection  Recent Flowsheet Documentation  Taken 5/2/2025 2100 by Marga Marlow, RN  Infection Prevention: rest/sleep promoted  Goal: Optimal Comfort and Wellbeing  Outcome: Progressing  Intervention: Monitor Pain and Promote Comfort  Recent Flowsheet Documentation  Taken " 5/2/2025 2017 by Marga Marlow RN  Pain Management Interventions: medication (see MAR)  Goal: Readiness for Transition of Care  Outcome: Progressing     Problem: Delirium  Goal: Optimal Coping  Outcome: Progressing  Goal: Improved Behavioral Control  Outcome: Progressing  Goal: Improved Attention and Thought Clarity  Outcome: Progressing  Goal: Improved Sleep  Outcome: Progressing     Problem: Orthopaedic Fracture  Goal: Absence of Bleeding  Outcome: Progressing  Goal: Bowel Elimination  Outcome: Progressing  Goal: Absence of Embolism Signs and Symptoms  Outcome: Progressing  Goal: Fracture Stability  Outcome: Progressing  Goal: Optimal Functional Ability  Outcome: Progressing  Intervention: Optimize Functional Ability  Recent Flowsheet Documentation  Taken 5/2/2025 2118 by Marga Marlow RN  Activity Management: bedrest  Goal: Absence of Infection Signs and Symptoms  Outcome: Progressing  Goal: Effective Tissue Perfusion  Outcome: Progressing  Goal: Optimal Pain Control and Function  Outcome: Progressing  Intervention: Manage Acute Orthopaedic-Related Pain  Recent Flowsheet Documentation  Taken 5/2/2025 2017 by Marga Marlow, RN  Pain Management Interventions: medication (see MAR)  Goal: Effective Oxygenation and Ventilation  Outcome: Progressing  Intervention: Promote Airway Secretion Clearance  Recent Flowsheet Documentation  Taken 5/2/2025 2118 by Marga Marlow, RN  Activity Management: bedrest

## 2025-05-04 ENCOUNTER — APPOINTMENT (OUTPATIENT)
Dept: PHYSICAL THERAPY | Facility: CLINIC | Age: 80
DRG: 494 | End: 2025-05-04
Payer: COMMERCIAL

## 2025-05-04 LAB
ANION GAP SERPL CALCULATED.3IONS-SCNC: 9 MMOL/L (ref 7–15)
BUN SERPL-MCNC: 10.2 MG/DL (ref 8–23)
CALCIUM SERPL-MCNC: 8.7 MG/DL (ref 8.8–10.4)
CHLORIDE SERPL-SCNC: 107 MMOL/L (ref 98–107)
CREAT SERPL-MCNC: 0.83 MG/DL (ref 0.51–0.95)
EGFRCR SERPLBLD CKD-EPI 2021: 72 ML/MIN/1.73M2
ERYTHROCYTE [DISTWIDTH] IN BLOOD BY AUTOMATED COUNT: 12.5 % (ref 10–15)
GLUCOSE SERPL-MCNC: 94 MG/DL (ref 70–99)
HCO3 SERPL-SCNC: 24 MMOL/L (ref 22–29)
HCT VFR BLD AUTO: 27.8 % (ref 35–47)
HGB BLD-MCNC: 9.2 G/DL (ref 11.7–15.7)
MAGNESIUM SERPL-MCNC: 1.9 MG/DL (ref 1.7–2.3)
MCH RBC QN AUTO: 31.5 PG (ref 26.5–33)
MCHC RBC AUTO-ENTMCNC: 33.1 G/DL (ref 31.5–36.5)
MCV RBC AUTO: 95 FL (ref 78–100)
PHOSPHATE SERPL-MCNC: 3.4 MG/DL (ref 2.5–4.5)
PLATELET # BLD AUTO: 178 10E3/UL (ref 150–450)
POTASSIUM SERPL-SCNC: 4.2 MMOL/L (ref 3.4–5.3)
RBC # BLD AUTO: 2.92 10E6/UL (ref 3.8–5.2)
SODIUM SERPL-SCNC: 140 MMOL/L (ref 135–145)
WBC # BLD AUTO: 8 10E3/UL (ref 4–11)

## 2025-05-04 PROCEDURE — 36415 COLL VENOUS BLD VENIPUNCTURE: CPT | Performed by: INTERNAL MEDICINE

## 2025-05-04 PROCEDURE — 80048 BASIC METABOLIC PNL TOTAL CA: CPT | Performed by: INTERNAL MEDICINE

## 2025-05-04 PROCEDURE — 85027 COMPLETE CBC AUTOMATED: CPT | Performed by: INTERNAL MEDICINE

## 2025-05-04 PROCEDURE — 250N000011 HC RX IP 250 OP 636: Mod: JZ

## 2025-05-04 PROCEDURE — 84100 ASSAY OF PHOSPHORUS: CPT | Performed by: INTERNAL MEDICINE

## 2025-05-04 PROCEDURE — 250N000011 HC RX IP 250 OP 636: Performed by: PHYSICIAN ASSISTANT

## 2025-05-04 PROCEDURE — 83735 ASSAY OF MAGNESIUM: CPT | Performed by: INTERNAL MEDICINE

## 2025-05-04 PROCEDURE — 97161 PT EVAL LOW COMPLEX 20 MIN: CPT | Mod: GP

## 2025-05-04 PROCEDURE — 250N000013 HC RX MED GY IP 250 OP 250 PS 637

## 2025-05-04 PROCEDURE — 120N000001 HC R&B MED SURG/OB

## 2025-05-04 PROCEDURE — 97530 THERAPEUTIC ACTIVITIES: CPT | Mod: GP

## 2025-05-04 PROCEDURE — 250N000013 HC RX MED GY IP 250 OP 250 PS 637: Performed by: INTERNAL MEDICINE

## 2025-05-04 PROCEDURE — 99232 SBSQ HOSP IP/OBS MODERATE 35: CPT | Performed by: INTERNAL MEDICINE

## 2025-05-04 RX ADMIN — HYDROMORPHONE HYDROCHLORIDE 0.4 MG: 0.2 INJECTION, SOLUTION INTRAMUSCULAR; INTRAVENOUS; SUBCUTANEOUS at 03:03

## 2025-05-04 RX ADMIN — POLYETHYLENE GLYCOL 3350 17 G: 17 POWDER, FOR SOLUTION ORAL at 08:25

## 2025-05-04 RX ADMIN — ACETAMINOPHEN 975 MG: 325 TABLET, FILM COATED ORAL at 03:02

## 2025-05-04 RX ADMIN — ACETAMINOPHEN 975 MG: 325 TABLET, FILM COATED ORAL at 17:55

## 2025-05-04 RX ADMIN — SENNOSIDES AND DOCUSATE SODIUM 1 TABLET: 50; 8.6 TABLET ORAL at 20:06

## 2025-05-04 RX ADMIN — ASPIRIN 81 MG: 81 TABLET, COATED ORAL at 20:06

## 2025-05-04 RX ADMIN — HYDROXYZINE HYDROCHLORIDE 10 MG: 10 TABLET, FILM COATED ORAL at 06:48

## 2025-05-04 RX ADMIN — ACETAMINOPHEN 975 MG: 325 TABLET, FILM COATED ORAL at 11:24

## 2025-05-04 RX ADMIN — OXYCODONE HYDROCHLORIDE 5 MG: 5 TABLET ORAL at 00:43

## 2025-05-04 RX ADMIN — LISINOPRIL 40 MG: 40 TABLET ORAL at 08:25

## 2025-05-04 RX ADMIN — PANTOPRAZOLE SODIUM 40 MG: 40 TABLET, DELAYED RELEASE ORAL at 06:46

## 2025-05-04 RX ADMIN — BUPROPION HYDROCHLORIDE 150 MG: 150 TABLET, FILM COATED, EXTENDED RELEASE ORAL at 08:25

## 2025-05-04 RX ADMIN — OXYCODONE HYDROCHLORIDE 5 MG: 5 TABLET ORAL at 20:05

## 2025-05-04 RX ADMIN — ATENOLOL 50 MG: 50 TABLET ORAL at 08:25

## 2025-05-04 RX ADMIN — HYDROXYZINE HYDROCHLORIDE 10 MG: 10 TABLET, FILM COATED ORAL at 20:06

## 2025-05-04 RX ADMIN — PANTOPRAZOLE SODIUM 40 MG: 40 TABLET, DELAYED RELEASE ORAL at 17:24

## 2025-05-04 RX ADMIN — ASPIRIN 81 MG: 81 TABLET, COATED ORAL at 08:29

## 2025-05-04 RX ADMIN — OXYCODONE HYDROCHLORIDE 10 MG: 5 TABLET ORAL at 06:08

## 2025-05-04 RX ADMIN — SENNOSIDES AND DOCUSATE SODIUM 1 TABLET: 50; 8.6 TABLET ORAL at 08:25

## 2025-05-04 RX ADMIN — FAMOTIDINE 20 MG: 20 TABLET, FILM COATED ORAL at 20:06

## 2025-05-04 RX ADMIN — BUPROPION HYDROCHLORIDE 150 MG: 150 TABLET, FILM COATED, EXTENDED RELEASE ORAL at 20:06

## 2025-05-04 RX ADMIN — DULOXETINE 60 MG: 60 CAPSULE, DELAYED RELEASE ORAL at 08:25

## 2025-05-04 RX ADMIN — CEFTRIAXONE 1 G: 1 INJECTION, POWDER, FOR SOLUTION INTRAMUSCULAR; INTRAVENOUS at 17:23

## 2025-05-04 RX ADMIN — HYDROXYZINE HYDROCHLORIDE 10 MG: 10 TABLET, FILM COATED ORAL at 00:43

## 2025-05-04 ASSESSMENT — ACTIVITIES OF DAILY LIVING (ADL)
ADLS_ACUITY_SCORE: 45
DEPENDENT_IADLS:: CLEANING;COOKING;LAUNDRY;SHOPPING;MEAL PREPARATION;MONEY MANAGEMENT;TRANSPORTATION
ADLS_ACUITY_SCORE: 45

## 2025-05-04 NOTE — PLAN OF CARE
"Goal Outcome Evaluation:      Plan of Care Reviewed With: patient    Overall Patient Progress: improvingOverall Patient Progress: improving    Outcome Evaluation: PT consult today. No acute events overnight    A&O x 4. Regular diet. C/o nausea x 1. Managed with Zofran. Tele monitoring- SR. VSS on RA. Voiding via purewick. NWB left LE. External fixator in place. Ace wrap CDI. CMS intact. Post op IV ABX's and IV rocephin for e coli UTI. K, Mag, Phos protocol. Will likely need TCU. Pain managed with Tylenol, Atarax, Oxy and IV Dilaudid.       Problem: Adult Inpatient Plan of Care  Goal: Plan of Care Review  Description: The Plan of Care Review/Shift note should be completed every shift.  The Outcome Evaluation is a brief statement about your assessment that the patient is improving, declining, or no change.  This information will be displayed automatically on your shiftnote.  Outcome: Progressing  Flowsheets (Taken 5/4/2025 0402)  Outcome Evaluation: PT consult today. No acute events overnight  Plan of Care Reviewed With: patient  Overall Patient Progress: improving  Goal: Patient-Specific Goal (Individualized)  Description: You can add care plan individualizations to a care plan. Examples of Individualization might be:  \"Parent requests to be called daily at 9am for status\", \"I have a hard time hearing out of my right ear\", or \"Do not touch me to wake me up as it startlesme\".  Outcome: Progressing  Goal: Absence of Hospital-Acquired Illness or Injury  Outcome: Progressing  Intervention: Identify and Manage Fall Risk  Recent Flowsheet Documentation  Taken 5/3/2025 2030 by Marga Marlow, RN  Safety Promotion/Fall Prevention:   assistive device/personal items within reach   safety round/check completed  Intervention: Prevent Infection  Recent Flowsheet Documentation  Taken 5/3/2025 2030 by Marga Marlow, RN  Infection Prevention: rest/sleep promoted  Goal: Optimal Comfort and Wellbeing  Outcome: " Progressing  Intervention: Monitor Pain and Promote Comfort  Recent Flowsheet Documentation  Taken 5/3/2025 2039 by Marga Marlow RN  Pain Management Interventions: medication (see MAR)  Goal: Readiness for Transition of Care  Outcome: Progressing     Problem: Delirium  Goal: Optimal Coping  Outcome: Progressing  Goal: Improved Behavioral Control  Outcome: Progressing  Intervention: Minimize Safety Risk  Recent Flowsheet Documentation  Taken 5/3/2025 2030 by Marga Marlow RN  Enhanced Safety Measures: review medications for side effects with activity  Goal: Improved Attention and Thought Clarity  Outcome: Progressing  Goal: Improved Sleep  Outcome: Progressing     Problem: Orthopaedic Fracture  Goal: Absence of Bleeding  Outcome: Progressing  Goal: Bowel Elimination  Outcome: Progressing  Goal: Absence of Embolism Signs and Symptoms  Outcome: Progressing  Goal: Fracture Stability  Outcome: Progressing  Goal: Optimal Functional Ability  Outcome: Progressing  Goal: Absence of Infection Signs and Symptoms  Outcome: Progressing  Goal: Effective Tissue Perfusion  Outcome: Progressing  Intervention: Prevent or Manage Neurovascular Compromise  Recent Flowsheet Documentation  Taken 5/4/2025 0300 by Marga Marlow RN  Neurovascular Pressure Management: Cast: extremity positioned at heart level  Taken 5/3/2025 2030 by Marga Marlow RN  Neurovascular Pressure Management: Cast: extremity positioned at heart level  Goal: Optimal Pain Control and Function  Outcome: Progressing  Intervention: Manage Acute Orthopaedic-Related Pain  Recent Flowsheet Documentation  Taken 5/3/2025 2039 by Marga Marlow RN  Pain Management Interventions: medication (see MAR)  Goal: Effective Oxygenation and Ventilation  Outcome: Progressing

## 2025-05-04 NOTE — PROGRESS NOTES
Orthopedic Surgery  Monse Peoples  05/04/2025     Admit Date:  5/2/2025  Assessment:   POD: 1 Day Post-Op   Procedure(s):  APPLICATION EXTERNAL FIXATION, LOWER EXTREMITY, ANKLE     Patient resting comfortably in bed.   Pain controlled.  Tolerating oral intake.    Denies vomiting. Notes some nausea that has been well managed with meds.  Denies chest pain or shortness of breath    Temp:  [96.6  F (35.9  C)-97.5  F (36.4  C)] 96.9  F (36.1  C)  Pulse:  [69-93] 72  Resp:  [13-27] 20  BP: (123-158)/() 123/60  SpO2:  [94 %-100 %] 95 %    Alert and oriented  External fixator in place with surrounding soft dressing that is clean, dry and intact.   Minimal erythema of the surrounding skin.   Bilateral calves are soft, non-tender.  Left lower extremity is NVI.  Sensation intact bilateral lower extremities  +Dp pulse    Labs:  Recent Labs   Lab Test 05/04/25  0748 05/03/25  0400 05/02/25  0727   WBC 8.0 7.1 13.0*   HGB 9.2* 9.9* 11.7    201 272     No lab results found.  No lab results found.      Plan:   Continue ASA 81 mg BID for DVT prophylaxis.     Mobilize with PT/OT    NWB LLE.     Continue current pain regiment.   Dressings: Keep intact.  Change if >60% saturated or peeling off.    Follow-up: with Mone Wang PA-C/Dr. Lares on Friday 5/9/25.     Disposition:    Anticipate d/c to TCU when medically cleared and progressing in PT.    Mone Wang PA-C  [facility]

## 2025-05-04 NOTE — PROGRESS NOTES
05/04/25 1343   Appointment Info   Signing Clinician's Name / Credentials (PT) Jaz Hdz, DPJOSE   Living Environment   People in Home spouse   Current Living Arrangements house   Home Accessibility stairs to enter home   Number of Stairs, Main Entrance 1  (Pt reports she has a ramp)   Transportation Anticipated family or friend will provide;car, drives self   Living Environment Comments Pt lives with her  in a townhouse. Reports she has a ramp from when she was in a WC prior   Self-Care   Usual Activity Tolerance good   Current Activity Tolerance fair   Equipment Currently Used at Home cane, straight;grab bar, toilet;grab bar, tub/shower;walker, standard   Fall history within last six months yes   Number of times patient has fallen within last six months 1   Activity/Exercise/Self-Care Comment Pt reports IND at baseline   General Information   Onset of Illness/Injury or Date of Surgery 05/02/25   Referring Physician Mone Wang PA-C   Patient/Family Therapy Goals Statement (PT) Return home   Pertinent History of Current Problem (include personal factors and/or comorbidities that impact the POC) Monse Peoples is a 79 year old female s/p L ankle ex fix, pt admitted on 5/2/2025 after having fallen at home. She apparently felt light headed and fell with reported LOC. She sustained a left trimalleolar fracture.     Medical history of Anxiety, Asthma, HTN, CAD, Depression, GERD, Former Smoker, HLD, OA, Pulmonary Nodules, Iron Deficiency, Prediabetes, S/p Gastric Bypass who presents to the ED today with left ankle pain.   Existing Precautions/Restrictions fall;weight bearing   Weight-Bearing Status - LLE nonweight-bearing   Cognition   Affect/Mental Status (Cognition) WFL   Orientation Status (Cognition) oriented x 4   Follows Commands (Cognition) WFL   Pain Assessment   Patient Currently in Pain   (3-4/10 L ankle)   Integumentary/Edema   Integumentary/Edema Comments L ankle ex fix, covered by ace wrap,  dorsum foot bruised   Posture    Posture Forward head position;Protracted shoulders   Range of Motion (ROM)   Range of Motion ROM deficits secondary to surgical procedure   Strength (Manual Muscle Testing)   Strength (Manual Muscle Testing) Deficits observed during functional mobility;Able to perform R SLR;Able to perform L SLR   Bed Mobility   Comment, (Bed Mobility) Supine to sit Min A   Transfers   Comment, (Transfers) Sit to stand Mod A x2   Gait/Stairs (Locomotion)   Comment, (Gait/Stairs) Pt side stepped up in bed w/ FWW and Min A x2   Balance   Balance Comments Good seated and fair standing w/ FWW   Sensory Examination   Sensory Perception patient reports no sensory changes   Clinical Impression   Criteria for Skilled Therapeutic Intervention Yes, treatment indicated   PT Diagnosis (PT) Impaired functional mobility and gait   Influenced by the following impairments Pain, weakness, decreased activity tolerance, impaired balance   Functional limitations due to impairments Limited functional mobility requiring AD and assist   Clinical Presentation (PT Evaluation Complexity) stable   Clinical Presentation Rationale Based on PMH, current status, and social support   Clinical Decision Making (Complexity) low complexity   Planned Therapy Interventions (PT) balance training;bed mobility training;gait training;transfer training;progressive activity/exercise;strengthening;patient/family education   Risk & Benefits of therapy have been explained evaluation/treatment results reviewed;care plan/treatment goals reviewed;risks/benefits reviewed;current/potential barriers reviewed;participants voiced agreement with care plan;participants included;patient   PT Total Evaluation Time   PT Jyotsna Low Complexity Minutes (63279) 10   Physical Therapy Goals   PT Frequency 5x/week   PT Predicted Duration/Target Date for Goal Attainment 05/11/25   PT Goals Bed Mobility;Transfers;Gait   PT: Bed Mobility Independent;Supine to/from sit    PT: Transfers Modified independent;Sit to/from stand;Assistive device;Within precautions   PT: Gait Modified independent;Assistive device;50 feet;Within precautions   Interventions   Interventions Quick Adds Therapeutic Activity   Therapeutic Activity   Therapeutic Activities: dynamic activities to improve functional performance Minutes (25937) 25   Symptoms Noted During/After Treatment Fatigue   Treatment Detail/Skilled Intervention Pt in bed upon therapist arrival, agreeable to PT. Time setting up room. Pt's ex fix tending to catch on sheets, VCs for awareness of L LE and ex fix. Pt sitting up, able to shift hips forward w/ VCs. Pt follows NWB precautions 100%. Pt performed sit <> stand w/ Mod Ax2. Pt stood for 20 seconds w/ FWW and Min A. Seated rest break, bed elevated. Pt performed sit <> stand x3 reps w/ FWW and Min A x2, VCs for hand placement. Pt performed standing heel raises w/ FWW and Min A, maintaining NWB on L. Seated rest break. On next stand, pt shuffled up in bed on R LE w/ FWW and Min A x2. Pt sat back EOB, shifted hips. Pt transferred to supine w/ Min A. Pt able to reposition in bed. All needs w/in reach, bed alarm on, LEs elevated, RN updated.   PT Discharge Planning   PT Plan Progress bed mob, STS, SPT, hopping   PT Discharge Recommendation (DC Rec) Transitional Care Facility   PT Rationale for DC Rec Pt below baseline, currently Ax2 for transfers. Pt limited by weakness and NWB. Recommend TCU to increase strength and functional mobility.   PT Brief overview of current status Ax2, SPT. Goals of therapy will be to address safe mobility and make recs for d/c to next level of care. Pt and RN will continue to follow all falls risk precautions as documented by RN staff while hospitalized   PT Total Distance Amb During Session (feet) 5   Physical Therapy Time and Intention   Timed Code Treatment Minutes 25   Total Session Time (sum of timed and untimed services) 35      stabilization device

## 2025-05-04 NOTE — PROGRESS NOTES
"[facility]    Medicine Progress Note - Hospitalist Service    Date of Admission:  5/2/2025    Assessment & Plan   Monse Peoples is a 79 year old female admitted on 5/2/2025 after having fallen at home. She apparently felt light headed and fell with reported LOC. She sustained a left trimalleolar fracture.    Medical history of Anxiety, Asthma, HTN, CAD, Depression, GERD, Former Smoker, HLD, OA, Pulmonary Nodules, Iron Deficiency, Prediabetes, S/p Gastric Bypass who presents to the ED today with left ankle pain.     Fam hx is very strong for early death related to cardiac issues. She clearly endorses LOC with the fall and is unable to give me more information about the event.     On presntation to the ED, VS: 118/88, HR 89, RR 16 with O2 sat 97% breathing RA. Afeb.  Labs: creat 1.05, Normal electrolytes, glucose 123.Hgb 11.7, WBC 13.0, Plt 272. UA (clean catch shows Nit and LE positive with 20 WBC/HPF.    DX:  Trimalleolar fracture dislocation, left. POD #0, s/p APPLICATION EXTERNAL FIXATION, LOWER EXTREMITY, ANKLE, Left Ankle.  Suspected syncope.  On multiple meds that may have contributed but also has a UTI. Echo is normal.   E coli UTI. Susceptibilities pending.   HTN. On Amlodipine - recently increased from 5 to 10 mg every day, Atenolol 20 mg daily, Hydralazine 100 mg TID, Lisinopril 40 mg daily and hydrochlorothiazide 25 every day. Note that Echo shows normal EF with only mild \"grade 1\" diastolic dysfunction.  CAD/HLD. Non-obstructive CAD.  GERD on PPI.   Depression/anxiety managed with bupropion, Duloxetine and Trazodone.  PLAN:  Cont tele.  Will plan a ZIO patch on discharge.   RN managed K, Mg and Phos protocols.  BP meds reviewed and resumed with parameters to hold.   Will need TCU.  Continues ceftriaxone for UTI.   Will discuss with Cards, but without any identified abnormality, I don't think that a consult is indicated.           Diet: Advance Diet as Tolerated: Regular Diet " "Adult    DVT Prophylaxis: Pneumatic Compression Devices  Garces Catheter: Not present  Lines: None     Cardiac Monitoring: ACTIVE order. Indication: Syncope- high cardiac risk (48 hours)  Code Status: Full Code      Clinically Significant Risk Factors                   # Hypertension: Noted on problem list            # Obesity: Estimated body mass index is 31.52 kg/m  as calculated from the following:    Height as of this encounter: 1.626 m (5' 4\").    Weight as of this encounter: 83.3 kg (183 lb 10.3 oz)., PRESENT ON ADMISSION     # Asthma: noted on problem list        Social Drivers of Health    Tobacco Use: Medium Risk (5/3/2025)    Patient History     Smoking Tobacco Use: Former     Smokeless Tobacco Use: Never    Received from [facility]    Social tracx          Disposition Plan     Medically Ready for Discharge: Anticipated in 2-4 Days             Sotero Krishnan MD  Hospitalist Service  [facility]  Securely message with [employer] (more info)  Text page via [email or web address] Paging/Directory   ______________________________________________________________________    Interval History   Stable night per nursing notes.     Physical Exam   Vital Signs: Temp: 96.9  F (36.1  C) Temp src: Temporal BP: 123/60 Pulse: 72   Resp: 20 SpO2: 95 % O2 Device: None (Room air) Oxygen Delivery: 2 LPM  Weight: 183 lbs 10.29 oz    General Appearance: Alert, coherent in NAD.  Respiratory: CTA, normal WOB  Cardiovascular: RRR without murmur  GI: soft, NTND  Skin: no edema or lesions.   Other: Left ankle external fixators in place.      Medical Decision Making       40 MINUTES SPENT BY ME on the date of service doing chart review, history, exam, documentation & further activities per the note.      Data   Results for orders placed or performed during the hospital encounter of 05/02/25 (from the past 24 hours)   Magnesium   Result Value Ref Range    Magnesium 1.9 1.7 - 2.3 mg/dL "   Phosphorus   Result Value Ref Range    Phosphorus 3.4 2.5 - 4.5 mg/dL   Basic metabolic panel   Result Value Ref Range    Sodium 140 135 - 145 mmol/L    Potassium 4.2 3.4 - 5.3 mmol/L    Chloride 107 98 - 107 mmol/L    Carbon Dioxide (CO2) 24 22 - 29 mmol/L    Anion Gap 9 7 - 15 mmol/L    Urea Nitrogen 10.2 8.0 - 23.0 mg/dL    Creatinine 0.83 0.51 - 0.95 mg/dL    GFR Estimate 72 >60 mL/min/1.73m2    Calcium 8.7 (L) 8.8 - 10.4 mg/dL    Glucose 94 70 - 99 mg/dL   CBC with platelets   Result Value Ref Range    WBC Count 8.0 4.0 - 11.0 10e3/uL    RBC Count 2.92 (L) 3.80 - 5.20 10e6/uL    Hemoglobin 9.2 (L) 11.7 - 15.7 g/dL    Hematocrit 27.8 (L) 35.0 - 47.0 %    MCV 95 78 - 100 fL    MCH 31.5 26.5 - 33.0 pg    MCHC 33.1 31.5 - 36.5 g/dL    RDW 12.5 10.0 - 15.0 %    Platelet Count 178 150 - 450 10e3/uL

## 2025-05-04 NOTE — CONSULTS
Care Management Initial Consult    General Information  Assessment completed with: PatientMonse  Type of CM/SW Visit: Initial Assessment    Primary Care Provider verified and updated as needed: Yes   Readmission within the last 30 days: no previous admission in last 30 days      Reason for Consult: discharge planning  Advance Care Planning: Advance Care Planning Reviewed: no concerns identified          Communication Assessment  Patient's communication style: spoken language (English or Bilingual)    Hearing Difficulty or Deaf: no   Wear Glasses or Blind: yes    Cognitive  Cognitive/Neuro/Behavioral: WDL  Level of Consciousness: intermittent confusion, lethargic  Arousal Level: opens eyes spontaneously  Orientation: disoriented to, situation             Living Environment:   People in home: spouse     Current living Arrangements: town home      Able to return to prior arrangements: yes       Family/Social Support:  Care provided by: self, spouse/significant other  Provides care for: no one  Marital Status:   Support system: , Children          Description of Support System: Supportive, Involved    Support Assessment: Adequate family and caregiver support    Current Resources:   Patient receiving home care services: No        Community Resources:    Equipment currently used at home: cane, straight, grab bar, toilet, grab bar, tub/shower, walker, standard  Supplies currently used at home:      Employment/Financial:  Employment Status:          Financial Concerns: none           Does the patient's insurance plan have a 3 day qualifying hospital stay waiver?  No    Lifestyle & Psychosocial Needs:  Social Drivers of Health     Food Insecurity: Low Risk  (5/2/2025)    Food Insecurity     Within the past 12 months, did you worry that your food would run out before you got money to buy more?: No     Within the past 12 months, did the food you bought just not last and you didn t have money to get more?: No    Depression: Not at risk (2/20/2024)    Received from [facility]    PHQ-2     PHQ-2 Score: 2   Housing Stability: Low Risk  (5/2/2025)    Housing Stability     Do you have housing? : Yes     Are you worried about losing your housing?: No   Tobacco Use: Medium Risk (5/3/2025)    Patient History     Smoking Tobacco Use: Former     Smokeless Tobacco Use: Never     Passive Exposure: Not on file   Financial Resource Strain: Low Risk  (5/2/2025)    Financial Resource Strain     Within the past 12 months, have you or your family members you live with been unable to get utilities (heat, electricity) when it was really needed?: No   Alcohol Use: Not on file   Transportation Needs: Low Risk  (5/2/2025)    Transportation Needs     Within the past 12 months, has lack of transportation kept you from medical appointments, getting your medicines, non-medical meetings or appointments, work, or from getting things that you need?: No   Physical Activity: Not on file   Interpersonal Safety: Low Risk  (5/2/2025)    Interpersonal Safety     Do you feel physically and emotionally safe where you currently live?: Yes     Within the past 12 months, have you been hit, slapped, kicked or otherwise physically hurt by someone?: No     Within the past 12 months, have you been humiliated or emotionally abused in other ways by your partner or ex-partner?: No   Stress: Not on file   Social Connections: Unknown (6/5/2024)    Received from [facility]    Social Connections     Frequency of Communication with Friends and Family: Not on file   Health Literacy: Not on file       Functional Status:  Prior to admission patient needed assistance:   Dependent ADLs:: Ambulation-cane, Bathing, Dressing, Toileting, Ambulation-walker  Dependent IADLs:: Cleaning, Cooking, Laundry, Shopping, Meal Preparation, Money Management, Transportation       Mental Health Status:  Mental Health Status: Current Concern  Mental Health  Management: Individual Therapy    Chemical Dependency Status:                Values/Beliefs:  Spiritual, Cultural Beliefs, Gnosticism Practices, Values that affect care:                 Discussed  Partnership in Safe Discharge Planning  document with patient/family: Yes:     Additional Information:    CM consulted for discharge planning. NESHA met with Monse at bedside to introduce role and complete initial assessment. Monse shared that she lives in a senior town home with her . She shared that she has a cane and a walker that she uses intermittently.    Monse shared that she has a hx of depression and described having an especially hard time after her son passed away in September. She shared that she did individual therapy and found it very helpful. She recently stopped seeing her therapist as she felt she was doing much better but is open to reaching out again if symptoms resurface. She shared that last night was difficult emotionally for her and she is considering reaching out to her therapist again.     SW shared PT's recommendation of TCU which Monse is agreeable. SW shared medicare.gov website and asked about facility preferences. Monse shared that she would like ref[facility] to be [facility] to [facility]. Monse is okay with a shared room.     Monse anticipates her  will transport her to TCU at discharge.     Next Steps:     Follow up on TCU referrals.     JULIANNE Dixon, [facility]   [email or web address]

## 2025-05-05 ENCOUNTER — DOCUMENTATION ONLY (OUTPATIENT)
Dept: GERIATRICS | Facility: CLINIC | Age: 80
End: 2025-05-05
Payer: COMMERCIAL

## 2025-05-05 ENCOUNTER — LAB REQUISITION (OUTPATIENT)
Dept: LAB | Facility: CLINIC | Age: 80
End: 2025-05-05
Payer: COMMERCIAL

## 2025-05-05 ENCOUNTER — HOSPITAL ENCOUNTER (EMERGENCY)
Facility: CLINIC | Age: 80
Discharge: NURSING, REHAB OR LONG-TERM CARE | End: 2025-05-06
Attending: EMERGENCY MEDICINE | Admitting: EMERGENCY MEDICINE
Payer: COMMERCIAL

## 2025-05-05 VITALS
BODY MASS INDEX: 31.35 KG/M2 | OXYGEN SATURATION: 94 % | SYSTOLIC BLOOD PRESSURE: 131 MMHG | TEMPERATURE: 97.1 F | DIASTOLIC BLOOD PRESSURE: 60 MMHG | HEART RATE: 76 BPM | RESPIRATION RATE: 18 BRPM | WEIGHT: 183.64 LBS | HEIGHT: 64 IN

## 2025-05-05 DIAGNOSIS — R58 BLEEDING: ICD-10-CM

## 2025-05-05 DIAGNOSIS — Z11.1 ENCOUNTER FOR SCREENING FOR RESPIRATORY TUBERCULOSIS: ICD-10-CM

## 2025-05-05 DIAGNOSIS — Z98.890 POSTOPERATIVE STATE: ICD-10-CM

## 2025-05-05 PROBLEM — N39.0 E. COLI UTI: Status: ACTIVE | Noted: 2025-05-05

## 2025-05-05 PROBLEM — R55 SYNCOPE, UNSPECIFIED SYNCOPE TYPE: Status: ACTIVE | Noted: 2025-05-05

## 2025-05-05 PROBLEM — B96.20 E. COLI UTI: Status: ACTIVE | Noted: 2025-05-05

## 2025-05-05 LAB
BASOPHILS # BLD AUTO: 0 10E3/UL (ref 0–0.2)
BASOPHILS NFR BLD AUTO: 0 %
EOSINOPHIL # BLD AUTO: 0.2 10E3/UL (ref 0–0.7)
EOSINOPHIL NFR BLD AUTO: 2 %
ERYTHROCYTE [DISTWIDTH] IN BLOOD BY AUTOMATED COUNT: 12.7 % (ref 10–15)
GLUCOSE SERPL-MCNC: 92 MG/DL (ref 70–99)
HCT VFR BLD AUTO: 29.3 % (ref 35–47)
HGB BLD-MCNC: 9.9 G/DL (ref 11.7–15.7)
HOLD SPECIMEN: NORMAL
IMM GRANULOCYTES # BLD: 0 10E3/UL
IMM GRANULOCYTES NFR BLD: 1 %
LYMPHOCYTES # BLD AUTO: 1.6 10E3/UL (ref 0.8–5.3)
LYMPHOCYTES NFR BLD AUTO: 21 %
MAGNESIUM SERPL-MCNC: 1.8 MG/DL (ref 1.7–2.3)
MCH RBC QN AUTO: 31.7 PG (ref 26.5–33)
MCHC RBC AUTO-ENTMCNC: 33.8 G/DL (ref 31.5–36.5)
MCV RBC AUTO: 94 FL (ref 78–100)
MONOCYTES # BLD AUTO: 0.8 10E3/UL (ref 0–1.3)
MONOCYTES NFR BLD AUTO: 10 %
NEUTROPHILS # BLD AUTO: 5.2 10E3/UL (ref 1.6–8.3)
NEUTROPHILS NFR BLD AUTO: 66 %
NRBC # BLD AUTO: 0 10E3/UL
NRBC BLD AUTO-RTO: 0 /100
PHOSPHATE SERPL-MCNC: 3.2 MG/DL (ref 2.5–4.5)
PLATELET # BLD AUTO: 236 10E3/UL (ref 150–450)
POTASSIUM SERPL-SCNC: 4.1 MMOL/L (ref 3.4–5.3)
RBC # BLD AUTO: 3.12 10E6/UL (ref 3.8–5.2)
WBC # BLD AUTO: 7.8 10E3/UL (ref 4–11)

## 2025-05-05 PROCEDURE — 84100 ASSAY OF PHOSPHORUS: CPT | Performed by: INTERNAL MEDICINE

## 2025-05-05 PROCEDURE — 99283 EMERGENCY DEPT VISIT LOW MDM: CPT

## 2025-05-05 PROCEDURE — 250N000013 HC RX MED GY IP 250 OP 250 PS 637: Performed by: INTERNAL MEDICINE

## 2025-05-05 PROCEDURE — 85004 AUTOMATED DIFF WBC COUNT: CPT | Performed by: EMERGENCY MEDICINE

## 2025-05-05 PROCEDURE — 250N000013 HC RX MED GY IP 250 OP 250 PS 637

## 2025-05-05 PROCEDURE — 99238 HOSP IP/OBS DSCHRG MGMT 30/<: CPT | Performed by: INTERNAL MEDICINE

## 2025-05-05 PROCEDURE — 83735 ASSAY OF MAGNESIUM: CPT | Performed by: INTERNAL MEDICINE

## 2025-05-05 PROCEDURE — 36415 COLL VENOUS BLD VENIPUNCTURE: CPT | Performed by: ORTHOPAEDIC SURGERY

## 2025-05-05 PROCEDURE — 250N000011 HC RX IP 250 OP 636: Performed by: PHYSICIAN ASSISTANT

## 2025-05-05 PROCEDURE — 36415 COLL VENOUS BLD VENIPUNCTURE: CPT | Performed by: EMERGENCY MEDICINE

## 2025-05-05 PROCEDURE — 84132 ASSAY OF SERUM POTASSIUM: CPT | Performed by: INTERNAL MEDICINE

## 2025-05-05 PROCEDURE — 82947 ASSAY GLUCOSE BLOOD QUANT: CPT | Performed by: ORTHOPAEDIC SURGERY

## 2025-05-05 RX ORDER — ACETAMINOPHEN 325 MG/1
975 TABLET ORAL EVERY 8 HOURS
DISCHARGE
Start: 2025-05-05 | End: 2025-05-06

## 2025-05-05 RX ORDER — MULTIPLE VITAMINS W/ MINERALS TAB 9MG-400MCG
1 TAB ORAL DAILY
DISCHARGE
Start: 2025-05-05

## 2025-05-05 RX ORDER — POLYETHYLENE GLYCOL 3350 17 G/17G
17 POWDER, FOR SOLUTION ORAL DAILY
DISCHARGE
Start: 2025-05-05

## 2025-05-05 RX ORDER — TRAZODONE HYDROCHLORIDE 100 MG/1
100-200 TABLET ORAL AT BEDTIME
DISCHARGE
Start: 2025-05-05

## 2025-05-05 RX ORDER — HYDROCHLOROTHIAZIDE 25 MG/1
25 TABLET ORAL DAILY
DISCHARGE
Start: 2025-05-05 | End: 2025-05-05

## 2025-05-05 RX ORDER — ASPIRIN 81 MG/1
81 TABLET, COATED ORAL 2 TIMES DAILY
DISCHARGE
Start: 2025-05-05

## 2025-05-05 RX ORDER — BUPROPION HYDROCHLORIDE 150 MG/1
150 TABLET, EXTENDED RELEASE ORAL 2 TIMES DAILY
DISCHARGE
Start: 2025-05-05

## 2025-05-05 RX ORDER — OXYCODONE HYDROCHLORIDE 5 MG/1
5 TABLET ORAL EVERY 4 HOURS PRN
Status: SHIPPED | DISCHARGE
Start: 2025-05-05

## 2025-05-05 RX ORDER — ATORVASTATIN CALCIUM 40 MG/1
40 TABLET, FILM COATED ORAL AT BEDTIME
DISCHARGE
Start: 2025-05-05

## 2025-05-05 RX ORDER — LISINOPRIL 40 MG/1
40 TABLET ORAL DAILY
DISCHARGE
Start: 2025-05-05

## 2025-05-05 RX ORDER — OXYCODONE HYDROCHLORIDE 5 MG/1
5 TABLET ORAL EVERY 4 HOURS PRN
Qty: 20 TABLET | Refills: 0 | Status: SHIPPED | OUTPATIENT
Start: 2025-05-05 | End: 2025-05-05

## 2025-05-05 RX ORDER — AMOXICILLIN 250 MG
1 CAPSULE ORAL 2 TIMES DAILY PRN
DISCHARGE
Start: 2025-05-05

## 2025-05-05 RX ORDER — ONDANSETRON 4 MG/1
4 TABLET, ORALLY DISINTEGRATING ORAL EVERY 6 HOURS PRN
DISCHARGE
Start: 2025-05-05

## 2025-05-05 RX ORDER — HYDROXYZINE HYDROCHLORIDE 10 MG/1
10 TABLET, FILM COATED ORAL EVERY 6 HOURS PRN
DISCHARGE
Start: 2025-05-05

## 2025-05-05 RX ORDER — HYDROXYZINE HYDROCHLORIDE 10 MG/1
10 TABLET, FILM COATED ORAL EVERY 6 HOURS PRN
DISCHARGE
Start: 2025-05-05 | End: 2025-05-05

## 2025-05-05 RX ORDER — HYDROCHLOROTHIAZIDE 25 MG/1
25 TABLET ORAL DAILY
DISCHARGE
Start: 2025-05-05

## 2025-05-05 RX ORDER — ATENOLOL 50 MG/1
50 TABLET ORAL DAILY
DISCHARGE
Start: 2025-05-05

## 2025-05-05 RX ORDER — AMLODIPINE BESYLATE 10 MG/1
10 TABLET ORAL DAILY
DISCHARGE
Start: 2025-05-05

## 2025-05-05 RX ORDER — HYDRALAZINE HYDROCHLORIDE 100 MG/1
100 TABLET, FILM COATED ORAL 3 TIMES DAILY
DISCHARGE
Start: 2025-05-05

## 2025-05-05 RX ORDER — DULOXETIN HYDROCHLORIDE 60 MG/1
60 CAPSULE, DELAYED RELEASE ORAL DAILY
DISCHARGE
Start: 2025-05-05

## 2025-05-05 RX ORDER — OMEPRAZOLE 40 MG/1
40 CAPSULE, DELAYED RELEASE ORAL DAILY
DISCHARGE
Start: 2025-05-05

## 2025-05-05 RX ORDER — LORATADINE 10 MG
1 TABLET ORAL DAILY
DISCHARGE
Start: 2025-05-05

## 2025-05-05 RX ADMIN — ACETAMINOPHEN 975 MG: 325 TABLET, FILM COATED ORAL at 14:21

## 2025-05-05 RX ADMIN — HYDRALAZINE HYDROCHLORIDE 100 MG: 50 TABLET ORAL at 08:06

## 2025-05-05 RX ADMIN — PANTOPRAZOLE SODIUM 40 MG: 40 TABLET, DELAYED RELEASE ORAL at 05:42

## 2025-05-05 RX ADMIN — BUPROPION HYDROCHLORIDE 150 MG: 150 TABLET, FILM COATED, EXTENDED RELEASE ORAL at 08:06

## 2025-05-05 RX ADMIN — CEFTRIAXONE 1 G: 1 INJECTION, POWDER, FOR SOLUTION INTRAMUSCULAR; INTRAVENOUS at 11:03

## 2025-05-05 RX ADMIN — ONDANSETRON 4 MG: 2 INJECTION, SOLUTION INTRAMUSCULAR; INTRAVENOUS at 08:06

## 2025-05-05 RX ADMIN — POLYETHYLENE GLYCOL 3350 17 G: 17 POWDER, FOR SOLUTION ORAL at 08:05

## 2025-05-05 RX ADMIN — ASPIRIN 81 MG: 81 TABLET, COATED ORAL at 08:06

## 2025-05-05 RX ADMIN — DULOXETINE 60 MG: 60 CAPSULE, DELAYED RELEASE ORAL at 08:06

## 2025-05-05 RX ADMIN — ATENOLOL 50 MG: 50 TABLET ORAL at 08:06

## 2025-05-05 RX ADMIN — HYDRALAZINE HYDROCHLORIDE 100 MG: 50 TABLET ORAL at 14:21

## 2025-05-05 RX ADMIN — LISINOPRIL 40 MG: 40 TABLET ORAL at 08:07

## 2025-05-05 RX ADMIN — SENNOSIDES AND DOCUSATE SODIUM 1 TABLET: 50; 8.6 TABLET ORAL at 08:06

## 2025-05-05 ASSESSMENT — ACTIVITIES OF DAILY LIVING (ADL)
ADLS_ACUITY_SCORE: 45
ADLS_ACUITY_SCORE: 59
ADLS_ACUITY_SCORE: 45
ADLS_ACUITY_SCORE: 59
ADLS_ACUITY_SCORE: 45
ADLS_ACUITY_SCORE: 45
ADLS_ACUITY_SCORE: 59
ADLS_ACUITY_SCORE: 45

## 2025-05-05 NOTE — PLAN OF CARE
Goal Outcome Evaluation:    Discharge Note    Patient discharged to TCU via EMS/BLS accompanied by significant other .  IV: Discontinued  Prescriptions sent with patient to discharge facility .   Belongings reviewed and sent with family.   Home medications returned to patient: NA  Equipment sent with: patient, N/A.   patient and family verbalizes understanding of discharge instructions. AVS given to patient and family.  Additional education completed? Wound care

## 2025-05-05 NOTE — PROGRESS NOTES
Care Management Discharge Note    Discharge Date: 05/05/2025       Discharge Disposition: Transitional Care    Discharge Services:      Discharge DME:      Discharge Transportation: agency    Private pay costs discussed: transportation costs    Does the patient's insurance plan have a 3 day qualifying hospital stay waiver?  No    PAS Confirmation Code: [id number]  Patient/family educated on Medicare website which has current facility and service quality ratings:  yes    Education Provided on the Discharge Plan:  no  Persons Notified of Discharge Plans: patient, RN, NST, TCU, , RN  Patient/Family in Agreement with the Plan:  yes    Handoff Referral Completed: No, handoff not indicated or clinically appropriate    Additional Information:  CM met with patient to discuss TCU today transfer.  She wanted to talk to MD as she states she is not ready.  MD paged and seen. Patient is medically ready per MD. Transport arranged for 1540-1620via [facility]. Patient is max assist of 2 to commode.  CM started BCBS auth. Case # [id number]. Clinicals faxed in urgent.    Addendum 1200  CM met with patient to discuss transportation. She adamantly refuses to go by  w/c.  Reports she can hop into the car of her . CM expressed concerns about safety to her and her  as there is no one at the facility that can help her out either. She refuses.  CM will keep ride until later to see if she will change her mind.    Addendum 1310  BCBS auth obtained [id number], good for dates 5/5-5/11. CM called facility and gave it to them.    Bobbi Ingram, RN, BSN, CM  Inpatient Care Coordination  [facility]  [phone]

## 2025-05-05 NOTE — PROGRESS NOTES
Care Management Discharge Note    Discharge Date: 05/05/2025       Discharge Disposition: Transitional Care    Discharge Services:      Discharge DME:      Discharge Transportation: agency    Private pay costs discussed: transportation costs    Does the patient's insurance plan have a 3 day qualifying hospital stay waiver?  No    PAS Confirmation Code: [id number]  Patient/family educated on Medicare website which has current facility and service quality ratings:  yes    Education Provided on the Discharge Plan:  no  Persons Notified of Discharge Plans: patient, RN, NST, TCU, , RN  Patient/Family in Agreement with the Plan:  yes    Handoff Referral Completed: No, handoff not indicated or clinically appropriate    Additional Information:  CM met with patient to discuss TCU today transfer.  She wanted to talk to MD as she states she is not ready.  MD paged and seen. Patient is medically ready per MD. Transport arranged for 1540-1620via [facility]. Patient is max assist of 2 to commode.  CM started BCBS auth. Case # [id number]. Clinicals faxed in urgent.    Addendum 1200  CM met with patient to discuss transportation. She adamantly refuses to go by  w/c.  Reports she can hop into the car of her . CM expressed concerns about safety to her and her  as there is no one at the facility that can help her out either. She refuses.  CM will keep ride until later to see if she will change her mind.    Bobbi Ingram, RN, BSN, CM  Inpatient Care Coordination  [facility]  [phone]

## 2025-05-05 NOTE — PLAN OF CARE
"Goal Outcome Evaluation:      Plan of Care Reviewed With: patient    Overall Patient Progress: improvingOverall Patient Progress: improving    Outcome Evaluation: Patient alert and oriented. VSS on room air. Pain managed with scheduled tylenol. swelling and bruising to left foot. Otherwise cms in tact. Voiding in external catheter. Up to bedside commode for bowel movement. Up assist of 2 pivot - nwb. Patient moves quickly and flops onto pieces of furniture. Educated patient on importance of finding balance before moving. 2 staff members with hands on gait belt for safety. Plan to discharge to TCU when placement is available.      Problem: Adult Inpatient Plan of Care  Goal: Plan of Care Review  Description: The Plan of Care Review/Shift note should be completed every shift.  The Outcome Evaluation is a brief statement about your assessment that the patient is improving, declining, or no change.  This information will be displayed automatically on your shiftnote.  Outcome: Progressing  Flowsheets (Taken 5/4/2025 1930)  Outcome Evaluation: Patient alert and oriented. VSS on room air.  Plan of Care Reviewed With: patient  Overall Patient Progress: improving  Goal: Patient-Specific Goal (Individualized)  Description: You can add care plan individualizations to a care plan. Examples of Individualization might be:  \"Parent requests to be called daily at 9am for status\", \"I have a hard time hearing out of my right ear\", or \"Do not touch me to wake me up as it startlesme\".  Outcome: Progressing  Goal: Absence of Hospital-Acquired Illness or Injury  Outcome: Progressing  Intervention: Identify and Manage Fall Risk  Recent Flowsheet Documentation  Taken 5/4/2025 0091 by Sailaja Stoner RN  Safety Promotion/Fall Prevention:   activity supervised   assistive device/personal items within reach   clutter free environment maintained   mobility aid in reach   nonskid shoes/slippers when out of bed   patient and family education   " safety round/check completed  Intervention: Prevent Skin Injury  Recent Flowsheet Documentation  Taken 5/4/2025 1900 by Sailaja Stoner RN  Body Position: position changed independently  Taken 5/4/2025 1330 by Sailaja Stoner RN  Body Position: position changed independently  Taken 5/4/2025 0829 by Sailaja Stoner RN  Body Position: supine, head elevated  Intervention: Prevent and Manage VTE (Venous Thromboembolism) Risk  Recent Flowsheet Documentation  Taken 5/4/2025 0829 by Sailaja Stoner RN  VTE Prevention/Management: SCDs on (sequential compression devices)  Intervention: Prevent Infection  Recent Flowsheet Documentation  Taken 5/4/2025 0829 by Sailaja Stoner RN  Infection Prevention:   rest/sleep promoted   single patient room provided  Goal: Optimal Comfort and Wellbeing  Outcome: Progressing  Intervention: Monitor Pain and Promote Comfort  Recent Flowsheet Documentation  Taken 5/4/2025 0734 by Sailaja Stoner RN  Pain Management Interventions: medication (see MAR)  Goal: Readiness for Transition of Care  Outcome: Progressing     Problem: Delirium  Goal: Optimal Coping  Outcome: Progressing  Goal: Improved Behavioral Control  Outcome: Progressing  Goal: Improved Attention and Thought Clarity  Outcome: Progressing  Goal: Improved Sleep  Outcome: Progressing     Problem: Orthopaedic Fracture  Goal: Absence of Bleeding  Outcome: Progressing  Intervention: Monitor and Manage Fracture Bleeding  Recent Flowsheet Documentation  Taken 5/4/2025 0829 by Sailaja Stoner RN  Bleeding Management: dressing monitored  Goal: Bowel Elimination  Outcome: Progressing  Goal: Absence of Embolism Signs and Symptoms  Outcome: Progressing  Intervention: Prevent or Manage Embolism Risk  Recent Flowsheet Documentation  Taken 5/4/2025 0829 by Sailaja Stoner RN  VTE Prevention/Management: SCDs on (sequential compression devices)  Goal: Fracture Stability  Outcome: Progressing  Goal: Optimal Functional Ability  Outcome:  Progressing  Intervention: Optimize Functional Ability  Recent Flowsheet Documentation  Taken 5/4/2025 1900 by Sailaja Stoner RN  Activity Management:   up to bedside commode   back to bed  Positioning/Transfer Devices:   pillows   in use  Taken 5/4/2025 1330 by Sailaja Stoner RN  Activity Management: back to bed  Positioning/Transfer Devices:   pillows   in use  Taken 5/4/2025 1144 by Sailaja Stoner RN  Activity Management: up in chair  Taken 5/4/2025 0829 by Sailaja Stoner RN  Activity Management: activity adjusted per tolerance  Positioning/Transfer Devices:   pillows   in use  Goal: Absence of Infection Signs and Symptoms  Outcome: Progressing  Intervention: Prevent or Manage Infection  Recent Flowsheet Documentation  Taken 5/4/2025 0829 by Sailaja Stoner RN  Infection Management: aseptic technique maintained  Goal: Effective Tissue Perfusion  Outcome: Progressing  Goal: Optimal Pain Control and Function  Outcome: Progressing  Intervention: Manage Acute Orthopaedic-Related Pain  Recent Flowsheet Documentation  Taken 5/4/2025 0734 by Sailaja Stoner RN  Pain Management Interventions: medication (see MAR)  Goal: Effective Oxygenation and Ventilation  Outcome: Progressing  Intervention: Promote Airway Secretion Clearance  Recent Flowsheet Documentation  Taken 5/4/2025 1900 by Sailaja Stoner RN  Activity Management:   up to bedside commode   back to bed  Taken 5/4/2025 1330 by Sailaja Stoner RN  Activity Management: back to bed  Taken 5/4/2025 1144 by Sailaja Stoner RN  Activity Management: up in chair  Taken 5/4/2025 0829 by Sailaja Stoner RN  Cough And Deep Breathing: done independently per patient  Activity Management: activity adjusted per tolerance  Intervention: Optimize Oxygenation and Ventilation  Recent Flowsheet Documentation  Taken 5/4/2025 1900 by Sailaja Stoner RN  Head of Bed ([facility] [place] Positioning: HOB at 20-30 degrees  Taken 5/4/2025 1330 by Sailaja Stoner RN  Head of Bed ([facility] [place])  Positioning: HOB at 20-30 degrees  Taken 5/4/2025 0829 by Sailaja Stoner RN  Head of Bed (HOB) Positioning: HOB at 20-30 degrees

## 2025-05-05 NOTE — PROGRESS NOTES
Orthopedic Surgery  Monse Peoples  05/05/2025     Admit Date:  5/2/2025  Assessment:   POD: 2 Days Post-Op   Procedure(s):  APPLICATION EXTERNAL FIXATION, LOWER EXTREMITY, ANKLE     Patient resting comfortably in bed    Reports difficulty sleeping due to change in medication schedule  Pain controlled  Tolerating oral intake  Reports nausea, but no vomiting  Denies chest pain or shortness of breath    Temp:  [97.1  F (36.2  C)-97.3  F (36.3  C)] 97.3  F (36.3  C)  Pulse:  [65-78] 74  Resp:  [16-24] 18  BP: (111-158)/(42-63) 158/63  Cuff Mean (mmHg):  [104] 104  SpO2:  [94 %-97 %] 94 %    Alert and oriented  Dressing is clean, dry, and intact   Bilateral calves are soft, non-tender  Left lower extremity is NVI  Moderate ecchymosis around the toes on the left foot  Sensation intact bilateral lower extremities  Patient can lift the leg, bend the knee and wiggle the toes without difficulty on the left  Patient able to resist dorsi and plantar flexion on the right  +Dp pulse on the right  Toes are well perfused, warm to touch and have appropriate capillary refill    Labs:  Recent Labs   Lab Test 05/04/25  0748 05/03/25  0400 05/02/25  0727   WBC 8.0 7.1 13.0*   HGB 9.2* 9.9* 11.7    201 272           Plan:   Continue ASA 81 mg BID for DVT prophylaxis.                Mobilize with PT/OT               NWB LLE.                Continue current pain regiment.              Dressings: Keep intact.  Change if >60% saturated or peeling off.               Follow-up: with Mone Wang PA-C/Dr. Lares on Friday 5/9/25.    Disposition:    Anticipate d/c to TCU when medically cleared and progressing in PT    Lilli Chun PA-C  [facility]

## 2025-05-05 NOTE — PLAN OF CARE
"Goal Outcome Evaluation:      Plan of Care Reviewed With: patient    Overall Patient Progress: improvingOverall Patient Progress: improving    Outcome Evaluation: No acute events overnight    A&O x 4, forgetful. Tolerating regular diet. Tele SR with PVC's. VSS on RA. Voiding adequately. 2A, GB, FWW pivot. NWB LLE. CMS intact. Tylenol, Oxy & Atarax for pain. SW following- plan for TCU at discharge.       Problem: Adult Inpatient Plan of Care  Goal: Plan of Care Review  Description: The Plan of Care Review/Shift note should be completed every shift.  The Outcome Evaluation is a brief statement about your assessment that the patient is improving, declining, or no change.  This information will be displayed automatically on your shiftnote.  Outcome: Progressing  Flowsheets (Taken 5/5/2025 0331)  Outcome Evaluation: No acute events overnight  Plan of Care Reviewed With: patient  Overall Patient Progress: improving  Goal: Patient-Specific Goal (Individualized)  Description: You can add care plan individualizations to a care plan. Examples of Individualization might be:  \"Parent requests to be called daily at 9am for status\", \"I have a hard time hearing out of my right ear\", or \"Do not touch me to wake me up as it startlesme\".  Outcome: Progressing  Goal: Absence of Hospital-Acquired Illness or Injury  Outcome: Progressing  Intervention: Identify and Manage Fall Risk  Recent Flowsheet Documentation  Taken 5/4/2025 2000 by Marga Marlow, RN  Safety Promotion/Fall Prevention:   activity supervised   assistive device/personal items within reach   safety round/check completed  Intervention: Prevent Infection  Recent Flowsheet Documentation  Taken 5/4/2025 2000 by Marga Marlow, RN  Infection Prevention:   rest/sleep promoted   single patient room provided  Goal: Optimal Comfort and Wellbeing  Outcome: Progressing  Intervention: Monitor Pain and Promote Comfort  Recent Flowsheet Documentation  Taken 5/4/2025 " 2001 by Marga Marlow RN  Pain Management Interventions: medication (see MAR)  Goal: Readiness for Transition of Care  Outcome: Progressing     Problem: Delirium  Goal: Optimal Coping  Outcome: Progressing  Goal: Improved Behavioral Control  Outcome: Progressing  Intervention: Minimize Safety Risk  Recent Flowsheet Documentation  Taken 5/4/2025 2000 by Marga Marlow RN  Enhanced Safety Measures:   assistive devices when indicated   pain management  Goal: Improved Attention and Thought Clarity  Outcome: Progressing  Goal: Improved Sleep  Outcome: Progressing     Problem: Orthopaedic Fracture  Goal: Absence of Bleeding  Outcome: Progressing  Goal: Bowel Elimination  Outcome: Progressing  Goal: Absence of Embolism Signs and Symptoms  Outcome: Progressing  Goal: Fracture Stability  Outcome: Progressing  Goal: Optimal Functional Ability  Outcome: Progressing  Goal: Absence of Infection Signs and Symptoms  Outcome: Progressing  Goal: Effective Tissue Perfusion  Outcome: Progressing  Intervention: Prevent or Manage Neurovascular Compromise  Recent Flowsheet Documentation  Taken 5/4/2025 2000 by Marga Marlow RN  Neurovascular Pressure Management: Cast: extremity positioned at heart level  Goal: Optimal Pain Control and Function  Outcome: Progressing  Intervention: Manage Acute Orthopaedic-Related Pain  Recent Flowsheet Documentation  Taken 5/4/2025 2001 by Marga Marlow RN  Pain Management Interventions: medication (see MAR)  Goal: Effective Oxygenation and Ventilation  Outcome: Progressing

## 2025-05-05 NOTE — DISCHARGE SUMMARY
[facility] Discharge Summary    Monse Peoples MRN# [id number]   Age: 79 year old YOB: 1945     Date of Admission:  5/2/2025  Date of Discharge::  5/5/2025  Admitting Physician:  Sotero Krishnan MD  Discharge Physician:  Sotero Krishnan MD     Home clinic: [facility], [place]          Admission Diagnoses:   Dislocation of left ankle joint, initial encounter [S93.05XA]  Closed trimalleolar fracture of left ankle, initial encounter [S82.852A]          Discharge Diagnosis:     Active Problems:    Esophageal reflux    Anxiety    Benign essential hypertension    Dislocation of left ankle joint, initial encounter    Closed trimalleolar fracture of left ankle, initial encounter    E. coli UTI    Syncope, unspecified syncope type           Procedures:   APPLICATION EXTERNAL FIXATION, LOWER EXTREMITY, ANKLE, Left Ankle           Medications Prior to Admission:     Medications Prior to Admission   Medication Sig Dispense Refill Last Dose/Taking    amLODIPine (NORVASC) 10 MG tablet Take 1 tablet (10 mg) by mouth daily. 90 tablet 3 5/1/2025 Bedtime    atenolol (TENORMIN) 50 MG tablet Take 50 mg by mouth daily   5/1/2025 Morning    atorvastatin (LIPITOR) 40 MG tablet Take 40 mg by mouth at bedtime.   5/1/2025 Bedtime    buPROPion (WELLBUTRIN SR) 150 MG 12 hr tablet Take 1 tablet (150 mg) by mouth 2 times daily 180 tablet 0 5/1/2025 Bedtime    calcium carbonate-vitamin D 600-400 MG-UNIT CHEW Take 2 tablets by mouth every morning   5/1/2025 Morning    DULoxetine (CYMBALTA) 60 MG capsule Take 1 capsule (60 mg) by mouth daily 90 capsule 0 5/1/2025 Morning    FIBER SELECT GUMMIES CHEW Take 1 chew tab by mouth daily    5/1/2025 Morning    hydrALAZINE (APRESOLINE) 100 MG tablet Take 1 tablet (100 mg) by mouth 3 times daily 270 tablet 3 5/1/2025 Bedtime    hydrochlorothiazide (HYDRODIURIL) 25 MG tablet Take 1 tablet (25 mg) by mouth daily 30 tablet 0 5/1/2025 Morning    lisinopril (ZESTRIL) 40 MG  tablet Take 1 tablet (40 mg) by mouth daily 90 tablet 0 5/1/2025 Bedtime    multivitamin w/minerals (THERA-VIT-M) tablet Take 1 tablet by mouth daily   5/1/2025 Morning    nitroGLYcerin (NITROSTAT) 0.4 MG sublingual tablet For chest pain place 1 tablet under the tongue every 5 minutes for 3 doses. If symptoms persist 5 minutes after 1st dose call 911. 25 tablet 0 Taking    Omega-3 Fatty Acids (OMEGA-3 FISH OIL PO) Take 1.2 g by mouth daily    5/1/2025 Morning    omeprazole (PRILOSEC) 40 MG DR capsule Take 1 capsule (40 mg) by mouth daily Take 30-60 minutes before a meal. 90 capsule 0 5/1/2025 Morning    traZODone (DESYREL) 100 MG tablet Take 100-200 mg by mouth at bedtime   5/1/2025 Bedtime    [DISCONTINUED] acetaminophen (TYLENOL) 325 MG tablet Take 650 mg by mouth every morning 100 tablet 0 5/1/2025 Morning    [DISCONTINUED] aspirin 81 MG tablet Take 81 mg by mouth daily   5/1/2025 Morning             Discharge Medications:     Current Discharge Medication List        START taking these medications    Details   aspirin (ASA) 81 MG EC tablet Take 1 tablet (81 mg) by mouth 2 times daily.    Associated Diagnoses: Dislocation of left ankle joint, initial encounter      hydrOXYzine HCl (ATARAX) 10 MG tablet Take 1 tablet (10 mg) by mouth every 6 hours as needed for other (adjuvant pain).    Associated Diagnoses: Dislocation of left ankle joint, initial encounter      ondansetron (ZOFRAN ODT) 4 MG ODT tab Take 1 tablet (4 mg) by mouth every 6 hours as needed for nausea.    Associated Diagnoses: Dislocation of left ankle joint, initial encounter      oxyCODONE (ROXICODONE) 5 MG tablet Take 1 tablet (5 mg) by mouth every 4 hours as needed for moderate pain.  Qty: 20 tablet, Refills: 0    Associated Diagnoses: Dislocation of left ankle joint, initial encounter      polyethylene glycol (MIRALAX) 17 GM/Dose powder Take 17 g by mouth daily.    Associated Diagnoses: Dislocation of left ankle joint, initial encounter       senna-docusate (SENOKOT-S/PERICOLACE) 8.6-50 MG tablet Take 1 tablet by mouth 2 times daily as needed for constipation.    Associated Diagnoses: Dislocation of left ankle joint, initial encounter           CONTINUE these medications which have CHANGED    Details   acetaminophen (TYLENOL) 325 MG tablet Take 3 tablets (975 mg) by mouth every 8 hours.    Comments: Through 5/6/25, starting 5/7/25, may take Q8 hours PRN  Associated Diagnoses: Dislocation of left ankle joint, initial encounter      hydrochlorothiazide (HYDRODIURIL) 25 MG tablet Take 1 tablet (25 mg) by mouth daily.    Comments: Hold upon transfer to U.  Associated Diagnoses: Benign essential hypertension           CONTINUE these medications which have NOT CHANGED    Details   amLODIPine (NORVASC) 10 MG tablet Take 1 tablet (10 mg) by mouth daily.  Qty: 90 tablet, Refills: 3    Associated Diagnoses: Primary hypertension      atenolol (TENORMIN) 50 MG tablet Take 50 mg by mouth daily      atorvastatin (LIPITOR) 40 MG tablet Take 40 mg by mouth at bedtime.      buPROPion (WELLBUTRIN SR) 150 MG 12 hr tablet Take 1 tablet (150 mg) by mouth 2 times daily  Qty: 180 tablet, Refills: 0    Comments: Medication is being filled for 1 time refill only due to:  Patient needs to be seen because it has been more than one year since last visit.  Associated Diagnoses: Anxiety      calcium carbonate-vitamin D 600-400 MG-UNIT CHEW Take 2 tablets by mouth every morning      DULoxetine (CYMBALTA) 60 MG capsule Take 1 capsule (60 mg) by mouth daily  Qty: 90 capsule, Refills: 0    Comments: Medication is being filled for 1 time refill only due to:  Patient needs to be seen because it has been more than one year since last visit.  Associated Diagnoses: Anxiety      FIBER SELECT GUMMIES CHEW Take 1 chew tab by mouth daily       hydrALAZINE (APRESOLINE) 100 MG tablet Take 1 tablet (100 mg) by mouth 3 times daily  Qty: 270 tablet, Refills: 3    Associated Diagnoses: Benign  essential hypertension      lisinopril (ZESTRIL) 40 MG tablet Take 1 tablet (40 mg) by mouth daily  Qty: 90 tablet, Refills: 0    Comments: Medication is being filled for 1 time refill only due to:  Patient needs to be seen because it has been more than one year since last visit.  Associated Diagnoses: Benign essential hypertension      multivitamin w/minerals (THERA-VIT-M) tablet Take 1 tablet by mouth daily      Omega-3 Fatty Acids (OMEGA-3 FISH OIL PO) Take 1.2 g by mouth daily       omeprazole (PRILOSEC) 40 MG DR capsule Take 1 capsule (40 mg) by mouth daily Take 30-60 minutes before a meal.  Qty: 90 capsule, Refills: 0    Comments: Medication is being filled for 1 time refill only due to:  Patient needs to be seen because it has been more than one year since last visit.  Associated Diagnoses: Gastroesophageal reflux disease without esophagitis      traZODone (DESYREL) 100 MG tablet Take 100-200 mg by mouth at bedtime           STOP taking these medications       aspirin 81 MG tablet Comments:   Reason for Stopping:         nitroGLYcerin (NITROSTAT) 0.4 MG sublingual tablet Comments:   Reason for Stopping:                     Consultations:   Consultation during this admission received from orthopedics          Brief History of Illness:   Monse Peoples is a 79 year old female admitted on 5/2/2025 after having fallen at home. She apparently felt light headed and fell with reported LOC. She sustained a left trimalleolar fracture.     Medical history of Anxiety, Asthma, HTN, CAD, Depression, GERD, Former Smoker, HLD, OA, Pulmonary Nodules, Iron Deficiency, Prediabetes, S/p Gastric Bypass who presents to the ED today with left ankle pain.     Fam hx is very strong for early death related to cardiac issues. She clearly endorses LOC with the fall and is unable to give me more information about the event.      On presntation to the ED, VS: 118/88, HR 89, RR 16 with O2 sat 97% breathing RA. Afeb.  Labs: creat 1.05,  "Normal electrolytes, glucose 123.Hgb 11.7, WBC 13.0, Plt 272. UA (clean catch shows Nit and LE positive with 20 WBC/HPF.          Hospital Course:   Monse Peoples was admitted to a medical bed on telemetry and was taken to the OR on the following day. She did well with the procedure.    BP meds were reviewed and resumed with parameters to hold:   Hold hydrochlorothiazide at this time due to known association with syncope in elderly patients.  Hold Atenolol 50 mg daily for SBP < 110 or HR < 60  Hold Lisinopril 40 mg daily for SBP < 120  Hold Amlodipine 10 mg for SBP < 130  Hold Hydralazine 100 mg TID for SP < 140      DX:  Trimalleolar fracture dislocation, left.  S/p APPLICATION EXTERNAL FIXATION, LOWER EXTREMITY, ANKLE, Left Ankle on 5/3/2025.  Suspected vasovagal syncope.  On multiple meds that may have contributed to low BP, and also has a UTI. Echo is normal. Fam hx is concerning, so will recommend a ZIO patch for after she completes her TCU stay. Her Telemetry monitoring was completely normal while she was here.  Low risk for dangerous rhythm disturbance, but ZIO is ordered out of an \"abundance of caution\".   Cystitis, due to pan-sensitive E coli. Received 4 days of Ceftriaxone 1 g IV daily while here. That should be adequate for simple UTI.  HTN. On Amlodipine - recently increased from 5 to 10 mg every day, Atenolol 20 mg daily, Hydralazine 100 mg TID, Lisinopril 40 mg daily and hydrochlorothiazide 25 every day. Note that Echo shows normal EF with only mild \"grade 1\" diastolic dysfunction.  CAD/HLD. Non-obstructive CAD.  GERD on PPI.   Depression/anxiety managed with bupropion, Duloxetine and Trazodone.  Appers quite anxious at the time of discharge.     BP (!) 158/63 (BP Location: Left arm)   Pulse 74   Temp 97.3  F (36.3  C) (Temporal)   Resp 18   Ht 1.626 m (5' 4\")   Wt 83.3 kg (183 lb 10.3 oz)   LMP  (LMP Unknown)   SpO2 94%   BMI 31.52 kg/m    On the date of discharge, Ms. Peoples is alert, " coherent and in NAd. She is anxious, but very astute and recognizes that she is made anxious by transition to TCU.   HEENT: no facial muscular weakness  Chest: CTA  COR: RRR without murmur  Abd: soft, NTND  Extrem: left lower leg external fixator in place.           Discharge Instructions and Follow-Up:     Discharge diet: Regular   Discharge activity: Restrictions per ortho   Discharge follow-up: With Ortho  With NH providers as usual  I have sent a ZIO patch to pt's home so she can apply when she gets through her TCU stay.           Discharge Disposition:     Discharged to short-term care facility      Attestation:  I have reviewed today's vital signs, notes, medications, labs and imaging.    Sotero Krishnan MD

## 2025-05-06 ENCOUNTER — PATIENT OUTREACH (OUTPATIENT)
Dept: CARE COORDINATION | Facility: CLINIC | Age: 80
End: 2025-05-06

## 2025-05-06 ENCOUNTER — TRANSITIONAL CARE UNIT VISIT (OUTPATIENT)
Dept: GERIATRICS | Facility: CLINIC | Age: 80
End: 2025-05-06
Payer: COMMERCIAL

## 2025-05-06 ENCOUNTER — MEDICAL CORRESPONDENCE (OUTPATIENT)
Dept: HEALTH INFORMATION MANAGEMENT | Facility: CLINIC | Age: 80
End: 2025-05-06

## 2025-05-06 VITALS
BODY MASS INDEX: 31.35 KG/M2 | OXYGEN SATURATION: 96 % | HEIGHT: 64 IN | WEIGHT: 183.64 LBS | HEART RATE: 77 BPM | RESPIRATION RATE: 18 BRPM | SYSTOLIC BLOOD PRESSURE: 140 MMHG | TEMPERATURE: 98.2 F | DIASTOLIC BLOOD PRESSURE: 65 MMHG

## 2025-05-06 VITALS
BODY MASS INDEX: 31.24 KG/M2 | OXYGEN SATURATION: 95 % | SYSTOLIC BLOOD PRESSURE: 147 MMHG | DIASTOLIC BLOOD PRESSURE: 74 MMHG | WEIGHT: 183 LBS | HEIGHT: 64 IN | HEART RATE: 81 BPM | TEMPERATURE: 97.5 F | RESPIRATION RATE: 18 BRPM

## 2025-05-06 DIAGNOSIS — R55 SYNCOPE, UNSPECIFIED SYNCOPE TYPE: ICD-10-CM

## 2025-05-06 DIAGNOSIS — Z87.440 PERSONAL HISTORY OF URINARY TRACT INFECTION: ICD-10-CM

## 2025-05-06 DIAGNOSIS — F33.1 MODERATE RECURRENT MAJOR DEPRESSION (H): ICD-10-CM

## 2025-05-06 DIAGNOSIS — D62 ABLA (ACUTE BLOOD LOSS ANEMIA): ICD-10-CM

## 2025-05-06 DIAGNOSIS — S82.852D CLOSED TRIMALLEOLAR FRACTURE OF LEFT ANKLE WITH ROUTINE HEALING, SUBSEQUENT ENCOUNTER: ICD-10-CM

## 2025-05-06 DIAGNOSIS — I10 BENIGN ESSENTIAL HYPERTENSION: ICD-10-CM

## 2025-05-06 DIAGNOSIS — S93.05XD DISLOCATION OF LEFT ANKLE JOINT, SUBSEQUENT ENCOUNTER: Primary | ICD-10-CM

## 2025-05-06 DIAGNOSIS — K21.9 GASTROESOPHAGEAL REFLUX DISEASE, UNSPECIFIED WHETHER ESOPHAGITIS PRESENT: ICD-10-CM

## 2025-05-06 PROCEDURE — 36415 COLL VENOUS BLD VENIPUNCTURE: CPT | Mod: ORL | Performed by: PHYSICIAN ASSISTANT

## 2025-05-06 PROCEDURE — 86481 TB AG RESPONSE T-CELL SUSP: CPT | Mod: ORL | Performed by: PHYSICIAN ASSISTANT

## 2025-05-06 PROCEDURE — P9604 ONE-WAY ALLOW PRORATED TRIP: HCPCS | Mod: ORL | Performed by: PHYSICIAN ASSISTANT

## 2025-05-06 PROCEDURE — 99310 SBSQ NF CARE HIGH MDM 45: CPT | Performed by: PHYSICIAN ASSISTANT

## 2025-05-06 RX ORDER — ACETAMINOPHEN 500 MG
1000 TABLET ORAL 3 TIMES DAILY
Status: SHIPPED | DISCHARGE
Start: 2025-05-06

## 2025-05-06 NOTE — PROGRESS NOTES
[facility] GERIATRICS    PRIMARY CARE PROVIDER AND CLINIC:  Christiano Taylor MD, [address] [place] [address] [facility]   Chief Complaint   Patient presents with    Hospital F/U      [place] Medical Record Number:  [id number]  Place of Service where encounter took place:  [facility]) [[id number]]    Monse Peoples  is a 79 year old  (1945), admitted to the above facility from  [facility]. Hospital stay 5/2/25 through 5/5/25..   HPI:    Notes from hospitalization reviewed including H&P, Ortho consult and D/c summary      Monse Peoples is a 79-year-old female with a past medical history of hypertension, CAD, anxiety, GERD who was recently hospitalized at [facility].  Presented for evaluation of a fall concerning for syncope.  Found to have trimalleolar fracture with associated dislocation.  Orthopedics consulted.  Underwent external fixation.  TTE obtained due to reports of syncope and unremarkable.  Continued on blood pressure medications.  Discharged to U    Shortly after admission to U had some bleeding at the external fixator.  Became concerned and was sent back to the ER 5/5.  Laboratory evaluation stable.  Dressing reinforced and discharged back to U    Monse is evaluated in her room.  Doing okay.  Struggled with initial adjustment to U.  Not really struggling much with pain.  Reviewed current pain management regimen.  Agrees to take oxycodone as needed.  Discussed that she is nonweightbearing.  Denies dizziness or lightheadedness.  Hospital did discharge her with a Zio patch and did discuss that we can complete this at [facility] if family brings it in.  Lives with her spouse.  History of anxiety and depression.  Did lose her son at the age of 51 and continues to grieve. Follows with psychologist Dr. Walters.      [address]  End ASA-take baby.   Trazadone 100 may take 100 mg prn  Dr Walters. [place] and [place] and  "psychology  Toi      [facility]      Review of nursing home EMR: [id number]    CODE STATUS/ADVANCE DIRECTIVES DISCUSSION:  Prior  CPR/Full code   ALLERGIES:   Allergies   Allergen Reactions    Codeine     Morphine Sulfate (Concentrate)      does not like side effects/\"hyper\"      PAST MEDICAL HISTORY:   Past Medical History:   Diagnosis Date    Anxiety 4/2/2015    Asthma     no inhaler for 2-3 years    Benign essential hypertension 8/2/2016    Chest pain, unspecified type 8/27/2017    Coronary artery disease     Coronary artery disease involving native coronary artery of native heart without angina pectoris 8/2/2016    Depression     Esophageal reflux 3/19/2015    Former smoker - has had pulmonary nodules in the past 3/29/2017    History of blood transfusion     Hyperlipidemia LDL goal <100 3/19/2015    Osteoarthritis     Osteopenia     Pulmonary nodules 4/22/2015    Noted 4/14/15, f/u scan 03/2016 showed minimal change, needs repeat scan 03/2017       PAST SURGICAL HISTORY:   has a past surgical history that includes tubal ligation; tonsillectomy; Cholecystectomy; GI surgery; gastric bypass; orthopedic surgery; Arthroplasty hip (9/23/2013); angiogram (6/15/05); Arthroplasty hip (Left, 5/19/2015); colonoscopy; Colonoscopy (Left, 4/19/2016); Arthroplasty knee (Left, 8/22/2016); and Apply external fixator lower extremity (Left, 5/3/2025).  FAMILY HISTORY: family history includes Breast Cancer (age of onset: 35) in her maternal aunt; Coronary Artery Disease in her brother, father, and sister; Heart Disease in her nephew; Hypertension in her brother, father, and sister; Other Cancer in her maternal half-sister.  SOCIAL HISTORY:   reports that she quit smoking about 12 years ago. Her smoking use included cigarettes. She started smoking about 62 years ago. She has a 25 pack-year smoking history. She has never used smokeless tobacco. She reports current alcohol use. She reports that she does not use drugs.  Patient's living " condition: lives with spouse    Post Discharge Medication Reconciliation Status:   MED REC REQUIRED  Post Medication Reconciliation Status: discharge medications reconciled and changed, per note/orders       Current Outpatient Medications   Medication Sig Dispense Refill    amLODIPine (NORVASC) 10 MG tablet Take 1 tablet (10 mg) by mouth daily.      aspirin (ASA) 81 MG EC tablet Take 1 tablet (81 mg) by mouth 2 times daily.      atenolol (TENORMIN) 50 MG tablet Take 1 tablet (50 mg) by mouth daily.      atorvastatin (LIPITOR) 40 MG tablet Take 1 tablet (40 mg) by mouth at bedtime.      buPROPion (WELLBUTRIN SR) 150 MG 12 hr tablet Take 1 tablet (150 mg) by mouth 2 times daily.      DULoxetine (CYMBALTA) 60 MG capsule Take 1 capsule (60 mg) by mouth daily.      hydrALAZINE (APRESOLINE) 100 MG tablet Take 1 tablet (100 mg) by mouth 3 times daily.      hydrochlorothiazide (HYDRODIURIL) 25 MG tablet Take 1 tablet (25 mg) by mouth daily.      hydrOXYzine HCl (ATARAX) 10 MG tablet Take 1 tablet (10 mg) by mouth every 6 hours as needed for other (adjuvant pain).      lisinopril (ZESTRIL) 40 MG tablet Take 1 tablet (40 mg) by mouth daily.      multivitamin w/minerals (THERA-VIT-M) tablet Take 1 tablet by mouth daily.      omeprazole (PRILOSEC) 40 MG DR capsule Take 1 capsule (40 mg) by mouth daily. Take 30-60 minutes before a meal.      ondansetron (ZOFRAN ODT) 4 MG ODT tab Take 1 tablet (4 mg) by mouth every 6 hours as needed for nausea.      oxyCODONE (ROXICODONE) 5 MG tablet Take 1 tablet (5 mg) by mouth every 4 hours as needed for moderate pain.      polyethylene glycol (MIRALAX) 17 GM/Dose powder Take 17 g by mouth daily.      senna-docusate (SENOKOT-S/PERICOLACE) 8.6-50 MG tablet Take 1 tablet by mouth 2 times daily as needed for constipation.      traZODone (DESYREL) 100 MG tablet Take 1-2 tablets (100-200 mg) by mouth at bedtime.      acetaminophen (TYLENOL) 500 MG tablet Take 2 tablets (1,000 mg) by mouth 3 times  "daily.      Fiber Select Gummies CHEW Take 1 chew tab by mouth daily.      Omega-3 Fatty Acids (OMEGA-3 FISH OIL PO) Take 1.2 g by mouth daily        No current facility-administered medications for this visit.       ROS:  10 point ROS of systems including Constitutional, Eyes, Respiratory, Cardiovascular, Gastroenterology, Genitourinary, Integumentary, Musculoskeletal, Psychiatric were all negative except for pertinent positives noted in my HPI.    Vitals:  BP (!) 147/74   Pulse 81   Temp 97.5  F (36.4  C)   Resp 18   Ht 1.626 m (5' 4\")   Wt 83 kg (183 lb)   LMP  (LMP Unknown)   SpO2 95%   BMI 31.41 kg/m    Exam:  Physical Exam  Vitals (Facility EMR) reviewed.   Constitutional:       General: She is not in acute distress.  HENT:      Head: Normocephalic and atraumatic.   Eyes:      General: No scleral icterus.  Cardiovascular:      Rate and Rhythm: Normal rate and regular rhythm.   Pulmonary:      Effort: Pulmonary effort is normal.   Musculoskeletal:      Right lower leg: No edema.      Left lower leg: No edema.      Comments: Left external fixator in place to left ankle. Mild blood drainage present.   Skin:     General: Skin is warm and dry.      Findings: No rash.   Neurological:      Mental Status: She is alert. Mental status is at baseline.   Psychiatric:         Behavior: Behavior normal.           Lab/Diagnostic data:  Recent labs in [facility] reviewed by me today.  and Most Recent 3 CBC's:  Recent Labs   Lab Test 05/05/25  2130 05/04/25  0748 05/03/25  0400   WBC 7.8 8.0 7.1   HGB 9.9* 9.2* 9.9*   MCV 94 95 97    178 201     Most Recent 3 BMP's:  Recent Labs   Lab Test 05/05/25  0643 05/04/25  0650 05/03/25  0400 05/02/25  0727   NA  --  140 141 135   POTASSIUM 4.1 4.2 4.3 4.6   CHLORIDE  --  107 105 100   CO2  --  24 24 24   BUN  --  10.2 11.7 19.7   CR  --  0.83 0.95 1.05*   ANIONGAP  --  9 12 11   HATTIE  --  8.7* 8.5* 9.2   GLC 92 94 107* 123*     Most Recent 2 LFT's:  Recent Labs   Lab Test " 06/03/24  1055 05/26/23  1109 10/24/18  0935 05/24/18  1205 05/02/17  1424   AST  --   --   --  43 30   ALT 35 35   < > 58* 50   ALKPHOS  --   --   --  53 60   BILITOTAL  --   --   --  0.4 0.5    < > = values in this interval not displayed.     Most Recent TSH and T4:  Recent Labs   Lab Test 07/07/24  1539   TSH 0.30     Most Recent Hemoglobin A1c:No lab results found.  Most Recent Anemia Panel:  Recent Labs   Lab Test 05/05/25  2130   WBC 7.8   HGB 9.9*   HCT 29.3*   MCV 94        0 Result Notes  Culture >100,000 CFU/mL Escherichia coli Abnormal         Resulting Agency: [facility]     Susceptibility     Escherichia coli     CHRISTOPHE     Ampicillin <=2 ug/mL Susceptible     Ampicillin/ Sulbactam <=2 ug/mL Susceptible     Cefazolin <=1 ug/mL Susceptible     Cefepime <=0.12 ug/mL Susceptible     Ceftazidime <=0.5 ug/mL Susceptible     Ceftriaxone <=0.25 ug/mL Susceptible     Ciprofloxacin <=0.06 ug/mL Susceptible     Gentamicin <=1 ug/mL Susceptible     Levofloxacin <=0.12 ug/mL Susceptible     Nitrofurantoin <=16 ug/mL Susceptible     Piperacillin/Tazobactam <=4 ug/mL Susceptible     Trimethoprim/Sulfamethoxazole <=1/19 ug/mL Susceptible                 Specimen Collected: 05/02/25 11:25 AM L        Left ventricular systolic function is normal.  The visual ejection fraction is 65-70%.  No regional wall motion abnormalities noted.  The study was technically difficult. Compared to the prior study dated 7/2,  there have been no changes.    ASSESSMENT/PLAN:  Left trimalleolar fracture dislocation status post external fixation  Pain control with scheduled Tylenol, as needed oxycodone, as needed hydroxyzine  NWB  PT/OT  Aspirin twice daily for DVT prophylaxis  Follow-up with orthopedics 5/9    ABLA: Does have some mild bleeding from external fixator site.  Continue aspirin twice daily  CBC 5/12    Possible syncope  HTN: Notes BP medications have been adjusted due to elevated BPs since her son's death.  TTE with normal EF and  no wall motion abnormalities.  Continue regimen of amlodipine, lisinopril, hydralazine and atenolol  Monitor for hypotension  Zio patch pending.  Discussed with patient she can bring this in from home    E. coli UTI, resolved: Status post 3 days ceftriaxone during hospitalization  Monitor off antibiotics    Anxiety  Depression  Grief: Son passed away in the past year.  Continue duloxetine and bupropion  Continue trazodone at at bedtime.  Prefers to take 100 mg scheduled at bedtime with additional 100 mg available as needed  Hydroxyzine available as needed for pain and anxiety adjunct    GERD  Continue omeprazole    This note was completed in part using Dragon voice recognition software. Although reviewed after completion, some word and grammatical errors may occur.    A total 49 min were spent on this hospital follow-up visit including review of hospitalization, medication reconciliation, evaluating patient, coordinating with facility staff, writing orders and documenting.  All services performed the day of visit        Electronically signed by:  Rosanna Luong PA-C

## 2025-05-06 NOTE — PROGRESS NOTES
Connected Care Resource Center: [facility] [place]    Background: Transitional Care Management program identified per system criteria and reviewed by Connected Care Resource Center team for possible outreach.    Assessment: Upon chart review, CCRC Team member will not proceed with patient outreach related to this episode of Transitional Care Management program due to reason below:    Non-[facility] TCU: CCRC team member noted patient discharged to TCU/ARU/LTACH. Patient is not established with a [facility] currently supported by Primary Care-Care Coordination therefore handoff to Primary Care-Care Coordination is not appropriate at this time.    Plan: Transitional Care Management episode addressed appropriately per reason noted above.      LORENZO Goldman  [facility], [facility]    *Connected Care Resource Team does NOT follow patient ongoing. Referrals are identified based on internal discharge reports and the outreach is to ensure patient has an understanding of their discharge instructions.

## 2025-05-06 NOTE — DISCHARGE INSTRUCTIONS
Monitor bleeding.  Return if it will not stop or you have a new concern but oozing may continue.  Otherwise, follow-up with orthopedics.

## 2025-05-06 NOTE — PLAN OF CARE
Physical Therapy Discharge Summary    Reason for therapy discharge:    Discharged to transitional care facility.    Progress towards therapy goal(s). See goals on Care Plan in [facility] electronic health record for goal details.  Goals not met.  Barriers to achieving goals:   discharge from facility.    Therapy recommendation(s):    Continued therapy is recommended.  Rationale/Recommendations:  TCU recommended for further progression of strength and IND mobility.

## 2025-05-06 NOTE — ED PROVIDER NOTES
Emergency Department Note      History of Present Illness     Chief Complaint   Post-op Problem      HPI   Monse Peoples is a 79 year old female with history of hypertension presenting alone from TCU via EMS for a postoperative problem.  She had an external fixator placed on her left ankle 2 days ago and was discharged today to TCU.  She tells me she had some bleeding in the hospital and was nervous about them discharging her without telling her why she was bleeding.  At her TCU this evening she had just come back from going to the bathroom with assistance (nonweightbearing on the left lower extremity) and she again bled onto her pillows.  She denies significant pain and actually reports that is improved compared to before her surgery.  She has no numbness, tingling, or any other concerns.    Independent Historian   None    Review of External Notes with  Discharge summary dated today after she was hospitalized for trimalleolar fracture with Ex-Fix placed 5/3/2025.    Past Medical History     Medical History and Problem List   Past Medical History:   Diagnosis Date    Anxiety 4/2/2015    Asthma     Benign essential hypertension 8/2/2016    Chest pain, unspecified type 8/27/2017    Coronary artery disease     Coronary artery disease involving native coronary artery of native heart without angina pectoris 8/2/2016    Depression     Esophageal reflux 3/19/2015    Former smoker - has had pulmonary nodules in the past 3/29/2017    History of blood transfusion     Hyperlipidemia LDL goal <100 3/19/2015    Osteoarthritis     Osteopenia     Pulmonary nodules 4/22/2015     Medications   acetaminophen (TYLENOL) 325 MG tablet  amLODIPine (NORVASC) 10 MG tablet  aspirin (ASA) 81 MG EC tablet  atenolol (TENORMIN) 50 MG tablet  atorvastatin (LIPITOR) 40 MG tablet  buPROPion (WELLBUTRIN SR) 150 MG 12 hr tablet  DULoxetine (CYMBALTA) 60 MG capsule  Fiber Select Gummies CHEW  hydrALAZINE (APRESOLINE) 100 MG  "tablet  hydrochlorothiazide (HYDRODIURIL) 25 MG tablet  hydrOXYzine HCl (ATARAX) 10 MG tablet  lisinopril (ZESTRIL) 40 MG tablet  multivitamin w/minerals (THERA-VIT-M) tablet  Omega-3 Fatty Acids (OMEGA-3 FISH OIL PO)  omeprazole (PRILOSEC) 40 MG DR capsule  ondansetron (ZOFRAN ODT) 4 MG ODT tab  oxyCODONE (ROXICODONE) 5 MG tablet  polyethylene glycol (MIRALAX) 17 GM/Dose powder  senna-docusate (SENOKOT-S/PERICOLACE) 8.6-50 MG tablet  traZODone (DESYREL) 100 MG tablet    Surgical History   Past Surgical History:   Procedure Laterality Date    ANGIOGRAM  6/15/05    APPLY EXTERNAL FIXATOR LOWER EXTREMITY Left 5/3/2025    Procedure: APPLICATION EXTERNAL FIXATION, LOWER EXTREMITY, ANKLE, Left - Ankle;  Surgeon: Dillon Lares MD;  Location: [facility] OR    ARTHROPLASTY HIP  9/23/2013    Procedure: ARTHROPLASTY HIP;  Right total hip arthroplasty      ARTHROPLASTY HIP Left 5/19/2015    Procedure: ARTHROPLASTY HIP;  Surgeon: Tyrese Howell MD;  Location: [facility] OR    ARTHROPLASTY KNEE Left 8/22/2016    Procedure: ARTHROPLASTY KNEE;  Surgeon: Tyrese Howell MD;  Location:  OR    CHOLECYSTECTOMY      COLONOSCOPY      COLONOSCOPY Left 4/19/2016    Procedure: COLONOSCOPY;  Surgeon: Moe Haney MD;  Location:  GI    GASTRIC BYPASS      2011 tiff en y    GI SURGERY      fistulotomy    ORTHOPEDIC SURGERY      left knee \"green procedure\"    TONSILLECTOMY      TUBAL LIGATION       Physical Exam     Patient Vitals for the past 24 hrs:   BP Temp Pulse Resp SpO2 Height Weight   05/06/25 0053 -- -- -- -- 96 % -- --   05/05/25 2320 (!) 140/65 -- 77 -- 95 % -- --   05/05/25 2301 -- -- -- -- 95 % -- --   05/05/25 2300 (!) 157/78 -- 74 -- -- -- --   05/05/25 2240 (!) 155/74 -- 85 -- -- -- --   05/05/25 2225 (!) 178/78 -- 76 -- 97 % -- --   05/05/25 2200 (!) 166/74 -- 79 -- 92 % -- --   05/05/25 2140 (!) 142/80 -- 76 -- 94 % -- --   05/05/25 2122 -- -- -- -- 96 % -- --   05/05/25 2121 -- -- -- -- 97 % -- --   05/05/25 2120 (!) " "156/80 -- 75 -- -- -- --   05/05/25 2100 125/74 -- 106 -- -- -- --   05/05/25 2058 109/80 -- -- -- 96 % 1.626 m (5' 4\") --   05/05/25 2056 -- -- -- 18 -- -- --   05/05/25 2055 -- 98.2  F (36.8  C) -- -- -- -- 83.3 kg (183 lb 10.3 oz)   05/05/25 2052 -- -- -- -- 96 % -- --   05/05/25 2051 -- -- -- -- 96 % -- --   05/05/25 2049 -- -- -- -- 97 % -- --   05/05/25 2048 -- -- -- -- 95 % -- --   05/05/25 2047 -- -- -- -- 95 % -- --   05/05/25 2046 (!) 139/99 -- 82 -- 96 % -- --     Physical Exam  General: Well-developed and well-nourished. Well appearing elderly woman. Cooperative.  Head:  Atraumatic.  Eyes:  Conjunctivae, lids, and sclerae are normal.  ENT:    Normal nose. Moist mucous membranes.  Neck:  Supple. Normal range of motion.  CV:  Warm and well-perfused throughout.  Resp:  No respiratory distress.   GI:  Non-distended.    MS:  External fixator on the left ankle as photographed below.  There is no bleeding actively.  There is no evidence of cellulitis.  There is appropriate postoperative ecchymosis.  Skin:  Warm. Non-diaphoretic. No pallor.  Ecchymosis to the left leg.  Neuro:  Awake. A&Ox3. Normal strength, able to wiggle toes.  Sensation intact to light touch distal to the Ex-Fix.  Psych: Normal mood and affect. Normal speech.  Vitals reviewed.        Diagnostics     Lab Results   Labs Ordered and Resulted from Time of ED Arrival to Time of ED Departure   CBC WITH PLATELETS AND DIFFERENTIAL - Abnormal       Result Value    WBC Count 7.8      RBC Count 3.12 (*)     Hemoglobin 9.9 (*)     Hematocrit 29.3 (*)     MCV 94      MCH 31.7      MCHC 33.8      RDW 12.7      Platelet Count 236      % Neutrophils 66      % Lymphocytes 21      % Monocytes 10      % Eosinophils 2      % Basophils 0      % Immature Granulocytes 1      NRBCs per 100 WBC 0      Absolute Neutrophils 5.2      Absolute Lymphocytes 1.6      Absolute Monocytes 0.8      Absolute Eosinophils 0.2      Absolute Basophils 0.0      Absolute Immature " Granulocytes 0.0      Absolute NRBCs 0.0       ED Course    ED Course   ED Course as of 05/06/25 0117   Mon May 05, 2025   3452 Patient had a small amount of oozing from the inferior pin on the anterior leg.  I removed Xeroform gauze and instead placed Surgicel around this post to promote clotting.  We will redress and discharge.       Additional Documentation  Social Determinants of Health: Social Connections/Isolation(currently living in a TCU)    Medical Decision Making / Diagnosis   BYRON Shea is a 79 year old woman who is postoperative day 2 status post left ankle external fixator placement who presents with bleeding from the site of one of the pins/posts.  She had this while she was in the hospital during the day and again after she returned to her bed from going to the bathroom this evening at her TCU.  She does not have increased pain or any other concerns.  She is well-appearing on exam with no active bleeding from the surgical sites.  Hemoglobin is unchanged from yesterday and there is no leukocytosis.  Imaging is unlikely to .  We did get her up and then back into bed and she had a tiny amount of oozing from the inferior anterior pin.  I remove the Xeroform and instead placed Surgicel around this pin and then we redressed her.  She is appropriate for discharge back to her TCU.  She may continue to have some oozing but does understand indications to return.  All questions answered.    Disposition   The patient was discharged.     Diagnosis     ICD-10-CM    1. Bleeding  R58       2. Postoperative state  Z98.890            Discharge Medications   Discharge Medication List as of 5/5/2025 11:36 PM           Laurel Castañeda MD  05/06/25 0121

## 2025-05-06 NOTE — LETTER
5/6/2025      Monse Peoples  [address] [facility] [id number]        [facility] GERIATRICS    PRIMARY CARE PROVIDER AND CLINIC:  Christiano Taylor MD, [address] [place] [address] [facility]   Chief Complaint   Patient presents with     Hospital F/U      [place] Medical Record Number:  [id number]  Place of Service where encounter took place:  [facility] ([facility]) [[id number]]    Monse Peoples  is a 79 year old  (1945), admitted to the above facility from  [facility]. Hospital stay 5/2/25 through 5/5/25..   HPI:    Notes from hospitalization reviewed including H&P, Ortho consult and D/c summary      Monse Peoples is a 79-year-old female with a past medical history of hypertension, CAD, anxiety, GERD who was recently hospitalized at [facility].  Presented for evaluation of a fall concerning for syncope.  Found to have trimalleolar fracture with associated dislocation.  Orthopedics consulted.  Underwent external fixation.  TTE obtained due to reports of syncope and unremarkable.  Continued on blood pressure medications.  Discharged to [facility]    Shortly after admission to U had some bleeding at the external fixator.  Became concerned and was sent back to the ER 5/5.  Laboratory evaluation stable.  Dressing reinforced and discharged back to [facility]    Monse is evaluated in her room.  Doing okay.  Struggled with initial adjustment to U.  Not really struggling much with pain.  Reviewed current pain management regimen.  Agrees to take oxycodone as needed.  Discussed that she is nonweightbearing.  Denies dizziness or lightheadedness.  Hospital did discharge her with a Zio patch and did discuss that we can complete this at [facility] if family brings it in.  Lives with her spouse.  History of anxiety and depression.  Did lose her son at the age of 51 and continues to grieve. Follows with psychologist Dr. Walters.      [address]  End ASA-take baby.  "  Trazadone 100 may take 100 mg prn  Dr Walters. [place] and [place] and psychology  Toi      CBC      Review of nursing home EMR: [id number]    CODE STATUS/ADVANCE DIRECTIVES DISCUSSION:  Prior  CPR/Full code   ALLERGIES:   Allergies   Allergen Reactions     Codeine      Morphine Sulfate (Concentrate)      does not like side effects/\"hyper\"      PAST MEDICAL HISTORY:   Past Medical History:   Diagnosis Date     Anxiety 4/2/2015     Asthma     no inhaler for 2-3 years     Benign essential hypertension 8/2/2016     Chest pain, unspecified type 8/27/2017     Coronary artery disease      Coronary artery disease involving native coronary artery of native heart without angina pectoris 8/2/2016     Depression      Esophageal reflux 3/19/2015     Former smoker - has had pulmonary nodules in the past 3/29/2017     History of blood transfusion      Hyperlipidemia LDL goal <100 3/19/2015     Osteoarthritis      Osteopenia      Pulmonary nodules 4/22/2015    Noted 4/14/15, f/u scan 03/2016 showed minimal change, needs repeat scan 03/2017       PAST SURGICAL HISTORY:   has a past surgical history that includes tubal ligation; tonsillectomy; Cholecystectomy; GI surgery; gastric bypass; orthopedic surgery; Arthroplasty hip (9/23/2013); angiogram (6/15/05); Arthroplasty hip (Left, 5/19/2015); colonoscopy; Colonoscopy (Left, 4/19/2016); Arthroplasty knee (Left, 8/22/2016); and Apply external fixator lower extremity (Left, 5/3/2025).  FAMILY HISTORY: family history includes Breast Cancer (age of onset: 35) in her maternal aunt; Coronary Artery Disease in her brother, father, and sister; Heart Disease in her nephew; Hypertension in her brother, father, and sister; Other Cancer in her maternal half-sister.  SOCIAL HISTORY:   reports that she quit smoking about 12 years ago. Her smoking use included cigarettes. She started smoking about 62 years ago. She has a 25 pack-year smoking history. She has never used smokeless tobacco. She reports " current alcohol use. She reports that she does not use drugs.  Patient's living condition: lives with spouse    Post Discharge Medication Reconciliation Status:   MED REC REQUIRED  Post Medication Reconciliation Status: discharge medications reconciled and changed, per note/orders       Current Outpatient Medications   Medication Sig Dispense Refill     amLODIPine (NORVASC) 10 MG tablet Take 1 tablet (10 mg) by mouth daily.       aspirin (ASA) 81 MG EC tablet Take 1 tablet (81 mg) by mouth 2 times daily.       atenolol (TENORMIN) 50 MG tablet Take 1 tablet (50 mg) by mouth daily.       atorvastatin (LIPITOR) 40 MG tablet Take 1 tablet (40 mg) by mouth at bedtime.       buPROPion (WELLBUTRIN SR) 150 MG 12 hr tablet Take 1 tablet (150 mg) by mouth 2 times daily.       DULoxetine (CYMBALTA) 60 MG capsule Take 1 capsule (60 mg) by mouth daily.       hydrALAZINE (APRESOLINE) 100 MG tablet Take 1 tablet (100 mg) by mouth 3 times daily.       hydrochlorothiazide (HYDRODIURIL) 25 MG tablet Take 1 tablet (25 mg) by mouth daily.       hydrOXYzine HCl (ATARAX) 10 MG tablet Take 1 tablet (10 mg) by mouth every 6 hours as needed for other (adjuvant pain).       lisinopril (ZESTRIL) 40 MG tablet Take 1 tablet (40 mg) by mouth daily.       multivitamin w/minerals (THERA-VIT-M) tablet Take 1 tablet by mouth daily.       omeprazole (PRILOSEC) 40 MG DR capsule Take 1 capsule (40 mg) by mouth daily. Take 30-60 minutes before a meal.       ondansetron (ZOFRAN ODT) 4 MG ODT tab Take 1 tablet (4 mg) by mouth every 6 hours as needed for nausea.       oxyCODONE (ROXICODONE) 5 MG tablet Take 1 tablet (5 mg) by mouth every 4 hours as needed for moderate pain.       polyethylene glycol (MIRALAX) 17 GM/Dose powder Take 17 g by mouth daily.       senna-docusate (SENOKOT-S/PERICOLACE) 8.6-50 MG tablet Take 1 tablet by mouth 2 times daily as needed for constipation.       traZODone (DESYREL) 100 MG tablet Take 1-2 tablets (100-200 mg) by mouth at  "bedtime.       acetaminophen (TYLENOL) 500 MG tablet Take 2 tablets (1,000 mg) by mouth 3 times daily.       Fiber Select Gummies CHEW Take 1 chew tab by mouth daily.       Omega-3 Fatty Acids (OMEGA-3 FISH OIL PO) Take 1.2 g by mouth daily        No current facility-administered medications for this visit.       ROS:  10 point ROS of systems including Constitutional, Eyes, Respiratory, Cardiovascular, Gastroenterology, Genitourinary, Integumentary, Musculoskeletal, Psychiatric were all negative except for pertinent positives noted in my HPI.    Vitals:  BP (!) 147/74   Pulse 81   Temp 97.5  F (36.4  C)   Resp 18   Ht 1.626 m (5' 4\")   Wt 83 kg (183 lb)   LMP  (LMP Unknown)   SpO2 95%   BMI 31.41 kg/m    Exam:  Physical Exam  Vitals (Facility EMR) reviewed.   Constitutional:       General: She is not in acute distress.  HENT:      Head: Normocephalic and atraumatic.   Eyes:      General: No scleral icterus.  Cardiovascular:      Rate and Rhythm: Normal rate and regular rhythm.   Pulmonary:      Effort: Pulmonary effort is normal.   Musculoskeletal:      Right lower leg: No edema.      Left lower leg: No edema.      Comments: Left external fixator in place to left ankle. Mild blood drainage present.   Skin:     General: Skin is warm and dry.      Findings: No rash.   Neurological:      Mental Status: She is alert. Mental status is at baseline.   Psychiatric:         Behavior: Behavior normal.           Lab/Diagnostic data:  Recent labs in [facility] reviewed by me today.  and Most Recent 3 CBC's:  Recent Labs   Lab Test 05/05/25  2130 05/04/25  0748 05/03/25  0400   WBC 7.8 8.0 7.1   HGB 9.9* 9.2* 9.9*   MCV 94 95 97    178 201     Most Recent 3 BMP's:  Recent Labs   Lab Test 05/05/25  0643 05/04/25  0650 05/03/25  0400 05/02/25  0727   NA  --  140 141 135   POTASSIUM 4.1 4.2 4.3 4.6   CHLORIDE  --  107 105 100   CO2  --  24 24 24   BUN  --  10.2 11.7 19.7   CR  --  0.83 0.95 1.05*   ANIONGAP  --  9 12 11 "   HATTIE  --  8.7* 8.5* 9.2   GLC 92 94 107* 123*     Most Recent 2 LFT's:  Recent Labs   Lab Test 06/03/24  1055 05/26/23  1109 10/24/18  0935 05/24/18  1205 05/02/17  1424   AST  --   --   --  43 30   ALT 35 35   < > 58* 50   ALKPHOS  --   --   --  53 60   BILITOTAL  --   --   --  0.4 0.5    < > = values in this interval not displayed.     Most Recent TSH and T4:  Recent Labs   Lab Test 07/07/24  1539   TSH 0.30     Most Recent Hemoglobin A1c:No lab results found.  Most Recent Anemia Panel:  Recent Labs   Lab Test 05/05/25  2130   WBC 7.8   HGB 9.9*   HCT 29.3*   MCV 94        0 Result Notes  Culture >100,000 CFU/mL Escherichia coli Abnormal         Resulting Agency: [facility]     Susceptibility     Escherichia coli     CHRISTOPHE     Ampicillin <=2 ug/mL Susceptible     Ampicillin/ Sulbactam <=2 ug/mL Susceptible     Cefazolin <=1 ug/mL Susceptible     Cefepime <=0.12 ug/mL Susceptible     Ceftazidime <=0.5 ug/mL Susceptible     Ceftriaxone <=0.25 ug/mL Susceptible     Ciprofloxacin <=0.06 ug/mL Susceptible     Gentamicin <=1 ug/mL Susceptible     Levofloxacin <=0.12 ug/mL Susceptible     Nitrofurantoin <=16 ug/mL Susceptible     Piperacillin/Tazobactam <=4 ug/mL Susceptible     Trimethoprim/Sulfamethoxazole <=1/19 ug/mL Susceptible                 Specimen Collected: 05/02/25 11:25 AM L        Left ventricular systolic function is normal.  The visual ejection fraction is 65-70%.  No regional wall motion abnormalities noted.  The study was technically difficult. Compared to the prior study dated 7/2,  there have been no changes.    ASSESSMENT/PLAN:  Left trimalleolar fracture dislocation status post external fixation  Pain control with scheduled Tylenol, as needed oxycodone, as needed hydroxyzine  NWB  PT/OT  Aspirin twice daily for DVT prophylaxis  Follow-up with orthopedics 5/9    ABLA: Does have some mild bleeding from external fixator site.  Continue aspirin twice daily  CBC 5/12    Possible syncope  HTN: Notes BP  medications have been adjusted due to elevated BPs since her son's death.  TTE with normal EF and no wall motion abnormalities.  Continue regimen of amlodipine, lisinopril, hydralazine and atenolol  Monitor for hypotension  Zio patch pending.  Discussed with patient she can bring this in from home    E. coli UTI, resolved: Status post 3 days ceftriaxone during hospitalization  Monitor off antibiotics    Anxiety  Depression  Grief: Son passed away in the past year.  Continue duloxetine and bupropion  Continue trazodone at at bedtime.  Prefers to take 100 mg scheduled at bedtime with additional 100 mg available as needed  Hydroxyzine available as needed for pain and anxiety adjunct    GERD  Continue omeprazole    This note was completed in part using Dragon voice recognition software. Although reviewed after completion, some word and grammatical errors may occur.    A total 49 min were spent on this hospital follow-up visit including review of hospitalization, medication reconciliation, evaluating patient, coordinating with facility staff, writing orders and documenting.  All services performed the day of visit        Electronically signed by:  Rosanna Luong PA-C                    Sincerely,        Rosanna Luong PA-C    Electronically signed

## 2025-05-06 NOTE — ED TRIAGE NOTES
Pt here with EMS for bleeding from surgical site tonight. Pt discharged from this hospital tonight after surgery on left knee. Pt states she noted bleeding at site yesterday but noted a sudden increase in bleeding this eveing and saturated 2 pillow cases. 25mcg fentanyl given by EMS en route, rates pain 2/10. Surgical site partially unwrapped, no active oozing noted.

## 2025-05-06 NOTE — ED NOTES
Bed: ED31  Expected date:   Expected time:   Means of arrival:   Comments:  A597. 79F. Post op bleeding

## 2025-05-07 ENCOUNTER — LAB REQUISITION (OUTPATIENT)
Dept: LAB | Facility: CLINIC | Age: 80
End: 2025-05-07
Payer: COMMERCIAL

## 2025-05-07 DIAGNOSIS — D50.9 IRON DEFICIENCY ANEMIA, UNSPECIFIED: ICD-10-CM

## 2025-05-07 LAB
GAMMA INTERFERON BACKGROUND BLD IA-ACNC: 0.05 IU/ML
M TB IFN-G BLD-IMP: NEGATIVE
M TB IFN-G CD4+ BCKGRND COR BLD-ACNC: 2.15 IU/ML
MITOGEN IGNF BCKGRD COR BLD-ACNC: 0 IU/ML
MITOGEN IGNF BCKGRD COR BLD-ACNC: 0.01 IU/ML
QUANTIFERON MITOGEN: 2.2 IU/ML
QUANTIFERON NIL TUBE: 0.05 IU/ML
QUANTIFERON TB1 TUBE: 0.06 IU/ML
QUANTIFERON TB2 TUBE: 0.05

## 2025-05-07 NOTE — OP NOTE
Preoperative diagnosis:  Displaced trimalleolar left ankle fracture    Postoperative diagnosis:  As above    Procedure:  Application of external fixator left lower extremity    Surgeon:  Dillon Lares MD     Assistant:  Mone Wang PA-C  A physicians assistant was available for the surgery and participated to decrease the patient's morbidity by assisting with positioning, manipulation of the limb during the procedure, surgical retraction as necessary, closure of the surgical wound and transferring the patient back to a hospital bed    Anesthesia:  General    Estimated blood loss:  5 cc    Complications:  None readily apparent    Indication for procedure:  Aaliyah Peoples is a very pleasant 79-year-old female who fell from standing 1 day prior.  She sustained a left ankle injury that resulted in pain and deformity.  Was unable to ambulate afterwards.  As such, was brought to the emergency room and x-rays were obtained.  Found to have an unstable fracture dislocation of the left ankle.  Initial reduction in the emergency room demonstrated residual widening of the ankle mortise.  As such, she was admitted to the hospital for pain control monitoring.  Given the condition of the skin of the left ankle, the risk benefits and alternatives to proceeding with application of external fixator to the left lower extremity were discussed with the patient.  She expressed understanding the risk of surgery and stated preference to proceed today.    Description of procedure:  The patient was met in the preoperative area by myself.  Informed consent was obtained as outlined above.  They were in agreement.  Therefore, initials were placed on the operative extremity indicating the correct surgical site.  Patient was then brought to the operating room where an appropriate anesthetic was induced by the anesthesia service.  This was successful.  The patient was subsequently transferred to the surgical table and standard prep and drape  performed.  A timeout was called by the surgical team.  The correct patient, hospital identification number, laterality and procedure to be performed were confirmed.  All in attendance were in agreement.  Antibiotics as well as tranexamic acid were given in accordance with [facility] protocol.      After appropriate surgical pause, fluoroscopic image intensification was utilized to localize pin sites well out of the zone of injury to the tibia.  This was well distal to the previously placed left total knee arthroplasty.  2 subcutaneous incisions were then created with a #15 blade.  The anterior crest of the tibia was dissected bluntly to allow for pin placement.  2 pins were placed in standard anterior to posterior direction in the tibial crest.  Care was taken with fluoroscopic image intensification in orthogonal planes to optimize pin placement.  After this, a calcaneal pin was placed and medial lateral fashion.  Again, fluoroscopic image intensification was utilized to optimize pin placement.  After this, a delta frame was then assembled and this was locked statically after confirming reduction of the tibiotalar joint.  Again, reduction was confirmed in orthogonal planes.  After this, the wounds were irrigated.  A sterile dressing was placed at the pin sites.  The patient was then subsequently transferred safely to a hospital bed.  All counts were correct at the end of the case in accordance with hospital protocol.    Postoperative plan:  Return to the hospital for pain control monitoring.  She will remain nonweightbearing on the extremity.  Plan for definitive fixation in 1 to 2 weeks as soft tissues allow    Dillon Lares MD

## 2025-05-12 LAB
ERYTHROCYTE [DISTWIDTH] IN BLOOD BY AUTOMATED COUNT: 13.3 % (ref 10–15)
HCT VFR BLD AUTO: 33.9 % (ref 35–47)
HGB BLD-MCNC: 10.7 G/DL (ref 11.7–15.7)
MCH RBC QN AUTO: 31.3 PG (ref 26.5–33)
MCHC RBC AUTO-ENTMCNC: 31.6 G/DL (ref 31.5–36.5)
MCV RBC AUTO: 99 FL (ref 78–100)
PLATELET # BLD AUTO: 355 10E3/UL (ref 150–450)
RBC # BLD AUTO: 3.42 10E6/UL (ref 3.8–5.2)
WBC # BLD AUTO: 8.6 10E3/UL (ref 4–11)

## 2025-05-12 PROCEDURE — P9604 ONE-WAY ALLOW PRORATED TRIP: HCPCS | Mod: ORL | Performed by: PHYSICIAN ASSISTANT

## 2025-05-12 PROCEDURE — 36415 COLL VENOUS BLD VENIPUNCTURE: CPT | Mod: ORL | Performed by: PHYSICIAN ASSISTANT

## 2025-05-12 PROCEDURE — 85027 COMPLETE CBC AUTOMATED: CPT | Mod: ORL | Performed by: PHYSICIAN ASSISTANT

## 2025-05-13 ENCOUNTER — TRANSITIONAL CARE UNIT VISIT (OUTPATIENT)
Dept: GERIATRICS | Facility: CLINIC | Age: 80
End: 2025-05-13
Payer: COMMERCIAL

## 2025-05-13 VITALS
DIASTOLIC BLOOD PRESSURE: 71 MMHG | HEIGHT: 64 IN | RESPIRATION RATE: 18 BRPM | HEART RATE: 90 BPM | TEMPERATURE: 98.7 F | OXYGEN SATURATION: 95 % | BODY MASS INDEX: 29.57 KG/M2 | WEIGHT: 173.2 LBS | SYSTOLIC BLOOD PRESSURE: 119 MMHG

## 2025-05-13 DIAGNOSIS — S82.852D CLOSED TRIMALLEOLAR FRACTURE OF LEFT ANKLE WITH ROUTINE HEALING, SUBSEQUENT ENCOUNTER: ICD-10-CM

## 2025-05-13 DIAGNOSIS — D62 ABLA (ACUTE BLOOD LOSS ANEMIA): ICD-10-CM

## 2025-05-13 DIAGNOSIS — I10 BENIGN ESSENTIAL HYPERTENSION: ICD-10-CM

## 2025-05-13 DIAGNOSIS — R05.2 SUBACUTE COUGH: Primary | ICD-10-CM

## 2025-05-13 PROCEDURE — 99309 SBSQ NF CARE MODERATE MDM 30: CPT | Performed by: PHYSICIAN ASSISTANT

## 2025-05-13 NOTE — LETTER
" 5/13/2025      Monse Peoples  [address] [facility] [id number]          Chief Complaint   Patient presents with     Nursing Home Acute       HPI:  Monse Peoples is a 79 year old  (1945), who is being seen today for an episodic care visit at: [facility] ([facility]) [[id number]].     Brief summary: Monse Peoples is a 79-year-old female with a past medical history of hypertension, CAD, anxiety, GERD who was recently hospitalized at [facility] [place] [facility] [place].  Presented for evaluation of a fall concerning for syncope.  Found to have trimalleolar fracture with associated dislocation.  Orthopedics consulted.  Underwent external fixation.  TTE obtained due to reports of syncope and unremarkable.  Continued on blood pressure medications.  Discharged to [facility]      Today's concern is: Aaliyah is evaluated in her room.  Overall doing well.  Had orthopedic follow-up on Friday.  Tentatively planning for surgery later this week to remove the external fixator.  It appears it is perhaps scheduled for Thursday, but no time set yet.  Pain is adequately controlled.  Cough is much improved.  Denies constipation.Labs from yesterday reviewed.  Denies dizziness        Review of nursing home EMR:-153, Wt 173.2,       Allergies, and PMH/PSH reviewed in [employer] today.    REVIEW OF SYSTEMS:  4 point ROS including Respiratory, CV, GI and , other than that noted in the HPI,  is negative    Objective:   /71   Pulse 90   Temp 98.7  F (37.1  C)   Resp 18   Ht 1.626 m (5' 4\")   Wt 78.6 kg (173 lb 3.2 oz)   LMP  (LMP Unknown)   SpO2 95%   BMI 29.73 kg/m      Physical Exam  Vitals (Facility EMR) reviewed.   Constitutional:       General: She is not in acute distress.  HENT:      Head: Normocephalic and atraumatic.   Eyes:      General: No scleral icterus.  Cardiovascular:      Rate and Rhythm: Normal rate and regular rhythm.   Pulmonary:      Effort: Pulmonary effort is normal. No respiratory distress.      " Breath sounds: No wheezing.   Musculoskeletal:      Right lower leg: No edema.   Skin:     General: Skin is warm and dry.      Findings: No rash.   Neurological:      Mental Status: She is alert. Mental status is at baseline.   Psychiatric:         Behavior: Behavior normal.          MED REC REQUIRED  Post Medication Reconciliation Status: discharge medications reconciled and changed, per note/orders      Recent labs in [facility] reviewed by me today.  and Most Recent 3 CBC's:  Recent Labs   Lab Test 05/12/25  0623 05/05/25  2130 05/04/25  0748   WBC 8.6 7.8 8.0   HGB 10.7* 9.9* 9.2*   MCV 99 94 95    236 178     Most Recent 3 BMP's:  Recent Labs   Lab Test 05/05/25  0643 05/04/25  0650 05/03/25  0400 05/02/25  0727   NA  --  140 141 135   POTASSIUM 4.1 4.2 4.3 4.6   CHLORIDE  --  107 105 100   CO2  --  24 24 24   BUN  --  10.2 11.7 19.7   CR  --  0.83 0.95 1.05*   ANIONGAP  --  9 12 11   HATTIE  --  8.7* 8.5* 9.2   GLC 92 94 107* 123*     Most Recent 2 LFT's:  Recent Labs   Lab Test 06/03/24  1055 05/26/23  1109 10/24/18  0935 05/24/18  1205 05/02/17  1424   AST  --   --   --  43 30   ALT 35 35   < > 58* 50   ALKPHOS  --   --   --  53 60   BILITOTAL  --   --   --  0.4 0.5    < > = values in this interval not displayed.       Assessment/Plan:  Left trimalleolar fracture dislocation status post external fixation  Pain control with scheduled Tylenol, as needed oxycodone, as needed hydroxyzine  NWB  PT/OT  Aspirin twice daily for DVT prophylaxis  Planning for removal of external fixator and ORIF in coming days.  Possibly the soonest Thursday those does not appear it has been confirmed.     Subacute cough, improved  History of smoking: Notes cough since prior to hospitalization.  Occasionally uses albuterol inhaler at home.  No diagnosis of COPD and no obvious evidence on CT scan 7/2024.  Does have a history of smoking as well as secondhand smoke exposure.    Improved with addition of loratadine  Continue as needed  albuterol and guaifenesin  Lungs clear.  Vital signs normal.  Symptoms improving.  No need for chest x-ray      History of rotator cuff tear: C/o increased shoulder pain with walker usage and NWB on LLE  Pain control as above  Diclofenac gel    ABLA:, Improving  Continue aspirin twice daily  CBC 5/12 improved to 10.7    Possible syncope  HTN: Notes BP medications have been adjusted due to elevated BPs since her son's death.  TTE with normal EF and no wall motion abnormalities.  Continue regimen of amlodipine, lisinopril, hydralazine and atenolol  Monitor for hypotension. BP appropiate  Zio patch pending.  Discussed with patient she can bring this in from home    E. coli UTI, resolved: Status post 3 days ceftriaxone during hospitalization  Monitor off antibiotics    Anxiety  Depression  Grief: Son passed away in the past year.  Continue duloxetine and bupropion  Continue trazodone at at bedtime.  Prefers to take 100 mg scheduled at bedtime with additional 100 mg available as needed  Hydroxyzine available as needed for pain and anxiety adjunct    GERD  Continue omeprazole      Orders:  NNO    Electronically signed by: Rosanna Luong PA-C       Sincerely,        Rosanna Luong PA-C    Electronically signed

## 2025-05-13 NOTE — PROGRESS NOTES
"  Chief Complaint   Patient presents with    Nursing Home Acute       HPI:  Monse Peoples is a 79 year old  (1945), who is being seen today for an episodic care visit at: [facility] ([facility]) [[id number]].     Brief summary: Monse Peoples is a 79-year-old female with a past medical history of hypertension, CAD, anxiety, GERD who was recently hospitalized at [facility].  Presented for evaluation of a fall concerning for syncope.  Found to have trimalleolar fracture with associated dislocation.  Orthopedics consulted.  Underwent external fixation.  TTE obtained due to reports of syncope and unremarkable.  Continued on blood pressure medications.  Discharged to [facility]      Today's concern is: Aaliyah is evaluated in her room.  Overall doing well.  Had orthopedic follow-up on Friday.  Tentatively planning for surgery later this week to remove the external fixator.  It appears it is perhaps scheduled for Thursday, but no time set yet.  Pain is adequately controlled.  Cough is much improved.  Denies constipation.Labs from yesterday reviewed.  Denies dizziness        Review of nursing home EMR:-153, Wt 173.2,       Allergies, and PMH/PSH reviewed in [employer] today.    REVIEW OF SYSTEMS:  4 point ROS including Respiratory, CV, GI and , other than that noted in the HPI,  is negative    Objective:   /71   Pulse 90   Temp 98.7  F (37.1  C)   Resp 18   Ht 1.626 m (5' 4\")   Wt 78.6 kg (173 lb 3.2 oz)   LMP  (LMP Unknown)   SpO2 95%   BMI 29.73 kg/m      Physical Exam  Vitals (Facility EMR) reviewed.   Constitutional:       General: She is not in acute distress.  HENT:      Head: Normocephalic and atraumatic.   Eyes:      General: No scleral icterus.  Cardiovascular:      Rate and Rhythm: Normal rate and regular rhythm.   Pulmonary:      Effort: Pulmonary effort is normal. No respiratory distress.      Breath sounds: No wheezing.   Musculoskeletal:      Right lower leg: No edema.   Skin:   "   General: Skin is warm and dry.      Findings: No rash.   Neurological:      Mental Status: She is alert. Mental status is at baseline.   Psychiatric:         Behavior: Behavior normal.          MED REC REQUIRED  Post Medication Reconciliation Status: discharge medications reconciled and changed, per note/orders      Recent labs in [facility] reviewed by me today.  and Most Recent 3 CBC's:  Recent Labs   Lab Test 05/12/25  0623 05/05/25  2130 05/04/25  0748   WBC 8.6 7.8 8.0   HGB 10.7* 9.9* 9.2*   MCV 99 94 95    236 178     Most Recent 3 BMP's:  Recent Labs   Lab Test 05/05/25  0643 05/04/25  0650 05/03/25  0400 05/02/25  0727   NA  --  140 141 135   POTASSIUM 4.1 4.2 4.3 4.6   CHLORIDE  --  107 105 100   CO2  --  24 24 24   BUN  --  10.2 11.7 19.7   CR  --  0.83 0.95 1.05*   ANIONGAP  --  9 12 11   HATTIE  --  8.7* 8.5* 9.2   GLC 92 94 107* 123*     Most Recent 2 LFT's:  Recent Labs   Lab Test 06/03/24  1055 05/26/23  1109 10/24/18  0935 05/24/18  1205 05/02/17  1424   AST  --   --   --  43 30   ALT 35 35   < > 58* 50   ALKPHOS  --   --   --  53 60   BILITOTAL  --   --   --  0.4 0.5    < > = values in this interval not displayed.       Assessment/Plan:  Left trimalleolar fracture dislocation status post external fixation  Pain control with scheduled Tylenol, as needed oxycodone, as needed hydroxyzine  NWB  PT/OT  Aspirin twice daily for DVT prophylaxis  Planning for removal of external fixator and ORIF in coming days.  Possibly the soonest Thursday those does not appear it has been confirmed.     Subacute cough, improved  History of smoking: Notes cough since prior to hospitalization.  Occasionally uses albuterol inhaler at home.  No diagnosis of COPD and no obvious evidence on CT scan 7/2024.  Does have a history of smoking as well as secondhand smoke exposure.    Improved with addition of loratadine  Continue as needed albuterol and guaifenesin  Lungs clear.  Vital signs normal.  Symptoms improving.  No need for  chest x-ray      History of rotator cuff tear: C/o increased shoulder pain with walker usage and NWB on LLE  Pain control as above  Diclofenac gel    ABLA:, Improving  Continue aspirin twice daily  CBC 5/12 improved to 10.7    Possible syncope  HTN: Notes BP medications have been adjusted due to elevated BPs since her son's death.  TTE with normal EF and no wall motion abnormalities.  Continue regimen of amlodipine, lisinopril, hydralazine and atenolol  Monitor for hypotension. BP appropiate  Zio patch pending.  Discussed with patient she can bring this in from home    E. coli UTI, resolved: Status post 3 days ceftriaxone during hospitalization  Monitor off antibiotics    Anxiety  Depression  Grief: Son passed away in the past year.  Continue duloxetine and bupropion  Continue trazodone at at bedtime.  Prefers to take 100 mg scheduled at bedtime with additional 100 mg available as needed  Hydroxyzine available as needed for pain and anxiety adjunct    GERD  Continue omeprazole      Orders:  NNO    Electronically signed by: Rosanna Luong PA-C

## 2025-05-15 ENCOUNTER — ANESTHESIA EVENT (OUTPATIENT)
Dept: SURGERY | Facility: CLINIC | Age: 80
End: 2025-05-15
Payer: COMMERCIAL

## 2025-05-15 ENCOUNTER — HOSPITAL ENCOUNTER (INPATIENT)
Facility: CLINIC | Age: 80
LOS: 1 days | Discharge: NURSING, REHAB OR LONG-TERM CARE | DRG: 909 | End: 2025-05-17
Attending: ORTHOPAEDIC SURGERY | Admitting: ORTHOPAEDIC SURGERY
Payer: COMMERCIAL

## 2025-05-15 ENCOUNTER — ANESTHESIA (OUTPATIENT)
Dept: SURGERY | Facility: CLINIC | Age: 80
End: 2025-05-15
Payer: COMMERCIAL

## 2025-05-15 ENCOUNTER — APPOINTMENT (OUTPATIENT)
Dept: GENERAL RADIOLOGY | Facility: CLINIC | Age: 80
DRG: 909 | End: 2025-05-15
Attending: ORTHOPAEDIC SURGERY
Payer: COMMERCIAL

## 2025-05-15 DIAGNOSIS — S82.852A CLOSED TRIMALLEOLAR FRACTURE OF LEFT ANKLE, INITIAL ENCOUNTER: Primary | ICD-10-CM

## 2025-05-15 PROCEDURE — 250N000011 HC RX IP 250 OP 636

## 2025-05-15 PROCEDURE — 250N000011 HC RX IP 250 OP 636: Performed by: NURSE ANESTHETIST, CERTIFIED REGISTERED

## 2025-05-15 PROCEDURE — C1713 ANCHOR/SCREW BN/BN,TIS/BN: HCPCS | Performed by: ORTHOPAEDIC SURGERY

## 2025-05-15 PROCEDURE — 250N000025 HC SEVOFLURANE, PER MIN: Performed by: ORTHOPAEDIC SURGERY

## 2025-05-15 PROCEDURE — 250N000009 HC RX 250: Performed by: NURSE ANESTHETIST, CERTIFIED REGISTERED

## 2025-05-15 PROCEDURE — 999N000141 HC STATISTIC PRE-PROCEDURE NURSING ASSESSMENT: Performed by: ORTHOPAEDIC SURGERY

## 2025-05-15 PROCEDURE — 258N000003 HC RX IP 258 OP 636

## 2025-05-15 PROCEDURE — 250N000009 HC RX 250: Performed by: ANESTHESIOLOGY

## 2025-05-15 PROCEDURE — C1769 GUIDE WIRE: HCPCS | Performed by: ORTHOPAEDIC SURGERY

## 2025-05-15 PROCEDURE — 272N000001 HC OR GENERAL SUPPLY STERILE: Performed by: ORTHOPAEDIC SURGERY

## 2025-05-15 PROCEDURE — 370N000017 HC ANESTHESIA TECHNICAL FEE, PER MIN: Performed by: ORTHOPAEDIC SURGERY

## 2025-05-15 PROCEDURE — 999N000179 XR SURGERY CARM FLUORO LESS THAN 5 MIN W STILLS

## 2025-05-15 PROCEDURE — 250N000009 HC RX 250: Performed by: ORTHOPAEDIC SURGERY

## 2025-05-15 PROCEDURE — 360N000083 HC SURGERY LEVEL 3 W/ FLUORO, PER MIN: Performed by: ORTHOPAEDIC SURGERY

## 2025-05-15 PROCEDURE — 0SPG35Z REMOVAL OF EXTERNAL FIXATION DEVICE FROM LEFT ANKLE JOINT, PERCUTANEOUS APPROACH: ICD-10-PCS | Performed by: ORTHOPAEDIC SURGERY

## 2025-05-15 PROCEDURE — 710N000009 HC RECOVERY PHASE 1, LEVEL 1, PER MIN: Performed by: ORTHOPAEDIC SURGERY

## 2025-05-15 PROCEDURE — 258N000003 HC RX IP 258 OP 636: Performed by: NURSE ANESTHETIST, CERTIFIED REGISTERED

## 2025-05-15 PROCEDURE — 250N000013 HC RX MED GY IP 250 OP 250 PS 637: Performed by: INTERNAL MEDICINE

## 2025-05-15 PROCEDURE — 258N000003 HC RX IP 258 OP 636: Performed by: ANESTHESIOLOGY

## 2025-05-15 PROCEDURE — 250N000013 HC RX MED GY IP 250 OP 250 PS 637

## 2025-05-15 PROCEDURE — 0SSG04Z REPOSITION LEFT ANKLE JOINT WITH INTERNAL FIXATION DEVICE, OPEN APPROACH: ICD-10-PCS | Performed by: ORTHOPAEDIC SURGERY

## 2025-05-15 DEVICE — IMPLANTABLE DEVICE: Type: IMPLANTABLE DEVICE | Site: ANKLE | Status: FUNCTIONAL

## 2025-05-15 DEVICE — SCREW LOCKING T10 3.5X16MM: Type: IMPLANTABLE DEVICE | Site: ANKLE | Status: FUNCTIONAL

## 2025-05-15 DEVICE — SCREW LOCKING T10 3.5X12MM: Type: IMPLANTABLE DEVICE | Site: ANKLE | Status: FUNCTIONAL

## 2025-05-15 DEVICE — SCREW LOCKING T10 3.5X14MM: Type: IMPLANTABLE DEVICE | Site: ANKLE | Status: FUNCTIONAL

## 2025-05-15 RX ORDER — AMOXICILLIN 250 MG
1 CAPSULE ORAL 2 TIMES DAILY
Status: DISCONTINUED | OUTPATIENT
Start: 2025-05-15 | End: 2025-05-17 | Stop reason: HOSPADM

## 2025-05-15 RX ORDER — TRAZODONE HYDROCHLORIDE 100 MG/1
100 TABLET ORAL
Status: DISCONTINUED | OUTPATIENT
Start: 2025-05-15 | End: 2025-05-17 | Stop reason: HOSPADM

## 2025-05-15 RX ORDER — POLYETHYLENE GLYCOL 3350 17 G/17G
17 POWDER, FOR SOLUTION ORAL DAILY
Status: DISCONTINUED | OUTPATIENT
Start: 2025-05-16 | End: 2025-05-17 | Stop reason: HOSPADM

## 2025-05-15 RX ORDER — MEPERIDINE HYDROCHLORIDE 25 MG/ML
12.5 INJECTION INTRAMUSCULAR; INTRAVENOUS; SUBCUTANEOUS EVERY 5 MIN PRN
Status: DISCONTINUED | OUTPATIENT
Start: 2025-05-15 | End: 2025-05-15 | Stop reason: HOSPADM

## 2025-05-15 RX ORDER — ACETAMINOPHEN 325 MG/1
975 TABLET ORAL ONCE
Status: DISCONTINUED | OUTPATIENT
Start: 2025-05-15 | End: 2025-05-15

## 2025-05-15 RX ORDER — ONDANSETRON 2 MG/ML
4 INJECTION INTRAMUSCULAR; INTRAVENOUS EVERY 6 HOURS PRN
Status: DISCONTINUED | OUTPATIENT
Start: 2025-05-15 | End: 2025-05-17 | Stop reason: HOSPADM

## 2025-05-15 RX ORDER — ONDANSETRON 4 MG/1
4 TABLET, ORALLY DISINTEGRATING ORAL EVERY 30 MIN PRN
Status: DISCONTINUED | OUTPATIENT
Start: 2025-05-15 | End: 2025-05-15 | Stop reason: HOSPADM

## 2025-05-15 RX ORDER — PANTOPRAZOLE SODIUM 40 MG/1
40 TABLET, DELAYED RELEASE ORAL
Status: DISCONTINUED | OUTPATIENT
Start: 2025-05-16 | End: 2025-05-17 | Stop reason: HOSPADM

## 2025-05-15 RX ORDER — OXYCODONE HYDROCHLORIDE 5 MG/1
5 TABLET ORAL EVERY 4 HOURS PRN
Status: DISCONTINUED | OUTPATIENT
Start: 2025-05-15 | End: 2025-05-15

## 2025-05-15 RX ORDER — ONDANSETRON 2 MG/ML
4 INJECTION INTRAMUSCULAR; INTRAVENOUS EVERY 30 MIN PRN
Status: DISCONTINUED | OUTPATIENT
Start: 2025-05-15 | End: 2025-05-15 | Stop reason: HOSPADM

## 2025-05-15 RX ORDER — ONDANSETRON 2 MG/ML
INJECTION INTRAMUSCULAR; INTRAVENOUS PRN
Status: DISCONTINUED | OUTPATIENT
Start: 2025-05-15 | End: 2025-05-15

## 2025-05-15 RX ORDER — ACETAMINOPHEN 325 MG/1
975 TABLET ORAL ONCE
Status: COMPLETED | OUTPATIENT
Start: 2025-05-15 | End: 2025-05-15

## 2025-05-15 RX ORDER — ACETAMINOPHEN 325 MG/1
975 TABLET ORAL EVERY 8 HOURS
Status: DISCONTINUED | OUTPATIENT
Start: 2025-05-15 | End: 2025-05-17 | Stop reason: HOSPADM

## 2025-05-15 RX ORDER — ALBUTEROL SULFATE 90 UG/1
2 INHALANT RESPIRATORY (INHALATION)
Status: DISCONTINUED | OUTPATIENT
Start: 2025-05-15 | End: 2025-05-17 | Stop reason: HOSPADM

## 2025-05-15 RX ORDER — DEXAMETHASONE SODIUM PHOSPHATE 4 MG/ML
4 INJECTION, SOLUTION INTRA-ARTICULAR; INTRALESIONAL; INTRAMUSCULAR; INTRAVENOUS; SOFT TISSUE
Status: DISCONTINUED | OUTPATIENT
Start: 2025-05-15 | End: 2025-05-15 | Stop reason: HOSPADM

## 2025-05-15 RX ORDER — BUPROPION HYDROCHLORIDE 150 MG/1
150 TABLET, EXTENDED RELEASE ORAL 2 TIMES DAILY
Status: DISCONTINUED | OUTPATIENT
Start: 2025-05-15 | End: 2025-05-17 | Stop reason: HOSPADM

## 2025-05-15 RX ORDER — CEFAZOLIN SODIUM 1 G/3ML
1 INJECTION, POWDER, FOR SOLUTION INTRAMUSCULAR; INTRAVENOUS EVERY 8 HOURS
Status: COMPLETED | OUTPATIENT
Start: 2025-05-15 | End: 2025-05-16

## 2025-05-15 RX ORDER — LIDOCAINE HYDROCHLORIDE 20 MG/ML
INJECTION, SOLUTION INFILTRATION; PERINEURAL PRN
Status: DISCONTINUED | OUTPATIENT
Start: 2025-05-15 | End: 2025-05-15

## 2025-05-15 RX ORDER — CEFAZOLIN SODIUM/WATER 2 G/20 ML
2 SYRINGE (ML) INTRAVENOUS SEE ADMIN INSTRUCTIONS
Status: DISCONTINUED | OUTPATIENT
Start: 2025-05-15 | End: 2025-05-15 | Stop reason: HOSPADM

## 2025-05-15 RX ORDER — FENTANYL CITRATE 50 UG/ML
50 INJECTION, SOLUTION INTRAMUSCULAR; INTRAVENOUS EVERY 5 MIN PRN
Status: DISCONTINUED | OUTPATIENT
Start: 2025-05-15 | End: 2025-05-15 | Stop reason: HOSPADM

## 2025-05-15 RX ORDER — BISACODYL 10 MG
10 SUPPOSITORY, RECTAL RECTAL DAILY PRN
Status: DISCONTINUED | OUTPATIENT
Start: 2025-05-15 | End: 2025-05-17 | Stop reason: HOSPADM

## 2025-05-15 RX ORDER — HYDRALAZINE HYDROCHLORIDE 50 MG/1
100 TABLET, FILM COATED ORAL 3 TIMES DAILY
Status: DISCONTINUED | OUTPATIENT
Start: 2025-05-16 | End: 2025-05-17 | Stop reason: HOSPADM

## 2025-05-15 RX ORDER — OXYCODONE HYDROCHLORIDE 10 MG/1
10 TABLET ORAL EVERY 4 HOURS PRN
Status: DISCONTINUED | OUTPATIENT
Start: 2025-05-15 | End: 2025-05-16

## 2025-05-15 RX ORDER — NALOXONE HYDROCHLORIDE 0.4 MG/ML
0.4 INJECTION, SOLUTION INTRAMUSCULAR; INTRAVENOUS; SUBCUTANEOUS
Status: DISCONTINUED | OUTPATIENT
Start: 2025-05-15 | End: 2025-05-17 | Stop reason: HOSPADM

## 2025-05-15 RX ORDER — FENTANYL CITRATE 50 UG/ML
25 INJECTION, SOLUTION INTRAMUSCULAR; INTRAVENOUS
Status: DISCONTINUED | OUTPATIENT
Start: 2025-05-15 | End: 2025-05-15 | Stop reason: HOSPADM

## 2025-05-15 RX ORDER — OXYCODONE HYDROCHLORIDE 5 MG/1
5 TABLET ORAL EVERY 4 HOURS PRN
Status: DISCONTINUED | OUTPATIENT
Start: 2025-05-15 | End: 2025-05-16

## 2025-05-15 RX ORDER — HYDROXYZINE HYDROCHLORIDE 10 MG/1
10 TABLET, FILM COATED ORAL EVERY 6 HOURS PRN
Status: DISCONTINUED | OUTPATIENT
Start: 2025-05-15 | End: 2025-05-17 | Stop reason: HOSPADM

## 2025-05-15 RX ORDER — OXYCODONE HYDROCHLORIDE 5 MG/1
10 TABLET ORAL EVERY 4 HOURS PRN
Status: DISCONTINUED | OUTPATIENT
Start: 2025-05-15 | End: 2025-05-15

## 2025-05-15 RX ORDER — ATENOLOL 50 MG/1
50 TABLET ORAL DAILY
Status: DISCONTINUED | OUTPATIENT
Start: 2025-05-16 | End: 2025-05-17 | Stop reason: HOSPADM

## 2025-05-15 RX ORDER — DEXAMETHASONE SODIUM PHOSPHATE 4 MG/ML
INJECTION, SOLUTION INTRA-ARTICULAR; INTRALESIONAL; INTRAMUSCULAR; INTRAVENOUS; SOFT TISSUE PRN
Status: DISCONTINUED | OUTPATIENT
Start: 2025-05-15 | End: 2025-05-15

## 2025-05-15 RX ORDER — ASPIRIN 81 MG/1
81 TABLET ORAL 2 TIMES DAILY
Status: DISCONTINUED | OUTPATIENT
Start: 2025-05-15 | End: 2025-05-17 | Stop reason: HOSPADM

## 2025-05-15 RX ORDER — DULOXETIN HYDROCHLORIDE 60 MG/1
60 CAPSULE, DELAYED RELEASE ORAL DAILY
Status: DISCONTINUED | OUTPATIENT
Start: 2025-05-16 | End: 2025-05-17 | Stop reason: HOSPADM

## 2025-05-15 RX ORDER — LABETALOL HYDROCHLORIDE 5 MG/ML
10 INJECTION, SOLUTION INTRAVENOUS EVERY 10 MIN PRN
Status: DISCONTINUED | OUTPATIENT
Start: 2025-05-15 | End: 2025-05-15 | Stop reason: HOSPADM

## 2025-05-15 RX ORDER — SODIUM CHLORIDE, SODIUM LACTATE, POTASSIUM CHLORIDE, CALCIUM CHLORIDE 600; 310; 30; 20 MG/100ML; MG/100ML; MG/100ML; MG/100ML
INJECTION, SOLUTION INTRAVENOUS CONTINUOUS
Status: DISCONTINUED | OUTPATIENT
Start: 2025-05-15 | End: 2025-05-17 | Stop reason: HOSPADM

## 2025-05-15 RX ORDER — NALOXONE HYDROCHLORIDE 0.4 MG/ML
0.2 INJECTION, SOLUTION INTRAMUSCULAR; INTRAVENOUS; SUBCUTANEOUS
Status: DISCONTINUED | OUTPATIENT
Start: 2025-05-15 | End: 2025-05-17 | Stop reason: HOSPADM

## 2025-05-15 RX ORDER — PROPOFOL 10 MG/ML
INJECTION, EMULSION INTRAVENOUS PRN
Status: DISCONTINUED | OUTPATIENT
Start: 2025-05-15 | End: 2025-05-15

## 2025-05-15 RX ORDER — ONDANSETRON 4 MG/1
4 TABLET, ORALLY DISINTEGRATING ORAL EVERY 6 HOURS PRN
Status: DISCONTINUED | OUTPATIENT
Start: 2025-05-15 | End: 2025-05-17 | Stop reason: HOSPADM

## 2025-05-15 RX ORDER — HYDROMORPHONE HCL IN WATER/PF 6 MG/30 ML
0.2 PATIENT CONTROLLED ANALGESIA SYRINGE INTRAVENOUS
Status: DISCONTINUED | OUTPATIENT
Start: 2025-05-15 | End: 2025-05-17 | Stop reason: HOSPADM

## 2025-05-15 RX ORDER — FENTANYL CITRATE 50 UG/ML
INJECTION, SOLUTION INTRAMUSCULAR; INTRAVENOUS PRN
Status: DISCONTINUED | OUTPATIENT
Start: 2025-05-15 | End: 2025-05-15

## 2025-05-15 RX ORDER — HYDROMORPHONE HCL IN WATER/PF 6 MG/30 ML
0.4 PATIENT CONTROLLED ANALGESIA SYRINGE INTRAVENOUS EVERY 5 MIN PRN
Status: DISCONTINUED | OUTPATIENT
Start: 2025-05-15 | End: 2025-05-15 | Stop reason: HOSPADM

## 2025-05-15 RX ORDER — CEFAZOLIN SODIUM/WATER 2 G/20 ML
2 SYRINGE (ML) INTRAVENOUS
Status: COMPLETED | OUTPATIENT
Start: 2025-05-15 | End: 2025-05-15

## 2025-05-15 RX ORDER — SODIUM CHLORIDE, SODIUM LACTATE, POTASSIUM CHLORIDE, CALCIUM CHLORIDE 600; 310; 30; 20 MG/100ML; MG/100ML; MG/100ML; MG/100ML
INJECTION, SOLUTION INTRAVENOUS CONTINUOUS
Status: DISCONTINUED | OUTPATIENT
Start: 2025-05-15 | End: 2025-05-15 | Stop reason: HOSPADM

## 2025-05-15 RX ORDER — MAGNESIUM HYDROXIDE 1200 MG/15ML
LIQUID ORAL PRN
Status: DISCONTINUED | OUTPATIENT
Start: 2025-05-15 | End: 2025-05-15 | Stop reason: HOSPADM

## 2025-05-15 RX ORDER — GLYCOPYRROLATE 0.2 MG/ML
INJECTION, SOLUTION INTRAMUSCULAR; INTRAVENOUS PRN
Status: DISCONTINUED | OUTPATIENT
Start: 2025-05-15 | End: 2025-05-15

## 2025-05-15 RX ORDER — FAMOTIDINE 20 MG/1
20 TABLET, FILM COATED ORAL 2 TIMES DAILY
Status: DISCONTINUED | OUTPATIENT
Start: 2025-05-15 | End: 2025-05-17 | Stop reason: HOSPADM

## 2025-05-15 RX ORDER — AMLODIPINE BESYLATE 10 MG/1
10 TABLET ORAL DAILY
Status: DISCONTINUED | OUTPATIENT
Start: 2025-05-16 | End: 2025-05-17 | Stop reason: HOSPADM

## 2025-05-15 RX ORDER — EPHEDRINE SULFATE 50 MG/ML
INJECTION, SOLUTION INTRAMUSCULAR; INTRAVENOUS; SUBCUTANEOUS PRN
Status: DISCONTINUED | OUTPATIENT
Start: 2025-05-15 | End: 2025-05-15

## 2025-05-15 RX ORDER — ALBUTEROL SULFATE 0.83 MG/ML
2.5 SOLUTION RESPIRATORY (INHALATION) EVERY 4 HOURS PRN
Status: DISCONTINUED | OUTPATIENT
Start: 2025-05-15 | End: 2025-05-15 | Stop reason: HOSPADM

## 2025-05-15 RX ORDER — HYDROMORPHONE HCL IN WATER/PF 6 MG/30 ML
0.4 PATIENT CONTROLLED ANALGESIA SYRINGE INTRAVENOUS
Status: DISCONTINUED | OUTPATIENT
Start: 2025-05-15 | End: 2025-05-17 | Stop reason: HOSPADM

## 2025-05-15 RX ORDER — LIDOCAINE 40 MG/G
CREAM TOPICAL
Status: DISCONTINUED | OUTPATIENT
Start: 2025-05-15 | End: 2025-05-15 | Stop reason: HOSPADM

## 2025-05-15 RX ORDER — LISINOPRIL 40 MG/1
40 TABLET ORAL DAILY
Status: DISCONTINUED | OUTPATIENT
Start: 2025-05-16 | End: 2025-05-17 | Stop reason: HOSPADM

## 2025-05-15 RX ORDER — HYDROCHLOROTHIAZIDE 25 MG/1
25 TABLET ORAL DAILY
Status: DISCONTINUED | OUTPATIENT
Start: 2025-05-16 | End: 2025-05-17 | Stop reason: HOSPADM

## 2025-05-15 RX ORDER — PROCHLORPERAZINE MALEATE 5 MG/1
5 TABLET ORAL EVERY 6 HOURS PRN
Status: DISCONTINUED | OUTPATIENT
Start: 2025-05-15 | End: 2025-05-17 | Stop reason: HOSPADM

## 2025-05-15 RX ORDER — BUPIVACAINE HCL/EPINEPHRINE 0.25-.0005
VIAL (ML) INJECTION
Status: COMPLETED | OUTPATIENT
Start: 2025-05-15 | End: 2025-05-15

## 2025-05-15 RX ORDER — LIDOCAINE 40 MG/G
CREAM TOPICAL
Status: DISCONTINUED | OUTPATIENT
Start: 2025-05-15 | End: 2025-05-17 | Stop reason: HOSPADM

## 2025-05-15 RX ORDER — LORATADINE 10 MG/1
10 TABLET ORAL DAILY
Status: DISCONTINUED | OUTPATIENT
Start: 2025-05-16 | End: 2025-05-17 | Stop reason: HOSPADM

## 2025-05-15 RX ADMIN — BUPROPION HYDROCHLORIDE 150 MG: 150 TABLET, FILM COATED, EXTENDED RELEASE ORAL at 20:39

## 2025-05-15 RX ADMIN — PHENYLEPHRINE HYDROCHLORIDE 150 MCG: 10 INJECTION INTRAVENOUS at 14:33

## 2025-05-15 RX ADMIN — ACETAMINOPHEN 975 MG: 325 TABLET, FILM COATED ORAL at 20:39

## 2025-05-15 RX ADMIN — LIDOCAINE HYDROCHLORIDE 50 MG: 20 INJECTION, SOLUTION INFILTRATION; PERINEURAL at 13:02

## 2025-05-15 RX ADMIN — PHENYLEPHRINE HYDROCHLORIDE 100 MCG: 10 INJECTION INTRAVENOUS at 13:36

## 2025-05-15 RX ADMIN — ACETAMINOPHEN 975 MG: 325 TABLET, FILM COATED ORAL at 12:18

## 2025-05-15 RX ADMIN — FENTANYL CITRATE 25 MCG: 50 INJECTION INTRAMUSCULAR; INTRAVENOUS at 13:17

## 2025-05-15 RX ADMIN — SODIUM CHLORIDE, SODIUM LACTATE, POTASSIUM CHLORIDE, AND CALCIUM CHLORIDE: .6; .31; .03; .02 INJECTION, SOLUTION INTRAVENOUS at 12:40

## 2025-05-15 RX ADMIN — FENTANYL CITRATE 50 MCG: 50 INJECTION INTRAMUSCULAR; INTRAVENOUS at 13:02

## 2025-05-15 RX ADMIN — HYDROMORPHONE HYDROCHLORIDE 0.5 MG: 1 INJECTION, SOLUTION INTRAMUSCULAR; INTRAVENOUS; SUBCUTANEOUS at 13:20

## 2025-05-15 RX ADMIN — PHENYLEPHRINE HYDROCHLORIDE 150 MCG: 10 INJECTION INTRAVENOUS at 14:37

## 2025-05-15 RX ADMIN — PROPOFOL 30 MG: 10 INJECTION, EMULSION INTRAVENOUS at 13:28

## 2025-05-15 RX ADMIN — PHENYLEPHRINE HYDROCHLORIDE 0.2 MCG/KG/MIN: 10 INJECTION INTRAVENOUS at 13:28

## 2025-05-15 RX ADMIN — PHENYLEPHRINE HYDROCHLORIDE 100 MCG: 10 INJECTION INTRAVENOUS at 13:28

## 2025-05-15 RX ADMIN — PHENYLEPHRINE HYDROCHLORIDE 50 MCG: 10 INJECTION INTRAVENOUS at 13:44

## 2025-05-15 RX ADMIN — PROPOFOL 50 MCG/KG/MIN: 10 INJECTION, EMULSION INTRAVENOUS at 13:16

## 2025-05-15 RX ADMIN — PROPOFOL 30 MG: 10 INJECTION, EMULSION INTRAVENOUS at 13:11

## 2025-05-15 RX ADMIN — SENNOSIDES AND DOCUSATE SODIUM 1 TABLET: 50; 8.6 TABLET ORAL at 20:39

## 2025-05-15 RX ADMIN — ONDANSETRON 4 MG: 2 INJECTION INTRAMUSCULAR; INTRAVENOUS at 14:38

## 2025-05-15 RX ADMIN — FENTANYL CITRATE 25 MCG: 50 INJECTION INTRAMUSCULAR; INTRAVENOUS at 13:13

## 2025-05-15 RX ADMIN — HYDROMORPHONE HYDROCHLORIDE 0.5 MG: 1 INJECTION, SOLUTION INTRAMUSCULAR; INTRAVENOUS; SUBCUTANEOUS at 13:28

## 2025-05-15 RX ADMIN — OXYCODONE HYDROCHLORIDE 5 MG: 5 TABLET ORAL at 20:39

## 2025-05-15 RX ADMIN — SODIUM CHLORIDE, SODIUM LACTATE, POTASSIUM CHLORIDE, AND CALCIUM CHLORIDE: .6; .31; .03; .02 INJECTION, SOLUTION INTRAVENOUS at 17:12

## 2025-05-15 RX ADMIN — PHENYLEPHRINE HYDROCHLORIDE 100 MCG: 10 INJECTION INTRAVENOUS at 13:19

## 2025-05-15 RX ADMIN — EPHEDRINE SULFATE 5 MG: 5 INJECTION INTRAVENOUS at 13:59

## 2025-05-15 RX ADMIN — DEXAMETHASONE SODIUM PHOSPHATE 8 MG: 4 INJECTION, SOLUTION INTRA-ARTICULAR; INTRALESIONAL; INTRAMUSCULAR; INTRAVENOUS; SOFT TISSUE at 13:03

## 2025-05-15 RX ADMIN — PROPOFOL 20 MG: 10 INJECTION, EMULSION INTRAVENOUS at 14:45

## 2025-05-15 RX ADMIN — CEFAZOLIN 1 G: 1 INJECTION, POWDER, FOR SOLUTION INTRAMUSCULAR; INTRAVENOUS at 20:41

## 2025-05-15 RX ADMIN — BUPIVACAINE HYDROCHLORIDE AND EPINEPHRINE BITARTRATE 30 ML: 2.5; .005 INJECTION, SOLUTION INFILTRATION; PERINEURAL at 12:52

## 2025-05-15 RX ADMIN — EPHEDRINE SULFATE 5 MG: 5 INJECTION INTRAVENOUS at 14:35

## 2025-05-15 RX ADMIN — FAMOTIDINE 20 MG: 20 TABLET, FILM COATED ORAL at 20:39

## 2025-05-15 RX ADMIN — ASPIRIN 81 MG: 81 TABLET, COATED ORAL at 20:39

## 2025-05-15 RX ADMIN — PHENYLEPHRINE HYDROCHLORIDE 100 MCG: 10 INJECTION INTRAVENOUS at 14:30

## 2025-05-15 RX ADMIN — Medication 2 G: at 12:55

## 2025-05-15 RX ADMIN — PROPOFOL 150 MG: 10 INJECTION, EMULSION INTRAVENOUS at 13:02

## 2025-05-15 ASSESSMENT — ACTIVITIES OF DAILY LIVING (ADL)
ADLS_ACUITY_SCORE: 58
ADLS_ACUITY_SCORE: 58
ADLS_ACUITY_SCORE: 38
ADLS_ACUITY_SCORE: 56
ADLS_ACUITY_SCORE: 38
ADLS_ACUITY_SCORE: 58
ADLS_ACUITY_SCORE: 38
ADLS_ACUITY_SCORE: 56

## 2025-05-15 ASSESSMENT — COLUMBIA-SUICIDE SEVERITY RATING SCALE - C-SSRS
6. HAVE YOU EVER DONE ANYTHING, STARTED TO DO ANYTHING, OR PREPARED TO DO ANYTHING TO END YOUR LIFE?: NO
1. IN THE PAST MONTH, HAVE YOU WISHED YOU WERE DEAD OR WISHED YOU COULD GO TO SLEEP AND NOT WAKE UP?: NO
2. HAVE YOU ACTUALLY HAD ANY THOUGHTS OF KILLING YOURSELF IN THE PAST MONTH?: NO

## 2025-05-15 NOTE — PROGRESS NOTES
"Brief Hospitalist Consult Note    Monse Peoples is a 79 year old female who was admitted s/p 5/15 removal of left ankle external fixator and ORIF left trimalleolar ankle fracture.  EBL less than 50 ml.  Internal Medicine has been asked by Dillon Lares MD, to manage chronic medical problems.    - Routine postoperative cares per primary service, including IVF, pain control, abx, DVT ppx, and disposition  - PT/OT as per primary service  - Appropriate PTA medications have been resumed   - Post op BP not low. Have tentatively ordered for home amlodipine, atenolol, hydralazine, hydrochlorothiazide, and lisinopril to be resumed on 5/16 with hold parameters. Resuming PTA Cymbalta, Bupropion, PRN albuterol, PRN trazodone, PPI, claritin.     - Formal consult to be completed on 5/16/25.  - Please send a Vocera to the \"[facility] Hospitalist [facility]\" group if any acute medical issues arise overnight.     Divya Hernandez MD FACP  Hospitalist Service  [facility]                  "

## 2025-05-15 NOTE — ANESTHESIA PROCEDURE NOTES
"Sciatic Procedure Note    Pre-Procedure   Staff -        Anesthesiologist:  Moe Araya MD       Performed By: anesthesiologist       Location: pre-op       Procedure Start/Stop Times: 5/15/2025 12:49 PM and 5/15/2025 12:52 PM       Pre-Anesthestic Checklist: patient identified, IV checked, site marked, risks and benefits discussed, informed consent, monitors and equipment checked, pre-op evaluation, at physician/surgeon's request and post-op pain management  Timeout:       Correct Patient: Yes        Correct Procedure: Yes        Correct Site: Yes        Correct Position: Yes        Correct Laterality: Yes        Site Marked: Yes  Procedure Documentation  Procedure: Sciatic         Laterality: left       Patient Position: supine       Patient Prep/Sterile Barriers: mask       Skin prep: Betadine       Local skin infiltrated with 0.8 mL of 1% lidocaine.  (popliteal approach).       Needle Type: insulated       Needle Gauge: 20.        Needle Length (Inches): 4        Ultrasound guided       1. Ultrasound was used to identify targeted nerve, plexus, vascular marker, or fascial plane and place a needle adjacent to it in real-time.       2. Ultrasound was used to visualize the spread of anesthetic in close proximity to the above referenced structure.    Assessment/Narrative         The placement was negative for: blood aspirated, painful injection and site bleeding       Paresthesias: No.       Bolus given via needle..        Secured via.        Insertion/Infusion Method: Single Shot       Complications: none       Injection made incrementally with aspirations every 5 mL.    Medication(s) Administered   Bupivacaine 0.25% w/ 1:200K Epi (Injection) - Injection   30 mL - 5/15/2025 12:52:00 PM  Medication Administration Time: 5/15/2025 12:49 PM      FOR [facility] ([facility] [place]) ONLY:   Pain Team Contact information: please page the Pain Team Via [employer]. Search \"Pain\". During daytime hours, please page the " attending first. At night please page the resident first.

## 2025-05-15 NOTE — PROGRESS NOTES
Arrived to room 601 from PACU at 1630 via cart, transferred to bed via hover mat without difficulty, oriented to room and call system.    Patient vital signs are at baseline: Yes ex currently requiring 2L O2. Capnography monitoring.   Patient able to ambulate as they were prior to admission or with assist devices provided by therapies during their stay:  No,  Reason:  Pt not OOB yet. Will be NWB.  Patient MUST void prior to discharge:  No,  Reason:  DTV  Patient able to tolerate oral intake:  Yes-regular diet.   Pain has adequate pain control using Oral analgesics:  Yes-Denies pain. Block in place.  Does patient have an identified :  Yes-spouse, but patient did come from TCU, has a bed hold.   Has goal D/C date and time been discussed with patient:  Yes     Pt A&O x4. CMS intact. Splint CDI.

## 2025-05-15 NOTE — ANESTHESIA PROCEDURE NOTES
Airway       Patient location during procedure: OR       Procedure Start/Stop Times: 5/15/2025 1:05 PM  Staff -        CRNA: Esa Ortega APRN CRNA       Performed By: CRNA  Consent for Airway        Urgency: elective  Indications and Patient Condition       Indications for airway management: gray-procedural       Induction type:intravenous       Mask difficulty assessment: 1 - vent by mask    Final Airway Details       Final airway type: supraglottic airway    Supraglottic Airway Details        Type: LMA       Brand: I-Gel       LMA size: 4    Post intubation assessment        Placement verified by: capnometry, equal breath sounds and chest rise        Number of attempts at approach: 1       Number of other approaches attempted: 0       Secured with: commercial tube lindo       Ease of procedure: easy       Dentition: Intact and Unchanged    Medication(s) Administered   Medication Administration Time: 5/15/2025 1:05 PM

## 2025-05-15 NOTE — ANESTHESIA CARE TRANSFER NOTE
Patient: Monse Peoples    Procedure: Procedure(s):  Removal of external fixator left ankle  Open reduction internal fixation left trimalleolar ankle fracture       Diagnosis: Trimalleolar fracture of ankle, closed, left, initial encounter [S89.883S]  Diagnosis Additional Information: No value filed.    Anesthesia Type:   General     Note:    Oropharynx: oropharynx clear of all foreign objects and spontaneously breathing  Level of Consciousness: drowsy  Oxygen Supplementation: face mask  Level of Supplemental Oxygen (L/min / FiO2): 6  Independent Airway: airway patency satisfactory and stable  Dentition: dentition unchanged  Vital Signs Stable: post-procedure vital signs reviewed and stable  Report to RN Given: handoff report given  Patient transferred to: PACU    Handoff Report: Identifed the Patient, Identified the Reponsible Provider, Reviewed the pertinent medical history, Discussed the surgical course, Reviewed Intra-OP anesthesia mangement and issues during anesthesia, Set expectations for post-procedure period and Allowed opportunity for questions and acknowledgement of understanding  Vitals:  Vitals Value Taken Time   /64 05/15/25 15:00   Temp 98.24  F (36.8  C) 05/15/25 15:01   Pulse 99 05/15/25 15:01   Resp 12 05/15/25 15:01   SpO2 98 % 05/15/25 15:01   Vitals shown include unfiled device data.    Electronically Signed By: MIRTA Barboza CRNA  May 15, 2025  3:02 PM

## 2025-05-15 NOTE — BRIEF OP NOTE
[facility]    Brief Operative Note    Pre-operative diagnosis: Trimalleolar fracture of ankle, closed, left, initial encounter [H85.443A]  Post-operative diagnosis Same as pre-operative diagnosis    Procedure: Removal of external fixator left ankle, Left - Ankle  Open reduction internal fixation left trimalleolar ankle fracture, Left - Ankle    Surgeon: Surgeons and Role:     * Dillon Lares MD - Primary     * Mone Wang PA-C - Assisting  Anesthesia: General   Estimated Blood Loss: Less than 50 ml    Drains: None  Specimens: * No specimens in log *  Findings:   None.  Complications: None.  Implants:   Implant Name Type Inv. Item Serial No.  Lot No. LRB No. Used Action   CLAMP PIN 5H - [id number] Metallic Hardware/[place] CLAMP PIN 5H  [employer] 8006 15 APR 2025 Left 1 Explanted   COUPLING MARGARETH TO MARGARETH - [id number] Metallic Hardware/[place] COUPLING MARGARETH TO MARGARETH  [employer] 8006 15 APR 2025 Left 4 Explanted   IMP MARGARETH EXTERNAL FIX SNOWDEN [id number] 4922-8-300 - [id number] Metallic Hardware/[place] IMP MARGARETH EXTERNAL FIX SNOWDEN [id number] 4922-8-300  [employer] 8006 15 APR 2025 Left 2 Explanted   PIN APEX S/D HALF 5X180 50 THR - [id number] Wire PIN APEX S/D HALF 5X180 50 THR  [employer] 8006 15 APR 2025 Left 2 Explanted   PIN FX 300MM 5/6MM APX TRNFX SLFDRL THRD 40MM - [id number] Metallic Hardware/[place] PIN FX 300MM 5/6MM APX TRNFX SLFDRL THRD 40MM  [employer] [facility] 8006 15 APR 2025 Left 1 Explanted   POST 30DEG [id number] - [id number] Metallic Hardware/[place] POST 30DEG [id number]  [employer] 8006 15 APR 2025 Left 2 Explanted   SCREW LOCKING T10 3.5X16MM - [id number] Metallic Hardware/[place] SCREW LOCKING T10 3.5X16MM  [employer] 8003 15MAY 2025 Left 1 Implanted   SCREW LOCKING T10 3.5X14MM - [id number] Metallic Hardware/[place] SCREW LOCKING T10 3.5X14MM  [employer] 8003 15MAY 2025 Left 3 Implanted   SCREW LOCKING  T10 3.5X12MM - [id number] Metallic Hardware/[place] SCREW LOCKING T10 3.5X12MM  [employer] 8003 15MAY 2025 Left 4 Implanted   SCREW AS3 TI 4X65MM - [id number] Metallic Hardware/[place] SCREW AS3 TI 4X65MM  [employer] 8003 15MAY 2025 Left 1 Implanted   SCREW AS3 TI 4X60MM - [id number] Metallic Hardware/[place] SCREW AS3 TI 4X60MM  [employer] 8003 15MAY 2025 Left 1 Implanted   WASHER AS 3 TI FOR 4MM SCREW - [id number] Metallic Hardware/[place] WASHER AS 3 TI FOR 4MM SCREW  [employer] 8003 15MAY 2025 Left 2 Implanted   BONE SCR T10 FT 3.5MM / L48MM - [id number] Metallic Hardware/[place] BONE SCR T10 FT 3.5MM / L48MM  [employer] 8003 15MAY 2025 Left 1 Implanted   BONE SCR T10 FT 3.5MM / L42MM - [id number] Metallic Hardware/[place] BONE SCR T10 FT 3.5MM / L42MM  [employer] 8003 15MAY 2025 Left 1 Implanted

## 2025-05-15 NOTE — PHARMACY-ADMISSION MEDICATION HISTORY
PTA meds completed by pre-admitting nurse ( Birgit Coleman RN  ). No further clarifications required by pharmacy.    Prior to Admission medications    Medication Sig Last Dose Taking? Auth Provider Long Term End Date   acetaminophen (TYLENOL) 500 MG tablet Take 2 tablets (1,000 mg) by mouth 3 times daily. 5/15/2025 at  6:00 AM Yes Rosanna Luong PA-C No    albuterol (PROAIR HFA/PROVENTIL HFA/VENTOLIN HFA) 108 (90 Base) MCG/ACT inhaler Inhale 2 puffs into the lungs every 2 hours as needed for shortness of breath, wheezing or cough.  Yes Rosanna Luong PA-C Yes    amLODIPine (NORVASC) 10 MG tablet Take 1 tablet (10 mg) by mouth daily.  Yes Sotero Krishnan MD Yes    aspirin (ASA) 81 MG EC tablet Take 1 tablet (81 mg) by mouth 2 times daily. 5/14/2025 at  7:48 AM Yes Lilli Chun PA-C No    atenolol (TENORMIN) 50 MG tablet Take 1 tablet (50 mg) by mouth daily. 5/14/2025 Bedtime Yes Sotero Krishnan MD Yes    atorvastatin (LIPITOR) 40 MG tablet Take 1 tablet (40 mg) by mouth at bedtime.  Yes Sotero Krishnan MD Yes    buPROPion (WELLBUTRIN SR) 150 MG 12 hr tablet Take 1 tablet (150 mg) by mouth 2 times daily.  Yes Sotero Krishnan MD Yes    diclofenac (VOLTAREN) 1 % topical gel Apply 2 g topically 3 times daily.  Yes Rosanna Luong PA-C     DULoxetine (CYMBALTA) 60 MG capsule Take 1 capsule (60 mg) by mouth daily.  Yes Sotero Krishnan MD Yes    guaiFENesin (ROBITUSSIN) 20 mg/mL liquid Take 10 mLs (200 mg) by mouth every 6 hours as needed for cough.  Yes Rosanna Luong PA-C     hydrALAZINE (APRESOLINE) 100 MG tablet Take 1 tablet (100 mg) by mouth 3 times daily.  Yes Sotero Krishnan MD Yes    hydrochlorothiazide (HYDRODIURIL) 25 MG tablet Take 1 tablet (25 mg) by mouth daily.  Yes Sotero Krishnan MD Yes    hydrOXYzine HCl (ATARAX) 10 MG tablet Take 1 tablet (10 mg) by mouth every 6 hours as needed for other (adjuvant pain).  Yes Sotero Krishnan MD     lisinopril (ZESTRIL) 40 MG  tablet Take 1 tablet (40 mg) by mouth daily.  Yes Sotero Krishnan MD Yes    loratadine (CLARITIN) 10 MG tablet Take 1 tablet (10 mg) by mouth daily.  Yes Rosanna Luong PA-C     multivitamin w/minerals (THERA-VIT-M) tablet Take 1 tablet by mouth daily.  Yes Sotero Krishnan MD     omeprazole (PRILOSEC) 40 MG DR capsule Take 1 capsule (40 mg) by mouth daily. Take 30-60 minutes before a meal. 5/15/2025 Morning Yes Sotero Krishnan MD     ondansetron (ZOFRAN ODT) 4 MG ODT tab Take 1 tablet (4 mg) by mouth every 6 hours as needed for nausea.  Yes Lilli Chun PA-C     oxyCODONE (ROXICODONE) 5 MG tablet Take 1 tablet (5 mg) by mouth every 4 hours as needed for moderate pain. 5/14/2025 Bedtime Yes Sotero Krishnan MD     polyethylene glycol (MIRALAX) 17 GM/Dose powder Take 17 g by mouth daily.  Yes Lilli Chun PA-C     senna-docusate (SENOKOT-S/PERICOLACE) 8.6-50 MG tablet Take 1 tablet by mouth 2 times daily as needed for constipation.  Yes Lilli Chun PA-C     traZODone (DESYREL) 100 MG tablet Take 1-2 tablets (100-200 mg) by mouth at bedtime.  Yes Sotero Krishnan MD Yes

## 2025-05-15 NOTE — ANESTHESIA PREPROCEDURE EVALUATION
"Anesthesia Pre-Procedure Evaluation    Patient: Monse Peoples   MRN: [id number] : 1945          Procedure : Procedure(s):  Removal of external fixator left ankle  Open reduction internal fixation left trimalleolar ankle fracture         Past Medical History:   Diagnosis Date    Anxiety 2015    Asthma     no inhaler for 2-3 years    Benign essential hypertension 2016    Chest pain, unspecified type 2017    Coronary artery disease involving native coronary artery of native heart without angina pectoris 2016    Depression     Esophageal reflux 2015    Former smoker - has had pulmonary nodules in the past 2017    History of blood transfusion     Hyperlipidemia LDL goal <100 2015    Osteoarthritis     Osteopenia     Pulmonary nodules 2015    Noted 4/14/15, f/u scan 2016 showed minimal change, needs repeat scan 2017       Past Surgical History:   Procedure Laterality Date    ANGIOGRAM  6/15/05    APPLY EXTERNAL FIXATOR LOWER EXTREMITY Left 5/3/2025    Procedure: APPLICATION EXTERNAL FIXATION, LOWER EXTREMITY, ANKLE, Left - Ankle;  Surgeon: Dillon Lares MD;  Location:  OR    ARTHROPLASTY HIP  2013    Procedure: ARTHROPLASTY HIP;  Right total hip arthroplasty      ARTHROPLASTY HIP Left 2015    Procedure: ARTHROPLASTY HIP;  Surgeon: Tyrese Howell MD;  Location: [facility] OR    ARTHROPLASTY KNEE Left 2016    Procedure: ARTHROPLASTY KNEE;  Surgeon: Tyrese Howell MD;  Location:  OR    CHOLECYSTECTOMY      COLONOSCOPY      COLONOSCOPY Left 2016    Procedure: COLONOSCOPY;  Surgeon: Moe Haney MD;  Location:  GI    GASTRIC BYPASS       tiff en y    GI SURGERY      fistulotomy    ORTHOPEDIC SURGERY      left knee \"green procedure\"    TONSILLECTOMY      TUBAL LIGATION        Allergies   Allergen Reactions    Codeine Nausea and Vomiting, Unknown and Other (See Comments)    Contrast Dye Unknown    Morphine Unknown    " "Morphine Sulfate (Concentrate)      does not like side effects/\"hyper\"      Social History     Tobacco Use    Smoking status: Former     Current packs/day: 0.00     Average packs/day: 0.5 packs/day for 50.0 years (25.0 ttl pk-yrs)     Types: Cigarettes     Start date: 1963     Quit date: 2013     Years since quittin.3    Smokeless tobacco: Never   Substance Use Topics    Alcohol use: Yes     Comment: 5 drinks a week (wine)      Wt Readings from Last 1 Encounters:   05/15/25 78.5 kg (173 lb)        Anesthesia Evaluation   Pt has had prior anesthetic. Type: General.    No history of anesthetic complications       ROS/MED HX  ENT/Pulmonary:     (+)                      asthma                  Neurologic:  - neg neurologic ROS     Cardiovascular:     (+)  hypertension- -  CAD -  - -                                      METS/Exercise Tolerance:     Hematologic:  - neg hematologic  ROS     Musculoskeletal:   (+)  arthritis,   fracture, Fracture location: LLE,         GI/Hepatic:     (+) GERD, Asymptomatic on medication,                  Renal/Genitourinary:  - neg Renal ROS     Endo:  - neg endo ROS     Psychiatric/Substance Use:     (+) psychiatric history depression and anxiety       Infectious Disease:  - neg infectious disease ROS     Malignancy:  - neg malignancy ROS     Other:  - neg other ROS            Physical Exam  Airway  TM distance: >3 FB  Neck ROM: full  Upper bite lip test: I  Mouth opening: >= 4 cm    Cardiovascular - normal exam  Rhythm: regular  Rate: normal rate     Dental     Pulmonary - normal examBreath sounds clear to auscultation        Neurological - normal exam  She appears awake, alert and oriented x3.    Other Findings       OUTSIDE LABS:  CBC:   Lab Results   Component Value Date    WBC 8.6 2025    WBC 7.8 2025    HGB 10.7 (L) 2025    HGB 9.9 (L) 2025    HCT 33.9 (L) 2025    HCT 29.3 (L) 2025     2025     2025 " "    BMP:   Lab Results   Component Value Date     05/04/2025     05/03/2025    POTASSIUM 4.1 05/05/2025    POTASSIUM 4.2 05/04/2025    CHLORIDE 107 05/04/2025    CHLORIDE 105 05/03/2025    CO2 24 05/04/2025    CO2 24 05/03/2025    BUN 10.2 05/04/2025    BUN 11.7 05/03/2025    CR 0.83 05/04/2025    CR 0.95 05/03/2025    GLC 92 05/05/2025    GLC 94 05/04/2025     COAGS:   Lab Results   Component Value Date    PTT 28 04/14/2015    INR 1.91 (H) 08/25/2016     POC: No results found for: \"BGM\", \"HCG\", \"HCGS\"  HEPATIC:   Lab Results   Component Value Date    ALBUMIN 3.6 05/24/2018    PROTTOTAL 7.2 05/24/2018    ALT 35 06/03/2024    AST 43 05/24/2018    ALKPHOS 53 05/24/2018    BILITOTAL 0.4 05/24/2018     OTHER:   Lab Results   Component Value Date    A1C 5.5 03/19/2015    HATTIE 8.7 (L) 05/04/2025    PHOS 3.2 05/05/2025    MAG 1.8 05/05/2025    LIPASE 129 05/08/2014    TSH 0.30 07/07/2024    CRP <2.9 05/24/2018       Anesthesia Plan    ASA Status:  3      NPO Status: NPO Appropriate   Anesthesia Type: General.  Airway: oral, supraglottic airway.  Induction: intravenous.   Techniques and Equipment:     - Airway:  Planned airway equipment includes supraglottic airway.     - Monitoring Plan: standard ASA monitoring     Consents    Anesthesia Plan(s) and associated risks, benefits, and realistic alternatives discussed. Questions answered and patient/representative(s) expressed understanding.     - Discussed: anesthesiologist     - Discussed with:  Patient          Blood Consent:      - Discussed with: patient.     - Consented: consented to blood products     Postoperative Care    Pain management: plan for postoperative opioid use, multimodal analgesia.     Comments:                   Moe Araya MD    I have reviewed the pertinent notes and labs in the chart from the past 30 days and (re)examined the patient.  Any updates or changes from those notes are reflected in this note.    Clinically Significant " "Risk Factors Present on Admission                 # Drug Induced Platelet Defect: home medication list includes an antiplatelet medication   # Hypertension: Noted on problem list           # Overweight: Estimated body mass index is 29.7 kg/m  as calculated from the following:    Height as of 5/13/25: 1.626 m (5' 4\").    Weight as of this encounter: 78.5 kg (173 lb).       # Financial/Environmental Concerns:    # Asthma: noted on problem list              "

## 2025-05-15 NOTE — ANESTHESIA POSTPROCEDURE EVALUATION
Patient: Monse Peoples    Procedure: Procedure(s):  Removal of external fixator left ankle  Open reduction internal fixation left trimalleolar ankle fracture       Anesthesia Type:  General    Note:     Postop Pain Control: Uneventful            Sign Out: Well controlled pain   PONV: No   Neuro/Psych: Uneventful            Sign Out: Acceptable/Baseline neuro status   Airway/Respiratory: Uneventful            Sign Out: Acceptable/Baseline resp. status   CV/Hemodynamics: Uneventful            Sign Out: Acceptable CV status   Other NRE: NONE   DID A NON-ROUTINE EVENT OCCUR? No           Last vitals:  Vitals Value Taken Time   /69 05/15/25 16:00   Temp 98.3  F (36.8  C) 05/15/25 16:00   Pulse 90 05/15/25 16:02   Resp 14 05/15/25 16:02   SpO2 95 % 05/15/25 16:01   Vitals shown include unfiled device data.    Electronically Signed By: Moe Araya MD  May 15, 2025  5:00 PM

## 2025-05-16 ENCOUNTER — APPOINTMENT (OUTPATIENT)
Dept: PHYSICAL THERAPY | Facility: CLINIC | Age: 80
DRG: 909 | End: 2025-05-16
Payer: COMMERCIAL

## 2025-05-16 LAB
GLUCOSE BLDC GLUCOMTR-MCNC: 111 MG/DL (ref 70–99)
HGB BLD-MCNC: 10.3 G/DL (ref 11.7–15.7)
HOLD SPECIMEN: NORMAL
MCV RBC AUTO: 96 FL (ref 78–100)

## 2025-05-16 PROCEDURE — 250N000013 HC RX MED GY IP 250 OP 250 PS 637: Performed by: STUDENT IN AN ORGANIZED HEALTH CARE EDUCATION/TRAINING PROGRAM

## 2025-05-16 PROCEDURE — 120N000001 HC R&B MED SURG/OB

## 2025-05-16 PROCEDURE — 97110 THERAPEUTIC EXERCISES: CPT | Mod: GP | Performed by: PHYSICAL THERAPIST

## 2025-05-16 PROCEDURE — 250N000013 HC RX MED GY IP 250 OP 250 PS 637: Performed by: INTERNAL MEDICINE

## 2025-05-16 PROCEDURE — 85018 HEMOGLOBIN: CPT | Performed by: PHYSICIAN ASSISTANT

## 2025-05-16 PROCEDURE — 250N000011 HC RX IP 250 OP 636

## 2025-05-16 PROCEDURE — 97161 PT EVAL LOW COMPLEX 20 MIN: CPT | Mod: GP | Performed by: PHYSICAL THERAPIST

## 2025-05-16 PROCEDURE — 250N000013 HC RX MED GY IP 250 OP 250 PS 637

## 2025-05-16 PROCEDURE — 36415 COLL VENOUS BLD VENIPUNCTURE: CPT | Performed by: PHYSICIAN ASSISTANT

## 2025-05-16 PROCEDURE — 97530 THERAPEUTIC ACTIVITIES: CPT | Mod: GP | Performed by: PHYSICAL THERAPIST

## 2025-05-16 PROCEDURE — 99207 PR NO BILLABLE SERVICE THIS VISIT: CPT | Performed by: PHYSICIAN ASSISTANT

## 2025-05-16 PROCEDURE — 250N000013 HC RX MED GY IP 250 OP 250 PS 637: Performed by: ORTHOPAEDIC SURGERY

## 2025-05-16 PROCEDURE — 82962 GLUCOSE BLOOD TEST: CPT

## 2025-05-16 RX ORDER — HYDROMORPHONE HYDROCHLORIDE 2 MG/1
2 TABLET ORAL EVERY 6 HOURS PRN
Qty: 15 TABLET | Refills: 0 | Status: SHIPPED | OUTPATIENT
Start: 2025-05-16 | End: 2025-05-17

## 2025-05-16 RX ORDER — HYDROMORPHONE HYDROCHLORIDE 2 MG/1
2 TABLET ORAL
Refills: 0 | Status: COMPLETED | OUTPATIENT
Start: 2025-05-16 | End: 2025-05-16

## 2025-05-16 RX ORDER — HYDROMORPHONE HYDROCHLORIDE 2 MG/1
2 TABLET ORAL EVERY 4 HOURS PRN
Refills: 0 | Status: DISCONTINUED | OUTPATIENT
Start: 2025-05-16 | End: 2025-05-17 | Stop reason: HOSPADM

## 2025-05-16 RX ORDER — HYDROMORPHONE HYDROCHLORIDE 2 MG/1
2 TABLET ORAL EVERY 6 HOURS PRN
Refills: 0 | Status: COMPLETED | OUTPATIENT
Start: 2025-05-16 | End: 2025-05-16

## 2025-05-16 RX ADMIN — BUPROPION HYDROCHLORIDE 150 MG: 150 TABLET, FILM COATED, EXTENDED RELEASE ORAL at 08:11

## 2025-05-16 RX ADMIN — HYDROXYZINE HYDROCHLORIDE 10 MG: 10 TABLET, FILM COATED ORAL at 10:31

## 2025-05-16 RX ADMIN — POLYETHYLENE GLYCOL 3350 17 G: 17 POWDER, FOR SOLUTION ORAL at 08:11

## 2025-05-16 RX ADMIN — BUPROPION HYDROCHLORIDE 150 MG: 150 TABLET, FILM COATED, EXTENDED RELEASE ORAL at 21:15

## 2025-05-16 RX ADMIN — ATENOLOL 50 MG: 50 TABLET ORAL at 08:11

## 2025-05-16 RX ADMIN — HYDROMORPHONE HYDROCHLORIDE 2 MG: 2 TABLET ORAL at 23:04

## 2025-05-16 RX ADMIN — HYDROMORPHONE HYDROCHLORIDE 2 MG: 2 TABLET ORAL at 14:39

## 2025-05-16 RX ADMIN — FAMOTIDINE 20 MG: 20 TABLET, FILM COATED ORAL at 21:15

## 2025-05-16 RX ADMIN — ACETAMINOPHEN 975 MG: 325 TABLET, FILM COATED ORAL at 21:14

## 2025-05-16 RX ADMIN — LORATADINE 10 MG: 10 TABLET ORAL at 08:11

## 2025-05-16 RX ADMIN — HYDROXYZINE HYDROCHLORIDE 10 MG: 10 TABLET, FILM COATED ORAL at 23:05

## 2025-05-16 RX ADMIN — LISINOPRIL 40 MG: 40 TABLET ORAL at 08:12

## 2025-05-16 RX ADMIN — CEFAZOLIN 1 G: 1 INJECTION, POWDER, FOR SOLUTION INTRAMUSCULAR; INTRAVENOUS at 05:46

## 2025-05-16 RX ADMIN — HYDRALAZINE HYDROCHLORIDE 100 MG: 50 TABLET ORAL at 14:39

## 2025-05-16 RX ADMIN — ASPIRIN 81 MG: 81 TABLET, COATED ORAL at 08:11

## 2025-05-16 RX ADMIN — HYDROMORPHONE HYDROCHLORIDE 0.4 MG: 0.2 INJECTION, SOLUTION INTRAMUSCULAR; INTRAVENOUS; SUBCUTANEOUS at 02:07

## 2025-05-16 RX ADMIN — ACETAMINOPHEN 975 MG: 325 TABLET, FILM COATED ORAL at 12:57

## 2025-05-16 RX ADMIN — ACETAMINOPHEN 975 MG: 325 TABLET, FILM COATED ORAL at 05:45

## 2025-05-16 RX ADMIN — SENNOSIDES AND DOCUSATE SODIUM 1 TABLET: 50; 8.6 TABLET ORAL at 21:15

## 2025-05-16 RX ADMIN — HYDROMORPHONE HYDROCHLORIDE 2 MG: 2 TABLET ORAL at 04:10

## 2025-05-16 RX ADMIN — OXYCODONE HYDROCHLORIDE 10 MG: 10 TABLET ORAL at 01:05

## 2025-05-16 RX ADMIN — HYDROMORPHONE HYDROCHLORIDE 2 MG: 2 TABLET ORAL at 18:42

## 2025-05-16 RX ADMIN — DULOXETINE HYDROCHLORIDE 60 MG: 60 CAPSULE, DELAYED RELEASE ORAL at 08:12

## 2025-05-16 RX ADMIN — HYDRALAZINE HYDROCHLORIDE 100 MG: 50 TABLET ORAL at 08:12

## 2025-05-16 RX ADMIN — HYDROXYZINE HYDROCHLORIDE 10 MG: 10 TABLET, FILM COATED ORAL at 01:05

## 2025-05-16 RX ADMIN — HYDROXYZINE HYDROCHLORIDE 10 MG: 10 TABLET, FILM COATED ORAL at 16:44

## 2025-05-16 RX ADMIN — ASPIRIN 81 MG: 81 TABLET, COATED ORAL at 21:15

## 2025-05-16 RX ADMIN — SENNOSIDES AND DOCUSATE SODIUM 1 TABLET: 50; 8.6 TABLET ORAL at 08:12

## 2025-05-16 RX ADMIN — HYDROCHLOROTHIAZIDE 25 MG: 25 TABLET ORAL at 08:11

## 2025-05-16 RX ADMIN — PANTOPRAZOLE SODIUM 40 MG: 40 TABLET, DELAYED RELEASE ORAL at 08:12

## 2025-05-16 RX ADMIN — HYDRALAZINE HYDROCHLORIDE 100 MG: 50 TABLET ORAL at 21:14

## 2025-05-16 RX ADMIN — FAMOTIDINE 20 MG: 20 TABLET, FILM COATED ORAL at 08:12

## 2025-05-16 RX ADMIN — AMLODIPINE BESYLATE 10 MG: 10 TABLET ORAL at 08:12

## 2025-05-16 RX ADMIN — HYDROMORPHONE HYDROCHLORIDE 2 MG: 2 TABLET ORAL at 10:31

## 2025-05-16 ASSESSMENT — ACTIVITIES OF DAILY LIVING (ADL)
ADLS_ACUITY_SCORE: 38
ADLS_ACUITY_SCORE: 39
ADLS_ACUITY_SCORE: 38
ADLS_ACUITY_SCORE: 39
ADLS_ACUITY_SCORE: 38
ADLS_ACUITY_SCORE: 38
ADLS_ACUITY_SCORE: 41
ADLS_ACUITY_SCORE: 39
ADLS_ACUITY_SCORE: 38
ADLS_ACUITY_SCORE: 39
ADLS_ACUITY_SCORE: 39
ADLS_ACUITY_SCORE: 38

## 2025-05-16 NOTE — PLAN OF CARE
Goal Outcome Evaluation:                  up with one assist. walker, got orall dilaudid ordered instead of the oxycodone. pt is voiding. pt did PT. will go back to AVV. bed is on hold. tolerating reg diet.         VS-stable, afebrile,   Lung Sounds-clear, no cough, on room air. Using IS upto 2000  O2-on room air,   GI-+Bs. +Flatus. Lbm was yesterday . Tolerating reg diet denies nausea. Bs was 111   -voiding. Adequate. Bladder scan pvr was 0   IVF-sl iv.   Dressings-cast/splint. C/d/I. Elevated on wedge.   CMS-denies numbness in feet. L pinky is numbness . +pp. Elevated on wedge, pillow. +wiggle toes.   Drain-none.   Activity-up with one assist. Walker belt.   Pain-rates pain level a 6-8. On po dilaudid, atarax. Ice. Elevation.   D/C Plan- AVV tomorrow between [id number]. Sws following.      Problem: Adult Inpatient Plan of Care  Goal: Plan of Care Review  Description: The Plan of Care Review/Shift note should be completed every shift.  The Outcome Evaluation is a brief statement about your assessment that the patient is improving, declining, or no change.  This information will be displayed automatically on your shiftnote.  5/16/2025 1242 by Jyothi Woody RN  Outcome: Progressing  Flowsheets (Taken 5/16/2025 1242)  Outcome Evaluation: up with one assist. walker, got orall dilaudid ordered instead of the oxycodone. pt is voiding. pt did PT. will go back to AVV. bed is on hold. tolerating reg diet.  Plan of Care Reviewed With: patient  Overall Patient Progress: improving  5/16/2025 1242 by Jyothi Woody RN  Outcome: Progressing  Flowsheets (Taken 5/16/2025 1242)  Outcome Evaluation: up with one assist. walker, got orall dilaudid ordered instead of the oxycodone. pt is voiding. pt did PT. will go back to AVV. bed is on hold. tolerating reg diet.  Plan of Care Reviewed With: patient  Overall Patient Progress: improving  Goal: Patient-Specific Goal (Individualized)  Description: You can add care plan  "individualizations to a care plan. Examples of Individualization might be:  \"Parent requests to be called daily at 9am for status\", \"I have a hard time hearing out of my right ear\", or \"Do not touch me to wake me up as it startlesme\".  5/16/2025 1242 by Jyothi Woody RN  Outcome: Progressing  5/16/2025 1242 by Jyothi Woody RN  Outcome: Progressing  Goal: Absence of Hospital-Acquired Illness or Injury  5/16/2025 1242 by Jyothi Woody RN  Outcome: Progressing  5/16/2025 1242 by Jyothi Woody RN  Outcome: Progressing  Intervention: Identify and Manage Fall Risk  Recent Flowsheet Documentation  Taken 5/16/2025 1000 by Jyothi Woody RN  Safety Promotion/Fall Prevention: safety round/check completed  Intervention: Prevent Skin Injury  Recent Flowsheet Documentation  Taken 5/16/2025 1000 by Jyothi Woody RN  Body Position: supine, head elevated  Skin Protection: protective footwear used  Intervention: Prevent and Manage VTE (Venous Thromboembolism) Risk  Recent Flowsheet Documentation  Taken 5/16/2025 1000 by Jyothi Woody RN  VTE Prevention/Management: SCDs on (sequential compression devices)  Intervention: Prevent Infection  Recent Flowsheet Documentation  Taken 5/16/2025 1000 by Jyothi Woody RN  Infection Prevention: rest/sleep promoted  Goal: Optimal Comfort and Wellbeing  5/16/2025 1242 by Jyothi Woody RN  Outcome: Progressing  5/16/2025 1242 by Jyothi Woody RN  Outcome: Progressing  Intervention: Monitor Pain and Promote Comfort  Recent Flowsheet Documentation  Taken 5/16/2025 0812 by Jyothi Woody RN  Pain Management Interventions:   medication (see MAR)   care clustered  Goal: Readiness for Transition of Care  5/16/2025 1242 by Jyothi Woody RN  Outcome: Progressing  5/16/2025 1242 by Jyothi Woody RN  Outcome: Progressing     Problem: Orthopaedic Fracture  Goal: Absence of Bleeding  5/16/2025 1242 by Jyothi Woody RN  Outcome: Progressing  5/16/2025 1242 by Jyothi Woody RN  Outcome: " Progressing  Intervention: Monitor and Manage Fracture Bleeding  Recent Flowsheet Documentation  Taken 5/16/2025 1000 by Jyothi Woody RN  Fracture Immobilization:   immobilization device maintained   supported with pillows  Bleeding Management: affected area elevated  Goal: Bowel Elimination  5/16/2025 1242 by Jyothi Woody RN  Outcome: Progressing  5/16/2025 1242 by Jyothi Woody RN  Outcome: Progressing  Intervention: Promote Effective Bowel Elimination  Recent Flowsheet Documentation  Taken 5/16/2025 1000 by Jyothi Woody RN  Bowel Elimination Promotion:   adequate fluid intake promoted   privacy promoted  Goal: Absence of Embolism Signs and Symptoms  5/16/2025 1242 by Jyothi Woody RN  Outcome: Progressing  5/16/2025 1242 by Jyothi Woody RN  Outcome: Progressing  Intervention: Prevent or Manage Embolism Risk  Recent Flowsheet Documentation  Taken 5/16/2025 1000 by Jyothi Woody RN  VTE Prevention/Management: SCDs on (sequential compression devices)  Goal: Fracture Stability  5/16/2025 1242 by Jyothi Woody RN  Outcome: Progressing  5/16/2025 1242 by Jyothi Woody RN  Outcome: Progressing  Intervention: Promote Fracture Stability and Healing  Recent Flowsheet Documentation  Taken 5/16/2025 1000 by Jyothi Woody RN  Fracture Immobilization:   immobilization device maintained   supported with pillows  Goal: Optimal Functional Ability  5/16/2025 1242 by Jyothi Woody RN  Outcome: Progressing  5/16/2025 1242 by Jyothi Woody RN  Outcome: Progressing  Intervention: Optimize Functional Ability  Recent Flowsheet Documentation  Taken 5/16/2025 1000 by Jyothi Woody RN  Activity Management:   activity adjusted per tolerance   back to bed  Taken 5/16/2025 0812 by Jyothi Woody RN  Activity Management: up in chair  Positioning/Transfer Devices:   pillows   in use  Goal: Absence of Infection Signs and Symptoms  5/16/2025 1242 by Jyothi Woody RN  Outcome: Progressing  5/16/2025 1242 by Jyothi Woody  RN  Outcome: Progressing  Intervention: Prevent or Manage Infection  Recent Flowsheet Documentation  Taken 5/16/2025 1000 by Jyothi Woody RN  Fever Reduction/Comfort Measures:   fluid intake increased   ice pack(s) applied   lightweight bedding  Infection Management: aseptic technique maintained  Goal: Effective Tissue Perfusion  5/16/2025 1242 by Jyothi Woody RN  Outcome: Progressing  5/16/2025 1242 by Jyothi Woody RN  Outcome: Progressing  Goal: Optimal Pain Control and Function  5/16/2025 1242 by Jyothi Woody RN  Outcome: Progressing  5/16/2025 1242 by Jyothi Woody RN  Outcome: Progressing  Intervention: Manage Acute Orthopaedic-Related Pain  Recent Flowsheet Documentation  Taken 5/16/2025 0812 by Jyothi Woody RN  Pain Management Interventions:   medication (see MAR)   care clustered  Goal: Effective Oxygenation and Ventilation  5/16/2025 1242 by Jyothi Woody RN  Outcome: Progressing  5/16/2025 1242 by Jyothi Woody RN  Outcome: Progressing  Intervention: Promote Airway Secretion Clearance  Recent Flowsheet Documentation  Taken 5/16/2025 1000 by Jyothi Woody RN  Breathing Techniques/Airway Clearance: deep/controlled cough encouraged  Cough And Deep Breathing: done independently per patient  Activity Management:   activity adjusted per tolerance   back to bed  Taken 5/16/2025 0812 by Jyothi Woody RN  Activity Management: up in chair  Intervention: Optimize Oxygenation and Ventilation  Recent Flowsheet Documentation  Taken 5/16/2025 1000 by Jyothi Woody RN  Airway/Ventilation Management:   airway patency maintained   pulmonary hygiene promoted  Head of Bed (HOB) Positioning: HOB at 30 degrees

## 2025-05-16 NOTE — PROGRESS NOTES
ORTHOPAEDIC SURGERY STAFF PROGRESS NOTE    Resting comfortably with pain medication. No overnight events. Denies chest pain, shortness of breath, lightheadedness.    /54 (BP Location: Left arm)   Pulse 88   Temp 97.2  F (36.2  C) (Temporal)   Resp 18   Wt 78.5 kg (173 lb)   LMP  (LMP Unknown)   SpO2 94%   BMI 29.70 kg/m    LLE-  Dressing clean/dry/intact  Compartments soft  +extensor hallucis longus, flexor hallucis longus   Sensation intact to light touch toes  Palpable DP pulse     S/P ORIF L ankle POD 1    Doing well overall   Pain control  DVT ppx -ASA   PT/OT  NWB LLE  Plan for D/C to TCU today, bed held    Dillon Lares MD   [facility] [place] [facility] [place]  [phone]

## 2025-05-16 NOTE — CONSULTS
Care Management Initial Consult    General Information  Assessment completed with: Patient,    Type of CM/SW Visit: Initial Assessment    Primary Care Provider verified and updated as needed:     Readmission within the last 30 days: no previous admission in last 30 days      Reason for Consult: discharge planning  Advance Care Planning: Advance Care Planning Reviewed: no concerns identified        Communication Assessment  Patient's communication style: spoken language (English or Bilingual)    Hearing Difficulty or Deaf: no   Wear Glasses or Blind: yes    Cognitive  Cognitive/Neuro/Behavioral: WDL  Level of Consciousness: alert  Arousal Level: arouses to voice                Living Environment:   People in home: spouse     Current living Arrangements: town home      Able to return to prior arrangements: other (see comments)  Living Arrangement Comments: bed hold at [facility]    Family/Social Support:  Care provided by: self, spouse/significant other  Provides care for: no one  Marital Status:     Current Resources:   Patient receiving home care services: No     Community Resources: None  Equipment currently used at home: wheelchair, manual  Supplies currently used at home:      Financial Concerns: none      Does the patient's insurance plan have a 3 day qualifying hospital stay waiver?  Yes     Which insurance plan 3 day waiver is available? Alternative insurance waiver    Will the waiver be used for post-acute placement? No    Lifestyle & Psychosocial Needs:  Social Drivers of Health     Food Insecurity: Low Risk  (5/15/2025)    Food Insecurity     Within the past 12 months, did you worry that your food would run out before you got money to buy more?: No     Within the past 12 months, did the food you bought just not last and you didn t have money to get more?: No   Depression: Not at risk (2/20/2024)    Received from [facility]    PHQ-2     PHQ-2 Score: 2   Housing Stability: Low Risk  (5/15/2025)    Housing  Stability     Do you have housing? : Yes     Are you worried about losing your housing?: No   Tobacco Use: Medium Risk (5/14/2025)    Patient History     Smoking Tobacco Use: Former     Smokeless Tobacco Use: Never     Passive Exposure: Not on file   Financial Resource Strain: Low Risk  (5/15/2025)    Financial Resource Strain     Within the past 12 months, have you or your family members you live with been unable to get utilities (heat, electricity) when it was really needed?: No   Alcohol Use: Not on file   Transportation Needs: Low Risk  (5/15/2025)    Transportation Needs     Within the past 12 months, has lack of transportation kept you from medical appointments, getting your medicines, non-medical meetings or appointments, work, or from getting things that you need?: No   Physical Activity: Not on file   Interpersonal Safety: Low Risk  (5/15/2025)    Interpersonal Safety     Do you feel physically and emotionally safe where you currently live?: Yes     Within the past 12 months, have you been hit, slapped, kicked or otherwise physically hurt by someone?: No     Within the past 12 months, have you been humiliated or emotionally abused in other ways by your partner or ex-partner?: No   Stress: Not on file   Social Connections: Unknown (6/5/2024)    Received from [facility]    Social Connections     Frequency of Communication with Friends and Family: Not on file   Health Literacy: Not on file        Values/Beliefs:  Spiritual, Cultural Beliefs, Jainism Practices, Values that affect care: no            Discussed  Partnership in Safe Discharge Planning  document with patient/family: No    Additional Information:  Care management consult for discharge planning/disposition. Patient here as outpatient following ORIF left trimalleolar ankle fracture.     Patient here from [facility]. She has a bed hold and plan is to return to facility. Discharge will be tomorrow,  Saturday.     Updated [place] that plan is for Saturday discharge. The weekend fax # for orders is [phone]. Weekend charge RN is Quinn at [phone].    Wheelchair transport set up for tomorrow between [phone]. Updated patient, care team. Patient has wheelchair here from facility.     Next Steps: fax discharge orders to [facility]    Nicole Ace RN  Care Coordinator  [facility]

## 2025-05-16 NOTE — PROGRESS NOTES
Brief progress note    Full chart review completed the morning of 5/16.  Essential home medications ordered when consult received on 5/15.  Repeat hemoglobin this morning due to history of chronic anemia with hemoglobin preoperatively at 10.7 and repeat of 10.3.  No evidence of bleeding per discussion with RN and per operative report less than 50 mL EBL.  BP slightly elevated prior to receiving home antihypertensive regimen with improvement on recheck.  Pain control was a concern overnight but improved with trial of oral Dilaudid which has now been added to regimen.  TCU bed held and will likely discharge there either later today or tomorrow per discussion with RN.  No other acute concerns needing ongoing medical management.  Will currently hold off seeing patient at this time and discussed with RN to contact if any concerns arise and patient can be seen then.    Anahi Hernandez PA-C  Hospitalist Service

## 2025-05-16 NOTE — PROGRESS NOTES
Patient complaining of pain which is not controlled with p.o. oxycodone.  Requesting to change it to p.o. Dilaudid.  Will give one-time dose of p.o. Dilaudid 2 mg, defer to primary service to address pain management further.

## 2025-05-16 NOTE — PROGRESS NOTES
"   05/16/25 1022   Appointment Info   Signing Clinician's Name / Credentials (PT) Joya Calle PT   Rehab Comments (PT) DANIEL LICONA   Quick Adds   Quick Adds Certification   Living Environment   Current Living Arrangements other (see comments)  (currently at TCU and plans to return to TCU)   Self-Care   Usual Activity Tolerance moderate  (reports \"I fall a lot\")   Current Activity Tolerance fair   Regular Exercise Yes   Activity/Exercise Type   (having PT at TCU)   Exercise Amount/Frequency daily   Fall history within last six months yes   Number of times patient has fallen within last six months   (patient doesn't quantify; reports having multiple falls)   General Information   Onset of Illness/Injury or Date of Surgery 05/15/25   Referring Physician Mone Wang PA-C   Patient/Family Therapy Goals Statement (PT) return to TCU   Pertinent History of Current Problem (include personal factors and/or comorbidities that impact the POC) per chart: \"Monse Peoples is a 79 year old female who was admitted s/p 5/15 removal of left ankle external fixator and ORIF left trimalleolar ankle fracture.  EBL less than 50 ml.  Internal Medicine has been asked by Dillon Lares MD, to manage chronic medical problems.\"; see medical record for further information   Existing Precautions/Restrictions fall;weight bearing   Weight-Bearing Status - LLE nonweight-bearing   General Observations Patient up in chair and wanting to return back to bed   Cognition   Affect/Mental Status (Cognition) confused   Orientation Status (Cognition) oriented x 3  (could name hospital, but at the same time thought she was at TCU)   Follows Commands (Cognition) 50-74% accuracy   Safety Deficit (Cognition) impulsivity;safety precautions awareness   Cognitive Status Comments patient reports some baseline memory impairment; making confusing statements during session   Pain Assessment   Patient Currently in Pain Yes, see Vital Sign flowsheet  (doesn't " rate)   Integumentary/Edema   Integumentary/Edema Comments L LE appears casted with heel cut out; see nursing notes for further information   Posture    Posture Forward head position;Protracted shoulders   Range of Motion (ROM)   Range of Motion ROM deficits secondary to surgical procedure   Strength (Manual Muscle Testing)   Strength (Manual Muscle Testing) Deficits observed during functional mobility;Able to perform R SLR;Able to perform L SLR   Strength Comments needing significant assist to perform transfers   Bed Mobility   Comment, (Bed Mobility) sit>supine with SBA; able to scoot to HOB with mod I and use of bedrails   Transfers   Comment, (Transfers) max A of 1 and CGA of another to stand safely; NWB on L LE; use of walker   Gait/Stairs (Locomotion)   Comment, (Gait/Stairs) difficulty with pivot on R LE to get to EOB; max A of 1 and CGA of another; use of walker   Balance   Balance Comments intact sitting balance; poor standing balance with NWB on L LE   Sensory Examination   Sensory Perception patient reports no sensory changes   Clinical Impression   Criteria for Skilled Therapeutic Intervention Yes, treatment indicated   PT Diagnosis (PT) impaired functional mobility   Influenced by the following impairments pain; functional weakness; decreased activity tolerance, confusion; NWB on L LE   Functional limitations due to impairments impaired independence with functional mobility and cares secondary to recent surgery and above deficits; doesn't appear safe to return home; plans to return to TCU to maximize return to PLOF   Clinical Presentation (PT Evaluation Complexity) stable   Clinical Presentation Rationale clinical judgement; level of support   Clinical Decision Making (Complexity) low complexity   Planned Therapy Interventions (PT) balance training;bed mobility training;gait training;transfer training;progressive activity/exercise;strengthening;patient/family education   Risk & Benefits of therapy have  been explained evaluation/treatment results reviewed;patient;participants voiced agreement with care plan   Clinical Impression Comments below reported baseline   PT Total Evaluation Time   PT Eval, Low Complexity Minutes (99251) 10   Therapy Certification   Start of care date 05/16/25   Certification date from 05/16/25   Certification date to 05/20/25   Medical Diagnosis removal of left ankle external fixator and ORIF left trimalleolar ankle fracture   Physical Therapy Goals   PT Frequency 5x/week   PT Predicted Duration/Target Date for Goal Attainment 05/20/25   PT Goals Bed Mobility;Transfers;Gait   PT: Bed Mobility Independent;Supine to/from sit   PT: Transfers Minimal assist;Sit to/from stand;Bed to/from chair;Assistive device;Within precautions   PT: Gait Minimal assist;10 feet   Interventions   Interventions Quick Adds Therapeutic Procedure   Therapeutic Procedure/Exercise   Ther. Procedure: strength, endurance, ROM, flexibillity Minutes (43000) 10   Symptoms Noted During/After Treatment fatigue;increased pain   Treatment Detail/Skilled Intervention Patient educated in ex's she could do to maintain her ROM/strength while NWB on L LE; agreeable to try to do ex's; able to SLR's bilaterally; encouraged hip and knee flexion/and extension bilaterally; educated in hip abduction/adduction, quad sets, ankle pumps on R; patient having some difficulty following commands at times even with verbal and tactile cues; slightly impulsive with movements; encouraged to do ex's during commercial breaks on TV; verbalized understanding   Therapeutic Activity   Therapeutic Activities: dynamic activities to improve functional performance Minutes (95835) 15   Symptoms Noted During/After Treatment Fatigue;Increased pain   Treatment Detail/Skilled Intervention Patient up in recliner chair; wanting to return to bed upon therapist arrival; some confusion noted during session; able to answer orientation questions but thought she was at  the rehab currently; reports being able to do pivot transfers at rehab; reports some limitations in UE strength and does endorse some baseline memory impairment; impulsive at times with movement but redirectable; up in recliner chair; chair moved to have stronger R side near the bed; needing max A of 1/CGA of another and cues for safety with hand placement; unsteady on feet; impaired balance; able to maintain NWB on L LE; some difficulty with weightshift and pivoting on R LE during transfer; cues for safety to reach back prior to sitting on EOB; denies dizziness; able to get own LE's into bed; O2 sats 94% on room air upon return to supine; L LE elevated on blue foam wedge; positioned rolled washcloth under ankle for heel suspension; alarm placed for safety; updated RN; all needs in reach at end of session   PT Discharge Planning   PT Plan Progress bed mob; A x 2 for STS, SPT, hopping   PT Discharge Recommendation (DC Rec) Transitional Care Facility   PT Rationale for DC Rec Pt below baseline, currently Ax2 for transfers. Pt limited by weakness, confusion,and NWB. Recommend TCU to increase strength and functional mobility.   PT Brief overview of current status A x 2 for pivot transfer chair>bed; NWB on L LE   PT Total Distance Amb During Session (feet) 0   Physical Therapy Time and Intention   Timed Code Treatment Minutes 25   Total Session Time (sum of timed and untimed services) 35

## 2025-05-16 NOTE — PLAN OF CARE
Goal Outcome Evaluation:      Plan of Care Reviewed With: patient    Overall Patient Progress: improvingOverall Patient Progress: improving    Outcome Evaluation: IV ABX given, Ax2 pivot to commode, pain relief with PRN IV dilaudid only per pt, plan for PT/OT this AM    Pt A/Ox4, VSS on RA, PIV SL, IV ABX given, Ax2 W/GB, NWB to LLE, ACE wrap and casting splint in place, CDI, pt compliant with LLE elevation via blue wedge support, pain managed with PRN IV dilaudid per pt, oxy not effective, CC notified, given 1x dose of PO dilaudid with relief, CMS intact, voiding via bedside commode, tolerating a regular diet, plan for PT/OT this AM, will continue to monitor.    Problem: Orthopaedic Fracture  Goal: Optimal Functional Ability  Outcome: Not Progressing  Intervention: Optimize Functional Ability  Recent Flowsheet Documentation  Taken 5/15/2025 2042 by Nadia Logan, RN  Activity Management:   activity adjusted per tolerance   up to bedside commode   back to bed  Goal: Optimal Pain Control and Function  Outcome: Not Progressing  Intervention: Manage Acute Orthopaedic-Related Pain  Recent Flowsheet Documentation  Taken 5/16/2025 0207 by Nadia Logan, RN  Pain Management Interventions: medication (see MAR)  Taken 5/16/2025 0105 by Nadia Logan, RN  Pain Management Interventions: medication (see MAR)  Taken 5/15/2025 2125 by Nadia Logan, RN  Pain Management Interventions: medication (see MAR)  Taken 5/15/2025 2019 by Nadia Logan, RN  Pain Management Interventions:   medication (see MAR)   care clustered   repositioned   rest     Problem: Adult Inpatient Plan of Care  Goal: Plan of Care Review  Description: The Plan of Care Review/Shift note should be completed every shift.  The Outcome Evaluation is a brief statement about your assessment that the patient is improving, declining, or no change.  This information will be displayed automatically on your shiftnote.  Outcome: Progressing  Flowsheets (Taken 5/16/2025  0334)  Outcome Evaluation: IV ABX given, Ax2 pivot to commode, pain relief with PRN IV dilaudid only per pt, plan for PT/OT this AM  Plan of Care Reviewed With: patient  Overall Patient Progress: improving

## 2025-05-17 VITALS
DIASTOLIC BLOOD PRESSURE: 56 MMHG | SYSTOLIC BLOOD PRESSURE: 110 MMHG | HEART RATE: 79 BPM | BODY MASS INDEX: 29.7 KG/M2 | OXYGEN SATURATION: 94 % | RESPIRATION RATE: 16 BRPM | TEMPERATURE: 97.2 F | WEIGHT: 173 LBS

## 2025-05-17 PROCEDURE — 250N000013 HC RX MED GY IP 250 OP 250 PS 637: Performed by: ORTHOPAEDIC SURGERY

## 2025-05-17 PROCEDURE — 250N000013 HC RX MED GY IP 250 OP 250 PS 637: Performed by: INTERNAL MEDICINE

## 2025-05-17 PROCEDURE — 250N000013 HC RX MED GY IP 250 OP 250 PS 637

## 2025-05-17 RX ORDER — HYDROMORPHONE HYDROCHLORIDE 2 MG/1
2 TABLET ORAL EVERY 6 HOURS PRN
Qty: 15 TABLET | Refills: 0 | Status: SHIPPED | OUTPATIENT
Start: 2025-05-17 | End: 2025-05-19

## 2025-05-17 RX ADMIN — DULOXETINE HYDROCHLORIDE 60 MG: 60 CAPSULE, DELAYED RELEASE ORAL at 08:02

## 2025-05-17 RX ADMIN — PANTOPRAZOLE SODIUM 40 MG: 40 TABLET, DELAYED RELEASE ORAL at 08:01

## 2025-05-17 RX ADMIN — FAMOTIDINE 20 MG: 20 TABLET, FILM COATED ORAL at 08:03

## 2025-05-17 RX ADMIN — HYDROMORPHONE HYDROCHLORIDE 2 MG: 2 TABLET ORAL at 13:18

## 2025-05-17 RX ADMIN — ATENOLOL 50 MG: 50 TABLET ORAL at 08:01

## 2025-05-17 RX ADMIN — BUPROPION HYDROCHLORIDE 150 MG: 150 TABLET, FILM COATED, EXTENDED RELEASE ORAL at 08:01

## 2025-05-17 RX ADMIN — ASPIRIN 81 MG: 81 TABLET, COATED ORAL at 08:02

## 2025-05-17 RX ADMIN — POLYETHYLENE GLYCOL 3350 17 G: 17 POWDER, FOR SOLUTION ORAL at 08:01

## 2025-05-17 RX ADMIN — LISINOPRIL 40 MG: 40 TABLET ORAL at 08:01

## 2025-05-17 RX ADMIN — AMLODIPINE BESYLATE 10 MG: 10 TABLET ORAL at 08:02

## 2025-05-17 RX ADMIN — HYDRALAZINE HYDROCHLORIDE 100 MG: 50 TABLET ORAL at 08:02

## 2025-05-17 RX ADMIN — TRAZODONE HYDROCHLORIDE 100 MG: 100 TABLET ORAL at 00:20

## 2025-05-17 RX ADMIN — HYDROCHLOROTHIAZIDE 25 MG: 25 TABLET ORAL at 08:02

## 2025-05-17 RX ADMIN — SENNOSIDES AND DOCUSATE SODIUM 1 TABLET: 50; 8.6 TABLET ORAL at 08:02

## 2025-05-17 RX ADMIN — ACETAMINOPHEN 975 MG: 325 TABLET, FILM COATED ORAL at 13:17

## 2025-05-17 RX ADMIN — HYDRALAZINE HYDROCHLORIDE 100 MG: 50 TABLET ORAL at 13:18

## 2025-05-17 RX ADMIN — LORATADINE 10 MG: 10 TABLET ORAL at 08:02

## 2025-05-17 ASSESSMENT — ACTIVITIES OF DAILY LIVING (ADL)
ADLS_ACUITY_SCORE: 41

## 2025-05-17 NOTE — PLAN OF CARE
Goal Outcome Evaluation:      Plan of Care Reviewed With: patient    Overall Patient Progress: improvingOverall Patient Progress: improving    Outcome Evaluation: VSS on RA. AOx4 w/some forgetfulness. Scant drainage on dressings. Pain managed with oxy and scheduled analgesics. No IV access and will not be replaced d/t discharge pending. NWB on LLE. A1 GB/walker to bedside commode.      Problem: Delirium  Goal: Optimal Coping  Intervention: Optimize Psychosocial Adjustment to Delirium  Recent Flowsheet Documentation  Taken 5/17/2025 0020 by Ty Patel, RN  Family/Support System Care: self-care encouraged  Goal: Improved Behavioral Control  Intervention: Minimize Safety Risk  Recent Flowsheet Documentation  Taken 5/17/2025 0020 by yT Patel, RN  Enhanced Safety Measures:   assistive devices when indicated   monitor patients coagulation values   pain management   patient/family teach back on injury risk   Pre/Post Op education on fall prevention   review medications for side effects with activity  Trust Relationship/Rapport:   care explained   choices provided   questions answered  Goal: Improved Sleep  Intervention: Promote Sleep  Recent Flowsheet Documentation  Taken 5/17/2025 0020 by Ty Patel, RN  Sleep/Rest Enhancement:   awakenings minimized   noise level reduced   regular sleep/rest pattern promoted   relaxation techniques promoted

## 2025-05-17 NOTE — PLAN OF CARE
Physical Therapy Discharge Summary    Reason for therapy discharge:    Discharged to transitional care facility.    Progress towards therapy goal(s). See goals on Care Plan in [facility] electronic health record for goal details.  Goals not met.  Barriers to achieving goals:   discharge from facility.    Therapy recommendation(s):    Continued therapy is recommended.  Rationale/Recommendations:  return to TCU to maximize return to PLOF.

## 2025-05-17 NOTE — PROGRESS NOTES
Care Management Discharge Note    Discharge Date: 05/17/2025       Discharge Disposition: Transitional Care - [facility]    Discharge Services:  none    Discharge DME:  none    Discharge Transportation: agency    Private pay costs discussed: transportation costs    Does the patient's insurance plan have a 3 day qualifying hospital stay waiver?  No    PAS Confirmation Code:  NA- returning to same facility  Patient/family educated on Medicare website which has current facility and service quality ratings:  yes    Education Provided on the Discharge Plan:  yes  Persons Notified of Discharge Plans: patient, care team   Patient/Family in Agreement with the Plan: yes    Handoff Referral Completed: No, handoff not indicated or clinically appropriate    Additional Information:  Patient discharging to TCU via [facility] at [phone]. Transportation costs and options discussed, all parties agreeable. Discharge discussed and confirmed with patient, nursing, hospitalist, and accepting facility, [facility]. Orders sent via fax to [phone].       DAHIANA Ross   Social Work Care Manager   [facility]   [phone]

## 2025-05-17 NOTE — PROGRESS NOTES
Orthopedic Surgery  Monse Peoples  05/17/2025     Admit Date:  5/15/2025  Assessment:   POD: 2 Days Post-Op   Procedure(s):  Removal of external fixator left ankle  Open reduction internal fixation left trimalleolar ankle fracture     Patient resting comfortably in bed. She notes there was some saturated blood on the splint that was noticed overnight.   Pain currently controlled.  Tolerating oral intake.    Denies nausea or vomiting  Denies chest pain or shortness of breath    Temp:  [97.1  F (36.2  C)-98.4  F (36.9  C)] 97.9  F (36.6  C)  Pulse:  [73-80] 79  Resp:  [17-18] 17  BP: (128-145)/(50-69) 145/63  SpO2:  [92 %-100 %] 94 %    Alert and oriented  Short leg splint is clean, dry, and intact. There was bloody saturation noted over the heel of the splint that appeared dry this morning.   Minimal erythema of the surrounding skin.   Right calf is soft, non-tender.  Left lower extremity is NVI.  Sensation intact bilateral lower extremities  +Dp pulse on right    Labs:  Recent Labs   Lab Test 05/16/25  0957 05/12/25  0623 05/05/25  2130 05/04/25  0748   WBC  --  8.6 7.8 8.0   HGB 10.3* 10.7* 9.9* 9.2*   PLT  --  355 236 178     No lab results found.  No lab results found.      Plan:   Continue ASA 81 mg BID x 6 weeks for DVT prophylaxis.     Mobilize with PT/OT    NWB LLE. Keep short leg splint clean, dry and intact until follow up appointment.     Continue current pain regiment.   Dressings: Keep intact.  Change if >60% saturated or peeling off.    Follow-up: 2 weeks post-op with Mone Wang PA-C/Dr. Lares.     Disposition:    Anticipate d/c to back to [facility] today.     Mone Wang PA-C  [facility]

## 2025-05-17 NOTE — PLAN OF CARE
"Goal Outcome Evaluation:      Plan of Care Reviewed With: patient    Overall Patient Progress: improvingOverall Patient Progress: improving    Outcome Evaluation: VS monitored, A&Ox4, drsg w/scant dried drainage to heel, small spot bleeding behind L knee, bandaid applied, A1 w/ww and gb, NWB L LE, 1+ L popliteal, tingling L pinky, able to wiggle toes and feel sensation, voiding, Tylenol/Atarax/PO Dilaudid for pain, plan to d/c back to [facility] tomorrow w/medical transport between [id number].    Patient vital signs are at baseline: Yes  Patient able to ambulate as they were prior to admission or with assist devices provided by therapies during their stay:  Yes  Patient MUST void prior to discharge:  Yes  Patient able to tolerate oral intake:  Yes  Pain has adequate pain control using Oral analgesics:  Yes  Does patient have an identified :  Yes  Has goal D/C date and time been discussed with patient:  Yes    Problem: Adult Inpatient Plan of Care  Goal: Plan of Care Review  Description: The Plan of Care Review/Shift note should be completed every shift.  The Outcome Evaluation is a brief statement about your assessment that the patient is improving, declining, or no change.  This information will be displayed automatically on your shiftnote.  Outcome: Progressing  Flowsheets (Taken 5/16/2025 1907)  Outcome Evaluation: VS monitored, A&Ox4, drsg w/scant dried drainage to heel, small spot bleeding behind L knee, bandaid applied, A1 w/ww and gb, NWB L LE, 1+ L popliteal, tingling L pinky, able to wiggle toes and feel sensation, voiding, Tylenol/Atarax/PO Dilaudid for pain, plan to d/c back to [facility] tomorrow w/medical transport between [id number].  Plan of Care Reviewed With: patient  Overall Patient Progress: improving  Goal: Patient-Specific Goal (Individualized)  Description: You can add care plan individualizations to a care plan. Examples of Individualization might be:  \"Parent requests to be called daily at 9am " "for status\", \"I have a hard time hearing out of my right ear\", or \"Do not touch me to wake me up as it startlesme\".  Outcome: Progressing  Goal: Absence of Hospital-Acquired Illness or Injury  Outcome: Progressing  Intervention: Identify and Manage Fall Risk  Recent Flowsheet Documentation  Taken 5/16/2025 1642 by Daysi Garvey RN  Safety Promotion/Fall Prevention:   activity supervised   assistive device/personal items within reach   clutter free environment maintained   mobility aid in reach   lighting adjusted   nonskid shoes/slippers when out of bed   patient and family education   safety round/check completed   room organization consistent   supervised activity   treat reversible contributory factors   treat underlying cause  Intervention: Prevent Skin Injury  Recent Flowsheet Documentation  Taken 5/16/2025 1642 by Daysi Garvey RN  Body Position:   supine, head elevated   supine, legs elevated  Skin Protection:   adhesive use limited   incontinence pads utilized   tubing/devices free from skin contact  Intervention: Prevent Infection  Recent Flowsheet Documentation  Taken 5/16/2025 1642 by Daysi Garvey RN  Infection Prevention:   hand hygiene promoted   rest/sleep promoted   single patient room provided  Goal: Optimal Comfort and Wellbeing  Outcome: Progressing  Intervention: Monitor Pain and Promote Comfort  Recent Flowsheet Documentation  Taken 5/16/2025 1842 by Daysi Garvey RN  Pain Management Interventions: medication (see MAR)  Taken 5/16/2025 1642 by Daysi Garvey RN  Pain Management Interventions: medication (see MAR)  Intervention: Provide Person-Centered Care  Recent Flowsheet Documentation  Taken 5/16/2025 1642 by Daysi Garvey RN  Trust Relationship/Rapport:   care explained   choices provided   questions answered  Goal: Readiness for Transition of Care  Outcome: Progressing     Problem: Orthopaedic Fracture  Goal: Absence of Bleeding  Outcome: Progressing  Intervention: Monitor and " Manage Fracture Bleeding  Recent Flowsheet Documentation  Taken 5/16/2025 1642 by Daysi Garvey RN  Bleeding Management: affected area elevated  Goal: Bowel Elimination  Outcome: Progressing  Intervention: Promote Effective Bowel Elimination  Recent Flowsheet Documentation  Taken 5/16/2025 1642 by Daysi Garvey RN  Bowel Elimination Management:   hygiene measures promoted   relaxation techniques promoted   sitting position facilitated   toileting offered  Bowel Elimination Promotion:   adequate fluid intake promoted   ambulation promoted   commode/bedpan at bedside  Goal: Absence of Embolism Signs and Symptoms  Outcome: Progressing  Goal: Fracture Stability  Outcome: Progressing  Goal: Optimal Functional Ability  Outcome: Progressing  Intervention: Optimize Functional Ability  Recent Flowsheet Documentation  Taken 5/16/2025 1642 by Daysi Garvey RN  Self-Care Promotion:   independence encouraged   BADL personal objects within reach   BADL personal routines maintained   meal set-up provided   adaptive equipment use encouraged  Activity Management: dorsiflexion/plantar flexion performed  Positioning/Transfer Devices:   wedge   in use  Goal: Absence of Infection Signs and Symptoms  Outcome: Progressing  Goal: Effective Tissue Perfusion  Outcome: Progressing  Intervention: Prevent or Manage Neurovascular Compromise  Recent Flowsheet Documentation  Taken 5/16/2025 1642 by Daysi Garvey RN  Neurovascular Pressure Management: Cast: extremity positioned at heart level  Compartment Syndrome Management:   active flexion/extension encouraged   extremity placed at heart level  Goal: Optimal Pain Control and Function  Outcome: Progressing  Intervention: Manage Acute Orthopaedic-Related Pain  Recent Flowsheet Documentation  Taken 5/16/2025 1842 by Daysi Garvey RN  Pain Management Interventions: medication (see MAR)  Taken 5/16/2025 1642 by Daysi Garvey RN  Pain Management Interventions: medication (see  MAR)  Sleep/Rest Enhancement:   awakenings minimized   noise level reduced   regular sleep/rest pattern promoted   relaxation techniques promoted  Goal: Effective Oxygenation and Ventilation  Outcome: Progressing  Intervention: Promote Airway Secretion Clearance  Recent Flowsheet Documentation  Taken 5/16/2025 1642 by Daysi Garvey RN  Breathing Techniques/Airway Clearance: deep/controlled cough encouraged  Cough And Deep Breathing: done independently per patient  Activity Management: dorsiflexion/plantar flexion performed  Intervention: Optimize Oxygenation and Ventilation  Recent Flowsheet Documentation  Taken 5/16/2025 1642 by Daysi Garvey RN  Airway/Ventilation Management:   airway patency maintained   pulmonary hygiene promoted  Fluid/Electrolyte Management: fluids provided

## 2025-05-17 NOTE — PLAN OF CARE
"Goal Outcome Evaluation:      Plan of Care Reviewed With: patient    Overall Patient Progress: improvingOverall Patient Progress: improving    3pm-11pm RN    Patient VS are at baseline: Yes  Patient able to ambulate as they were prior to admission or with assist devices provided by therapy during their stay: No is NWB on LLE  Patient must void prior to discharge: Yes  Patient able to tolerate oral intake: Yes  Patient has adequate pain control using oral analgesics: Yes  Dressing has a scant amount of drainage on the heel otherwise is CDI  Plan is to discharge to TCU tomorrow  Problem: Adult Inpatient Plan of Care  Goal: Plan of Care Review  Description: The Plan of Care Review/Shift note should be completed every shift.  The Outcome Evaluation is a brief statement about your assessment that the patient is improving, declining, or no change.  This information will be displayed automatically on your shiftnote.  Outcome: Progressing  Flowsheets (Taken 5/16/2025 3998)  Plan of Care Reviewed With: patient  Overall Patient Progress: improving  Goal: Patient-Specific Goal (Individualized)  Description: You can add care plan individualizations to a care plan. Examples of Individualization might be:  \"Parent requests to be called daily at 9am for status\", \"I have a hard time hearing out of my right ear\", or \"Do not touch me to wake me up as it startlesme\".  Outcome: Progressing  Goal: Absence of Hospital-Acquired Illness or Injury  Outcome: Progressing  Goal: Optimal Comfort and Wellbeing  Outcome: Progressing  Goal: Readiness for Transition of Care  Outcome: Progressing     Problem: Orthopaedic Fracture  Goal: Absence of Bleeding  Outcome: Progressing  Goal: Bowel Elimination  Outcome: Progressing  Goal: Absence of Embolism Signs and Symptoms  Outcome: Progressing  Goal: Fracture Stability  Outcome: Progressing  Goal: Optimal Functional Ability  Outcome: Progressing  Goal: Absence of Infection Signs and Symptoms  Outcome: " Progressing  Goal: Effective Tissue Perfusion  Outcome: Progressing  Goal: Optimal Pain Control and Function  Outcome: Progressing  Goal: Effective Oxygenation and Ventilation  Outcome: Progressing

## 2025-05-17 NOTE — PLAN OF CARE
Goal Outcome Evaluation:      Plan of Care Reviewed With: patient    Overall Patient Progress: improvingOverall Patient Progress: improving    Outcome Evaluation: stable vitals. room air. alert and orientated. ace bandage c/d/i. elevated on pillows. on dilaudid . no iv access. NWB on LLE. suzan diet. rates pain a 5. going to TCU at time of discharge today. up with one assist. gb walker, to bsc.      VS-stable afebrile,   Lung Sounds-clear, no cough, taking deep breathes. Using IS upto 2000.   O2-on room air.   GI-+bs. +flatus. Lbm was day of surgery on the 15th. Tolerating diet. Denies nausea. Had bm this shift. Large formed.   -voiding adequately on bsc.   IVF-none.   Dressings-ace bandage splint readjusted per Ortho PA. C/d/I. Elevated on pillows.   CMS-denies numbness and tingling, except L pinky finger. +wiggle toes. Elevated. Splinted.   Drain-none.   Activity-up with one assist. Gb walker, belt bsc.   Pain-rates pain level a 5.  Dilaudid given x 1 this shift.    D/C Plan-tcu at time of discharge today.     Discharge Note    Patient discharged to TCU via transportation service  accompanied by no family/friend .  IV: Discontinued  Prescriptions sent with patient to discharge facility . Dilaudid script in packet   Belongings reviewed and sent with patient.   Home medications returned to patient: NA  Equipment sent with: N/A.   patient verbalizes understanding of discharge instructions. AVS given to patient.  Additional education completed? Anticoagulation Therapy      Problem: Delirium  Goal: Optimal Coping  Intervention: Optimize Psychosocial Adjustment to Delirium  Recent Flowsheet Documentation  Taken 5/17/2025 0800 by Jyothi Woody, RN  Family/Support System Care: self-care encouraged  Goal: Improved Behavioral Control  Intervention: Minimize Safety Risk  Recent Flowsheet Documentation  Taken 5/17/2025 0800 by Jyothi Woody, RN  Enhanced Safety Measures:   assistive devices when indicated   monitor patients  coagulation values   pain management   patient/family teach back on injury risk   Pre/Post Op education on fall prevention   review medications for side effects with activity  Trust Relationship/Rapport:   care explained   choices provided   questions answered  Goal: Improved Sleep  Intervention: Promote Sleep  Recent Flowsheet Documentation  Taken 5/17/2025 0800 by Jyothi Woody, RN  Sleep/Rest Enhancement:   regular sleep/rest pattern promoted   relaxation techniques promoted

## 2025-05-19 ENCOUNTER — TRANSITIONAL CARE UNIT VISIT (OUTPATIENT)
Dept: GERIATRICS | Facility: CLINIC | Age: 80
End: 2025-05-19
Payer: COMMERCIAL

## 2025-05-19 ENCOUNTER — PATIENT OUTREACH (OUTPATIENT)
Dept: CARE COORDINATION | Facility: CLINIC | Age: 80
End: 2025-05-19

## 2025-05-19 VITALS
RESPIRATION RATE: 18 BRPM | HEART RATE: 87 BPM | HEIGHT: 64 IN | DIASTOLIC BLOOD PRESSURE: 73 MMHG | BODY MASS INDEX: 29.71 KG/M2 | SYSTOLIC BLOOD PRESSURE: 118 MMHG | WEIGHT: 174 LBS | TEMPERATURE: 97.4 F | OXYGEN SATURATION: 99 %

## 2025-05-19 DIAGNOSIS — S82.852D CLOSED TRIMALLEOLAR FRACTURE OF LEFT ANKLE WITH ROUTINE HEALING, SUBSEQUENT ENCOUNTER: ICD-10-CM

## 2025-05-19 DIAGNOSIS — D62 ABLA (ACUTE BLOOD LOSS ANEMIA): ICD-10-CM

## 2025-05-19 DIAGNOSIS — I10 BENIGN ESSENTIAL HYPERTENSION: ICD-10-CM

## 2025-05-19 DIAGNOSIS — R55 SYNCOPE, UNSPECIFIED SYNCOPE TYPE: Primary | ICD-10-CM

## 2025-05-19 PROCEDURE — 99309 SBSQ NF CARE MODERATE MDM 30: CPT | Performed by: PHYSICIAN ASSISTANT

## 2025-05-19 RX ORDER — HYDROMORPHONE HYDROCHLORIDE 2 MG/1
TABLET ORAL
Qty: 5 TABLET | Refills: 0 | Status: SHIPPED | OUTPATIENT
Start: 2025-05-19

## 2025-05-19 NOTE — LETTER
" 5/19/2025      Monse Peoples  [address] [address]        [facility] GERIATRICS    PRIMARY CARE PROVIDER AND CLINIC:  Christiano Taylor MD, [facility] [address] [facility] / [facility] [address]  Chief Complaint   Patient presents with     Hospital F/U      [place] Medical Record Number:  [id number]  Place of Service where encounter took place:  [facility] ([facility]) [[id number]]    Monse Peoples  is a 79 year old  (1945), admitted to the above facility from  [facility]. Hospital stay 5/15/25 through 5/17/25..   HPI:    Notes from hospitalization reviewed including H&P  and D/c summary    Monse Peoples is a very pleasant 71-year-old female with a past medical history of hypertension, CAD, anxiety, GERD with 2 recent hospitalizations at [facility]    **5/2 - 5/5/2025: Presented for evaluation after a fall/concern for syncope.  Found to have left trimalleolar fracture with associated dislocation.  Underwent external fixation.  Discharged to [facility]    **5/15 - 5/17/2025: Readmitted to the hospital for planned ORIF of left trimalleolar fracture.  Perioperative course uncomplicated.  Now discharged back to [facility]    Aaliyah is overall doing well.  Does have a bit more pain in her left foot compared to prior to surgery.  Would like to take hydromorphone scheduled in the morning and prior to bedtime.  No constipation.  Cough has improved.  Family has been checking the mail and no Zio patch has arrived.          CODE STATUS/ADVANCE DIRECTIVES DISCUSSION:  Prior  CPR/Full code   ALLERGIES:   Allergies   Allergen Reactions     Codeine Other (See Comments), Nausea and Vomiting and Unknown     hyperactivity     Contrast Dye Unknown     Morphine Unknown     Pt states as of 5/15/25 that she does not think she has an allergy to morphine.     Morphine Sulfate (Concentrate)      does not like side effects/\"hyper\"      PAST MEDICAL HISTORY:   Past Medical " History:   Diagnosis Date     Anxiety 04/02/2015     Asthma     no inhaler for 2-3 years     Benign essential hypertension 08/02/2016     Chest pain, unspecified type 08/27/2017     Coronary artery disease involving native coronary artery of native heart without angina pectoris 08/02/2016     Depression      Esophageal reflux 03/19/2015     Former smoker - has had pulmonary nodules in the past 03/29/2017     History of blood transfusion      Hyperlipidemia LDL goal <100 03/19/2015     Osteoarthritis      Osteopenia      Pulmonary nodules 04/22/2015    Noted 4/14/15, f/u scan 03/2016 showed minimal change, needs repeat scan 03/2017       PAST SURGICAL HISTORY:   has a past surgical history that includes tubal ligation; tonsillectomy; Cholecystectomy; GI surgery; gastric bypass; orthopedic surgery; Arthroplasty hip (9/23/2013); angiogram (6/15/05); Arthroplasty hip (Left, 5/19/2015); colonoscopy; Colonoscopy (Left, 4/19/2016); Arthroplasty knee (Left, 8/22/2016); and Apply external fixator lower extremity (Left, 5/3/2025).  FAMILY HISTORY: family history includes Breast Cancer (age of onset: 35) in her maternal aunt; Coronary Artery Disease in her brother, father, and sister; Heart Disease in her nephew; Hypertension in her brother, father, and sister; Other Cancer in her maternal half-sister.  SOCIAL HISTORY:   reports that she quit smoking about 12 years ago. Her smoking use included cigarettes. She started smoking about 62 years ago. She has a 25 pack-year smoking history. She has never used smokeless tobacco. She reports current alcohol use. She reports that she does not use drugs.  Patient's living condition: lives with spouse    Post Discharge Medication Reconciliation Status:   MED REC REQUIRED  Post Medication Reconciliation Status: discharge medications reconciled and changed, per note/orders       Current Outpatient Medications   Medication Sig Dispense Refill     acetaminophen (TYLENOL) 500 MG tablet Take 2  tablets (1,000 mg) by mouth 3 times daily.       albuterol (PROAIR HFA/PROVENTIL HFA/VENTOLIN HFA) 108 (90 Base) MCG/ACT inhaler Inhale 2 puffs into the lungs every 2 hours as needed for shortness of breath, wheezing or cough.       amLODIPine (NORVASC) 10 MG tablet Take 1 tablet (10 mg) by mouth daily.       aspirin (ASA) 81 MG EC tablet Take 1 tablet (81 mg) by mouth 2 times daily.       atenolol (TENORMIN) 50 MG tablet Take 1 tablet (50 mg) by mouth daily.       atorvastatin (LIPITOR) 40 MG tablet Take 1 tablet (40 mg) by mouth at bedtime.       buPROPion (WELLBUTRIN SR) 150 MG 12 hr tablet Take 1 tablet (150 mg) by mouth 2 times daily.       diclofenac (VOLTAREN) 1 % topical gel Apply 2 g topically 3 times daily.       DULoxetine (CYMBALTA) 60 MG capsule Take 1 capsule (60 mg) by mouth daily.       guaiFENesin (ROBITUSSIN) 20 mg/mL liquid Take 10 mLs (200 mg) by mouth every 6 hours as needed for cough.       hydrALAZINE (APRESOLINE) 100 MG tablet Take 1 tablet (100 mg) by mouth 3 times daily.       hydrochlorothiazide (HYDRODIURIL) 25 MG tablet Take 1 tablet (25 mg) by mouth daily.       HYDROmorphone (DILAUDID) 2 MG tablet Take 1 tablet (2 mg) by mouth every 6 hours as needed for severe pain. 15 tablet 0     hydrOXYzine HCl (ATARAX) 10 MG tablet Take 1 tablet (10 mg) by mouth every 6 hours as needed for other (adjuvant pain).       lisinopril (ZESTRIL) 40 MG tablet Take 1 tablet (40 mg) by mouth daily.       loratadine (CLARITIN) 10 MG tablet Take 1 tablet (10 mg) by mouth daily.       multivitamin w/minerals (THERA-VIT-M) tablet Take 1 tablet by mouth daily.       omeprazole (PRILOSEC) 40 MG DR capsule Take 1 capsule (40 mg) by mouth daily. Take 30-60 minutes before a meal.       ondansetron (ZOFRAN ODT) 4 MG ODT tab Take 1 tablet (4 mg) by mouth every 6 hours as needed for nausea.       oxyCODONE (ROXICODONE) 5 MG tablet Take 1 tablet (5 mg) by mouth every 4 hours as needed for moderate pain.        "polyethylene glycol (MIRALAX) 17 GM/Dose powder Take 17 g by mouth daily.       senna-docusate (SENOKOT-S/PERICOLACE) 8.6-50 MG tablet Take 1 tablet by mouth 2 times daily as needed for constipation.       traZODone (DESYREL) 100 MG tablet Take 1-2 tablets (100-200 mg) by mouth at bedtime.       No current facility-administered medications for this visit.       ROS:  10 point ROS of systems including Constitutional, Eyes, Respiratory, Cardiovascular, Gastroenterology, Genitourinary, Integumentary, Musculoskeletal, Psychiatric were all negative except for pertinent positives noted in my HPI.    Vitals:  /73   Pulse 87   Temp 97.4  F (36.3  C)   Resp 18   Ht 1.626 m (5' 4\")   Wt 78.9 kg (174 lb)   LMP  (LMP Unknown)   SpO2 99%   BMI 29.87 kg/m    Exam:  Physical Exam  Vitals (Facility EMR) reviewed.   Constitutional:       General: She is not in acute distress.  HENT:      Head: Normocephalic and atraumatic.   Eyes:      General: No scleral icterus.  Cardiovascular:      Rate and Rhythm: Normal rate and regular rhythm.   Pulmonary:      Effort: Pulmonary effort is normal.   Musculoskeletal:      Comments: Left lower extremity in splint.  CMS intact.   Skin:     General: Skin is warm and dry.      Findings: No rash.   Neurological:      Mental Status: She is alert. Mental status is at baseline.   Psychiatric:         Behavior: Behavior normal.         Lab/Diagnostic data:  Recent labs in [facility] reviewed by me today.  and Most Recent 3 CBC's:  Recent Labs   Lab Test 05/16/25  0957 05/12/25  0623 05/05/25  2130 05/04/25  0748   WBC  --  8.6 7.8 8.0   HGB 10.3* 10.7* 9.9* 9.2*   MCV 96 99 94 95   PLT  --  355 236 178     Most Recent 3 BMP's:  Recent Labs   Lab Test 05/16/25  0740 05/05/25  0643 05/04/25  0650 05/03/25  0400 05/02/25  0727   NA  --   --  140 141 135   POTASSIUM  --  4.1 4.2 4.3 4.6   CHLORIDE  --   --  107 105 100   CO2  --   --  24 24 24   BUN  --   --  10.2 11.7 19.7   CR  --   --  0.83 0.95 " 1.05*   ANIONGAP  --   --  9 12 11   HATTIE  --   --  8.7* 8.5* 9.2   * 92 94 107* 123*     Most Recent 2 LFT's:  Recent Labs   Lab Test 06/03/24  1055 05/26/23  1109 10/24/18  0935 05/24/18  1205 05/02/17  1424   AST  --   --   --  43 30   ALT 35 35   < > 58* 50   ALKPHOS  --   --   --  53 60   BILITOTAL  --   --   --  0.4 0.5    < > = values in this interval not displayed.       ASSESSMENT/PLAN:  Left trimalleolar fracture dislocation status post external fixation 5/3/25  S/p ORIF 5/15/25  Pain control with scheduled Tylenol  Adjust hydromorphone to 2 mg scheduled morning and at bedtime.  May also take 2 mg twice daily as needed  Discontinue hydroxyzine  NWB  PT/OT  Aspirin twice daily for DVT prophylaxis  Orthopedic follow-up 5/30/2025      Subacute cough, improved  History of smoking: Notes cough since prior to hospitalization.  Occasionally uses albuterol inhaler at home.  No diagnosis of COPD and no obvious evidence on CT scan 7/2024.  Does have a history of smoking as well as secondhand smoke exposure.    Improved with addition of loratadine  Continue as needed albuterol and guaifenesin        History of rotator cuff tear: C/o increased shoulder pain with walker usage and NWB on LLE  Pain control as above  Diclofenac gel    ABLA:, Improving  Continue aspirin twice daily  CBC 5/12 improved to 10.7  Repeat CBC 5/27    Possible syncope  HTN: Notes BP medications have been adjusted due to elevated BPs since her son's death.  TTE with normal EF and no wall motion abnormalities.  Continue regimen of amlodipine, lisinopril, hydralazine and atenolol  Monitor for hypotension. BP appropiate  Zio patch pending.  Patient indicates she has not received this at home per her spouse.  Will reorder.    E. coli UTI, resolved: Status post 3 days ceftriaxone during hospitalization  Monitor off antibiotics    Anxiety  Depression  Grief: Son passed away in the past year.  Continue duloxetine and bupropion  Continue trazodone  at at bedtime.      GERD  Continue omeprazole    This note was completed in part using Dragon voice recognition software. Although reviewed after completion, some word and grammatical errors may occur.        Electronically signed by:  Rosanna Luong PA-C                    Sincerely,        Rosanna Luong PA-C    Electronically signed

## 2025-05-19 NOTE — PROGRESS NOTES
[facility]    Background: Transitional Care Management program identified per system criteria and reviewed by [facility] team for possible outreach.    Assessment: Upon chart review, [facility] Team member will not proceed with patient outreach related to this episode of Transitional Care Management program due to reason below:    Patient has a follow up appointment with an appropriate provider today for hospital discharge    Plan: Transitional Care Management episode addressed appropriately per reason noted above.      LORENZO Scott  [facility]    *Connected Care Resource Team does NOT follow patient ongoing. Referrals are identified based on internal discharge reports and the outreach is to ensure patient has an understanding of their discharge instructions.

## 2025-05-19 NOTE — PROGRESS NOTES
"[facility] GERIATRICS    PRIMARY CARE PROVIDER AND CLINIC:  Christiano Taylor MD, [place] [address] [facility] / [facility] [address]  Chief Complaint   Patient presents with    Hospital F/U      [place] Medical Record Number:  [id number]  Place of Service where encounter took place:  [facility] ([facility]) [[id number]]    Monse Peoples  is a 79 year old  (1945), admitted to the above facility from  [facility]. Hospital stay 5/15/25 through 5/17/25..   HPI:    Notes from hospitalization reviewed including H&P  and D/c summary    Monse Peoples is a very pleasant 71-year-old female with a past medical history of hypertension, CAD, anxiety, GERD with 2 recent hospitalizations at [facility]    **5/2 - 5/5/2025: Presented for evaluation after a fall/concern for syncope.  Found to have left trimalleolar fracture with associated dislocation.  Underwent external fixation.  Discharged to [facility]    **5/15 - 5/17/2025: Readmitted to the hospital for planned ORIF of left trimalleolar fracture.  Perioperative course uncomplicated.  Now discharged back to [facility]    Aaliyah is overall doing well.  Does have a bit more pain in her left foot compared to prior to surgery.  Would like to take hydromorphone scheduled in the morning and prior to bedtime.  No constipation.  Cough has improved.  Family has been checking the mail and no Zio patch has arrived.          CODE STATUS/ADVANCE DIRECTIVES DISCUSSION:  Prior  CPR/Full code   ALLERGIES:   Allergies   Allergen Reactions    Codeine Other (See Comments), Nausea and Vomiting and Unknown     hyperactivity    Contrast Dye Unknown    Morphine Unknown     Pt states as of 5/15/25 that she does not think she has an allergy to morphine.    Morphine Sulfate (Concentrate)      does not like side effects/\"hyper\"      PAST MEDICAL HISTORY:   Past Medical History:   Diagnosis Date    Anxiety 04/02/2015    Asthma     no inhaler for 2-3 years    " Benign essential hypertension 08/02/2016    Chest pain, unspecified type 08/27/2017    Coronary artery disease involving native coronary artery of native heart without angina pectoris 08/02/2016    Depression     Esophageal reflux 03/19/2015    Former smoker - has had pulmonary nodules in the past 03/29/2017    History of blood transfusion     Hyperlipidemia LDL goal <100 03/19/2015    Osteoarthritis     Osteopenia     Pulmonary nodules 04/22/2015    Noted 4/14/15, f/u scan 03/2016 showed minimal change, needs repeat scan 03/2017       PAST SURGICAL HISTORY:   has a past surgical history that includes tubal ligation; tonsillectomy; Cholecystectomy; GI surgery; gastric bypass; orthopedic surgery; Arthroplasty hip (9/23/2013); angiogram (6/15/05); Arthroplasty hip (Left, 5/19/2015); colonoscopy; Colonoscopy (Left, 4/19/2016); Arthroplasty knee (Left, 8/22/2016); and Apply external fixator lower extremity (Left, 5/3/2025).  FAMILY HISTORY: family history includes Breast Cancer (age of onset: 35) in her maternal aunt; Coronary Artery Disease in her brother, father, and sister; Heart Disease in her nephew; Hypertension in her brother, father, and sister; Other Cancer in her maternal half-sister.  SOCIAL HISTORY:   reports that she quit smoking about 12 years ago. Her smoking use included cigarettes. She started smoking about 62 years ago. She has a 25 pack-year smoking history. She has never used smokeless tobacco. She reports current alcohol use. She reports that she does not use drugs.  Patient's living condition: lives with spouse    Post Discharge Medication Reconciliation Status:   MED REC REQUIRED  Post Medication Reconciliation Status: discharge medications reconciled and changed, per note/orders       Current Outpatient Medications   Medication Sig Dispense Refill    acetaminophen (TYLENOL) 500 MG tablet Take 2 tablets (1,000 mg) by mouth 3 times daily.      albuterol (PROAIR HFA/PROVENTIL HFA/VENTOLIN HFA) 108  (90 Base) MCG/ACT inhaler Inhale 2 puffs into the lungs every 2 hours as needed for shortness of breath, wheezing or cough.      amLODIPine (NORVASC) 10 MG tablet Take 1 tablet (10 mg) by mouth daily.      aspirin (ASA) 81 MG EC tablet Take 1 tablet (81 mg) by mouth 2 times daily.      atenolol (TENORMIN) 50 MG tablet Take 1 tablet (50 mg) by mouth daily.      atorvastatin (LIPITOR) 40 MG tablet Take 1 tablet (40 mg) by mouth at bedtime.      buPROPion (WELLBUTRIN SR) 150 MG 12 hr tablet Take 1 tablet (150 mg) by mouth 2 times daily.      diclofenac (VOLTAREN) 1 % topical gel Apply 2 g topically 3 times daily.      DULoxetine (CYMBALTA) 60 MG capsule Take 1 capsule (60 mg) by mouth daily.      guaiFENesin (ROBITUSSIN) 20 mg/mL liquid Take 10 mLs (200 mg) by mouth every 6 hours as needed for cough.      hydrALAZINE (APRESOLINE) 100 MG tablet Take 1 tablet (100 mg) by mouth 3 times daily.      hydrochlorothiazide (HYDRODIURIL) 25 MG tablet Take 1 tablet (25 mg) by mouth daily.      HYDROmorphone (DILAUDID) 2 MG tablet Take 1 tablet (2 mg) by mouth every 6 hours as needed for severe pain. 15 tablet 0    hydrOXYzine HCl (ATARAX) 10 MG tablet Take 1 tablet (10 mg) by mouth every 6 hours as needed for other (adjuvant pain).      lisinopril (ZESTRIL) 40 MG tablet Take 1 tablet (40 mg) by mouth daily.      loratadine (CLARITIN) 10 MG tablet Take 1 tablet (10 mg) by mouth daily.      multivitamin w/minerals (THERA-VIT-M) tablet Take 1 tablet by mouth daily.      omeprazole (PRILOSEC) 40 MG DR capsule Take 1 capsule (40 mg) by mouth daily. Take 30-60 minutes before a meal.      ondansetron (ZOFRAN ODT) 4 MG ODT tab Take 1 tablet (4 mg) by mouth every 6 hours as needed for nausea.      oxyCODONE (ROXICODONE) 5 MG tablet Take 1 tablet (5 mg) by mouth every 4 hours as needed for moderate pain.      polyethylene glycol (MIRALAX) 17 GM/Dose powder Take 17 g by mouth daily.      senna-docusate (SENOKOT-S/PERICOLACE) 8.6-50 MG  "tablet Take 1 tablet by mouth 2 times daily as needed for constipation.      traZODone (DESYREL) 100 MG tablet Take 1-2 tablets (100-200 mg) by mouth at bedtime.       No current facility-administered medications for this visit.       ROS:  10 point ROS of systems including Constitutional, Eyes, Respiratory, Cardiovascular, Gastroenterology, Genitourinary, Integumentary, Musculoskeletal, Psychiatric were all negative except for pertinent positives noted in my HPI.    Vitals:  /73   Pulse 87   Temp 97.4  F (36.3  C)   Resp 18   Ht 1.626 m (5' 4\")   Wt 78.9 kg (174 lb)   LMP  (LMP Unknown)   SpO2 99%   BMI 29.87 kg/m    Exam:  Physical Exam  Vitals (Facility EMR) reviewed.   Constitutional:       General: She is not in acute distress.  HENT:      Head: Normocephalic and atraumatic.   Eyes:      General: No scleral icterus.  Cardiovascular:      Rate and Rhythm: Normal rate and regular rhythm.   Pulmonary:      Effort: Pulmonary effort is normal.   Musculoskeletal:      Comments: Left lower extremity in splint.  CMS intact.   Skin:     General: Skin is warm and dry.      Findings: No rash.   Neurological:      Mental Status: She is alert. Mental status is at baseline.   Psychiatric:         Behavior: Behavior normal.         Lab/Diagnostic data:  Recent labs in [facility] reviewed by me today.  and Most Recent 3 CBC's:  Recent Labs   Lab Test 05/16/25  0957 05/12/25  0623 05/05/25  2130 05/04/25  0748   WBC  --  8.6 7.8 8.0   HGB 10.3* 10.7* 9.9* 9.2*   MCV 96 99 94 95   PLT  --  355 236 178     Most Recent 3 BMP's:  Recent Labs   Lab Test 05/16/25  0740 05/05/25  0643 05/04/25  0650 05/03/25  0400 05/02/25  0727   NA  --   --  140 141 135   POTASSIUM  --  4.1 4.2 4.3 4.6   CHLORIDE  --   --  107 105 100   CO2  --   --  24 24 24   BUN  --   --  10.2 11.7 19.7   CR  --   --  0.83 0.95 1.05*   ANIONGAP  --   --  9 12 11   HATTIE  --   --  8.7* 8.5* 9.2   * 92 94 107* 123*     Most Recent 2 LFT's:  Recent Labs "   Lab Test 06/03/24  1055 05/26/23  1109 10/24/18  0935 05/24/18  1205 05/02/17  1424   AST  --   --   --  43 30   ALT 35 35   < > 58* 50   ALKPHOS  --   --   --  53 60   BILITOTAL  --   --   --  0.4 0.5    < > = values in this interval not displayed.       ASSESSMENT/PLAN:  Left trimalleolar fracture dislocation status post external fixation 5/3/25  S/p ORIF 5/15/25  Pain control with scheduled Tylenol  Adjust hydromorphone to 2 mg scheduled morning and at bedtime.  May also take 2 mg twice daily as needed  Discontinue hydroxyzine  NWB  PT/OT  Aspirin twice daily for DVT prophylaxis  Orthopedic follow-up 5/30/2025      Subacute cough, improved  History of smoking: Notes cough since prior to hospitalization.  Occasionally uses albuterol inhaler at home.  No diagnosis of COPD and no obvious evidence on CT scan 7/2024.  Does have a history of smoking as well as secondhand smoke exposure.    Improved with addition of loratadine  Continue as needed albuterol and guaifenesin        History of rotator cuff tear: C/o increased shoulder pain with walker usage and NWB on LLE  Pain control as above  Diclofenac gel    ABLA:, Improving  Continue aspirin twice daily  CBC 5/12 improved to 10.7  Repeat CBC 5/27    Possible syncope  HTN: Notes BP medications have been adjusted due to elevated BPs since her son's death.  TTE with normal EF and no wall motion abnormalities.  Continue regimen of amlodipine, lisinopril, hydralazine and atenolol  Monitor for hypotension. BP appropiate  Zio patch pending.  Patient indicates she has not received this at home per her spouse.  Will reorder.    E. coli UTI, resolved: Status post 3 days ceftriaxone during hospitalization  Monitor off antibiotics    Anxiety  Depression  Grief: Son passed away in the past year.  Continue duloxetine and bupropion  Continue trazodone at at bedtime.      GERD  Continue omeprazole    This note was completed in part using Dragon voice recognition software. Although  reviewed after completion, some word and grammatical errors may occur.        Electronically signed by:  Rosanna Luong PA-C

## 2025-05-20 ENCOUNTER — LAB REQUISITION (OUTPATIENT)
Dept: LAB | Facility: CLINIC | Age: 80
End: 2025-05-20
Payer: COMMERCIAL

## 2025-05-20 DIAGNOSIS — D50.9 IRON DEFICIENCY ANEMIA, UNSPECIFIED: ICD-10-CM

## 2025-05-21 ENCOUNTER — TRANSITIONAL CARE UNIT VISIT (OUTPATIENT)
Dept: GERIATRICS | Facility: CLINIC | Age: 80
End: 2025-05-21
Payer: COMMERCIAL

## 2025-05-21 VITALS
TEMPERATURE: 98.6 F | RESPIRATION RATE: 18 BRPM | HEART RATE: 88 BPM | HEIGHT: 64 IN | OXYGEN SATURATION: 93 % | DIASTOLIC BLOOD PRESSURE: 69 MMHG | BODY MASS INDEX: 29.71 KG/M2 | WEIGHT: 174 LBS | SYSTOLIC BLOOD PRESSURE: 149 MMHG

## 2025-05-21 DIAGNOSIS — F33.1 MODERATE RECURRENT MAJOR DEPRESSION (H): ICD-10-CM

## 2025-05-21 DIAGNOSIS — R41.89 COGNITIVE IMPAIRMENT: ICD-10-CM

## 2025-05-21 DIAGNOSIS — F43.23 ADJUSTMENT DISORDER WITH MIXED ANXIETY AND DEPRESSED MOOD: ICD-10-CM

## 2025-05-21 DIAGNOSIS — S82.852D CLOSED TRIMALLEOLAR FRACTURE OF LEFT ANKLE WITH ROUTINE HEALING, SUBSEQUENT ENCOUNTER: ICD-10-CM

## 2025-05-21 DIAGNOSIS — G47.00 INSOMNIA, UNSPECIFIED TYPE: ICD-10-CM

## 2025-05-21 DIAGNOSIS — I10 BENIGN ESSENTIAL HYPERTENSION: ICD-10-CM
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
## 2025-05-21 RX ORDER — TRAZODONE HYDROCHLORIDE 100 MG/1
100 TABLET ORAL AT BEDTIME
COMMUNITY

## 2025-05-21 NOTE — LETTER
5/21/2025      Monse Peoples  [id number]  [facility] [id number]        [place] [facility]  PHYSICIAN NOTE    PRIMARY CARE PROVIDER AND CLINIC:  Christiano Taylor MD, [address] [place] [address] [facility] / [facility] [address]*    Chief Complaint   Patient presents with     Hospital F/U     [place] Medical Record Number:  [id number]  Place of Service where encounter took place:  [facility] ([facility]) [[id number]]    Monse Peoples is a 79 year old (1945), admitted to the above facility from  [facility]. Hospital stay 5/2/25 through 5/5/25. Admitted to this facility for  rehab, medical management, and nursing care.     HPI:    HPI information obtained from: facility chart records, facility staff, patient report, [facility] chart review, and Care Everywhere [facility] chart review.     Brief summary of hospital course: Monse Peoples is a 79yoF admitted after a fall at home sustaining a left ankle trimalleolar fracture. Had application of external fixation. Etiology of fall perhaps r/t vasovagal syncope; does have significant hypertensive history as well as family h/o CAD. Tele reportedly normal in the hospital but Zio recommended as outpatient in abundance of caution; echo satisfactory too. Treated as well for UTI. Appeared quite anxious with known h/o depression/anxiety per hospital notes and grieving loss of son last fall. Discharged to [facility] for rehab/care with ex fix in place and future planned ORIF left ankle.    Updates on status since skilled nursing admission: Readmitted to the hospital 5/15/25 for elective removal of external fixator ankle and ORIF surgery. Returned to TCU without complications on 5/17/25.    Recent vitals: Afebrile, -140/60-70, HR 70-90 and weight stable in 170s#. Cognitive scores: SLUMS 21/30 and CPT 5.0/5.6 prior to her repeat surgery. Remains NWB LLE and to keep short splint in place until follow up with [facility] as scheduled on  5/30/25.    Monse is seen sitting up on the edge of the bed today in her room and welcomes a visit.  You can feel the anxiety in her voice and she admits to it as well.  She says it has been very challenging to continue to need the care and the support here at a facility.  Agrees that the therapy staff have been wonderful but of course wishes she could be home.  Feels bored in between therapies.  Admits that she has history of depression and anxiety which understandably had been worse after her son passed away last fall.  Had been working with a therapist but then felt they had gotten to a good place and was able to discharge from therapy just prior to her fall and admission recently.  Offered onsite ACP which she declines for now.  Is aware that she is on Wellbutrin and Cymbalta but says those are old medications and eventually she would like to see a psychiatrist to consider an adjustment.  She was on Klonopin as well as Ativan as needed in the past but we talked about how those would interact with her opiates and she respects that.  I mentioned we could try something like BuSpar which it does not look like she has tried in the past but she'd like to hold off on that as well.  She does take trazodone as well at nighttime and would like me to time it closer to 10 PM so that she does not get it earlier in the evening and ruin the rest of her evening (ex: 7 PM is too early for her).  We talked about her pain control regimen which includes scheduled Dilaudid 2 mg twice a day in a.m. and p.m. with a twice daily as needed dose in between that she is utilizing very sparingly.  She also has as needed oxycodone which she has used a couple of times in the past week but also very sparingly.  I asked if she could tell the difference between the 2 different opiates and how they affect her.  She says she admits she has never really tried to gauge it but then goes on to describe different feelings from each.  She does feel the  "Dilaudid makes her slightly more sleepy as well as it takes off the edge of some of her anxious feelings as it calms her brain.  She thinks though the Dilaudid does help more with her postsurgical pain.  Seems to not be bothered by taking Dilaudid in AM as able to stay awake with stimulation of therapy. Alternatively in some respects she does not think the oxycodone is very helpful and in other respects thinks that the oxycodone helps other joint aches and pains that are non-surgical.  Currently she is requesting an oxycodone from her aide during my visit due to pain in her right knee and hip as she is using that leg to help with transfers since she is nonweightbearing on the left.  She is very anxious about her blood pressure and says that she struggled with it since she was in her 30s.  I mention that her blood pressures really are quite satisfactory with systolics ranging 110-140 but she perceives that it would be better for her if they were in the 110-120 range.  We talked about allowing a little bit of permissive hypertension to reduce risk of dizziness, syncope or falls.  She really wants to try to go home as soon as possible.  She lives in a 1 level [facility] with her  and feels that the doorways are wide enough for a wheelchair.  However, she admits she does not have a wheelchair yet.  She also mentions her perception is that her  is \"scared for her\" in terms of returning home this soon.  She also has not practiced transferring into a private vehicle and does have an appointment with her orthopedist on May 30.  She is aware that she would likely still continue to be nonweightbearing on the left even after the May 30 [facility] appointment.  She says that she got fed up with waiting for staff and decided to learn how to self transfer as carefully as she could with a pivot on her right leg into a wheelchair to get into the bathroom.  She admits therapy was a little anxious about this but she is " "continuing to do it nonetheless.    CODE STATUS/ADVANCE DIRECTIVES DISCUSSION:   CPR/Full code   Patient's living condition: lives with spouse    ALLERGIES: Codeine, Contrast dye, Morphine, and Morphine sulfate (concentrate)    Past Medical History:   Diagnosis Date     Anxiety 04/02/2015     Asthma     no inhaler for 2-3 years     Benign essential hypertension 08/02/2016     Chest pain, unspecified type 08/27/2017     Coronary artery disease involving native coronary artery of native heart without angina pectoris 08/02/2016     Depression      Esophageal reflux 03/19/2015     Former smoker - has had pulmonary nodules in the past 03/29/2017     History of blood transfusion      Hyperlipidemia LDL goal <100 03/19/2015     Osteoarthritis      Osteopenia      Pulmonary nodules 04/22/2015    Noted 4/14/15, f/u scan 03/2016 showed minimal change, needs repeat scan 03/2017       Past Surgical History:   Procedure Laterality Date     ANGIOGRAM  6/15/05     APPLY EXTERNAL FIXATOR LOWER EXTREMITY Left 5/3/2025    Procedure: APPLICATION EXTERNAL FIXATION, LOWER EXTREMITY, ANKLE, Left - Ankle;  Surgeon: Dillon Lares MD;  Location:  OR     ARTHROPLASTY HIP  9/23/2013    Procedure: ARTHROPLASTY HIP;  Right total hip arthroplasty       ARTHROPLASTY HIP Left 5/19/2015    Procedure: ARTHROPLASTY HIP;  Surgeon: Tyrese Howell MD;  Location:  OR     ARTHROPLASTY KNEE Left 8/22/2016    Procedure: ARTHROPLASTY KNEE;  Surgeon: Tyrese Howell MD;  Location:  OR     CHOLECYSTECTOMY       COLONOSCOPY       COLONOSCOPY Left 4/19/2016    Procedure: COLONOSCOPY;  Surgeon: Moe Haney MD;  Location:  GI     GASTRIC BYPASS      2011 tiff en y     GI SURGERY      fistulotomy     OPEN REDUCTION INTERNAL FIXATION ANKLE Left 5/15/2025    Procedure: Open reduction internal fixation left trimalleolar ankle fracture;  Surgeon: Dillon Lares MD;  Location:  OR     ORTHOPEDIC SURGERY      left knee \"green " "procedure\"     REMOVE EXTERNAL FIXATOR LOWER EXTREMITY Left 5/15/2025    Procedure: Removal of external fixator left ankle;  Surgeon: Dillon Lares MD;  Location: [facility] OR     TONSILLECTOMY       TUBAL LIGATION           Post-discharge medication reconciliation status: Reviewed and updated in [facility] according to facility MAR    Current Outpatient Medications   Medication Sig Dispense Refill     acetaminophen (TYLENOL) 500 MG tablet Take 2 tablets (1,000 mg) by mouth 3 times daily.       amLODIPine (NORVASC) 10 MG tablet Take 1 tablet (10 mg) by mouth daily.       aspirin (ASA) 81 MG EC tablet Take 1 tablet (81 mg) by mouth 2 times daily.       atenolol (TENORMIN) 50 MG tablet Take 1 tablet (50 mg) by mouth daily.       atorvastatin (LIPITOR) 40 MG tablet Take 1 tablet (40 mg) by mouth at bedtime.       buPROPion (WELLBUTRIN SR) 150 MG 12 hr tablet Take 1 tablet (150 mg) by mouth 2 times daily.       diclofenac (VOLTAREN) 1 % topical gel Apply 2 g topically 3 times daily.       DULoxetine (CYMBALTA) 60 MG capsule Take 1 capsule (60 mg) by mouth daily.       guaiFENesin (ROBITUSSIN) 20 mg/mL liquid Take 10 mLs (200 mg) by mouth every 6 hours as needed for cough.       hydrALAZINE (APRESOLINE) 100 MG tablet Take 1 tablet (100 mg) by mouth 3 times daily.       hydrochlorothiazide (HYDRODIURIL) 25 MG tablet Take 1 tablet (25 mg) by mouth daily.       lisinopril (ZESTRIL) 40 MG tablet Take 1 tablet (40 mg) by mouth daily.       loratadine (CLARITIN) 10 MG tablet Take 1 tablet (10 mg) by mouth daily.       multivitamin w/minerals (THERA-VIT-M) tablet Take 1 tablet by mouth daily.       omeprazole (PRILOSEC) 40 MG DR capsule Take 1 capsule (40 mg) by mouth daily. Take 30-60 minutes before a meal.       polyethylene glycol (MIRALAX) 17 GM/Dose powder Take 17 g by mouth daily.       traZODone (DESYREL) 100 MG tablet Take 100 mg by mouth at bedtime.       albuterol (PROAIR HFA/PROVENTIL HFA/VENTOLIN HFA) 108 (90 Base) " "MCG/ACT inhaler Inhale 2 puffs into the lungs every 2 hours as needed for shortness of breath, wheezing or cough.       HYDROmorphone (DILAUDID) 2 MG tablet Take 1 tablet (2 mg) by mouth 2 times daily. May also take 1 tablet (2 mg) 2 times daily as needed for severe pain. 5 tablet 0     ondansetron (ZOFRAN ODT) 4 MG ODT tab Take 1 tablet (4 mg) by mouth every 6 hours as needed for nausea.       oxyCODONE (ROXICODONE) 5 MG tablet Take 1 tablet (5 mg) by mouth every 4 hours as needed for moderate pain.       senna-docusate (SENOKOT-S/PERICOLACE) 8.6-50 MG tablet Take 1 tablet by mouth 2 times daily as needed for constipation.         ROS:  4 point ROS including Respiratory, CV, GI and , other than that noted in the HPI,  is negative    Exam:  BP (!) 149/69   Pulse 88   Temp 98.6  F (37  C)   Resp 18   Ht 1.626 m (5' 4\")   Wt 78.9 kg (174 lb)   LMP  (LMP Unknown)   SpO2 93%   BMI 29.87 kg/m    Alert, casually dressed, sitting up on the edge of the bed  No scleral icterus  Moist oral mucosa  Heart tones regular at this time with no murmur rub or gallop  Breathing unlabored on room air, no cough  Abdomen nonobese  No right lower extremity edema  Left lower extremity with well-healed left knee incision and distally intact and clean short splint in place  Left toes visible without discoloration  Very anxious disposition and perseverative on some topics such as her anxiety and her blood pressure    Lab/Diagnostic data:  Most Recent 3 CBC's:  Recent Labs   Lab Test 05/16/25  0957 05/12/25  0623 05/05/25  2130 05/04/25  0748   WBC  --  8.6 7.8 8.0   HGB 10.3* 10.7* 9.9* 9.2*   MCV 96 99 94 95   PLT  --  355 236 178     Most Recent 3 BMP's:  Recent Labs   Lab Test 05/16/25  0740 05/05/25  0643 05/04/25  0650 05/03/25  0400 05/02/25  0727   NA  --   --  140 141 135   POTASSIUM  --  4.1 4.2 4.3 4.6   CHLORIDE  --   --  107 105 100   CO2  --   --  24 24 24   BUN  --   --  10.2 11.7 19.7   CR  --   --  0.83 0.95 1.05* "   ANIONGAP  --   --  9 12 11   HATTIE  --   --  8.7* 8.5* 9.2   * 92 94 107* 123*     Most Recent 2 LFT's:  Recent Labs   Lab Test 06/03/24  1055 05/26/23  1109 10/24/18  0935 05/24/18  1205 05/02/17  1424   AST  --   --   --  43 30   ALT 35 35   < > 58* 50   ALKPHOS  --   --   --  53 60   BILITOTAL  --   --   --  0.4 0.5    < > = values in this interval not displayed.     Most Recent TSH and T4:  Recent Labs   Lab Test 07/07/24  1539   TSH 0.30     Most Recent Hemoglobin A1c:No lab results found.    ASSESSMENT/PLAN:  Fall, subsequent encounter  Closed trimalleolar fracture of left ankle with routine healing, subsequent encounter  Has completed 2 surgeries now  Has left lower extremity in short splint to remain in place until her [facility] follow-up scheduled on May 30  We talked extensively about pain control especially given she has both Oxycodone and Dilaudid available  She is able to verbalize different reasons for taking each one although admittedly some of the reasons she takes the pain medications are not specifically for pain but rather for mood symptoms  See more detailed information below regarding her anxiety/depression  I asked her to continue to kind of monitor the effects of her medications so that hopefully prior to discharge we can settle in on 1 opiate therapy and she thought that was reasonable as well  Thankfully the PRN Dilaudid and Oxycodone are used sparingly; remains on the BID scheduled Dilaudid as well  Continue aspirin 81 mg twice daily until 6/26 for VTE prophylaxis  Follow-up CBC for trend on her ABLA is scheduled for May 27  Continue onsite therapies  Continues to be nonweightbearing on the left lower extremity though thankfully has a pretty strong right side  Appreciate staff support as well as family during this difficult time  Sounds like she will need a wheelchair for home most likely    Adjustment disorder with mixed anxiety and depressed mood  Moderate recurrent major depression  (H)  Cognitive impairment  Insomnia, unspecified type  Spent a lot of time going over her understandable stressors at this time with acute symptoms on top of chronic history of depression and anxiety  Also has slightly low cognitive scores (SLUMS 21/30 and CPT 5.0/5.6) which may contribute that need to follow trend as an outpatient as could be related to her opiate therapy vs other underlying disease process  Ultimately she felt she would like to continue on her Cymbalta and her Wellbutrin  Offered BuSpar but she would like to consider that and was not really interested in adding additional medications at this time  I told her benzodiazepines would not be preferred given that she is on 2 opiates at this time and she understood  Glad she has family checking in on her and being supportive  I offered onsite ACP psychotherapy but she declined this as well  I did discuss about taking some deep breaths to see if that can help when she feels anxious and she says that she does that from time to time as well; daughter also brought in a journal for her  I will time the trazodone for 10 PM rather than earlier in the evening to see if that helps as well per her request    Benign essential hypertension   Blood pressures overall satisfactory for age and comorbidities and discussed we do not want hypotension which would risk dizziness and falls as she is recovering from major surgery which she understood but at baseline she admits she prefers tighter control of blood pressure  She says that the [employer] heart monitor is supposed to be sent in the mail either today or tomorrow as they had asked the cardiology team to send it again      Total time spent with patient visit at the [facility] was 55 minutes, including medication/chart review 10:15 - 10:30 AM, patient visit with charting 2:30 - 3:10 PM. Greater than 50% of total time spent with counseling and coordinating care due to her anxiety and review of surgery / pain  control / medications.    Electronically signed by:  Earline Norton, DO      Sincerely,        Earline Norton, DO    Electronically signed

## 2025-05-21 NOTE — PROGRESS NOTES
[facility]  PHYSICIAN NOTE    PRIMARY CARE PROVIDER AND CLINIC:  Christiano Taylor MD, [place] [address] [facility] / [facility] 550*    Chief Complaint   Patient presents with    Hospital F/U     [facility] Medical Record Number:  [id number]  Place of Service where encounter took place:  [facility] ([facility]) [[id number]]    Monse Peoples is a 79 year old (1945), admitted to the above facility from  [facility]. Hospital stay 5/2/25 through 5/5/25. Admitted to this facility for  rehab, medical management, and nursing care.     HPI:    HPI information obtained from: facility chart records, facility staff, patient report, [facility] chart review, and Care Everywhere [facility] chart review.     Brief summary of hospital course: Monse Peoples is a 79yoF admitted after a fall at home sustaining a left ankle trimalleolar fracture. Had application of external fixation. Etiology of fall perhaps r/t vasovagal syncope; does have significant hypertensive history as well as family h/o CAD. Tele reportedly normal in the hospital but Zio recommended as outpatient in abundance of caution; echo satisfactory too. Treated as well for UTI. Appeared quite anxious with known h/o depression/anxiety per hospital notes and grieving loss of son last fall. Discharged to TCU for rehab/care with ex fix in place and future planned ORIF left ankle.    Updates on status since skilled nursing admission: Readmitted to the hospital 5/15/25 for elective removal of external fixator ankle and ORIF surgery. Returned to TCU without complications on 5/17/25.    Recent vitals: Afebrile, -140/60-70, HR 70-90 and weight stable in 170s#. Cognitive scores: SLUMS 21/30 and CPT 5.0/5.6 prior to her repeat surgery. Remains NWB LLE and to keep short splint in place until follow up with [facility] as scheduled on 5/30/25.    Monse is seen sitting up on the edge of the bed today in her room and  welcomes a visit.  You can feel the anxiety in her voice and she admits to it as well.  She says it has been very challenging to continue to need the care and the support here at a facility.  Agrees that the therapy staff have been wonderful but of course wishes she could be home.  Feels bored in between therapies.  Admits that she has history of depression and anxiety which understandably had been worse after her son passed away last fall.  Had been working with a therapist but then felt they had gotten to a good place and was able to discharge from therapy just prior to her fall and admission recently.  Offered onsite ACP which she declines for now.  Is aware that she is on Wellbutrin and Cymbalta but says those are old medications and eventually she would like to see a psychiatrist to consider an adjustment.  She was on Klonopin as well as Ativan as needed in the past but we talked about how those would interact with her opiates and she respects that.  I mentioned we could try something like BuSpar which it does not look like she has tried in the past but she'd like to hold off on that as well.  She does take trazodone as well at nighttime and would like me to time it closer to 10 PM so that she does not get it earlier in the evening and ruin the rest of her evening (ex: 7 PM is too early for her).  We talked about her pain control regimen which includes scheduled Dilaudid 2 mg twice a day in a.m. and p.m. with a twice daily as needed dose in between that she is utilizing very sparingly.  She also has as needed oxycodone which she has used a couple of times in the past week but also very sparingly.  I asked if she could tell the difference between the 2 different opiates and how they affect her.  She says she admits she has never really tried to gauge it but then goes on to describe different feelings from each.  She does feel the Dilaudid makes her slightly more sleepy as well as it takes off the edge of some of  "her anxious feelings as it calms her brain.  She thinks though the Dilaudid does help more with her postsurgical pain.  Seems to not be bothered by taking Dilaudid in AM as able to stay awake with stimulation of therapy. Alternatively in some respects she does not think the oxycodone is very helpful and in other respects thinks that the oxycodone helps other joint aches and pains that are non-surgical.  Currently she is requesting an oxycodone from her aide during my visit due to pain in her right knee and hip as she is using that leg to help with transfers since she is nonweightbearing on the left.  She is very anxious about her blood pressure and says that she struggled with it since she was in her 30s.  I mention that her blood pressures really are quite satisfactory with systolics ranging 110-140 but she perceives that it would be better for her if they were in the 110-120 range.  We talked about allowing a little bit of permissive hypertension to reduce risk of dizziness, syncope or falls.  She really wants to try to go home as soon as possible.  She lives in a  level [facility] with her  and feels that the doorways are wide enough for a wheelchair.  However, she admits she does not have a wheelchair yet.  She also mentions her perception is that her  is \"scared for her\" in terms of returning home this soon.  She also has not practiced transferring into a private vehicle and does have an appointment with her orthopedist on May 30.  She is aware that she would likely still continue to be nonweightbearing on the left even after the May 30 [facility] appointment.  She says that she got fed up with waiting for staff and decided to learn how to self transfer as carefully as she could with a pivot on her right leg into a wheelchair to get into the bathroom.  She admits therapy was a little anxious about this but she is continuing to do it nonetheless.    CODE STATUS/ADVANCE DIRECTIVES DISCUSSION:   CPR/Full " "code   Patient's living condition: lives with spouse    ALLERGIES: Codeine, Contrast dye, Morphine, and Morphine sulfate (concentrate)    Past Medical History:   Diagnosis Date    Anxiety 04/02/2015    Asthma     no inhaler for 2-3 years    Benign essential hypertension 08/02/2016    Chest pain, unspecified type 08/27/2017    Coronary artery disease involving native coronary artery of native heart without angina pectoris 08/02/2016    Depression     Esophageal reflux 03/19/2015    Former smoker - has had pulmonary nodules in the past 03/29/2017    History of blood transfusion     Hyperlipidemia LDL goal <100 03/19/2015    Osteoarthritis     Osteopenia     Pulmonary nodules 04/22/2015    Noted 4/14/15, f/u scan 03/2016 showed minimal change, needs repeat scan 03/2017       Past Surgical History:   Procedure Laterality Date    ANGIOGRAM  6/15/05    APPLY EXTERNAL FIXATOR LOWER EXTREMITY Left 5/3/2025    Procedure: APPLICATION EXTERNAL FIXATION, LOWER EXTREMITY, ANKLE, Left - Ankle;  Surgeon: Dillon Lares MD;  Location: [facility] OR    ARTHROPLASTY HIP  9/23/2013    Procedure: ARTHROPLASTY HIP;  Right total hip arthroplasty      ARTHROPLASTY HIP Left 5/19/2015    Procedure: ARTHROPLASTY HIP;  Surgeon: Tyrese Howell MD;  Location: [facility] OR    ARTHROPLASTY KNEE Left 8/22/2016    Procedure: ARTHROPLASTY KNEE;  Surgeon: Tyrese Howell MD;  Location:  OR    CHOLECYSTECTOMY      COLONOSCOPY      COLONOSCOPY Left 4/19/2016    Procedure: COLONOSCOPY;  Surgeon: Moe Haney MD;  Location:  GI    GASTRIC BYPASS      2011 tiff en y    GI SURGERY      fistulotomy    OPEN REDUCTION INTERNAL FIXATION ANKLE Left 5/15/2025    Procedure: Open reduction internal fixation left trimalleolar ankle fracture;  Surgeon: Dillon Lares MD;  Location:  OR    ORTHOPEDIC SURGERY      left knee \"green procedure\"    REMOVE EXTERNAL FIXATOR LOWER EXTREMITY Left 5/15/2025    Procedure: Removal of external fixator left ankle;  " Surgeon: Dillon Lares MD;  Location: [facility] OR    TONSILLECTOMY      TUBAL LIGATION           Post-discharge medication reconciliation status: Reviewed and updated in [facility] according to facility MAR    Current Outpatient Medications   Medication Sig Dispense Refill    acetaminophen (TYLENOL) 500 MG tablet Take 2 tablets (1,000 mg) by mouth 3 times daily.      amLODIPine (NORVASC) 10 MG tablet Take 1 tablet (10 mg) by mouth daily.      aspirin (ASA) 81 MG EC tablet Take 1 tablet (81 mg) by mouth 2 times daily.      atenolol (TENORMIN) 50 MG tablet Take 1 tablet (50 mg) by mouth daily.      atorvastatin (LIPITOR) 40 MG tablet Take 1 tablet (40 mg) by mouth at bedtime.      buPROPion (WELLBUTRIN SR) 150 MG 12 hr tablet Take 1 tablet (150 mg) by mouth 2 times daily.      diclofenac (VOLTAREN) 1 % topical gel Apply 2 g topically 3 times daily.      DULoxetine (CYMBALTA) 60 MG capsule Take 1 capsule (60 mg) by mouth daily.      guaiFENesin (ROBITUSSIN) 20 mg/mL liquid Take 10 mLs (200 mg) by mouth every 6 hours as needed for cough.      hydrALAZINE (APRESOLINE) 100 MG tablet Take 1 tablet (100 mg) by mouth 3 times daily.      hydrochlorothiazide (HYDRODIURIL) 25 MG tablet Take 1 tablet (25 mg) by mouth daily.      lisinopril (ZESTRIL) 40 MG tablet Take 1 tablet (40 mg) by mouth daily.      loratadine (CLARITIN) 10 MG tablet Take 1 tablet (10 mg) by mouth daily.      multivitamin w/minerals (THERA-VIT-M) tablet Take 1 tablet by mouth daily.      omeprazole (PRILOSEC) 40 MG DR capsule Take 1 capsule (40 mg) by mouth daily. Take 30-60 minutes before a meal.      polyethylene glycol (MIRALAX) 17 GM/Dose powder Take 17 g by mouth daily.      traZODone (DESYREL) 100 MG tablet Take 100 mg by mouth at bedtime.      albuterol (PROAIR HFA/PROVENTIL HFA/VENTOLIN HFA) 108 (90 Base) MCG/ACT inhaler Inhale 2 puffs into the lungs every 2 hours as needed for shortness of breath, wheezing or cough.      HYDROmorphone (DILAUDID) 2 MG  "tablet Take 1 tablet (2 mg) by mouth 2 times daily. May also take 1 tablet (2 mg) 2 times daily as needed for severe pain. 5 tablet 0    ondansetron (ZOFRAN ODT) 4 MG ODT tab Take 1 tablet (4 mg) by mouth every 6 hours as needed for nausea.      oxyCODONE (ROXICODONE) 5 MG tablet Take 1 tablet (5 mg) by mouth every 4 hours as needed for moderate pain.      senna-docusate (SENOKOT-S/PERICOLACE) 8.6-50 MG tablet Take 1 tablet by mouth 2 times daily as needed for constipation.         ROS:  4 point ROS including Respiratory, CV, GI and , other than that noted in the HPI,  is negative    Exam:  BP (!) 149/69   Pulse 88   Temp 98.6  F (37  C)   Resp 18   Ht 1.626 m (5' 4\")   Wt 78.9 kg (174 lb)   LMP  (LMP Unknown)   SpO2 93%   BMI 29.87 kg/m    Alert, casually dressed, sitting up on the edge of the bed  No scleral icterus  Moist oral mucosa  Heart tones regular at this time with no murmur rub or gallop  Breathing unlabored on room air, no cough  Abdomen nonobese  No right lower extremity edema  Left lower extremity with well-healed left knee incision and distally intact and clean short splint in place  Left toes visible without discoloration  Very anxious disposition and perseverative on some topics such as her anxiety and her blood pressure    Lab/Diagnostic data:  Most Recent 3 CBC's:  Recent Labs   Lab Test 05/16/25  0957 05/12/25  0623 05/05/25  2130 05/04/25  0748   WBC  --  8.6 7.8 8.0   HGB 10.3* 10.7* 9.9* 9.2*   MCV 96 99 94 95   PLT  --  355 236 178     Most Recent 3 BMP's:  Recent Labs   Lab Test 05/16/25  0740 05/05/25  0643 05/04/25  0650 05/03/25  0400 05/02/25  0727   NA  --   --  140 141 135   POTASSIUM  --  4.1 4.2 4.3 4.6   CHLORIDE  --   --  107 105 100   CO2  --   --  24 24 24   BUN  --   --  10.2 11.7 19.7   CR  --   --  0.83 0.95 1.05*   ANIONGAP  --   --  9 12 11   HATTIE  --   --  8.7* 8.5* 9.2   * 92 94 107* 123*     Most Recent 2 LFT's:  Recent Labs   Lab Test 06/03/24  1055 " 05/26/23  1109 10/24/18  0935 05/24/18  1205 05/02/17  1424   AST  --   --   --  43 30   ALT 35 35   < > 58* 50   ALKPHOS  --   --   --  53 60   BILITOTAL  --   --   --  0.4 0.5    < > = values in this interval not displayed.     Most Recent TSH and T4:  Recent Labs   Lab Test 07/07/24  1539   TSH 0.30     Most Recent Hemoglobin A1c:No lab results found.    ASSESSMENT/PLAN:  Fall, subsequent encounter  Closed trimalleolar fracture of left ankle with routine healing, subsequent encounter  Has completed 2 surgeries now  Has left lower extremity in short splint to remain in place until her [facility] follow-up scheduled on May 30  We talked extensively about pain control especially given she has both Oxycodone and Dilaudid available  She is able to verbalize different reasons for taking each one although admittedly some of the reasons she takes the pain medications are not specifically for pain but rather for mood symptoms  See more detailed information below regarding her anxiety/depression  I asked her to continue to kind of monitor the effects of her medications so that hopefully prior to discharge we can settle in on 1 opiate therapy and she thought that was reasonable as well  Thankfully the PRN Dilaudid and Oxycodone are used sparingly; remains on the BID scheduled Dilaudid as well  Continue aspirin 81 mg twice daily until 6/26 for VTE prophylaxis  Follow-up CBC for trend on her ABLA is scheduled for May 27  Continue onsite therapies  Continues to be nonweightbearing on the left lower extremity though thankfully has a pretty strong right side  Appreciate staff support as well as family during this difficult time  Sounds like she will need a wheelchair for home most likely    Adjustment disorder with mixed anxiety and depressed mood  Moderate recurrent major depression (H)  Cognitive impairment  Insomnia, unspecified type  Spent a lot of time going over her understandable stressors at this time with acute symptoms on top  of chronic history of depression and anxiety  Also has slightly low cognitive scores (SLUMS 21/30 and CPT 5.0/5.6) which may contribute that need to follow trend as an outpatient as could be related to her opiate therapy vs other underlying disease process  Ultimately she felt she would like to continue on her Cymbalta and her Wellbutrin  Offered BuSpar but she would like to consider that and was not really interested in adding additional medications at this time  I told her benzodiazepines would not be preferred given that she is on 2 opiates at this time and she understood  Glad she has family checking in on her and being supportive  I offered onsite ACP psychotherapy but she declined this as well  I did discuss about taking some deep breaths to see if that can help when she feels anxious and she says that she does that from time to time as well; daughter also brought in a journal for her  I will time the trazodone for 10 PM rather than earlier in the evening to see if that helps as well per her request    Benign essential hypertension   Blood pressures overall satisfactory for age and comorbidities and discussed we do not want hypotension which would risk dizziness and falls as she is recovering from major surgery which she understood but at baseline she admits she prefers tighter control of blood pressure  She says that the ZIO heart monitor is supposed to be sent in the mail either today or tomorrow as they had asked the cardiology team to send it again      Total time spent with patient visit at the [facility] ER nursing facility was 55 minutes, including medication/chart review 10:15 - 10:30 AM, patient visit with charting 2:30 - 3:10 PM. Greater than 50% of total time spent with counseling and coordinating care due to her anxiety and review of surgery / pain control / medications.    Electronically signed by:  Earline Norton DO

## 2025-05-22 NOTE — OP NOTE
Preoperative diagnosis:  Unstable left trimalleolar ankle fracture    Postoperative diagnosis:  As above    Procedure:  #1 open reduction internal fixation left trimalleolar ankle with syndesmotic fixation  #2 removal of external fixator left ankle  #3 final interpretation of intraoperative fluoroscopy    Surgeon:  Dillon aLres MD     Assistant:  Mone Wang PA-C  A physician assistant was above for the case and participated to decrease the patient's morbidity by assisting with positioning prior to procedure, manipulation of the limb, retraction of the procedure and closure of the wound.    Anesthesia:  General with a peripheral nerve block    Estimated blood loss:  50 cc    Complications:  None readily apparent    Indication for Procedure:  Monse Peoples is a pleasant 79 year old female who sustained a fall from standing 2 weeks prior. She had immediate left ankle pain and was unable to ambulate afterwards. She was seen in the emergency room. X-rays were obtained of the left ankle, and she was found to have an unstable fracture of the left ankle.  She was initially placed on external fixator by myself.  As such, the patient was scheduled for surgical fixation of the unstable ankle fracture.    Description of Procedure:  Mones Peoples was met on the preoperative area by myself. Informed consent was obtained. I discussed the risks, benefits, and alternatives to open reduction internal fixation of an unstable ankle fracture with the patient as well as present family members. They understood the risks of anesthesia, as well as operative risks including postoperative pain, loss of fracture fixation, surgical site infection, and bleeding. Thus, She elected for surgical fixation of the left ankle. She was then brought to the operating room, where a general anesthetic was induced by the anesthesia service. This was successful. She was placed supine on the operating table ensuring all bony prominences were well  padded. Time-out was called by the surgical team. All in attendance were in agreement that the left ankle was the correct operative site.    We began with removal of the external fixator.  Pin sites were prepped with Betadine.  The frame was disassembled and external fixator pins were removed.  The pin sites were later debrided and closed with staples.  At this point in time, the left leg was prepped and draped in sterile fashion.   After appropriate surgical pause the limb was elevated and exsanguinated and Esmarch tourniquet.  Pneumatic tourniquet was inflated to 250 mmHg.  Total tourniquet time for the case was 59 minutes.  Fluoroscopic image intensifier was then used to directly visualize the ankle fracture and an incision was made over the posterior lateral aspect of the fibula. The this was dissected sharply to bone.  A Bovie electrocautery was used to control subcutaneous bleeding during the approach.  Care was taken to ensure the superficial peroneal nerve was not at the proximal aspect of the incision.  After this, the periosteum was reflected with a periosteal elevator. The fracture site was identified. This was reduced with bone forceps under direct visualization.  The bone was extremely porous and fracture pattern precluded placement of an interfragmentary screw.  As such, this was provisionally reduced with a combination of reduction forceps and K wires.  At this point image intensifier was utilized to confirm this radiographically. After confirmation of our fracture reduction, an appropriately sized lateral locking plate was applied to the lateral aspect of the fibular cortex. This was secured with a bone forceps and the distal margin of the plate was confirmed to be on the bone with image intensifier. We proceeded to fix the plate to the bone using bicortical screws proximally and unicortical locking screws distally to avoid penetration into the articular surface. Placement of the screws was then  confirmed radiographically in orthogonal planes.     Attention was turned to the medial malleolus.  2 small longitudinal incisions were created for placement of K wires.  This allowed an anatomic reduction of the medial malleolus.  At this point, 2 screws were placed nicely reducing the medial malleolus.    At this point, we performed a Cotton test on the ankle with the mortise view under fluoroscopic visualization.  The ankle mortise demonstrated instability of the syndesmosis.  Therefore, 2 Quadra cortical syndesmotic screws were placed with excellent purchase.  Final images were obtained which demonstrated a stable anatomic reduction of the syndesmosis.  At this point, the wound was irrigated and the deep tissue was closed with 0-Vicryl, deep dermis was closed with 2-0 Vicryl, and the skin was closed with staples. Sterile dressing was placed. And a splint was placed over this The patient was transferred safely back to the hospital bed. All instrument, needle and lap counts were correct at the end of the case in accordance with hospital protocol    Postoperative Plan:  Monse Peoples will be discharged home and instructed to remain nonweightbearing on the operative extremity for 2 weeks. They may use an assist device for ambulation. She will follow up in clinic in 2 weeks and will receive aspirin for DVT prophylaxis. Weight-bearing will be advanced at that point depending on the appearance of radiographs.

## 2025-05-24 NOTE — DISCHARGE SUMMARY
ORTHOPAEDIC SURGERY DISCHARGE SUMMARY     Date of Admission: 5/15/2025  Date of Discharge: 5/17/2025  Disposition: rehab  Staff Physician: No att. providers found  Primary Care Provider: Christiano Taylor    DISCHARGE DIAGNOSIS:  Trimalleolar fracture of ankle, closed, left, initial encounter [S82.506R]    PROCEDURES: Procedure(s):  Removal of external fixator left ankle  Open reduction internal fixation left trimalleolar ankle fracture on 5/15/2025    HOSPITAL COURSE:    The patient was admitted to the hospital on the aforementioned date. Underwent surgery as above. This was uncomplicated. Monse Peoples has done well post-operatively. Medicine was consulted post operatively to aid in management of medical comorbidities. See final recommendations below. The patient received routine nursing cares and is medically stable. Vital signs are stable. The patient is tolerating a regular diet without GI distress/nausea or vomiting. Voiding spontaneously. All PT/OT goals have been met for safe mobility. Pain is now controlled on oral medications which will be available on discharge. Stool softeners have been used while taking pain medications to help prevent constipation. Monse Peoples is deemed medically safe to discharge.     Antibiotics:  24 hours peripostop.  DVT prophylaxis:  As below daily for 5 weeks  PT Progress:  Has met PT/OT goals for safe mobility.   Pain Meds:  Weaned off all IV pain meds by discharge.  Inpatient Events: No significant events or complications.    Discharge orders and instructions as below.    FOLLOWUP:    Follow up with Dr. Lares at 2-3 weeks postoperatively or an previously scheduled.  [facility]  Care Coordinator Earline Bangura  [phone]    PLANNED DISCHARGE ORDERS:     Discharge Medication List as of 5/17/2025  1:21 PM    CONTINUE these medications which have CHANGED    HYDROmorphone (DILAUDID) 2 MG tablet  Take 1 tablet (2 mg) by mouth every 6 hours  as needed for severe pain., Disp-15 tablet, R-0, Local Print      CONTINUE these medications which have NOT CHANGED    acetaminophen (TYLENOL) 500 MG tablet  Take 2 tablets (1,000 mg) by mouth 3 times daily., TransitionalThrough 5/6/25, starting 5/7/25, may take Q8 hours PRN    albuterol (PROAIR HFA/PROVENTIL HFA/VENTOLIN HFA) 108 (90 Base) MCG/ACT inhaler  Inhale 2 puffs into the lungs every 2 hours as needed for shortness of breath, wheezing or cough., No Print OutPharmacy may dispense brand covered by insurance (Proair, or proventil or ventolin or generic albuterol inhaler)    amLODIPine (NORVASC) 10 MG tablet  Take 1 tablet (10 mg) by mouth daily., Transitional    aspirin (ASA) 81 MG EC tablet  Take 1 tablet (81 mg) by mouth 2 times daily., Transitional    atenolol (TENORMIN) 50 MG tablet  Take 1 tablet (50 mg) by mouth daily., Transitional    atorvastatin (LIPITOR) 40 MG tablet  Take 1 tablet (40 mg) by mouth at bedtime., Transitional    buPROPion (WELLBUTRIN SR) 150 MG 12 hr tablet  Take 1 tablet (150 mg) by mouth 2 times daily., Transitional    diclofenac (VOLTAREN) 1 % topical gel  Apply 2 g topically 3 times daily., No Print Out    DULoxetine (CYMBALTA) 60 MG capsule  Take 1 capsule (60 mg) by mouth daily., Transitional    guaiFENesin (ROBITUSSIN) 20 mg/mL liquid  Take 10 mLs (200 mg) by mouth every 6 hours as needed for cough., No Print Out    hydrALAZINE (APRESOLINE) 100 MG tablet  Take 1 tablet (100 mg) by mouth 3 times daily., Transitional    hydrochlorothiazide (HYDRODIURIL) 25 MG tablet  Take 1 tablet (25 mg) by mouth daily., Transitional    lisinopril (ZESTRIL) 40 MG tablet  Take 1 tablet (40 mg) by mouth daily., Transitional    loratadine (CLARITIN) 10 MG tablet  Take 1 tablet (10 mg) by mouth daily., No Print Out    multivitamin w/minerals (THERA-VIT-M) tablet  Take 1 tablet by mouth daily., Transitional    omeprazole (PRILOSEC) 40 MG DR capsule  Take 1 capsule (40 mg) by mouth daily. Take 30-60  minutes before a meal., Transitional    ondansetron (ZOFRAN ODT) 4 MG ODT tab  Take 1 tablet (4 mg) by mouth every 6 hours as needed for nausea., Transitional    polyethylene glycol (MIRALAX) 17 GM/Dose powder  Take 17 g by mouth daily., Transitional    senna-docusate (SENOKOT-S/PERICOLACE) 8.6-50 MG tablet  Take 1 tablet by mouth 2 times daily as needed for constipation., Transitional    hydrOXYzine HCl (ATARAX) 10 MG tablet  Take 1 tablet (10 mg) by mouth every 6 hours as needed for other (adjuvant pain)., Transitional    oxyCODONE (ROXICODONE) 5 MG tablet  Take 1 tablet (5 mg) by mouth every 4 hours as needed for moderate pain., Transitional    traZODone (DESYREL) 100 MG tablet  Take 1-2 tablets (100-200 mg) by mouth at bedtime., Transitional           Dillon Lares MD  Adult Reconstruction and Trauma

## 2025-06-12 ENCOUNTER — RESULTS FOLLOW-UP (OUTPATIENT)
Dept: FAMILY MEDICINE | Facility: CLINIC | Age: 80
End: 2025-06-12

## 2025-08-11 ENCOUNTER — HOSPITAL ENCOUNTER (INPATIENT)
Facility: CLINIC | Age: 80
DRG: 493 | End: 2025-08-11
Attending: EMERGENCY MEDICINE | Admitting: INTERNAL MEDICINE
Payer: COMMERCIAL

## 2025-08-11 ENCOUNTER — APPOINTMENT (OUTPATIENT)
Dept: ULTRASOUND IMAGING | Facility: CLINIC | Age: 80
DRG: 493 | End: 2025-08-11
Attending: EMERGENCY MEDICINE
Payer: COMMERCIAL

## 2025-08-11 ENCOUNTER — APPOINTMENT (OUTPATIENT)
Dept: GENERAL RADIOLOGY | Facility: CLINIC | Age: 80
DRG: 493 | End: 2025-08-11
Attending: EMERGENCY MEDICINE
Payer: COMMERCIAL

## 2025-08-11 ENCOUNTER — APPOINTMENT (OUTPATIENT)
Dept: CT IMAGING | Facility: CLINIC | Age: 80
DRG: 493 | End: 2025-08-11
Attending: EMERGENCY MEDICINE
Payer: COMMERCIAL

## 2025-08-11 DIAGNOSIS — M00.079: ICD-10-CM

## 2025-08-11 DIAGNOSIS — I10 PRIMARY HYPERTENSION: ICD-10-CM

## 2025-08-11 DIAGNOSIS — S82.852A CLOSED DISPLACED TRIMALLEOLAR FRACTURE OF LEFT ANKLE, INITIAL ENCOUNTER: ICD-10-CM

## 2025-08-11 DIAGNOSIS — M00.9 SEPTIC ARTHRITIS OF LEFT ANKLE, DUE TO UNSPECIFIED ORGANISM (H): Primary | ICD-10-CM

## 2025-08-11 DIAGNOSIS — S93.05XA DISLOCATION OF LEFT ANKLE JOINT, INITIAL ENCOUNTER: ICD-10-CM

## 2025-08-11 LAB
ALBUMIN SERPL BCG-MCNC: 3.4 G/DL (ref 3.5–5.2)
ALP SERPL-CCNC: 93 U/L (ref 40–150)
ALT SERPL W P-5'-P-CCNC: 20 U/L (ref 0–50)
ANION GAP SERPL CALCULATED.3IONS-SCNC: 13 MMOL/L (ref 7–15)
AST SERPL W P-5'-P-CCNC: 30 U/L (ref 0–45)
BASOPHILS # BLD AUTO: 0 10E3/UL (ref 0–0.2)
BASOPHILS NFR BLD AUTO: 0 %
BILIRUB SERPL-MCNC: 0.5 MG/DL
BUN SERPL-MCNC: 15.6 MG/DL (ref 8–23)
CALCIUM SERPL-MCNC: 8.8 MG/DL (ref 8.8–10.4)
CHLORIDE SERPL-SCNC: 93 MMOL/L (ref 98–107)
CREAT SERPL-MCNC: 1.11 MG/DL (ref 0.51–0.95)
CRP SERPL-MCNC: 263.49 MG/L
EGFRCR SERPLBLD CKD-EPI 2021: 51 ML/MIN/1.73M2
EOSINOPHIL # BLD AUTO: 0.1 10E3/UL (ref 0–0.7)
EOSINOPHIL NFR BLD AUTO: 1 %
ERYTHROCYTE [DISTWIDTH] IN BLOOD BY AUTOMATED COUNT: 13.6 % (ref 10–15)
ERYTHROCYTE [SEDIMENTATION RATE] IN BLOOD BY WESTERGREN METHOD: 81 MM/HR (ref 0–30)
GLUCOSE SERPL-MCNC: 139 MG/DL (ref 70–99)
HCO3 SERPL-SCNC: 24 MMOL/L (ref 22–29)
HCT VFR BLD AUTO: 29.5 % (ref 35–47)
HGB BLD-MCNC: 10.1 G/DL (ref 11.7–15.7)
IMM GRANULOCYTES # BLD: 0.1 10E3/UL
IMM GRANULOCYTES NFR BLD: 1 %
LACTATE SERPL-SCNC: 1 MMOL/L (ref 0.7–2)
LYMPHOCYTES # BLD AUTO: 0.8 10E3/UL (ref 0.8–5.3)
LYMPHOCYTES NFR BLD AUTO: 7 %
MCH RBC QN AUTO: 30.9 PG (ref 26.5–33)
MCHC RBC AUTO-ENTMCNC: 34.2 G/DL (ref 31.5–36.5)
MCV RBC AUTO: 90 FL (ref 78–100)
MONOCYTES # BLD AUTO: 1 10E3/UL (ref 0–1.3)
MONOCYTES NFR BLD AUTO: 9 %
NEUTROPHILS # BLD AUTO: 8.9 10E3/UL (ref 1.6–8.3)
NEUTROPHILS NFR BLD AUTO: 82 %
NRBC # BLD AUTO: 0 10E3/UL
NRBC BLD AUTO-RTO: 0 /100
PLATELET # BLD AUTO: 309 10E3/UL (ref 150–450)
POTASSIUM SERPL-SCNC: 3.4 MMOL/L (ref 3.4–5.3)
PROT SERPL-MCNC: 6.8 G/DL (ref 6.4–8.3)
RBC # BLD AUTO: 3.27 10E6/UL (ref 3.8–5.2)
SODIUM SERPL-SCNC: 130 MMOL/L (ref 135–145)
WBC # BLD AUTO: 10.8 10E3/UL (ref 4–11)

## 2025-08-11 PROCEDURE — 96365 THER/PROPH/DIAG IV INF INIT: CPT

## 2025-08-11 PROCEDURE — 258N000003 HC RX IP 258 OP 636: Performed by: EMERGENCY MEDICINE

## 2025-08-11 PROCEDURE — 82040 ASSAY OF SERUM ALBUMIN: CPT | Performed by: EMERGENCY MEDICINE

## 2025-08-11 PROCEDURE — 83605 ASSAY OF LACTIC ACID: CPT | Performed by: EMERGENCY MEDICINE

## 2025-08-11 PROCEDURE — 73700 CT LOWER EXTREMITY W/O DYE: CPT | Mod: LT

## 2025-08-11 PROCEDURE — 93971 EXTREMITY STUDY: CPT | Mod: LT

## 2025-08-11 PROCEDURE — 73610 X-RAY EXAM OF ANKLE: CPT | Mod: LT

## 2025-08-11 PROCEDURE — 80053 COMPREHEN METABOLIC PANEL: CPT | Performed by: EMERGENCY MEDICINE

## 2025-08-11 PROCEDURE — 85652 RBC SED RATE AUTOMATED: CPT | Performed by: EMERGENCY MEDICINE

## 2025-08-11 PROCEDURE — 258N000003 HC RX IP 258 OP 636: Performed by: PHYSICIAN ASSISTANT

## 2025-08-11 PROCEDURE — 87154 CUL TYP ID BLD PTHGN 6+ TRGT: CPT | Performed by: EMERGENCY MEDICINE

## 2025-08-11 PROCEDURE — 87040 BLOOD CULTURE FOR BACTERIA: CPT | Performed by: EMERGENCY MEDICINE

## 2025-08-11 PROCEDURE — 120N000001 HC R&B MED SURG/OB

## 2025-08-11 PROCEDURE — 85025 COMPLETE CBC W/AUTO DIFF WBC: CPT | Performed by: EMERGENCY MEDICINE

## 2025-08-11 PROCEDURE — 99285 EMERGENCY DEPT VISIT HI MDM: CPT | Mod: 25 | Performed by: EMERGENCY MEDICINE

## 2025-08-11 PROCEDURE — 250N000011 HC RX IP 250 OP 636: Performed by: EMERGENCY MEDICINE

## 2025-08-11 PROCEDURE — 250N000013 HC RX MED GY IP 250 OP 250 PS 637: Performed by: EMERGENCY MEDICINE

## 2025-08-11 PROCEDURE — 36415 COLL VENOUS BLD VENIPUNCTURE: CPT | Performed by: EMERGENCY MEDICINE

## 2025-08-11 PROCEDURE — 96375 TX/PRO/DX INJ NEW DRUG ADDON: CPT

## 2025-08-11 PROCEDURE — 86140 C-REACTIVE PROTEIN: CPT | Performed by: EMERGENCY MEDICINE

## 2025-08-11 PROCEDURE — 250N000013 HC RX MED GY IP 250 OP 250 PS 637: Performed by: PHYSICIAN ASSISTANT

## 2025-08-11 PROCEDURE — 99285 EMERGENCY DEPT VISIT HI MDM: CPT | Mod: 25

## 2025-08-11 PROCEDURE — 99223 1ST HOSP IP/OBS HIGH 75: CPT | Performed by: PHYSICIAN ASSISTANT

## 2025-08-11 RX ORDER — BUPROPION HYDROCHLORIDE 150 MG/1
150 TABLET, EXTENDED RELEASE ORAL 2 TIMES DAILY
Status: DISCONTINUED | OUTPATIENT
Start: 2025-08-11 | End: 2025-08-16 | Stop reason: HOSPADM

## 2025-08-11 RX ORDER — CEFAZOLIN SODIUM 2 G/50ML
2 SOLUTION INTRAVENOUS SEE ADMIN INSTRUCTIONS
Status: CANCELLED | OUTPATIENT
Start: 2025-08-11

## 2025-08-11 RX ORDER — PIPERACILLIN SODIUM, TAZOBACTAM SODIUM 4; .5 G/20ML; G/20ML
4.5 INJECTION, POWDER, LYOPHILIZED, FOR SOLUTION INTRAVENOUS EVERY 6 HOURS
Status: DISCONTINUED | OUTPATIENT
Start: 2025-08-12 | End: 2025-08-12

## 2025-08-11 RX ORDER — HYDROMORPHONE HYDROCHLORIDE 2 MG/1
2 TABLET ORAL EVERY 4 HOURS PRN
Status: DISCONTINUED | OUTPATIENT
Start: 2025-08-11 | End: 2025-08-16 | Stop reason: HOSPADM

## 2025-08-11 RX ORDER — HYDROMORPHONE HCL IN WATER/PF 6 MG/30 ML
0.4 PATIENT CONTROLLED ANALGESIA SYRINGE INTRAVENOUS
Status: DISCONTINUED | OUTPATIENT
Start: 2025-08-11 | End: 2025-08-12

## 2025-08-11 RX ORDER — HYDROMORPHONE HCL IN WATER/PF 6 MG/30 ML
0.2 PATIENT CONTROLLED ANALGESIA SYRINGE INTRAVENOUS
Status: DISCONTINUED | OUTPATIENT
Start: 2025-08-11 | End: 2025-08-12

## 2025-08-11 RX ORDER — TRANEXAMIC ACID 10 MG/ML
1 INJECTION, SOLUTION INTRAVENOUS ONCE
Status: CANCELLED | OUTPATIENT
Start: 2025-08-11 | End: 2025-08-11

## 2025-08-11 RX ORDER — AMOXICILLIN 250 MG
2 CAPSULE ORAL 2 TIMES DAILY
Status: DISCONTINUED | OUTPATIENT
Start: 2025-08-11 | End: 2025-08-16 | Stop reason: HOSPADM

## 2025-08-11 RX ORDER — ONDANSETRON 2 MG/ML
4 INJECTION INTRAMUSCULAR; INTRAVENOUS EVERY 6 HOURS PRN
Status: DISCONTINUED | OUTPATIENT
Start: 2025-08-11 | End: 2025-08-16 | Stop reason: HOSPADM

## 2025-08-11 RX ORDER — ONDANSETRON 4 MG/1
4 TABLET, ORALLY DISINTEGRATING ORAL EVERY 6 HOURS PRN
Status: DISCONTINUED | OUTPATIENT
Start: 2025-08-11 | End: 2025-08-16 | Stop reason: HOSPADM

## 2025-08-11 RX ORDER — AMOXICILLIN 250 MG
1 CAPSULE ORAL 2 TIMES DAILY
Status: DISCONTINUED | OUTPATIENT
Start: 2025-08-11 | End: 2025-08-16 | Stop reason: HOSPADM

## 2025-08-11 RX ORDER — POLYETHYLENE GLYCOL 3350 17 G/17G
17 POWDER, FOR SOLUTION ORAL 2 TIMES DAILY PRN
Status: DISCONTINUED | OUTPATIENT
Start: 2025-08-11 | End: 2025-08-14

## 2025-08-11 RX ORDER — PIPERACILLIN SODIUM, TAZOBACTAM SODIUM 4; .5 G/20ML; G/20ML
4.5 INJECTION, POWDER, LYOPHILIZED, FOR SOLUTION INTRAVENOUS ONCE
Status: COMPLETED | OUTPATIENT
Start: 2025-08-11 | End: 2025-08-11

## 2025-08-11 RX ORDER — PANTOPRAZOLE SODIUM 40 MG/1
40 TABLET, DELAYED RELEASE ORAL DAILY
Status: DISCONTINUED | OUTPATIENT
Start: 2025-08-12 | End: 2025-08-16 | Stop reason: HOSPADM

## 2025-08-11 RX ORDER — ACETAMINOPHEN 325 MG/1
650 TABLET ORAL EVERY 4 HOURS PRN
Status: DISCONTINUED | OUTPATIENT
Start: 2025-08-11 | End: 2025-08-16 | Stop reason: HOSPADM

## 2025-08-11 RX ORDER — ATENOLOL 50 MG/1
50 TABLET ORAL DAILY
Status: DISCONTINUED | OUTPATIENT
Start: 2025-08-12 | End: 2025-08-16 | Stop reason: HOSPADM

## 2025-08-11 RX ORDER — SODIUM CHLORIDE 9 MG/ML
INJECTION, SOLUTION INTRAVENOUS CONTINUOUS
Status: ACTIVE | OUTPATIENT
Start: 2025-08-11 | End: 2025-08-12

## 2025-08-11 RX ORDER — AMLODIPINE BESYLATE 5 MG/1
5 TABLET ORAL DAILY
Status: DISCONTINUED | OUTPATIENT
Start: 2025-08-12 | End: 2025-08-16 | Stop reason: HOSPADM

## 2025-08-11 RX ORDER — DULOXETIN HYDROCHLORIDE 60 MG/1
60 CAPSULE, DELAYED RELEASE ORAL DAILY
Status: DISCONTINUED | OUTPATIENT
Start: 2025-08-12 | End: 2025-08-16 | Stop reason: HOSPADM

## 2025-08-11 RX ORDER — CEFAZOLIN SODIUM 2 G/50ML
2 SOLUTION INTRAVENOUS
Status: CANCELLED | OUTPATIENT
Start: 2025-08-11

## 2025-08-11 RX ORDER — ATORVASTATIN CALCIUM 40 MG/1
40 TABLET, FILM COATED ORAL AT BEDTIME
Status: DISCONTINUED | OUTPATIENT
Start: 2025-08-11 | End: 2025-08-16 | Stop reason: HOSPADM

## 2025-08-11 RX ORDER — HYDRALAZINE HYDROCHLORIDE 50 MG/1
100 TABLET, FILM COATED ORAL 3 TIMES DAILY
Status: DISCONTINUED | OUTPATIENT
Start: 2025-08-11 | End: 2025-08-16 | Stop reason: HOSPADM

## 2025-08-11 RX ORDER — LIDOCAINE 40 MG/G
CREAM TOPICAL
Status: DISCONTINUED | OUTPATIENT
Start: 2025-08-11 | End: 2025-08-16 | Stop reason: HOSPADM

## 2025-08-11 RX ORDER — TRAZODONE HYDROCHLORIDE 100 MG/1
200 TABLET ORAL AT BEDTIME
Status: DISCONTINUED | OUTPATIENT
Start: 2025-08-11 | End: 2025-08-16 | Stop reason: HOSPADM

## 2025-08-11 RX ORDER — OXYCODONE HYDROCHLORIDE 5 MG/1
5 TABLET ORAL ONCE
Refills: 0 | Status: COMPLETED | OUTPATIENT
Start: 2025-08-11 | End: 2025-08-11

## 2025-08-11 RX ADMIN — OXYCODONE HYDROCHLORIDE 5 MG: 5 TABLET ORAL at 17:05

## 2025-08-11 RX ADMIN — HYDRALAZINE HYDROCHLORIDE 100 MG: 50 TABLET ORAL at 21:18

## 2025-08-11 RX ADMIN — VANCOMYCIN HYDROCHLORIDE 1750 MG: 5 INJECTION, POWDER, LYOPHILIZED, FOR SOLUTION INTRAVENOUS at 18:17

## 2025-08-11 RX ADMIN — PIPERACILLIN AND TAZOBACTAM 4.5 G: 4; .5 INJECTION, POWDER, FOR SOLUTION INTRAVENOUS at 17:10

## 2025-08-11 RX ADMIN — ACETAMINOPHEN 650 MG: 325 TABLET ORAL at 21:40

## 2025-08-11 RX ADMIN — SODIUM CHLORIDE: 0.9 INJECTION, SOLUTION INTRAVENOUS at 21:20

## 2025-08-11 RX ADMIN — BUPROPION HYDROCHLORIDE 150 MG: 150 TABLET, FILM COATED, EXTENDED RELEASE ORAL at 21:18

## 2025-08-11 RX ADMIN — HYDROMORPHONE HYDROCHLORIDE 1 MG: 2 TABLET ORAL at 21:44

## 2025-08-11 RX ADMIN — ATORVASTATIN CALCIUM 40 MG: 40 TABLET, FILM COATED ORAL at 21:18

## 2025-08-11 RX ADMIN — TRAZODONE HYDROCHLORIDE 200 MG: 100 TABLET ORAL at 21:19

## 2025-08-11 ASSESSMENT — ACTIVITIES OF DAILY LIVING (ADL)
ADLS_ACUITY_SCORE: 60
ADLS_ACUITY_SCORE: 66
ADLS_ACUITY_SCORE: 60
ADLS_ACUITY_SCORE: 69

## 2025-08-11 ASSESSMENT — COLUMBIA-SUICIDE SEVERITY RATING SCALE - C-SSRS
6. HAVE YOU EVER DONE ANYTHING, STARTED TO DO ANYTHING, OR PREPARED TO DO ANYTHING TO END YOUR LIFE?: NO
2. HAVE YOU ACTUALLY HAD ANY THOUGHTS OF KILLING YOURSELF IN THE PAST MONTH?: NO
1. IN THE PAST MONTH, HAVE YOU WISHED YOU WERE DEAD OR WISHED YOU COULD GO TO SLEEP AND NOT WAKE UP?: NO

## 2025-08-12 ENCOUNTER — ENROLLMENT (OUTPATIENT)
Dept: HOME HEALTH SERVICES | Facility: HOME HEALTH | Age: 80
End: 2025-08-12
Payer: COMMERCIAL

## 2025-08-12 ENCOUNTER — ANESTHESIA EVENT (OUTPATIENT)
Dept: SURGERY | Facility: CLINIC | Age: 80
End: 2025-08-12
Payer: COMMERCIAL

## 2025-08-12 ENCOUNTER — ANESTHESIA (OUTPATIENT)
Dept: SURGERY | Facility: CLINIC | Age: 80
End: 2025-08-12
Payer: COMMERCIAL

## 2025-08-12 ENCOUNTER — APPOINTMENT (OUTPATIENT)
Dept: GENERAL RADIOLOGY | Facility: CLINIC | Age: 80
DRG: 493 | End: 2025-08-12
Attending: INTERNAL MEDICINE
Payer: COMMERCIAL

## 2025-08-12 LAB
ACB COMPLEX DNA BLD POS QL NAA+NON-PROBE: NOT DETECTED
ALBUMIN UR-MCNC: 10 MG/DL
ANION GAP SERPL CALCULATED.3IONS-SCNC: 13 MMOL/L (ref 7–15)
APPEARANCE UR: ABNORMAL
B FRAGILIS DNA BLD POS QL NAA+NON-PROBE: NOT DETECTED
BACTERIA #/AREA URNS HPF: ABNORMAL /HPF
BILIRUB UR QL STRIP: NEGATIVE
BUN SERPL-MCNC: 16.1 MG/DL (ref 8–23)
C ALBICANS DNA BLD POS QL NAA+NON-PROBE: NOT DETECTED
C AURIS DNA BLD POS QL NAA+NON-PROBE: NOT DETECTED
C GATTII+NEOFOR DNA BLD POS QL NAA+N-PRB: NOT DETECTED
C GLABRATA DNA BLD POS QL NAA+NON-PROBE: NOT DETECTED
C KRUSEI DNA BLD POS QL NAA+NON-PROBE: NOT DETECTED
C PARAP DNA BLD POS QL NAA+NON-PROBE: NOT DETECTED
C TROPICLS DNA BLD POS QL NAA+NON-PROBE: NOT DETECTED
CALCIUM SERPL-MCNC: 8.3 MG/DL (ref 8.8–10.4)
CHLORIDE SERPL-SCNC: 99 MMOL/L (ref 98–107)
COLOR UR AUTO: YELLOW
CREAT SERPL-MCNC: 1.06 MG/DL (ref 0.51–0.95)
CRP SERPL-MCNC: 204.43 MG/L
E CLOAC COMP DNA BLD POS NAA+NON-PROBE: NOT DETECTED
E COLI DNA BLD POS QL NAA+NON-PROBE: NOT DETECTED
E FAECALIS DNA BLD POS QL NAA+NON-PROBE: NOT DETECTED
E FAECIUM DNA BLD POS QL NAA+NON-PROBE: NOT DETECTED
EGFRCR SERPLBLD CKD-EPI 2021: 54 ML/MIN/1.73M2
ENTEROBACTERALES DNA BLD POS NAA+N-PRB: NOT DETECTED
ERYTHROCYTE [DISTWIDTH] IN BLOOD BY AUTOMATED COUNT: 13.6 % (ref 10–15)
GLUCOSE SERPL-MCNC: 95 MG/DL (ref 70–99)
GLUCOSE UR STRIP-MCNC: NEGATIVE MG/DL
GP B STREP DNA BLD POS QL NAA+NON-PROBE: NOT DETECTED
HAEM INFLU DNA BLD POS QL NAA+NON-PROBE: NOT DETECTED
HCO3 SERPL-SCNC: 24 MMOL/L (ref 22–29)
HCT VFR BLD AUTO: 26.2 % (ref 35–47)
HGB BLD-MCNC: 8.6 G/DL (ref 11.7–15.7)
HGB UR QL STRIP: NEGATIVE
K OXYTOCA DNA BLD POS QL NAA+NON-PROBE: NOT DETECTED
KETONES UR STRIP-MCNC: NEGATIVE MG/DL
KLEBSIELLA SP DNA BLD POS QL NAA+NON-PRB: NOT DETECTED
KLEBSIELLA SP DNA BLD POS QL NAA+NON-PRB: NOT DETECTED
L MONOCYTOG DNA BLD POS QL NAA+NON-PROBE: NOT DETECTED
LEUKOCYTE ESTERASE UR QL STRIP: ABNORMAL
MCH RBC QN AUTO: 30.2 PG (ref 26.5–33)
MCHC RBC AUTO-ENTMCNC: 32.8 G/DL (ref 31.5–36.5)
MCV RBC AUTO: 92 FL (ref 78–100)
MECA+MECC+MREJ ISLT/SPM QL: NOT DETECTED
MUCOUS THREADS #/AREA URNS LPF: PRESENT /LPF
N MEN DNA BLD POS QL NAA+NON-PROBE: NOT DETECTED
NITRATE UR QL: NEGATIVE
P AERUGINOSA DNA BLD POS NAA+NON-PROBE: NOT DETECTED
PH UR STRIP: 5.5 [PH] (ref 5–7)
PLATELET # BLD AUTO: 262 10E3/UL (ref 150–450)
POTASSIUM SERPL-SCNC: 3.2 MMOL/L (ref 3.4–5.3)
PROTEUS SP DNA BLD POS QL NAA+NON-PROBE: NOT DETECTED
RBC # BLD AUTO: 2.85 10E6/UL (ref 3.8–5.2)
RBC URINE: 3 /HPF
S AUREUS DNA BLD POS QL NAA+NON-PROBE: DETECTED
S EPIDERMIDIS DNA BLD POS QL NAA+NON-PRB: NOT DETECTED
S LUGDUNENSIS DNA BLD POS QL NAA+NON-PRB: NOT DETECTED
S MALTOPHILIA DNA BLD POS QL NAA+NON-PRB: NOT DETECTED
S MARCESCENS DNA BLD POS NAA+NON-PROBE: NOT DETECTED
S PNEUM DNA BLD POS QL NAA+NON-PROBE: NOT DETECTED
S PYO DNA BLD POS QL NAA+NON-PROBE: NOT DETECTED
SALMONELLA DNA BLD POS QL NAA+NON-PROBE: NOT DETECTED
SODIUM SERPL-SCNC: 136 MMOL/L (ref 135–145)
SP GR UR STRIP: 1.02 (ref 1–1.03)
SQUAMOUS EPITHELIAL: 4 /HPF
STREPTOCOCCUS DNA BLD POS NAA+NON-PROBE: NOT DETECTED
UROBILINOGEN UR STRIP-MCNC: NORMAL MG/DL
WBC # BLD AUTO: 8.8 10E3/UL (ref 4–11)
WBC URINE: 14 /HPF

## 2025-08-12 PROCEDURE — 258N000003 HC RX IP 258 OP 636: Performed by: ANESTHESIOLOGY

## 2025-08-12 PROCEDURE — 87075 CULTR BACTERIA EXCEPT BLOOD: CPT | Performed by: STUDENT IN AN ORGANIZED HEALTH CARE EDUCATION/TRAINING PROGRAM

## 2025-08-12 PROCEDURE — 360N000076 HC SURGERY LEVEL 3, PER MIN: Performed by: STUDENT IN AN ORGANIZED HEALTH CARE EDUCATION/TRAINING PROGRAM

## 2025-08-12 PROCEDURE — 272N000001 HC OR GENERAL SUPPLY STERILE: Performed by: STUDENT IN AN ORGANIZED HEALTH CARE EDUCATION/TRAINING PROGRAM

## 2025-08-12 PROCEDURE — 85027 COMPLETE CBC AUTOMATED: CPT | Performed by: PHYSICIAN ASSISTANT

## 2025-08-12 PROCEDURE — 120N000001 HC R&B MED SURG/OB

## 2025-08-12 PROCEDURE — 250N000009 HC RX 250: Performed by: ANESTHESIOLOGY

## 2025-08-12 PROCEDURE — 99222 1ST HOSP IP/OBS MODERATE 55: CPT | Performed by: SPECIALIST

## 2025-08-12 PROCEDURE — 250N000011 HC RX IP 250 OP 636: Performed by: NURSE ANESTHETIST, CERTIFIED REGISTERED

## 2025-08-12 PROCEDURE — 36415 COLL VENOUS BLD VENIPUNCTURE: CPT | Performed by: PHYSICIAN ASSISTANT

## 2025-08-12 PROCEDURE — 258N000003 HC RX IP 258 OP 636

## 2025-08-12 PROCEDURE — 36415 COLL VENOUS BLD VENIPUNCTURE: CPT | Performed by: SPECIALIST

## 2025-08-12 PROCEDURE — 370N000017 HC ANESTHESIA TECHNICAL FEE, PER MIN: Performed by: STUDENT IN AN ORGANIZED HEALTH CARE EDUCATION/TRAINING PROGRAM

## 2025-08-12 PROCEDURE — 87086 URINE CULTURE/COLONY COUNT: CPT | Performed by: EMERGENCY MEDICINE

## 2025-08-12 PROCEDURE — 999N000179 XR SURGERY CARM FLUORO LESS THAN 5 MIN W STILLS

## 2025-08-12 PROCEDURE — 0QPH04Z REMOVAL OF INTERNAL FIXATION DEVICE FROM LEFT TIBIA, OPEN APPROACH: ICD-10-PCS | Performed by: STUDENT IN AN ORGANIZED HEALTH CARE EDUCATION/TRAINING PROGRAM

## 2025-08-12 PROCEDURE — 250N000013 HC RX MED GY IP 250 OP 250 PS 637: Performed by: PHYSICIAN ASSISTANT

## 2025-08-12 PROCEDURE — 0QBK0ZZ EXCISION OF LEFT FIBULA, OPEN APPROACH: ICD-10-PCS | Performed by: STUDENT IN AN ORGANIZED HEALTH CARE EDUCATION/TRAINING PROGRAM

## 2025-08-12 PROCEDURE — 86140 C-REACTIVE PROTEIN: CPT | Performed by: PHYSICIAN ASSISTANT

## 2025-08-12 PROCEDURE — 0QBH0ZZ EXCISION OF LEFT TIBIA, OPEN APPROACH: ICD-10-PCS | Performed by: STUDENT IN AN ORGANIZED HEALTH CARE EDUCATION/TRAINING PROGRAM

## 2025-08-12 PROCEDURE — 250N000011 HC RX IP 250 OP 636

## 2025-08-12 PROCEDURE — 710N000009 HC RECOVERY PHASE 1, LEVEL 1, PER MIN: Performed by: STUDENT IN AN ORGANIZED HEALTH CARE EDUCATION/TRAINING PROGRAM

## 2025-08-12 PROCEDURE — 87040 BLOOD CULTURE FOR BACTERIA: CPT | Performed by: SPECIALIST

## 2025-08-12 PROCEDURE — 99232 SBSQ HOSP IP/OBS MODERATE 35: CPT | Performed by: INTERNAL MEDICINE

## 2025-08-12 PROCEDURE — 0QPK04Z REMOVAL OF INTERNAL FIXATION DEVICE FROM LEFT FIBULA, OPEN APPROACH: ICD-10-PCS | Performed by: STUDENT IN AN ORGANIZED HEALTH CARE EDUCATION/TRAINING PROGRAM

## 2025-08-12 PROCEDURE — 250N000013 HC RX MED GY IP 250 OP 250 PS 637

## 2025-08-12 PROCEDURE — L4361 PNEUMA/VAC WALK BOOT PRE OTS: HCPCS

## 2025-08-12 PROCEDURE — 250N000009 HC RX 250: Performed by: NURSE ANESTHETIST, CERTIFIED REGISTERED

## 2025-08-12 PROCEDURE — 250N000013 HC RX MED GY IP 250 OP 250 PS 637: Performed by: ANESTHESIOLOGY

## 2025-08-12 PROCEDURE — 250N000011 HC RX IP 250 OP 636: Performed by: PHYSICIAN ASSISTANT

## 2025-08-12 PROCEDURE — 250N000011 HC RX IP 250 OP 636: Performed by: ANESTHESIOLOGY

## 2025-08-12 PROCEDURE — 250N000009 HC RX 250: Performed by: STUDENT IN AN ORGANIZED HEALTH CARE EDUCATION/TRAINING PROGRAM

## 2025-08-12 PROCEDURE — 80048 BASIC METABOLIC PNL TOTAL CA: CPT | Performed by: PHYSICIAN ASSISTANT

## 2025-08-12 PROCEDURE — 81003 URINALYSIS AUTO W/O SCOPE: CPT | Performed by: EMERGENCY MEDICINE

## 2025-08-12 PROCEDURE — 250N000011 HC RX IP 250 OP 636: Performed by: SPECIALIST

## 2025-08-12 PROCEDURE — 999N000141 HC STATISTIC PRE-PROCEDURE NURSING ASSESSMENT: Performed by: STUDENT IN AN ORGANIZED HEALTH CARE EDUCATION/TRAINING PROGRAM

## 2025-08-12 PROCEDURE — 87186 SC STD MICRODIL/AGAR DIL: CPT | Performed by: STUDENT IN AN ORGANIZED HEALTH CARE EDUCATION/TRAINING PROGRAM

## 2025-08-12 PROCEDURE — 258N000003 HC RX IP 258 OP 636: Performed by: NURSE ANESTHETIST, CERTIFIED REGISTERED

## 2025-08-12 PROCEDURE — 250N000025 HC SEVOFLURANE, PER MIN: Performed by: STUDENT IN AN ORGANIZED HEALTH CARE EDUCATION/TRAINING PROGRAM

## 2025-08-12 RX ORDER — FENTANYL CITRATE 50 UG/ML
50 INJECTION, SOLUTION INTRAMUSCULAR; INTRAVENOUS EVERY 5 MIN PRN
Status: DISCONTINUED | OUTPATIENT
Start: 2025-08-12 | End: 2025-08-12 | Stop reason: HOSPADM

## 2025-08-12 RX ORDER — DEXAMETHASONE SODIUM PHOSPHATE 4 MG/ML
INJECTION, SOLUTION INTRA-ARTICULAR; INTRALESIONAL; INTRAMUSCULAR; INTRAVENOUS; SOFT TISSUE PRN
Status: DISCONTINUED | OUTPATIENT
Start: 2025-08-12 | End: 2025-08-12

## 2025-08-12 RX ORDER — LIDOCAINE HYDROCHLORIDE 20 MG/ML
INJECTION, SOLUTION INFILTRATION; PERINEURAL PRN
Status: DISCONTINUED | OUTPATIENT
Start: 2025-08-12 | End: 2025-08-12

## 2025-08-12 RX ORDER — BUPIVACAINE HCL/EPINEPHRINE 0.25-.0005
VIAL (ML) INJECTION
Status: COMPLETED | OUTPATIENT
Start: 2025-08-12 | End: 2025-08-12

## 2025-08-12 RX ORDER — HYDROMORPHONE HCL IN WATER/PF 6 MG/30 ML
0.2 PATIENT CONTROLLED ANALGESIA SYRINGE INTRAVENOUS EVERY 4 HOURS PRN
Status: DISCONTINUED | OUTPATIENT
Start: 2025-08-12 | End: 2025-08-16 | Stop reason: HOSPADM

## 2025-08-12 RX ORDER — ONDANSETRON 2 MG/ML
4 INJECTION INTRAMUSCULAR; INTRAVENOUS EVERY 30 MIN PRN
Status: DISCONTINUED | OUTPATIENT
Start: 2025-08-12 | End: 2025-08-12 | Stop reason: HOSPADM

## 2025-08-12 RX ORDER — OXYCODONE HYDROCHLORIDE 5 MG/1
10 TABLET ORAL EVERY 4 HOURS PRN
Status: DISCONTINUED | OUTPATIENT
Start: 2025-08-12 | End: 2025-08-12 | Stop reason: HOSPADM

## 2025-08-12 RX ORDER — ACETAMINOPHEN 325 MG/1
975 TABLET ORAL EVERY 8 HOURS
Status: DISCONTINUED | OUTPATIENT
Start: 2025-08-12 | End: 2025-08-16 | Stop reason: HOSPADM

## 2025-08-12 RX ORDER — MEPERIDINE HYDROCHLORIDE 25 MG/ML
12.5 INJECTION INTRAMUSCULAR; INTRAVENOUS; SUBCUTANEOUS EVERY 5 MIN PRN
Status: DISCONTINUED | OUTPATIENT
Start: 2025-08-12 | End: 2025-08-12 | Stop reason: HOSPADM

## 2025-08-12 RX ORDER — OXYCODONE HYDROCHLORIDE 5 MG/1
5 TABLET ORAL EVERY 4 HOURS PRN
Status: DISCONTINUED | OUTPATIENT
Start: 2025-08-12 | End: 2025-08-14

## 2025-08-12 RX ORDER — NALOXONE HYDROCHLORIDE 0.4 MG/ML
0.2 INJECTION, SOLUTION INTRAMUSCULAR; INTRAVENOUS; SUBCUTANEOUS
Status: DISCONTINUED | OUTPATIENT
Start: 2025-08-12 | End: 2025-08-16 | Stop reason: HOSPADM

## 2025-08-12 RX ORDER — AMOXICILLIN 250 MG
1 CAPSULE ORAL 2 TIMES DAILY
Status: DISCONTINUED | OUTPATIENT
Start: 2025-08-12 | End: 2025-08-14

## 2025-08-12 RX ORDER — HYDROMORPHONE HCL IN WATER/PF 6 MG/30 ML
0.4 PATIENT CONTROLLED ANALGESIA SYRINGE INTRAVENOUS EVERY 5 MIN PRN
Status: DISCONTINUED | OUTPATIENT
Start: 2025-08-12 | End: 2025-08-12 | Stop reason: HOSPADM

## 2025-08-12 RX ORDER — FENTANYL CITRATE 50 UG/ML
25 INJECTION, SOLUTION INTRAMUSCULAR; INTRAVENOUS
Status: DISCONTINUED | OUTPATIENT
Start: 2025-08-12 | End: 2025-08-12 | Stop reason: HOSPADM

## 2025-08-12 RX ORDER — ACETAMINOPHEN 325 MG/1
975 TABLET ORAL ONCE
Status: COMPLETED | OUTPATIENT
Start: 2025-08-12 | End: 2025-08-12

## 2025-08-12 RX ORDER — FENTANYL CITRATE 50 UG/ML
INJECTION, SOLUTION INTRAMUSCULAR; INTRAVENOUS PRN
Status: DISCONTINUED | OUTPATIENT
Start: 2025-08-12 | End: 2025-08-12

## 2025-08-12 RX ORDER — SENNOSIDES 8.6 MG
325 CAPSULE ORAL DAILY
Status: DISCONTINUED | OUTPATIENT
Start: 2025-08-13 | End: 2025-08-16 | Stop reason: HOSPADM

## 2025-08-12 RX ORDER — DEXAMETHASONE SODIUM PHOSPHATE 4 MG/ML
4 INJECTION, SOLUTION INTRA-ARTICULAR; INTRALESIONAL; INTRAMUSCULAR; INTRAVENOUS; SOFT TISSUE
Status: DISCONTINUED | OUTPATIENT
Start: 2025-08-12 | End: 2025-08-12 | Stop reason: HOSPADM

## 2025-08-12 RX ORDER — SODIUM CHLORIDE, SODIUM LACTATE, POTASSIUM CHLORIDE, CALCIUM CHLORIDE 600; 310; 30; 20 MG/100ML; MG/100ML; MG/100ML; MG/100ML
INJECTION, SOLUTION INTRAVENOUS CONTINUOUS
Status: DISCONTINUED | OUTPATIENT
Start: 2025-08-12 | End: 2025-08-12 | Stop reason: HOSPADM

## 2025-08-12 RX ORDER — GLYCOPYRROLATE 0.2 MG/ML
INJECTION, SOLUTION INTRAMUSCULAR; INTRAVENOUS PRN
Status: DISCONTINUED | OUTPATIENT
Start: 2025-08-12 | End: 2025-08-12

## 2025-08-12 RX ORDER — NALOXONE HYDROCHLORIDE 0.4 MG/ML
0.4 INJECTION, SOLUTION INTRAMUSCULAR; INTRAVENOUS; SUBCUTANEOUS
Status: DISCONTINUED | OUTPATIENT
Start: 2025-08-12 | End: 2025-08-16 | Stop reason: HOSPADM

## 2025-08-12 RX ORDER — ONDANSETRON 4 MG/1
4 TABLET, ORALLY DISINTEGRATING ORAL EVERY 30 MIN PRN
Status: DISCONTINUED | OUTPATIENT
Start: 2025-08-12 | End: 2025-08-12 | Stop reason: HOSPADM

## 2025-08-12 RX ORDER — BISACODYL 10 MG
10 SUPPOSITORY, RECTAL RECTAL DAILY PRN
Status: DISCONTINUED | OUTPATIENT
Start: 2025-08-15 | End: 2025-08-16 | Stop reason: HOSPADM

## 2025-08-12 RX ORDER — CEFEPIME HYDROCHLORIDE 2 G/1
2 INJECTION, POWDER, FOR SOLUTION INTRAVENOUS EVERY 12 HOURS
Status: DISCONTINUED | OUTPATIENT
Start: 2025-08-12 | End: 2025-08-13

## 2025-08-12 RX ORDER — LABETALOL HYDROCHLORIDE 5 MG/ML
10 INJECTION, SOLUTION INTRAVENOUS EVERY 10 MIN PRN
Status: DISCONTINUED | OUTPATIENT
Start: 2025-08-12 | End: 2025-08-12 | Stop reason: HOSPADM

## 2025-08-12 RX ORDER — METHOCARBAMOL 500 MG/1
250 TABLET ORAL EVERY 6 HOURS PRN
Status: DISCONTINUED | OUTPATIENT
Start: 2025-08-12 | End: 2025-08-16 | Stop reason: HOSPADM

## 2025-08-12 RX ORDER — LIDOCAINE 40 MG/G
CREAM TOPICAL
Status: DISCONTINUED | OUTPATIENT
Start: 2025-08-12 | End: 2025-08-12 | Stop reason: HOSPADM

## 2025-08-12 RX ORDER — POLYETHYLENE GLYCOL 3350 17 G/17G
17 POWDER, FOR SOLUTION ORAL DAILY
Status: DISCONTINUED | OUTPATIENT
Start: 2025-08-13 | End: 2025-08-16 | Stop reason: HOSPADM

## 2025-08-12 RX ORDER — ALBUTEROL SULFATE 0.83 MG/ML
2.5 SOLUTION RESPIRATORY (INHALATION) EVERY 4 HOURS PRN
Status: DISCONTINUED | OUTPATIENT
Start: 2025-08-12 | End: 2025-08-12 | Stop reason: HOSPADM

## 2025-08-12 RX ORDER — MAGNESIUM HYDROXIDE 1200 MG/15ML
LIQUID ORAL PRN
Status: DISCONTINUED | OUTPATIENT
Start: 2025-08-12 | End: 2025-08-12 | Stop reason: HOSPADM

## 2025-08-12 RX ORDER — LIDOCAINE 40 MG/G
CREAM TOPICAL
Status: DISCONTINUED | OUTPATIENT
Start: 2025-08-12 | End: 2025-08-14

## 2025-08-12 RX ORDER — PROPOFOL 10 MG/ML
INJECTION, EMULSION INTRAVENOUS PRN
Status: DISCONTINUED | OUTPATIENT
Start: 2025-08-12 | End: 2025-08-12

## 2025-08-12 RX ORDER — HYDROMORPHONE HCL IN WATER/PF 6 MG/30 ML
0.1 PATIENT CONTROLLED ANALGESIA SYRINGE INTRAVENOUS EVERY 4 HOURS PRN
Status: DISCONTINUED | OUTPATIENT
Start: 2025-08-12 | End: 2025-08-16 | Stop reason: HOSPADM

## 2025-08-12 RX ORDER — LISINOPRIL 40 MG/1
40 TABLET ORAL DAILY
Status: DISCONTINUED | OUTPATIENT
Start: 2025-08-13 | End: 2025-08-16 | Stop reason: HOSPADM

## 2025-08-12 RX ORDER — HYDROXYZINE HYDROCHLORIDE 10 MG/1
10 TABLET, FILM COATED ORAL EVERY 6 HOURS PRN
Status: DISCONTINUED | OUTPATIENT
Start: 2025-08-12 | End: 2025-08-16 | Stop reason: HOSPADM

## 2025-08-12 RX ORDER — CEFAZOLIN SODIUM 1 G/3ML
1 INJECTION, POWDER, FOR SOLUTION INTRAMUSCULAR; INTRAVENOUS EVERY 8 HOURS
Status: CANCELLED | OUTPATIENT
Start: 2025-08-12 | End: 2025-08-13

## 2025-08-12 RX ORDER — SODIUM CHLORIDE, SODIUM LACTATE, POTASSIUM CHLORIDE, CALCIUM CHLORIDE 600; 310; 30; 20 MG/100ML; MG/100ML; MG/100ML; MG/100ML
INJECTION, SOLUTION INTRAVENOUS CONTINUOUS
Status: DISCONTINUED | OUTPATIENT
Start: 2025-08-12 | End: 2025-08-16 | Stop reason: HOSPADM

## 2025-08-12 RX ORDER — ONDANSETRON 2 MG/ML
INJECTION INTRAMUSCULAR; INTRAVENOUS PRN
Status: DISCONTINUED | OUTPATIENT
Start: 2025-08-12 | End: 2025-08-12

## 2025-08-12 RX ORDER — OXYCODONE HYDROCHLORIDE 5 MG/1
5 TABLET ORAL EVERY 4 HOURS PRN
Status: DISCONTINUED | OUTPATIENT
Start: 2025-08-12 | End: 2025-08-12 | Stop reason: HOSPADM

## 2025-08-12 RX ADMIN — SODIUM CHLORIDE, SODIUM LACTATE, POTASSIUM CHLORIDE, AND CALCIUM CHLORIDE: .6; .31; .03; .02 INJECTION, SOLUTION INTRAVENOUS at 09:35

## 2025-08-12 RX ADMIN — FENTANYL CITRATE 50 MCG: 50 INJECTION, SOLUTION INTRAMUSCULAR; INTRAVENOUS at 12:39

## 2025-08-12 RX ADMIN — GLYCOPYRROLATE 0.2 MG: 0.2 INJECTION, SOLUTION INTRAMUSCULAR; INTRAVENOUS at 09:49

## 2025-08-12 RX ADMIN — CEFEPIME 2 G: 2 INJECTION, POWDER, FOR SOLUTION INTRAVENOUS at 14:42

## 2025-08-12 RX ADMIN — FENTANYL CITRATE 50 MCG: 50 INJECTION INTRAMUSCULAR; INTRAVENOUS at 10:32

## 2025-08-12 RX ADMIN — HYDROMORPHONE HYDROCHLORIDE 2 MG: 2 TABLET ORAL at 03:07

## 2025-08-12 RX ADMIN — FENTANYL CITRATE 50 MCG: 50 INJECTION, SOLUTION INTRAMUSCULAR; INTRAVENOUS at 11:52

## 2025-08-12 RX ADMIN — ONDANSETRON 4 MG: 2 INJECTION INTRAMUSCULAR; INTRAVENOUS at 11:21

## 2025-08-12 RX ADMIN — PIPERACILLIN AND TAZOBACTAM 4.5 G: 4; .5 INJECTION, POWDER, FOR SOLUTION INTRAVENOUS at 06:45

## 2025-08-12 RX ADMIN — PANTOPRAZOLE SODIUM 40 MG: 40 TABLET, DELAYED RELEASE ORAL at 07:53

## 2025-08-12 RX ADMIN — ATORVASTATIN CALCIUM 40 MG: 40 TABLET, FILM COATED ORAL at 23:34

## 2025-08-12 RX ADMIN — TRANEXAMIC ACID 1 G: 1 INJECTION, SOLUTION INTRAVENOUS at 09:49

## 2025-08-12 RX ADMIN — SODIUM CHLORIDE, SODIUM LACTATE, POTASSIUM CHLORIDE, AND CALCIUM CHLORIDE: .6; .31; .03; .02 INJECTION, SOLUTION INTRAVENOUS at 11:22

## 2025-08-12 RX ADMIN — FENTANYL CITRATE 50 MCG: 50 INJECTION INTRAMUSCULAR; INTRAVENOUS at 09:49

## 2025-08-12 RX ADMIN — HYDROXYZINE HYDROCHLORIDE 10 MG: 10 TABLET, FILM COATED ORAL at 16:55

## 2025-08-12 RX ADMIN — PROPOFOL 150 MG: 10 INJECTION, EMULSION INTRAVENOUS at 09:49

## 2025-08-12 RX ADMIN — PHENYLEPHRINE HYDROCHLORIDE 100 MCG: 10 INJECTION INTRAVENOUS at 10:15

## 2025-08-12 RX ADMIN — HYDROXYZINE HYDROCHLORIDE 10 MG: 10 TABLET, FILM COATED ORAL at 23:33

## 2025-08-12 RX ADMIN — TRAZODONE HYDROCHLORIDE 200 MG: 100 TABLET ORAL at 23:33

## 2025-08-12 RX ADMIN — ATENOLOL 50 MG: 50 TABLET ORAL at 07:53

## 2025-08-12 RX ADMIN — BUPROPION HYDROCHLORIDE 150 MG: 150 TABLET, FILM COATED, EXTENDED RELEASE ORAL at 20:16

## 2025-08-12 RX ADMIN — DEXAMETHASONE SODIUM PHOSPHATE 8 MG: 4 INJECTION, SOLUTION INTRA-ARTICULAR; INTRALESIONAL; INTRAMUSCULAR; INTRAVENOUS; SOFT TISSUE at 09:50

## 2025-08-12 RX ADMIN — SODIUM CHLORIDE, SODIUM LACTATE, POTASSIUM CHLORIDE, AND CALCIUM CHLORIDE: .6; .31; .03; .02 INJECTION, SOLUTION INTRAVENOUS at 12:42

## 2025-08-12 RX ADMIN — PIPERACILLIN AND TAZOBACTAM 4.5 G: 4; .5 INJECTION, POWDER, FOR SOLUTION INTRAVENOUS at 00:24

## 2025-08-12 RX ADMIN — VANCOMYCIN HYDROCHLORIDE 1250 MG: 5 INJECTION, POWDER, LYOPHILIZED, FOR SOLUTION INTRAVENOUS at 17:47

## 2025-08-12 RX ADMIN — FENTANYL CITRATE 50 MCG: 50 INJECTION INTRAMUSCULAR; INTRAVENOUS at 10:07

## 2025-08-12 RX ADMIN — BUPIVACAINE HYDROCHLORIDE AND EPINEPHRINE BITARTRATE 30 ML: 2.5; .005 INJECTION, SOLUTION INFILTRATION; PERINEURAL at 09:12

## 2025-08-12 RX ADMIN — ACETAMINOPHEN 975 MG: 325 TABLET ORAL at 09:35

## 2025-08-12 RX ADMIN — ACETAMINOPHEN 975 MG: 325 TABLET ORAL at 23:33

## 2025-08-12 RX ADMIN — ONDANSETRON 4 MG: 2 INJECTION, SOLUTION INTRAMUSCULAR; INTRAVENOUS at 12:34

## 2025-08-12 RX ADMIN — FENTANYL CITRATE 50 MCG: 50 INJECTION INTRAMUSCULAR; INTRAVENOUS at 10:51

## 2025-08-12 RX ADMIN — DULOXETINE 60 MG: 60 CAPSULE, DELAYED RELEASE ORAL at 14:43

## 2025-08-12 RX ADMIN — OXYCODONE HYDROCHLORIDE 5 MG: 5 TABLET ORAL at 16:55

## 2025-08-12 RX ADMIN — LIDOCAINE HYDROCHLORIDE 50 MG: 20 INJECTION, SOLUTION INFILTRATION; PERINEURAL at 09:49

## 2025-08-12 RX ADMIN — ACETAMINOPHEN 975 MG: 325 TABLET ORAL at 16:23

## 2025-08-12 ASSESSMENT — ACTIVITIES OF DAILY LIVING (ADL)
ADLS_ACUITY_SCORE: 66
ADLS_ACUITY_SCORE: 66
ADLS_ACUITY_SCORE: 65
ADLS_ACUITY_SCORE: 66
ADLS_ACUITY_SCORE: 65
ADLS_ACUITY_SCORE: 66
ADLS_ACUITY_SCORE: 66
ADLS_ACUITY_SCORE: 65
ADLS_ACUITY_SCORE: 66
ADLS_ACUITY_SCORE: 65
ADLS_ACUITY_SCORE: 66
DEPENDENT_IADLS:: INDEPENDENT
ADLS_ACUITY_SCORE: 65
ADLS_ACUITY_SCORE: 66
ADLS_ACUITY_SCORE: 65
ADLS_ACUITY_SCORE: 66

## 2025-08-13 ENCOUNTER — APPOINTMENT (OUTPATIENT)
Dept: PHYSICAL THERAPY | Facility: CLINIC | Age: 80
DRG: 493 | End: 2025-08-13
Payer: COMMERCIAL

## 2025-08-13 LAB
ANION GAP SERPL CALCULATED.3IONS-SCNC: 10 MMOL/L (ref 7–15)
BACTERIA UR CULT: NO GROWTH
BUN SERPL-MCNC: 18.5 MG/DL (ref 8–23)
CALCIUM SERPL-MCNC: 8.5 MG/DL (ref 8.8–10.4)
CHLORIDE SERPL-SCNC: 102 MMOL/L (ref 98–107)
CREAT SERPL-MCNC: 0.93 MG/DL (ref 0.51–0.95)
EGFRCR SERPLBLD CKD-EPI 2021: 63 ML/MIN/1.73M2
ERYTHROCYTE [DISTWIDTH] IN BLOOD BY AUTOMATED COUNT: 13.2 % (ref 10–15)
GLUCOSE SERPL-MCNC: 117 MG/DL (ref 70–99)
HCO3 SERPL-SCNC: 24 MMOL/L (ref 22–29)
HCT VFR BLD AUTO: 25.3 % (ref 35–47)
HGB BLD-MCNC: 8.4 G/DL (ref 11.7–15.7)
MCH RBC QN AUTO: 30.2 PG (ref 26.5–33)
MCHC RBC AUTO-ENTMCNC: 33.2 G/DL (ref 31.5–36.5)
MCV RBC AUTO: 91 FL (ref 78–100)
PLATELET # BLD AUTO: 270 10E3/UL (ref 150–450)
POTASSIUM SERPL-SCNC: 3.7 MMOL/L (ref 3.4–5.3)
RBC # BLD AUTO: 2.78 10E6/UL (ref 3.8–5.2)
SODIUM SERPL-SCNC: 136 MMOL/L (ref 135–145)
WBC # BLD AUTO: 8.41 10E3/UL (ref 4–11)

## 2025-08-13 PROCEDURE — 250N000011 HC RX IP 250 OP 636: Performed by: SPECIALIST

## 2025-08-13 PROCEDURE — 97161 PT EVAL LOW COMPLEX 20 MIN: CPT | Mod: GP

## 2025-08-13 PROCEDURE — 85018 HEMOGLOBIN: CPT | Performed by: INTERNAL MEDICINE

## 2025-08-13 PROCEDURE — 86140 C-REACTIVE PROTEIN: CPT | Performed by: SPECIALIST

## 2025-08-13 PROCEDURE — 250N000013 HC RX MED GY IP 250 OP 250 PS 637: Performed by: INTERNAL MEDICINE

## 2025-08-13 PROCEDURE — 120N000001 HC R&B MED SURG/OB

## 2025-08-13 PROCEDURE — 250N000013 HC RX MED GY IP 250 OP 250 PS 637: Performed by: PHYSICIAN ASSISTANT

## 2025-08-13 PROCEDURE — 250N000013 HC RX MED GY IP 250 OP 250 PS 637

## 2025-08-13 PROCEDURE — 80048 BASIC METABOLIC PNL TOTAL CA: CPT | Performed by: INTERNAL MEDICINE

## 2025-08-13 PROCEDURE — 250N000011 HC RX IP 250 OP 636: Performed by: PHYSICIAN ASSISTANT

## 2025-08-13 PROCEDURE — 97116 GAIT TRAINING THERAPY: CPT | Mod: GP

## 2025-08-13 PROCEDURE — 99232 SBSQ HOSP IP/OBS MODERATE 35: CPT | Performed by: SPECIALIST

## 2025-08-13 PROCEDURE — 36415 COLL VENOUS BLD VENIPUNCTURE: CPT | Performed by: INTERNAL MEDICINE

## 2025-08-13 PROCEDURE — 99232 SBSQ HOSP IP/OBS MODERATE 35: CPT | Performed by: INTERNAL MEDICINE

## 2025-08-13 PROCEDURE — 97530 THERAPEUTIC ACTIVITIES: CPT | Mod: GP

## 2025-08-13 RX ORDER — CEFAZOLIN SODIUM 2 G/50ML
2 SOLUTION INTRAVENOUS EVERY 8 HOURS
Status: DISCONTINUED | OUTPATIENT
Start: 2025-08-13 | End: 2025-08-16 | Stop reason: HOSPADM

## 2025-08-13 RX ADMIN — POLYETHYLENE GLYCOL 3350 17 G: 17 POWDER, FOR SOLUTION ORAL at 08:17

## 2025-08-13 RX ADMIN — CEFAZOLIN SODIUM 2 G: 2 SOLUTION INTRAVENOUS at 16:40

## 2025-08-13 RX ADMIN — ACETAMINOPHEN 650 MG: 325 TABLET ORAL at 21:27

## 2025-08-13 RX ADMIN — HYDROXYZINE HYDROCHLORIDE 10 MG: 10 TABLET, FILM COATED ORAL at 08:18

## 2025-08-13 RX ADMIN — TRAZODONE HYDROCHLORIDE 200 MG: 100 TABLET ORAL at 22:08

## 2025-08-13 RX ADMIN — PANTOPRAZOLE SODIUM 40 MG: 40 TABLET, DELAYED RELEASE ORAL at 08:18

## 2025-08-13 RX ADMIN — BUPROPION HYDROCHLORIDE 150 MG: 150 TABLET, FILM COATED, EXTENDED RELEASE ORAL at 08:18

## 2025-08-13 RX ADMIN — ONDANSETRON 4 MG: 4 TABLET, ORALLY DISINTEGRATING ORAL at 21:27

## 2025-08-13 RX ADMIN — HYDRALAZINE HYDROCHLORIDE 100 MG: 50 TABLET ORAL at 15:04

## 2025-08-13 RX ADMIN — HYDRALAZINE HYDROCHLORIDE 100 MG: 50 TABLET ORAL at 21:28

## 2025-08-13 RX ADMIN — ATORVASTATIN CALCIUM 40 MG: 40 TABLET, FILM COATED ORAL at 21:27

## 2025-08-13 RX ADMIN — ACETAMINOPHEN 975 MG: 325 TABLET ORAL at 16:32

## 2025-08-13 RX ADMIN — DULOXETINE 60 MG: 60 CAPSULE, DELAYED RELEASE ORAL at 08:18

## 2025-08-13 RX ADMIN — SENNOSIDES AND DOCUSATE SODIUM 2 TABLET: 50; 8.6 TABLET ORAL at 21:28

## 2025-08-13 RX ADMIN — LISINOPRIL 40 MG: 40 TABLET ORAL at 08:25

## 2025-08-13 RX ADMIN — CEFEPIME 2 G: 2 INJECTION, POWDER, FOR SOLUTION INTRAVENOUS at 02:51

## 2025-08-13 RX ADMIN — HYDROXYZINE HYDROCHLORIDE 10 MG: 10 TABLET, FILM COATED ORAL at 16:32

## 2025-08-13 RX ADMIN — SENNOSIDES AND DOCUSATE SODIUM 1 TABLET: 50; 8.6 TABLET ORAL at 08:18

## 2025-08-13 RX ADMIN — METHOCARBAMOL 250 MG: 500 TABLET ORAL at 21:27

## 2025-08-13 RX ADMIN — CEFAZOLIN SODIUM 2 G: 2 SOLUTION INTRAVENOUS at 08:25

## 2025-08-13 RX ADMIN — ACETAMINOPHEN 975 MG: 325 TABLET ORAL at 08:18

## 2025-08-13 RX ADMIN — ASPIRIN 325 MG: 325 TABLET, COATED ORAL at 08:18

## 2025-08-13 RX ADMIN — BUPROPION HYDROCHLORIDE 150 MG: 150 TABLET, FILM COATED, EXTENDED RELEASE ORAL at 21:28

## 2025-08-13 ASSESSMENT — ACTIVITIES OF DAILY LIVING (ADL)
ADLS_ACUITY_SCORE: 66
ADLS_ACUITY_SCORE: 63
ADLS_ACUITY_SCORE: 63
ADLS_ACUITY_SCORE: 64
ADLS_ACUITY_SCORE: 63
ADLS_ACUITY_SCORE: 63
ADLS_ACUITY_SCORE: 64
ADLS_ACUITY_SCORE: 64
ADLS_ACUITY_SCORE: 63
ADLS_ACUITY_SCORE: 63
ADLS_ACUITY_SCORE: 64
ADLS_ACUITY_SCORE: 63
ADLS_ACUITY_SCORE: 64
ADLS_ACUITY_SCORE: 63
ADLS_ACUITY_SCORE: 64
ADLS_ACUITY_SCORE: 63
ADLS_ACUITY_SCORE: 63
ADLS_ACUITY_SCORE: 61

## 2025-08-14 ENCOUNTER — APPOINTMENT (OUTPATIENT)
Dept: PHYSICAL THERAPY | Facility: CLINIC | Age: 80
DRG: 493 | End: 2025-08-14
Payer: COMMERCIAL

## 2025-08-14 VITALS
DIASTOLIC BLOOD PRESSURE: 58 MMHG | HEART RATE: 74 BPM | WEIGHT: 167 LBS | BODY MASS INDEX: 28.51 KG/M2 | RESPIRATION RATE: 24 BRPM | SYSTOLIC BLOOD PRESSURE: 141 MMHG | HEIGHT: 64 IN | OXYGEN SATURATION: 93 % | TEMPERATURE: 97.8 F

## 2025-08-14 LAB
ANION GAP SERPL CALCULATED.3IONS-SCNC: 13 MMOL/L (ref 7–15)
ATRIAL RATE - MUSE: 71 BPM
BACTERIA SPEC CULT: ABNORMAL
BACTERIA SPEC CULT: NORMAL
BACTERIA SPEC CULT: NORMAL
BACTERIA TISS BX CULT: ABNORMAL
BACTERIA TISS BX CULT: NORMAL
BUN SERPL-MCNC: 12.3 MG/DL (ref 8–23)
CALCIUM SERPL-MCNC: 8.7 MG/DL (ref 8.8–10.4)
CHLORIDE SERPL-SCNC: 103 MMOL/L (ref 98–107)
CREAT SERPL-MCNC: 0.86 MG/DL (ref 0.51–0.95)
CRP SERPL-MCNC: 153.89 MG/L
CRP SERPL-MCNC: 73.18 MG/L
DIASTOLIC BLOOD PRESSURE - MUSE: NORMAL MMHG
EGFRCR SERPLBLD CKD-EPI 2021: 69 ML/MIN/1.73M2
ERYTHROCYTE [DISTWIDTH] IN BLOOD BY AUTOMATED COUNT: 13.4 % (ref 10–15)
GLUCOSE SERPL-MCNC: 96 MG/DL (ref 70–99)
HCO3 SERPL-SCNC: 24 MMOL/L (ref 22–29)
HCT VFR BLD AUTO: 26.9 % (ref 35–47)
HGB BLD-MCNC: 8.6 G/DL (ref 11.7–15.7)
INTERPRETATION ECG - MUSE: NORMAL
MCH RBC QN AUTO: 29.6 PG (ref 26.5–33)
MCHC RBC AUTO-ENTMCNC: 32 G/DL (ref 31.5–36.5)
MCV RBC AUTO: 92.4 FL (ref 78–100)
P AXIS - MUSE: 29 DEGREES
PLATELET # BLD AUTO: 291 10E3/UL (ref 150–450)
POTASSIUM SERPL-SCNC: 3.3 MMOL/L (ref 3.4–5.3)
POTASSIUM SERPL-SCNC: 3.9 MMOL/L (ref 3.4–5.3)
PR INTERVAL - MUSE: 208 MS
QRS DURATION - MUSE: 80 MS
QT - MUSE: 420 MS
QTC - MUSE: 456 MS
R AXIS - MUSE: 54 DEGREES
RBC # BLD AUTO: 2.91 10E6/UL (ref 3.8–5.2)
SODIUM SERPL-SCNC: 140 MMOL/L (ref 135–145)
SYSTOLIC BLOOD PRESSURE - MUSE: NORMAL MMHG
T AXIS - MUSE: 34 DEGREES
VENTRICULAR RATE- MUSE: 71 BPM
WBC # BLD AUTO: 7.86 10E3/UL (ref 4–11)

## 2025-08-14 PROCEDURE — 250N000011 HC RX IP 250 OP 636: Performed by: SPECIALIST

## 2025-08-14 PROCEDURE — 86140 C-REACTIVE PROTEIN: CPT | Performed by: PHYSICIAN ASSISTANT

## 2025-08-14 PROCEDURE — 97116 GAIT TRAINING THERAPY: CPT | Mod: GP | Performed by: PHYSICAL THERAPIST

## 2025-08-14 PROCEDURE — 120N000001 HC R&B MED SURG/OB

## 2025-08-14 PROCEDURE — 85018 HEMOGLOBIN: CPT | Performed by: INTERNAL MEDICINE

## 2025-08-14 PROCEDURE — 250N000013 HC RX MED GY IP 250 OP 250 PS 637: Performed by: PHYSICIAN ASSISTANT

## 2025-08-14 PROCEDURE — 80048 BASIC METABOLIC PNL TOTAL CA: CPT | Performed by: INTERNAL MEDICINE

## 2025-08-14 PROCEDURE — 99232 SBSQ HOSP IP/OBS MODERATE 35: CPT | Performed by: INTERNAL MEDICINE

## 2025-08-14 PROCEDURE — 97530 THERAPEUTIC ACTIVITIES: CPT | Mod: GP | Performed by: PHYSICAL THERAPIST

## 2025-08-14 PROCEDURE — 250N000013 HC RX MED GY IP 250 OP 250 PS 637: Performed by: INTERNAL MEDICINE

## 2025-08-14 PROCEDURE — 250N000013 HC RX MED GY IP 250 OP 250 PS 637

## 2025-08-14 PROCEDURE — 84132 ASSAY OF SERUM POTASSIUM: CPT | Performed by: INTERNAL MEDICINE

## 2025-08-14 PROCEDURE — 36415 COLL VENOUS BLD VENIPUNCTURE: CPT | Performed by: INTERNAL MEDICINE

## 2025-08-14 PROCEDURE — 99232 SBSQ HOSP IP/OBS MODERATE 35: CPT | Performed by: SPECIALIST

## 2025-08-14 RX ORDER — SENNOSIDES 8.6 MG
325 CAPSULE ORAL DAILY
Qty: 42 TABLET | Refills: 0 | Status: SHIPPED | OUTPATIENT
Start: 2025-08-15 | End: 2025-08-18

## 2025-08-14 RX ORDER — POTASSIUM CHLORIDE 1500 MG/1
40 TABLET, EXTENDED RELEASE ORAL ONCE
Status: COMPLETED | OUTPATIENT
Start: 2025-08-14 | End: 2025-08-14

## 2025-08-14 RX ORDER — CEFAZOLIN SODIUM 2 G/50ML
2 SOLUTION INTRAVENOUS EVERY 8 HOURS
DISCHARGE
Start: 2025-08-14 | End: 2025-08-18

## 2025-08-14 RX ORDER — CALCIUM CARBONATE 500 MG/1
1000 TABLET, CHEWABLE ORAL 3 TIMES DAILY PRN
Status: DISCONTINUED | OUTPATIENT
Start: 2025-08-14 | End: 2025-08-16 | Stop reason: HOSPADM

## 2025-08-14 RX ORDER — NITROGLYCERIN 0.4 MG/1
0.4 TABLET SUBLINGUAL EVERY 5 MIN PRN
Status: DISCONTINUED | OUTPATIENT
Start: 2025-08-14 | End: 2025-08-16 | Stop reason: HOSPADM

## 2025-08-14 RX ORDER — METHOCARBAMOL 500 MG/1
250 TABLET, FILM COATED ORAL EVERY 6 HOURS PRN
Qty: 20 TABLET | Refills: 0 | Status: SHIPPED | OUTPATIENT
Start: 2025-08-14 | End: 2025-08-18

## 2025-08-14 RX ORDER — HYDROMORPHONE HYDROCHLORIDE 2 MG/1
1-2 TABLET ORAL EVERY 4 HOURS PRN
Qty: 15 TABLET | Refills: 0 | Status: ON HOLD | OUTPATIENT
Start: 2025-08-14 | End: 2025-08-22

## 2025-08-14 RX ORDER — POLYETHYLENE GLYCOL 3350 17 G/17G
17 POWDER, FOR SOLUTION ORAL DAILY PRN
Status: SHIPPED
Start: 2025-08-14

## 2025-08-14 RX ORDER — ACETAMINOPHEN 325 MG/1
975 TABLET ORAL EVERY 8 HOURS
Qty: 60 TABLET | Refills: 0 | Status: SHIPPED | OUTPATIENT
Start: 2025-08-14

## 2025-08-14 RX ADMIN — HYDROXYZINE HYDROCHLORIDE 10 MG: 10 TABLET, FILM COATED ORAL at 19:19

## 2025-08-14 RX ADMIN — HYDROMORPHONE HYDROCHLORIDE 2 MG: 2 TABLET ORAL at 05:44

## 2025-08-14 RX ADMIN — HYDRALAZINE HYDROCHLORIDE 100 MG: 50 TABLET ORAL at 09:36

## 2025-08-14 RX ADMIN — BUPROPION HYDROCHLORIDE 150 MG: 150 TABLET, FILM COATED, EXTENDED RELEASE ORAL at 09:37

## 2025-08-14 RX ADMIN — AMLODIPINE BESYLATE 5 MG: 5 TABLET ORAL at 09:36

## 2025-08-14 RX ADMIN — POTASSIUM CHLORIDE 40 MEQ: 1500 TABLET, EXTENDED RELEASE ORAL at 12:17

## 2025-08-14 RX ADMIN — LISINOPRIL 40 MG: 40 TABLET ORAL at 09:37

## 2025-08-14 RX ADMIN — METHOCARBAMOL 250 MG: 500 TABLET ORAL at 05:44

## 2025-08-14 RX ADMIN — CEFAZOLIN SODIUM 2 G: 2 SOLUTION INTRAVENOUS at 00:28

## 2025-08-14 RX ADMIN — PANTOPRAZOLE SODIUM 40 MG: 40 TABLET, DELAYED RELEASE ORAL at 09:36

## 2025-08-14 RX ADMIN — ACETAMINOPHEN 975 MG: 325 TABLET ORAL at 09:37

## 2025-08-14 RX ADMIN — HYDROMORPHONE HYDROCHLORIDE 1 MG: 2 TABLET ORAL at 23:01

## 2025-08-14 RX ADMIN — ATENOLOL 50 MG: 50 TABLET ORAL at 09:36

## 2025-08-14 RX ADMIN — ACETAMINOPHEN 975 MG: 325 TABLET ORAL at 17:16

## 2025-08-14 RX ADMIN — CEFAZOLIN SODIUM 2 G: 2 SOLUTION INTRAVENOUS at 09:38

## 2025-08-14 RX ADMIN — HYDRALAZINE HYDROCHLORIDE 100 MG: 50 TABLET ORAL at 14:03

## 2025-08-14 RX ADMIN — CALCIUM CARBONATE (ANTACID) CHEW TAB 500 MG 1000 MG: 500 CHEW TAB at 19:19

## 2025-08-14 RX ADMIN — ATORVASTATIN CALCIUM 40 MG: 40 TABLET, FILM COATED ORAL at 21:19

## 2025-08-14 RX ADMIN — DULOXETINE 60 MG: 60 CAPSULE, DELAYED RELEASE ORAL at 09:36

## 2025-08-14 RX ADMIN — ASPIRIN 325 MG: 325 TABLET, COATED ORAL at 09:37

## 2025-08-14 RX ADMIN — TRAZODONE HYDROCHLORIDE 200 MG: 100 TABLET ORAL at 21:19

## 2025-08-14 RX ADMIN — CEFAZOLIN SODIUM 2 G: 2 SOLUTION INTRAVENOUS at 17:18

## 2025-08-14 RX ADMIN — HYDRALAZINE HYDROCHLORIDE 100 MG: 50 TABLET ORAL at 19:20

## 2025-08-14 RX ADMIN — BUPROPION HYDROCHLORIDE 150 MG: 150 TABLET, FILM COATED, EXTENDED RELEASE ORAL at 19:20

## 2025-08-14 ASSESSMENT — ACTIVITIES OF DAILY LIVING (ADL)
ADLS_ACUITY_SCORE: 61
ADLS_ACUITY_SCORE: 67
ADLS_ACUITY_SCORE: 66
ADLS_ACUITY_SCORE: 66
ADLS_ACUITY_SCORE: 67
ADLS_ACUITY_SCORE: 67
ADLS_ACUITY_SCORE: 61
ADLS_ACUITY_SCORE: 67
ADLS_ACUITY_SCORE: 66
ADLS_ACUITY_SCORE: 64
ADLS_ACUITY_SCORE: 61
ADLS_ACUITY_SCORE: 67
ADLS_ACUITY_SCORE: 61
ADLS_ACUITY_SCORE: 66
ADLS_ACUITY_SCORE: 67
ADLS_ACUITY_SCORE: 66
ADLS_ACUITY_SCORE: 64
ADLS_ACUITY_SCORE: 61
ADLS_ACUITY_SCORE: 61

## 2025-08-15 ENCOUNTER — APPOINTMENT (OUTPATIENT)
Dept: GENERAL RADIOLOGY | Facility: CLINIC | Age: 80
DRG: 493 | End: 2025-08-15
Attending: INTERNAL MEDICINE
Payer: COMMERCIAL

## 2025-08-15 LAB
CREAT SERPL-MCNC: 0.85 MG/DL (ref 0.51–0.95)
CRP SERPL-MCNC: 80.01 MG/L
EGFRCR SERPLBLD CKD-EPI 2021: 70 ML/MIN/1.73M2
POTASSIUM SERPL-SCNC: 4.5 MMOL/L (ref 3.4–5.3)

## 2025-08-15 PROCEDURE — 36415 COLL VENOUS BLD VENIPUNCTURE: CPT | Performed by: PHYSICIAN ASSISTANT

## 2025-08-15 PROCEDURE — 250N000009 HC RX 250: Performed by: PHYSICIAN ASSISTANT

## 2025-08-15 PROCEDURE — 82565 ASSAY OF CREATININE: CPT | Performed by: INTERNAL MEDICINE

## 2025-08-15 PROCEDURE — 36569 INSJ PICC 5 YR+ W/O IMAGING: CPT

## 2025-08-15 PROCEDURE — 120N000001 HC R&B MED SURG/OB

## 2025-08-15 PROCEDURE — 272N000579 HC TRAY POWER PICC SOLO 4FR SINGLE LUMEN

## 2025-08-15 PROCEDURE — 250N000013 HC RX MED GY IP 250 OP 250 PS 637: Performed by: PHYSICIAN ASSISTANT

## 2025-08-15 PROCEDURE — 272N000277 HC DEVICE 4FR SECURACATH

## 2025-08-15 PROCEDURE — 84132 ASSAY OF SERUM POTASSIUM: CPT | Performed by: INTERNAL MEDICINE

## 2025-08-15 PROCEDURE — 250N000011 HC RX IP 250 OP 636: Performed by: SPECIALIST

## 2025-08-15 PROCEDURE — 86140 C-REACTIVE PROTEIN: CPT | Performed by: PHYSICIAN ASSISTANT

## 2025-08-15 PROCEDURE — 250N000013 HC RX MED GY IP 250 OP 250 PS 637

## 2025-08-15 PROCEDURE — 99232 SBSQ HOSP IP/OBS MODERATE 35: CPT | Performed by: INTERNAL MEDICINE

## 2025-08-15 PROCEDURE — 250N000013 HC RX MED GY IP 250 OP 250 PS 637: Performed by: INTERNAL MEDICINE

## 2025-08-15 PROCEDURE — 999N000065 XR CHEST PORT 1 VIEW

## 2025-08-15 RX ORDER — AMLODIPINE BESYLATE 5 MG/1
5 TABLET ORAL DAILY
COMMUNITY
Start: 2025-08-15

## 2025-08-15 RX ADMIN — TRAZODONE HYDROCHLORIDE 200 MG: 100 TABLET ORAL at 21:43

## 2025-08-15 RX ADMIN — AMLODIPINE BESYLATE 5 MG: 5 TABLET ORAL at 08:18

## 2025-08-15 RX ADMIN — HYDRALAZINE HYDROCHLORIDE 100 MG: 50 TABLET ORAL at 15:05

## 2025-08-15 RX ADMIN — CEFAZOLIN SODIUM 2 G: 2 SOLUTION INTRAVENOUS at 01:14

## 2025-08-15 RX ADMIN — DULOXETINE 60 MG: 60 CAPSULE, DELAYED RELEASE ORAL at 08:18

## 2025-08-15 RX ADMIN — ASPIRIN 325 MG: 325 TABLET, COATED ORAL at 08:18

## 2025-08-15 RX ADMIN — ATORVASTATIN CALCIUM 40 MG: 40 TABLET, FILM COATED ORAL at 21:33

## 2025-08-15 RX ADMIN — ACETAMINOPHEN 975 MG: 325 TABLET ORAL at 01:13

## 2025-08-15 RX ADMIN — HYDRALAZINE HYDROCHLORIDE 100 MG: 50 TABLET ORAL at 21:35

## 2025-08-15 RX ADMIN — CEFAZOLIN SODIUM 2 G: 2 SOLUTION INTRAVENOUS at 08:18

## 2025-08-15 RX ADMIN — Medication 1.5 ML: at 15:14

## 2025-08-15 RX ADMIN — ACETAMINOPHEN 975 MG: 325 TABLET ORAL at 16:39

## 2025-08-15 RX ADMIN — PANTOPRAZOLE SODIUM 40 MG: 40 TABLET, DELAYED RELEASE ORAL at 08:18

## 2025-08-15 RX ADMIN — CEFAZOLIN SODIUM 2 G: 2 SOLUTION INTRAVENOUS at 16:43

## 2025-08-15 RX ADMIN — HYDRALAZINE HYDROCHLORIDE 100 MG: 50 TABLET ORAL at 08:17

## 2025-08-15 RX ADMIN — LISINOPRIL 40 MG: 40 TABLET ORAL at 08:18

## 2025-08-15 RX ADMIN — HYDROMORPHONE HYDROCHLORIDE 1 MG: 2 TABLET ORAL at 21:34

## 2025-08-15 RX ADMIN — BUPROPION HYDROCHLORIDE 150 MG: 150 TABLET, FILM COATED, EXTENDED RELEASE ORAL at 08:18

## 2025-08-15 RX ADMIN — ATENOLOL 50 MG: 50 TABLET ORAL at 08:17

## 2025-08-15 RX ADMIN — ACETAMINOPHEN 975 MG: 325 TABLET ORAL at 08:17

## 2025-08-15 RX ADMIN — BUPROPION HYDROCHLORIDE 150 MG: 150 TABLET, FILM COATED, EXTENDED RELEASE ORAL at 21:34

## 2025-08-15 ASSESSMENT — ACTIVITIES OF DAILY LIVING (ADL)
ADLS_ACUITY_SCORE: 63
ADLS_ACUITY_SCORE: 64
ADLS_ACUITY_SCORE: 63
ADLS_ACUITY_SCORE: 66
ADLS_ACUITY_SCORE: 66
ADLS_ACUITY_SCORE: 64
ADLS_ACUITY_SCORE: 66
ADLS_ACUITY_SCORE: 66
ADLS_ACUITY_SCORE: 63
ADLS_ACUITY_SCORE: 63
ADLS_ACUITY_SCORE: 66
ADLS_ACUITY_SCORE: 64
ADLS_ACUITY_SCORE: 66
ADLS_ACUITY_SCORE: 63
ADLS_ACUITY_SCORE: 63
ADLS_ACUITY_SCORE: 64
ADLS_ACUITY_SCORE: 66
ADLS_ACUITY_SCORE: 64

## 2025-08-16 ENCOUNTER — HOME INFUSION (OUTPATIENT)
Dept: HOME HEALTH SERVICES | Facility: HOME HEALTH | Age: 80
End: 2025-08-16
Payer: COMMERCIAL

## 2025-08-16 ENCOUNTER — HOME CARE VISIT (OUTPATIENT)
Dept: HOME HEALTH SERVICES | Facility: HOME HEALTH | Age: 80
End: 2025-08-16
Payer: COMMERCIAL

## 2025-08-16 ENCOUNTER — HOME INFUSION BILLING (OUTPATIENT)
Dept: HOME HEALTH SERVICES | Facility: HOME HEALTH | Age: 80
End: 2025-08-16
Payer: COMMERCIAL

## 2025-08-16 ENCOUNTER — TELEPHONE (OUTPATIENT)
Dept: HOME HEALTH SERVICES | Facility: HOME HEALTH | Age: 80
End: 2025-08-16

## 2025-08-16 VITALS
TEMPERATURE: 98 F | SYSTOLIC BLOOD PRESSURE: 142 MMHG | HEART RATE: 73 BPM | RESPIRATION RATE: 18 BRPM | DIASTOLIC BLOOD PRESSURE: 60 MMHG | OXYGEN SATURATION: 97 %

## 2025-08-16 VITALS
WEIGHT: 167 LBS | OXYGEN SATURATION: 95 % | DIASTOLIC BLOOD PRESSURE: 60 MMHG | SYSTOLIC BLOOD PRESSURE: 131 MMHG | RESPIRATION RATE: 16 BRPM | HEART RATE: 78 BPM | TEMPERATURE: 97.8 F | BODY MASS INDEX: 28.51 KG/M2 | HEIGHT: 64 IN

## 2025-08-16 LAB
CRP SERPL-MCNC: 61.83 MG/L
POTASSIUM SERPL-SCNC: 3.7 MMOL/L (ref 3.4–5.3)

## 2025-08-16 PROCEDURE — 250N000013 HC RX MED GY IP 250 OP 250 PS 637: Performed by: INTERNAL MEDICINE

## 2025-08-16 PROCEDURE — E0781 EXTERNAL AMBULATORY INFUS PU: HCPCS | Mod: RR

## 2025-08-16 PROCEDURE — 250N000013 HC RX MED GY IP 250 OP 250 PS 637: Performed by: PHYSICIAN ASSISTANT

## 2025-08-16 PROCEDURE — 84132 ASSAY OF SERUM POTASSIUM: CPT | Performed by: INTERNAL MEDICINE

## 2025-08-16 PROCEDURE — E1399 DURABLE MEDICAL EQUIPMENT MI: HCPCS

## 2025-08-16 PROCEDURE — 250N000011 HC RX IP 250 OP 636: Performed by: SPECIALIST

## 2025-08-16 PROCEDURE — 99207 PR NO BILLABLE SERVICE THIS VISIT: CPT | Performed by: INTERNAL MEDICINE

## 2025-08-16 PROCEDURE — 99601 HOME NFS VISIT <2 HRS: CPT

## 2025-08-16 PROCEDURE — 86140 C-REACTIVE PROTEIN: CPT | Performed by: PHYSICIAN ASSISTANT

## 2025-08-16 PROCEDURE — S1015 IV TUBING EXTENSION SET: HCPCS

## 2025-08-16 PROCEDURE — A4452 WATERPROOF TAPE: HCPCS

## 2025-08-16 PROCEDURE — A4245 ALCOHOL WIPES PER BOX: HCPCS

## 2025-08-16 PROCEDURE — A4222 INFUSION SUPPLIES WITH PUMP: HCPCS

## 2025-08-16 PROCEDURE — A9270 NON-COVERED ITEM OR SERVICE: HCPCS

## 2025-08-16 PROCEDURE — A4221 SUPP NON-INSULIN INF CATH/WK: HCPCS

## 2025-08-16 PROCEDURE — 250N000013 HC RX MED GY IP 250 OP 250 PS 637

## 2025-08-16 RX ADMIN — ATENOLOL 50 MG: 50 TABLET ORAL at 08:49

## 2025-08-16 RX ADMIN — CEFAZOLIN SODIUM 2 G: 2 SOLUTION INTRAVENOUS at 00:55

## 2025-08-16 RX ADMIN — BUPROPION HYDROCHLORIDE 150 MG: 150 TABLET, FILM COATED, EXTENDED RELEASE ORAL at 08:49

## 2025-08-16 RX ADMIN — PANTOPRAZOLE SODIUM 40 MG: 40 TABLET, DELAYED RELEASE ORAL at 08:50

## 2025-08-16 RX ADMIN — DULOXETINE 60 MG: 60 CAPSULE, DELAYED RELEASE ORAL at 08:50

## 2025-08-16 RX ADMIN — HYDRALAZINE HYDROCHLORIDE 100 MG: 50 TABLET ORAL at 08:49

## 2025-08-16 RX ADMIN — LISINOPRIL 40 MG: 40 TABLET ORAL at 08:49

## 2025-08-16 RX ADMIN — ACETAMINOPHEN 975 MG: 325 TABLET ORAL at 00:56

## 2025-08-16 RX ADMIN — CEFAZOLIN SODIUM 2 G: 2 SOLUTION INTRAVENOUS at 08:53

## 2025-08-16 RX ADMIN — ACETAMINOPHEN 975 MG: 325 TABLET ORAL at 08:50

## 2025-08-16 RX ADMIN — AMLODIPINE BESYLATE 5 MG: 5 TABLET ORAL at 08:49

## 2025-08-16 RX ADMIN — ASPIRIN 325 MG: 325 TABLET, COATED ORAL at 08:49

## 2025-08-16 ASSESSMENT — ACTIVITIES OF DAILY LIVING (ADL)
ADLS_ACUITY_SCORE: 63

## 2025-08-17 ENCOUNTER — HOME CARE VISIT (OUTPATIENT)
Dept: HOME HEALTH SERVICES | Facility: HOME HEALTH | Age: 80
End: 2025-08-17
Payer: COMMERCIAL

## 2025-08-17 ENCOUNTER — NURSE TRIAGE (OUTPATIENT)
Dept: NURSING | Facility: CLINIC | Age: 80
End: 2025-08-17
Payer: COMMERCIAL

## 2025-08-17 VITALS
OXYGEN SATURATION: 97 % | RESPIRATION RATE: 16 BRPM | SYSTOLIC BLOOD PRESSURE: 126 MMHG | DIASTOLIC BLOOD PRESSURE: 56 MMHG | HEART RATE: 74 BPM | TEMPERATURE: 98.2 F

## 2025-08-17 LAB
BACTERIA SPEC CULT: NO GROWTH
BACTERIA SPEC CULT: NO GROWTH

## 2025-08-17 PROCEDURE — 99601 HOME NFS VISIT <2 HRS: CPT

## 2025-08-17 PROCEDURE — A4222 INFUSION SUPPLIES WITH PUMP: HCPCS

## 2025-08-17 PROCEDURE — 99602 HOME NFS VISIT EACH ADDL HR: CPT

## 2025-08-18 ENCOUNTER — HOSPITAL ENCOUNTER (OUTPATIENT)
Facility: CLINIC | Age: 80
Setting detail: OBSERVATION
End: 2025-08-18
Attending: EMERGENCY MEDICINE | Admitting: STUDENT IN AN ORGANIZED HEALTH CARE EDUCATION/TRAINING PROGRAM
Payer: COMMERCIAL

## 2025-08-18 ENCOUNTER — HOME INFUSION BILLING (OUTPATIENT)
Dept: HOME HEALTH SERVICES | Facility: HOME HEALTH | Age: 80
End: 2025-08-18
Payer: COMMERCIAL

## 2025-08-18 DIAGNOSIS — R53.1 WEAKNESS GENERALIZED: Primary | ICD-10-CM

## 2025-08-18 DIAGNOSIS — R19.7 DIARRHEA, UNSPECIFIED TYPE: ICD-10-CM

## 2025-08-18 DIAGNOSIS — M00.9 SEPTIC ARTHRITIS OF LEFT ANKLE, DUE TO UNSPECIFIED ORGANISM (H): ICD-10-CM

## 2025-08-18 LAB
ALBUMIN UR-MCNC: NEGATIVE MG/DL
ANION GAP SERPL CALCULATED.3IONS-SCNC: 10 MMOL/L (ref 7–15)
APPEARANCE UR: CLEAR
ATRIAL RATE - MUSE: 71 BPM
BASOPHILS # BLD AUTO: 0.03 10E3/UL (ref 0–0.2)
BASOPHILS NFR BLD AUTO: 0.3 %
BILIRUB UR QL STRIP: NEGATIVE
BUN SERPL-MCNC: 8.1 MG/DL (ref 8–23)
CALCIUM SERPL-MCNC: 8.4 MG/DL (ref 8.8–10.4)
CHLORIDE SERPL-SCNC: 102 MMOL/L (ref 98–107)
COLOR UR AUTO: ABNORMAL
CREAT SERPL-MCNC: 0.8 MG/DL (ref 0.51–0.95)
DIASTOLIC BLOOD PRESSURE - MUSE: NORMAL MMHG
EGFRCR SERPLBLD CKD-EPI 2021: 75 ML/MIN/1.73M2
EOSINOPHIL # BLD AUTO: 0.43 10E3/UL (ref 0–0.7)
EOSINOPHIL NFR BLD AUTO: 4.9 %
ERYTHROCYTE [DISTWIDTH] IN BLOOD BY AUTOMATED COUNT: 13.9 % (ref 10–15)
FLUAV RNA SPEC QL NAA+PROBE: NEGATIVE
FLUBV RNA RESP QL NAA+PROBE: NEGATIVE
GLUCOSE SERPL-MCNC: 102 MG/DL (ref 70–99)
GLUCOSE UR STRIP-MCNC: NEGATIVE MG/DL
HCO3 SERPL-SCNC: 26 MMOL/L (ref 22–29)
HCT VFR BLD AUTO: 26.4 % (ref 35–47)
HGB BLD-MCNC: 8.5 G/DL (ref 11.7–15.7)
HGB UR QL STRIP: NEGATIVE
IMM GRANULOCYTES # BLD: 0.16 10E3/UL
IMM GRANULOCYTES NFR BLD: 1.8 %
INTERPRETATION ECG - MUSE: NORMAL
KETONES UR STRIP-MCNC: NEGATIVE MG/DL
LEUKOCYTE ESTERASE UR QL STRIP: NEGATIVE
LYMPHOCYTES # BLD AUTO: 1.69 10E3/UL (ref 0.8–5.3)
LYMPHOCYTES NFR BLD AUTO: 19.1 %
MAGNESIUM SERPL-MCNC: 2 MG/DL (ref 1.7–2.3)
MCH RBC QN AUTO: 29.4 PG (ref 26.5–33)
MCHC RBC AUTO-ENTMCNC: 32.2 G/DL (ref 31.5–36.5)
MCV RBC AUTO: 91.3 FL (ref 78–100)
MONOCYTES # BLD AUTO: 0.95 10E3/UL (ref 0–1.3)
MONOCYTES NFR BLD AUTO: 10.7 %
NEUTROPHILS # BLD AUTO: 5.6 10E3/UL (ref 1.6–8.3)
NEUTROPHILS NFR BLD AUTO: 63.2 %
NITRATE UR QL: NEGATIVE
NRBC # BLD AUTO: <0.03 10E3/UL
NRBC BLD AUTO-RTO: 0 /100
P AXIS - MUSE: 29 DEGREES
PH UR STRIP: 6.5 [PH] (ref 5–7)
PHOSPHATE SERPL-MCNC: 3.2 MG/DL (ref 2.5–4.5)
PLATELET # BLD AUTO: 369 10E3/UL (ref 150–450)
POTASSIUM SERPL-SCNC: 3.6 MMOL/L (ref 3.4–5.3)
PR INTERVAL - MUSE: 208 MS
QRS DURATION - MUSE: 80 MS
QT - MUSE: 420 MS
QTC - MUSE: 456 MS
R AXIS - MUSE: 54 DEGREES
RBC # BLD AUTO: 2.89 10E6/UL (ref 3.8–5.2)
RBC URINE: <1 /HPF
RSV RNA SPEC NAA+PROBE: NEGATIVE
SARS-COV-2 RNA RESP QL NAA+PROBE: NEGATIVE
SODIUM SERPL-SCNC: 138 MMOL/L (ref 135–145)
SP GR UR STRIP: 1.02 (ref 1–1.03)
SQUAMOUS EPITHELIAL: 2 /HPF
SYSTOLIC BLOOD PRESSURE - MUSE: NORMAL MMHG
T AXIS - MUSE: 34 DEGREES
TSH SERPL DL<=0.005 MIU/L-ACNC: 0.54 UIU/ML (ref 0.3–4.2)
UROBILINOGEN UR STRIP-MCNC: NORMAL MG/DL
VENTRICULAR RATE- MUSE: 71 BPM
WBC # BLD AUTO: 8.86 10E3/UL (ref 4–11)
WBC URINE: 1 /HPF

## 2025-08-18 PROCEDURE — 96361 HYDRATE IV INFUSION ADD-ON: CPT

## 2025-08-18 PROCEDURE — 250N000011 HC RX IP 250 OP 636: Performed by: EMERGENCY MEDICINE

## 2025-08-18 PROCEDURE — 84443 ASSAY THYROID STIM HORMONE: CPT | Performed by: STUDENT IN AN ORGANIZED HEALTH CARE EDUCATION/TRAINING PROGRAM

## 2025-08-18 PROCEDURE — 36415 COLL VENOUS BLD VENIPUNCTURE: CPT | Performed by: EMERGENCY MEDICINE

## 2025-08-18 PROCEDURE — 258N000003 HC RX IP 258 OP 636: Performed by: EMERGENCY MEDICINE

## 2025-08-18 PROCEDURE — 250N000011 HC RX IP 250 OP 636: Performed by: STUDENT IN AN ORGANIZED HEALTH CARE EDUCATION/TRAINING PROGRAM

## 2025-08-18 PROCEDURE — 87040 BLOOD CULTURE FOR BACTERIA: CPT | Performed by: EMERGENCY MEDICINE

## 2025-08-18 PROCEDURE — G0378 HOSPITAL OBSERVATION PER HR: HCPCS

## 2025-08-18 PROCEDURE — A9270 NON-COVERED ITEM OR SERVICE: HCPCS

## 2025-08-18 PROCEDURE — 99285 EMERGENCY DEPT VISIT HI MDM: CPT | Mod: 25 | Performed by: EMERGENCY MEDICINE

## 2025-08-18 PROCEDURE — 96374 THER/PROPH/DIAG INJ IV PUSH: CPT

## 2025-08-18 PROCEDURE — 96375 TX/PRO/DX INJ NEW DRUG ADDON: CPT

## 2025-08-18 PROCEDURE — 87637 SARSCOV2&INF A&B&RSV AMP PRB: CPT | Performed by: STUDENT IN AN ORGANIZED HEALTH CARE EDUCATION/TRAINING PROGRAM

## 2025-08-18 PROCEDURE — 85004 AUTOMATED DIFF WBC COUNT: CPT | Performed by: EMERGENCY MEDICINE

## 2025-08-18 PROCEDURE — E1399 DURABLE MEDICAL EQUIPMENT MI: HCPCS

## 2025-08-18 PROCEDURE — 99223 1ST HOSP IP/OBS HIGH 75: CPT | Performed by: STUDENT IN AN ORGANIZED HEALTH CARE EDUCATION/TRAINING PROGRAM

## 2025-08-18 PROCEDURE — S1015 IV TUBING EXTENSION SET: HCPCS

## 2025-08-18 PROCEDURE — A4222 INFUSION SUPPLIES WITH PUMP: HCPCS

## 2025-08-18 PROCEDURE — 258N000003 HC RX IP 258 OP 636: Performed by: STUDENT IN AN ORGANIZED HEALTH CARE EDUCATION/TRAINING PROGRAM

## 2025-08-18 PROCEDURE — 80048 BASIC METABOLIC PNL TOTAL CA: CPT | Performed by: EMERGENCY MEDICINE

## 2025-08-18 PROCEDURE — 81003 URINALYSIS AUTO W/O SCOPE: CPT | Performed by: STUDENT IN AN ORGANIZED HEALTH CARE EDUCATION/TRAINING PROGRAM

## 2025-08-18 PROCEDURE — 250N000013 HC RX MED GY IP 250 OP 250 PS 637: Performed by: STUDENT IN AN ORGANIZED HEALTH CARE EDUCATION/TRAINING PROGRAM

## 2025-08-18 PROCEDURE — 84100 ASSAY OF PHOSPHORUS: CPT | Performed by: STUDENT IN AN ORGANIZED HEALTH CARE EDUCATION/TRAINING PROGRAM

## 2025-08-18 PROCEDURE — 83735 ASSAY OF MAGNESIUM: CPT | Performed by: STUDENT IN AN ORGANIZED HEALTH CARE EDUCATION/TRAINING PROGRAM

## 2025-08-18 RX ORDER — NALOXONE HYDROCHLORIDE 0.4 MG/ML
0.2 INJECTION, SOLUTION INTRAMUSCULAR; INTRAVENOUS; SUBCUTANEOUS
Status: DISCONTINUED | OUTPATIENT
Start: 2025-08-18 | End: 2025-08-22 | Stop reason: HOSPADM

## 2025-08-18 RX ORDER — LISINOPRIL 20 MG/1
40 TABLET ORAL DAILY
Status: DISCONTINUED | OUTPATIENT
Start: 2025-08-19 | End: 2025-08-22 | Stop reason: HOSPADM

## 2025-08-18 RX ORDER — HYDRALAZINE HYDROCHLORIDE 50 MG/1
100 TABLET, FILM COATED ORAL 3 TIMES DAILY
Status: DISCONTINUED | OUTPATIENT
Start: 2025-08-18 | End: 2025-08-22 | Stop reason: HOSPADM

## 2025-08-18 RX ORDER — ATENOLOL 50 MG/1
50 TABLET ORAL DAILY
Status: DISCONTINUED | OUTPATIENT
Start: 2025-08-19 | End: 2025-08-22 | Stop reason: HOSPADM

## 2025-08-18 RX ORDER — OMEPRAZOLE 20 MG/1
40 CAPSULE, DELAYED RELEASE ORAL DAILY
Status: DISCONTINUED | OUTPATIENT
Start: 2025-08-19 | End: 2025-08-18

## 2025-08-18 RX ORDER — AMOXICILLIN 250 MG
2 CAPSULE ORAL 2 TIMES DAILY PRN
Status: DISCONTINUED | OUTPATIENT
Start: 2025-08-18 | End: 2025-08-22 | Stop reason: HOSPADM

## 2025-08-18 RX ORDER — ONDANSETRON 2 MG/ML
4 INJECTION INTRAMUSCULAR; INTRAVENOUS ONCE
Status: COMPLETED | OUTPATIENT
Start: 2025-08-18 | End: 2025-08-18

## 2025-08-18 RX ORDER — NALOXONE HYDROCHLORIDE 0.4 MG/ML
0.4 INJECTION, SOLUTION INTRAMUSCULAR; INTRAVENOUS; SUBCUTANEOUS
Status: DISCONTINUED | OUTPATIENT
Start: 2025-08-18 | End: 2025-08-22 | Stop reason: HOSPADM

## 2025-08-18 RX ORDER — ATORVASTATIN CALCIUM 40 MG/1
40 TABLET, FILM COATED ORAL AT BEDTIME
Status: DISCONTINUED | OUTPATIENT
Start: 2025-08-18 | End: 2025-08-22 | Stop reason: HOSPADM

## 2025-08-18 RX ORDER — ONDANSETRON 2 MG/ML
4 INJECTION INTRAMUSCULAR; INTRAVENOUS EVERY 6 HOURS PRN
Status: DISCONTINUED | OUTPATIENT
Start: 2025-08-18 | End: 2025-08-22 | Stop reason: HOSPADM

## 2025-08-18 RX ORDER — TRAZODONE HYDROCHLORIDE 100 MG/1
200 TABLET ORAL AT BEDTIME
Status: DISCONTINUED | OUTPATIENT
Start: 2025-08-18 | End: 2025-08-22 | Stop reason: HOSPADM

## 2025-08-18 RX ORDER — OMEPRAZOLE 20 MG/1
20 CAPSULE, DELAYED RELEASE ORAL DAILY
COMMUNITY

## 2025-08-18 RX ORDER — GUAIFENESIN 200 MG/10ML
200 LIQUID ORAL EVERY 6 HOURS PRN
Status: DISCONTINUED | OUTPATIENT
Start: 2025-08-18 | End: 2025-08-18

## 2025-08-18 RX ORDER — AMLODIPINE BESYLATE 5 MG/1
5 TABLET ORAL DAILY
Status: DISCONTINUED | OUTPATIENT
Start: 2025-08-19 | End: 2025-08-22 | Stop reason: HOSPADM

## 2025-08-18 RX ORDER — CEFAZOLIN SODIUM 2 G/50ML
2 SOLUTION INTRAVENOUS EVERY 8 HOURS
Status: DISCONTINUED | OUTPATIENT
Start: 2025-08-18 | End: 2025-08-22 | Stop reason: HOSPADM

## 2025-08-18 RX ORDER — SENNOSIDES 8.6 MG
325 CAPSULE ORAL DAILY
Status: DISCONTINUED | OUTPATIENT
Start: 2025-08-18 | End: 2025-08-18

## 2025-08-18 RX ORDER — BUPROPION HYDROCHLORIDE 150 MG/1
150 TABLET, EXTENDED RELEASE ORAL 2 TIMES DAILY
Status: DISCONTINUED | OUTPATIENT
Start: 2025-08-18 | End: 2025-08-22 | Stop reason: HOSPADM

## 2025-08-18 RX ORDER — ASPIRIN 81 MG/1
81 TABLET ORAL DAILY
COMMUNITY

## 2025-08-18 RX ORDER — SODIUM CHLORIDE, SODIUM LACTATE, POTASSIUM CHLORIDE, CALCIUM CHLORIDE 600; 310; 30; 20 MG/100ML; MG/100ML; MG/100ML; MG/100ML
INJECTION, SOLUTION INTRAVENOUS CONTINUOUS
Status: ACTIVE | OUTPATIENT
Start: 2025-08-18 | End: 2025-08-19

## 2025-08-18 RX ORDER — ACETAMINOPHEN 650 MG/1
650 SUPPOSITORY RECTAL EVERY 4 HOURS PRN
Status: DISCONTINUED | OUTPATIENT
Start: 2025-08-18 | End: 2025-08-22 | Stop reason: HOSPADM

## 2025-08-18 RX ORDER — AMOXICILLIN 250 MG
1 CAPSULE ORAL 2 TIMES DAILY PRN
Status: DISCONTINUED | OUTPATIENT
Start: 2025-08-18 | End: 2025-08-22 | Stop reason: HOSPADM

## 2025-08-18 RX ORDER — METHOCARBAMOL 500 MG/1
250 TABLET ORAL EVERY 6 HOURS PRN
Status: DISCONTINUED | OUTPATIENT
Start: 2025-08-18 | End: 2025-08-18

## 2025-08-18 RX ORDER — PROCHLORPERAZINE MALEATE 5 MG/1
5 TABLET ORAL EVERY 6 HOURS PRN
Status: DISCONTINUED | OUTPATIENT
Start: 2025-08-18 | End: 2025-08-22 | Stop reason: HOSPADM

## 2025-08-18 RX ORDER — ACETAMINOPHEN 325 MG/1
650 TABLET ORAL EVERY 4 HOURS PRN
Status: DISCONTINUED | OUTPATIENT
Start: 2025-08-18 | End: 2025-08-22 | Stop reason: HOSPADM

## 2025-08-18 RX ORDER — HYDROCHLOROTHIAZIDE 25 MG/1
25 TABLET ORAL DAILY
Status: DISCONTINUED | OUTPATIENT
Start: 2025-08-19 | End: 2025-08-22 | Stop reason: HOSPADM

## 2025-08-18 RX ORDER — DULOXETIN HYDROCHLORIDE 60 MG/1
60 CAPSULE, DELAYED RELEASE ORAL DAILY
Status: DISCONTINUED | OUTPATIENT
Start: 2025-08-19 | End: 2025-08-22 | Stop reason: HOSPADM

## 2025-08-18 RX ORDER — ONDANSETRON 4 MG/1
4 TABLET, ORALLY DISINTEGRATING ORAL EVERY 6 HOURS PRN
Status: DISCONTINUED | OUTPATIENT
Start: 2025-08-18 | End: 2025-08-22 | Stop reason: HOSPADM

## 2025-08-18 RX ORDER — POLYETHYLENE GLYCOL 3350 17 G/17G
17 POWDER, FOR SOLUTION ORAL DAILY PRN
Status: DISCONTINUED | OUTPATIENT
Start: 2025-08-18 | End: 2025-08-22 | Stop reason: HOSPADM

## 2025-08-18 RX ORDER — PANTOPRAZOLE SODIUM 40 MG/1
40 TABLET, DELAYED RELEASE ORAL DAILY
Status: DISCONTINUED | OUTPATIENT
Start: 2025-08-19 | End: 2025-08-22 | Stop reason: HOSPADM

## 2025-08-18 RX ORDER — MULTIPLE VITAMINS W/ MINERALS TAB 9MG-400MCG
1 TAB ORAL DAILY
Status: DISCONTINUED | OUTPATIENT
Start: 2025-08-18 | End: 2025-08-22 | Stop reason: HOSPADM

## 2025-08-18 RX ADMIN — TRAZODONE HYDROCHLORIDE 200 MG: 100 TABLET ORAL at 21:44

## 2025-08-18 RX ADMIN — ONDANSETRON 4 MG: 2 INJECTION, SOLUTION INTRAMUSCULAR; INTRAVENOUS at 14:36

## 2025-08-18 RX ADMIN — SODIUM CHLORIDE, SODIUM LACTATE, POTASSIUM CHLORIDE, AND CALCIUM CHLORIDE: .6; .31; .03; .02 INJECTION, SOLUTION INTRAVENOUS at 17:26

## 2025-08-18 RX ADMIN — CEFAZOLIN SODIUM 2 G: 2 SOLUTION INTRAVENOUS at 18:58

## 2025-08-18 RX ADMIN — BUPROPION HYDROCHLORIDE 150 MG: 150 TABLET, FILM COATED, EXTENDED RELEASE ORAL at 20:33

## 2025-08-18 RX ADMIN — Medication 2 TABLET: at 20:33

## 2025-08-18 RX ADMIN — SODIUM CHLORIDE 500 ML: 0.9 INJECTION, SOLUTION INTRAVENOUS at 14:36

## 2025-08-18 RX ADMIN — HYDRALAZINE HYDROCHLORIDE 100 MG: 50 TABLET ORAL at 20:33

## 2025-08-18 RX ADMIN — Medication 1 TABLET: at 20:33

## 2025-08-18 ASSESSMENT — ACTIVITIES OF DAILY LIVING (ADL)
ADLS_ACUITY_SCORE: 49
ADLS_ACUITY_SCORE: 57
ADLS_ACUITY_SCORE: 56
ADLS_ACUITY_SCORE: 57
ADLS_ACUITY_SCORE: 56
ADLS_ACUITY_SCORE: 56
ADLS_ACUITY_SCORE: 57

## 2025-08-18 ASSESSMENT — COLUMBIA-SUICIDE SEVERITY RATING SCALE - C-SSRS
2. HAVE YOU ACTUALLY HAD ANY THOUGHTS OF KILLING YOURSELF IN THE PAST MONTH?: NO
1. IN THE PAST MONTH, HAVE YOU WISHED YOU WERE DEAD OR WISHED YOU COULD GO TO SLEEP AND NOT WAKE UP?: NO
6. HAVE YOU EVER DONE ANYTHING, STARTED TO DO ANYTHING, OR PREPARED TO DO ANYTHING TO END YOUR LIFE?: NO

## 2025-08-19 ENCOUNTER — APPOINTMENT (OUTPATIENT)
Dept: GENERAL RADIOLOGY | Facility: CLINIC | Age: 80
End: 2025-08-19
Attending: NURSE PRACTITIONER
Payer: COMMERCIAL

## 2025-08-19 ENCOUNTER — APPOINTMENT (OUTPATIENT)
Dept: PHYSICAL THERAPY | Facility: CLINIC | Age: 80
End: 2025-08-19
Attending: STUDENT IN AN ORGANIZED HEALTH CARE EDUCATION/TRAINING PROGRAM
Payer: COMMERCIAL

## 2025-08-19 LAB
ANION GAP SERPL CALCULATED.3IONS-SCNC: 10 MMOL/L (ref 7–15)
BACTERIA TISS BX CULT: NORMAL
BUN SERPL-MCNC: 6.7 MG/DL (ref 8–23)
C CAYETANENSIS DNA STL QL NAA+NON-PROBE: NEGATIVE
C DIFF TOX B STL QL: NEGATIVE
CALCIUM SERPL-MCNC: 8.5 MG/DL (ref 8.8–10.4)
CAMPYLOBACTER DNA SPEC NAA+PROBE: NEGATIVE
CHLORIDE SERPL-SCNC: 105 MMOL/L (ref 98–107)
CREAT SERPL-MCNC: 0.93 MG/DL (ref 0.51–0.95)
CRYPTOSP DNA STL QL NAA+NON-PROBE: NEGATIVE
EC STX1+STX2 GENES STL QL NAA+NON-PROBE: NEGATIVE
EGFRCR SERPLBLD CKD-EPI 2021: 63 ML/MIN/1.73M2
ERYTHROCYTE [DISTWIDTH] IN BLOOD BY AUTOMATED COUNT: 14.1 % (ref 10–15)
G LAMBLIA DNA STL QL NAA+NON-PROBE: NEGATIVE
GLUCOSE SERPL-MCNC: 89 MG/DL (ref 70–99)
HCO3 SERPL-SCNC: 27 MMOL/L (ref 22–29)
HCT VFR BLD AUTO: 24.5 % (ref 35–47)
HGB BLD-MCNC: 7.8 G/DL (ref 11.7–15.7)
MCH RBC QN AUTO: 29.3 PG (ref 26.5–33)
MCHC RBC AUTO-ENTMCNC: 31.8 G/DL (ref 31.5–36.5)
MCV RBC AUTO: 92.1 FL (ref 78–100)
NOROVIRUS GI+II RNA STL QL NAA+NON-PROBE: NEGATIVE
PLATELET # BLD AUTO: 322 10E3/UL (ref 150–450)
POTASSIUM SERPL-SCNC: 3.9 MMOL/L (ref 3.4–5.3)
RBC # BLD AUTO: 2.66 10E6/UL (ref 3.8–5.2)
SALMONELLA SP RPOD STL QL NAA+PROBE: NEGATIVE
SHIGELLA SP+EIEC IPAH ST NAA+NON-PROBE: NEGATIVE
SODIUM SERPL-SCNC: 142 MMOL/L (ref 135–145)
VIBRIO DNA SPEC NAA+PROBE: NEGATIVE
WBC # BLD AUTO: 8.13 10E3/UL (ref 4–11)
Y ENTEROCOL DNA STL QL NAA+PROBE: NEGATIVE

## 2025-08-19 PROCEDURE — 250N000011 HC RX IP 250 OP 636: Performed by: STUDENT IN AN ORGANIZED HEALTH CARE EDUCATION/TRAINING PROGRAM

## 2025-08-19 PROCEDURE — 97162 PT EVAL MOD COMPLEX 30 MIN: CPT | Mod: GP | Performed by: PHYSICAL THERAPIST

## 2025-08-19 PROCEDURE — 250N000013 HC RX MED GY IP 250 OP 250 PS 637: Performed by: NURSE PRACTITIONER

## 2025-08-19 PROCEDURE — 250N000011 HC RX IP 250 OP 636: Performed by: NURSE PRACTITIONER

## 2025-08-19 PROCEDURE — 96376 TX/PRO/DX INJ SAME DRUG ADON: CPT

## 2025-08-19 PROCEDURE — 97116 GAIT TRAINING THERAPY: CPT | Mod: GP | Performed by: PHYSICAL THERAPIST

## 2025-08-19 PROCEDURE — 80048 BASIC METABOLIC PNL TOTAL CA: CPT | Performed by: STUDENT IN AN ORGANIZED HEALTH CARE EDUCATION/TRAINING PROGRAM

## 2025-08-19 PROCEDURE — 97530 THERAPEUTIC ACTIVITIES: CPT | Mod: GP | Performed by: PHYSICAL THERAPIST

## 2025-08-19 PROCEDURE — 85027 COMPLETE CBC AUTOMATED: CPT | Performed by: STUDENT IN AN ORGANIZED HEALTH CARE EDUCATION/TRAINING PROGRAM

## 2025-08-19 PROCEDURE — 87493 C DIFF AMPLIFIED PROBE: CPT | Performed by: STUDENT IN AN ORGANIZED HEALTH CARE EDUCATION/TRAINING PROGRAM

## 2025-08-19 PROCEDURE — 99232 SBSQ HOSP IP/OBS MODERATE 35: CPT | Performed by: NURSE PRACTITIONER

## 2025-08-19 PROCEDURE — G0378 HOSPITAL OBSERVATION PER HR: HCPCS

## 2025-08-19 PROCEDURE — 87506 IADNA-DNA/RNA PROBE TQ 6-11: CPT | Performed by: STUDENT IN AN ORGANIZED HEALTH CARE EDUCATION/TRAINING PROGRAM

## 2025-08-19 PROCEDURE — 250N000013 HC RX MED GY IP 250 OP 250 PS 637: Performed by: STUDENT IN AN ORGANIZED HEALTH CARE EDUCATION/TRAINING PROGRAM

## 2025-08-19 PROCEDURE — 71045 X-RAY EXAM CHEST 1 VIEW: CPT

## 2025-08-19 PROCEDURE — A4222 INFUSION SUPPLIES WITH PUMP: HCPCS

## 2025-08-19 RX ORDER — ASPIRIN 81 MG/1
81 TABLET ORAL DAILY
Status: DISCONTINUED | OUTPATIENT
Start: 2025-08-19 | End: 2025-08-22 | Stop reason: HOSPADM

## 2025-08-19 RX ADMIN — AMLODIPINE BESYLATE 5 MG: 5 TABLET ORAL at 10:56

## 2025-08-19 RX ADMIN — HYDRALAZINE HYDROCHLORIDE 100 MG: 50 TABLET ORAL at 20:05

## 2025-08-19 RX ADMIN — TRAZODONE HYDROCHLORIDE 200 MG: 100 TABLET ORAL at 21:40

## 2025-08-19 RX ADMIN — HYDROMORPHONE HYDROCHLORIDE 2 MG: 2 TABLET ORAL at 03:29

## 2025-08-19 RX ADMIN — DULOXETINE 60 MG: 60 CAPSULE, DELAYED RELEASE ORAL at 10:56

## 2025-08-19 RX ADMIN — CEFAZOLIN SODIUM 2 G: 2 SOLUTION INTRAVENOUS at 03:19

## 2025-08-19 RX ADMIN — Medication 1 TABLET: at 10:57

## 2025-08-19 RX ADMIN — CEFAZOLIN SODIUM 2 G: 2 SOLUTION INTRAVENOUS at 18:55

## 2025-08-19 RX ADMIN — Medication 2 TABLET: at 10:57

## 2025-08-19 RX ADMIN — BUPROPION HYDROCHLORIDE 150 MG: 150 TABLET, FILM COATED, EXTENDED RELEASE ORAL at 20:05

## 2025-08-19 RX ADMIN — BUPROPION HYDROCHLORIDE 150 MG: 150 TABLET, FILM COATED, EXTENDED RELEASE ORAL at 10:56

## 2025-08-19 RX ADMIN — HYDRALAZINE HYDROCHLORIDE 100 MG: 50 TABLET ORAL at 16:42

## 2025-08-19 RX ADMIN — HYDROMORPHONE HYDROCHLORIDE 1 MG: 2 TABLET ORAL at 20:06

## 2025-08-19 RX ADMIN — LISINOPRIL 40 MG: 20 TABLET ORAL at 10:56

## 2025-08-19 RX ADMIN — ASPIRIN 81 MG: 81 TABLET, DELAYED RELEASE ORAL at 16:42

## 2025-08-19 RX ADMIN — ACETAMINOPHEN 650 MG: 325 TABLET ORAL at 22:23

## 2025-08-19 RX ADMIN — HYDRALAZINE HYDROCHLORIDE 100 MG: 50 TABLET ORAL at 10:56

## 2025-08-19 RX ADMIN — CEFAZOLIN SODIUM 2 G: 2 SOLUTION INTRAVENOUS at 11:17

## 2025-08-19 RX ADMIN — ATENOLOL 50 MG: 50 TABLET ORAL at 10:56

## 2025-08-19 RX ADMIN — PANTOPRAZOLE SODIUM 40 MG: 40 TABLET, DELAYED RELEASE ORAL at 10:56

## 2025-08-19 ASSESSMENT — ACTIVITIES OF DAILY LIVING (ADL)
ADLS_ACUITY_SCORE: 58
ADLS_ACUITY_SCORE: 60
DEPENDENT_IADLS:: CLEANING;COOKING;SHOPPING;LAUNDRY;MEAL PREPARATION;MEDICATION MANAGEMENT;TRANSPORTATION
ADLS_ACUITY_SCORE: 57
ADLS_ACUITY_SCORE: 60
ADLS_ACUITY_SCORE: 57
ADLS_ACUITY_SCORE: 61
ADLS_ACUITY_SCORE: 54
ADLS_ACUITY_SCORE: 58
ADLS_ACUITY_SCORE: 56
ADLS_ACUITY_SCORE: 58
ADLS_ACUITY_SCORE: 60
ADLS_ACUITY_SCORE: 57
ADLS_ACUITY_SCORE: 61
ADLS_ACUITY_SCORE: 60
ADLS_ACUITY_SCORE: 57
ADLS_ACUITY_SCORE: 60
ADLS_ACUITY_SCORE: 54
ADLS_ACUITY_SCORE: 57
ADLS_ACUITY_SCORE: 60
ADLS_ACUITY_SCORE: 56
ADLS_ACUITY_SCORE: 54

## 2025-08-20 LAB
ANION GAP SERPL CALCULATED.3IONS-SCNC: 8 MMOL/L (ref 7–15)
BUN SERPL-MCNC: 6.5 MG/DL (ref 8–23)
CALCIUM SERPL-MCNC: 8.3 MG/DL (ref 8.8–10.4)
CHLORIDE SERPL-SCNC: 104 MMOL/L (ref 98–107)
CREAT SERPL-MCNC: 0.87 MG/DL (ref 0.51–0.95)
EGFRCR SERPLBLD CKD-EPI 2021: 68 ML/MIN/1.73M2
ERYTHROCYTE [DISTWIDTH] IN BLOOD BY AUTOMATED COUNT: 13.9 % (ref 10–15)
GLUCOSE SERPL-MCNC: 90 MG/DL (ref 70–99)
HCO3 SERPL-SCNC: 26 MMOL/L (ref 22–29)
HCT VFR BLD AUTO: 24.2 % (ref 35–47)
HGB BLD-MCNC: 7.7 G/DL (ref 11.7–15.7)
MCH RBC QN AUTO: 29.3 PG (ref 26.5–33)
MCHC RBC AUTO-ENTMCNC: 31.8 G/DL (ref 31.5–36.5)
MCV RBC AUTO: 92 FL (ref 78–100)
PLATELET # BLD AUTO: 326 10E3/UL (ref 150–450)
POTASSIUM SERPL-SCNC: 3.6 MMOL/L (ref 3.4–5.3)
RBC # BLD AUTO: 2.63 10E6/UL (ref 3.8–5.2)
SODIUM SERPL-SCNC: 138 MMOL/L (ref 135–145)
WBC # BLD AUTO: 8.11 10E3/UL (ref 4–11)

## 2025-08-20 PROCEDURE — 99232 SBSQ HOSP IP/OBS MODERATE 35: CPT | Performed by: NURSE PRACTITIONER

## 2025-08-20 PROCEDURE — 250N000013 HC RX MED GY IP 250 OP 250 PS 637: Performed by: STUDENT IN AN ORGANIZED HEALTH CARE EDUCATION/TRAINING PROGRAM

## 2025-08-20 PROCEDURE — G0378 HOSPITAL OBSERVATION PER HR: HCPCS

## 2025-08-20 PROCEDURE — 80048 BASIC METABOLIC PNL TOTAL CA: CPT | Performed by: NURSE PRACTITIONER

## 2025-08-20 PROCEDURE — A4222 INFUSION SUPPLIES WITH PUMP: HCPCS

## 2025-08-20 PROCEDURE — 250N000011 HC RX IP 250 OP 636: Performed by: NURSE PRACTITIONER

## 2025-08-20 PROCEDURE — 250N000013 HC RX MED GY IP 250 OP 250 PS 637: Performed by: NURSE PRACTITIONER

## 2025-08-20 PROCEDURE — 96376 TX/PRO/DX INJ SAME DRUG ADON: CPT

## 2025-08-20 PROCEDURE — 85027 COMPLETE CBC AUTOMATED: CPT | Performed by: NURSE PRACTITIONER

## 2025-08-20 RX ORDER — GABAPENTIN 100 MG/1
100 CAPSULE ORAL 3 TIMES DAILY
Status: DISCONTINUED | OUTPATIENT
Start: 2025-08-20 | End: 2025-08-22 | Stop reason: HOSPADM

## 2025-08-20 RX ADMIN — AMLODIPINE BESYLATE 5 MG: 5 TABLET ORAL at 08:09

## 2025-08-20 RX ADMIN — Medication 1 TABLET: at 08:07

## 2025-08-20 RX ADMIN — GABAPENTIN 100 MG: 100 CAPSULE ORAL at 14:11

## 2025-08-20 RX ADMIN — ASPIRIN 81 MG: 81 TABLET, DELAYED RELEASE ORAL at 08:07

## 2025-08-20 RX ADMIN — GABAPENTIN 100 MG: 100 CAPSULE ORAL at 20:31

## 2025-08-20 RX ADMIN — CEFAZOLIN SODIUM 2 G: 2 SOLUTION INTRAVENOUS at 01:37

## 2025-08-20 RX ADMIN — Medication 2 TABLET: at 08:09

## 2025-08-20 RX ADMIN — CEFAZOLIN SODIUM 2 G: 2 SOLUTION INTRAVENOUS at 18:10

## 2025-08-20 RX ADMIN — CEFAZOLIN SODIUM 2 G: 2 SOLUTION INTRAVENOUS at 09:57

## 2025-08-20 RX ADMIN — HYDRALAZINE HYDROCHLORIDE 100 MG: 50 TABLET ORAL at 08:08

## 2025-08-20 RX ADMIN — TRAZODONE HYDROCHLORIDE 200 MG: 100 TABLET ORAL at 22:07

## 2025-08-20 RX ADMIN — ATENOLOL 50 MG: 50 TABLET ORAL at 08:07

## 2025-08-20 RX ADMIN — BUPROPION HYDROCHLORIDE 150 MG: 150 TABLET, FILM COATED, EXTENDED RELEASE ORAL at 08:08

## 2025-08-20 RX ADMIN — BUPROPION HYDROCHLORIDE 150 MG: 150 TABLET, FILM COATED, EXTENDED RELEASE ORAL at 20:31

## 2025-08-20 RX ADMIN — LISINOPRIL 40 MG: 20 TABLET ORAL at 08:07

## 2025-08-20 RX ADMIN — DULOXETINE 60 MG: 60 CAPSULE, DELAYED RELEASE ORAL at 08:08

## 2025-08-20 RX ADMIN — PANTOPRAZOLE SODIUM 40 MG: 40 TABLET, DELAYED RELEASE ORAL at 08:08

## 2025-08-20 RX ADMIN — HYDRALAZINE HYDROCHLORIDE 100 MG: 50 TABLET ORAL at 14:11

## 2025-08-20 RX ADMIN — HYDRALAZINE HYDROCHLORIDE 100 MG: 50 TABLET ORAL at 20:31

## 2025-08-20 RX ADMIN — HYDROMORPHONE HYDROCHLORIDE 2 MG: 2 TABLET ORAL at 22:07

## 2025-08-20 ASSESSMENT — ACTIVITIES OF DAILY LIVING (ADL)
ADLS_ACUITY_SCORE: 58
ADLS_ACUITY_SCORE: 57
ADLS_ACUITY_SCORE: 58
ADLS_ACUITY_SCORE: 57
ADLS_ACUITY_SCORE: 58
ADLS_ACUITY_SCORE: 57
ADLS_ACUITY_SCORE: 58
ADLS_ACUITY_SCORE: 57
ADLS_ACUITY_SCORE: 58
ADLS_ACUITY_SCORE: 57
ADLS_ACUITY_SCORE: 57
ADLS_ACUITY_SCORE: 58
ADLS_ACUITY_SCORE: 58
ADLS_ACUITY_SCORE: 57
ADLS_ACUITY_SCORE: 58

## 2025-08-21 LAB
ANION GAP SERPL CALCULATED.3IONS-SCNC: 10 MMOL/L (ref 7–15)
BACTERIA SPEC CULT: NORMAL
BACTERIA SPEC CULT: NORMAL
BASOPHILS # BLD AUTO: <0.03 10E3/UL (ref 0–0.2)
BASOPHILS NFR BLD AUTO: 0.2 %
BUN SERPL-MCNC: 6.6 MG/DL (ref 8–23)
CALCIUM SERPL-MCNC: 8.7 MG/DL (ref 8.8–10.4)
CHLORIDE SERPL-SCNC: 102 MMOL/L (ref 98–107)
CREAT SERPL-MCNC: 0.8 MG/DL (ref 0.51–0.95)
EGFRCR SERPLBLD CKD-EPI 2021: 75 ML/MIN/1.73M2
EOSINOPHIL # BLD AUTO: 0.24 10E3/UL (ref 0–0.7)
EOSINOPHIL NFR BLD AUTO: 2.7 %
ERYTHROCYTE [DISTWIDTH] IN BLOOD BY AUTOMATED COUNT: 14.1 % (ref 10–15)
GLUCOSE SERPL-MCNC: 135 MG/DL (ref 70–99)
HCO3 SERPL-SCNC: 26 MMOL/L (ref 22–29)
HCT VFR BLD AUTO: 25 % (ref 35–47)
HGB BLD-MCNC: 8.1 G/DL (ref 11.7–15.7)
IMM GRANULOCYTES # BLD: 0.09 10E3/UL
IMM GRANULOCYTES NFR BLD: 1 %
LYMPHOCYTES # BLD AUTO: 1.28 10E3/UL (ref 0.8–5.3)
LYMPHOCYTES NFR BLD AUTO: 14.6 %
MCH RBC QN AUTO: 29.7 PG (ref 26.5–33)
MCHC RBC AUTO-ENTMCNC: 32.4 G/DL (ref 31.5–36.5)
MCV RBC AUTO: 91.6 FL (ref 78–100)
MONOCYTES # BLD AUTO: 0.66 10E3/UL (ref 0–1.3)
MONOCYTES NFR BLD AUTO: 7.5 %
NEUTROPHILS # BLD AUTO: 6.48 10E3/UL (ref 1.6–8.3)
NEUTROPHILS NFR BLD AUTO: 74 %
NRBC # BLD AUTO: <0.03 10E3/UL
NRBC BLD AUTO-RTO: 0 /100
PLATELET # BLD AUTO: 290 10E3/UL (ref 150–450)
POTASSIUM SERPL-SCNC: 3.7 MMOL/L (ref 3.4–5.3)
RBC # BLD AUTO: 2.73 10E6/UL (ref 3.8–5.2)
SODIUM SERPL-SCNC: 138 MMOL/L (ref 135–145)
WBC # BLD AUTO: 8.77 10E3/UL (ref 4–11)

## 2025-08-21 PROCEDURE — G0378 HOSPITAL OBSERVATION PER HR: HCPCS

## 2025-08-21 PROCEDURE — 250N000011 HC RX IP 250 OP 636: Performed by: NURSE PRACTITIONER

## 2025-08-21 PROCEDURE — 250N000013 HC RX MED GY IP 250 OP 250 PS 637: Performed by: NURSE PRACTITIONER

## 2025-08-21 PROCEDURE — 96376 TX/PRO/DX INJ SAME DRUG ADON: CPT

## 2025-08-21 PROCEDURE — 250N000013 HC RX MED GY IP 250 OP 250 PS 637

## 2025-08-21 PROCEDURE — 85004 AUTOMATED DIFF WBC COUNT: CPT

## 2025-08-21 PROCEDURE — A4222 INFUSION SUPPLIES WITH PUMP: HCPCS

## 2025-08-21 PROCEDURE — 250N000011 HC RX IP 250 OP 636: Performed by: STUDENT IN AN ORGANIZED HEALTH CARE EDUCATION/TRAINING PROGRAM

## 2025-08-21 PROCEDURE — 80048 BASIC METABOLIC PNL TOTAL CA: CPT

## 2025-08-21 PROCEDURE — 250N000013 HC RX MED GY IP 250 OP 250 PS 637: Performed by: STUDENT IN AN ORGANIZED HEALTH CARE EDUCATION/TRAINING PROGRAM

## 2025-08-21 PROCEDURE — 99232 SBSQ HOSP IP/OBS MODERATE 35: CPT

## 2025-08-21 RX ORDER — SIMETHICONE 80 MG
80 TABLET,CHEWABLE ORAL EVERY 6 HOURS PRN
Status: DISCONTINUED | OUTPATIENT
Start: 2025-08-21 | End: 2025-08-22 | Stop reason: HOSPADM

## 2025-08-21 RX ORDER — LOPERAMIDE HYDROCHLORIDE 2 MG/1
2 CAPSULE ORAL 4 TIMES DAILY PRN
Status: DISCONTINUED | OUTPATIENT
Start: 2025-08-21 | End: 2025-08-22 | Stop reason: HOSPADM

## 2025-08-21 RX ADMIN — HYDRALAZINE HYDROCHLORIDE 100 MG: 50 TABLET ORAL at 20:18

## 2025-08-21 RX ADMIN — CEFAZOLIN SODIUM 2 G: 2 SOLUTION INTRAVENOUS at 18:19

## 2025-08-21 RX ADMIN — AMLODIPINE BESYLATE 5 MG: 5 TABLET ORAL at 08:43

## 2025-08-21 RX ADMIN — BUPROPION HYDROCHLORIDE 150 MG: 150 TABLET, FILM COATED, EXTENDED RELEASE ORAL at 08:44

## 2025-08-21 RX ADMIN — DULOXETINE 60 MG: 60 CAPSULE, DELAYED RELEASE ORAL at 08:43

## 2025-08-21 RX ADMIN — CEFAZOLIN SODIUM 2 G: 2 SOLUTION INTRAVENOUS at 11:15

## 2025-08-21 RX ADMIN — HYDRALAZINE HYDROCHLORIDE 100 MG: 50 TABLET ORAL at 08:44

## 2025-08-21 RX ADMIN — TRAZODONE HYDROCHLORIDE 200 MG: 100 TABLET ORAL at 23:01

## 2025-08-21 RX ADMIN — PANTOPRAZOLE SODIUM 40 MG: 40 TABLET, DELAYED RELEASE ORAL at 08:43

## 2025-08-21 RX ADMIN — LISINOPRIL 40 MG: 20 TABLET ORAL at 08:44

## 2025-08-21 RX ADMIN — GABAPENTIN 100 MG: 100 CAPSULE ORAL at 16:45

## 2025-08-21 RX ADMIN — GABAPENTIN 100 MG: 100 CAPSULE ORAL at 20:18

## 2025-08-21 RX ADMIN — HYDROCHLOROTHIAZIDE 25 MG: 25 TABLET ORAL at 08:44

## 2025-08-21 RX ADMIN — GABAPENTIN 100 MG: 100 CAPSULE ORAL at 08:43

## 2025-08-21 RX ADMIN — Medication 2 TABLET: at 08:44

## 2025-08-21 RX ADMIN — ATENOLOL 50 MG: 50 TABLET ORAL at 08:43

## 2025-08-21 RX ADMIN — ASPIRIN 81 MG: 81 TABLET, DELAYED RELEASE ORAL at 08:44

## 2025-08-21 RX ADMIN — ONDANSETRON 4 MG: 4 TABLET, ORALLY DISINTEGRATING ORAL at 16:51

## 2025-08-21 RX ADMIN — ATORVASTATIN CALCIUM 40 MG: 40 TABLET, FILM COATED ORAL at 23:00

## 2025-08-21 RX ADMIN — HYDRALAZINE HYDROCHLORIDE 100 MG: 50 TABLET ORAL at 16:45

## 2025-08-21 RX ADMIN — CEFAZOLIN SODIUM 2 G: 2 SOLUTION INTRAVENOUS at 02:06

## 2025-08-21 RX ADMIN — BUPROPION HYDROCHLORIDE 150 MG: 150 TABLET, FILM COATED, EXTENDED RELEASE ORAL at 20:18

## 2025-08-21 RX ADMIN — Medication 1 TABLET: at 08:44

## 2025-08-21 ASSESSMENT — ACTIVITIES OF DAILY LIVING (ADL)
ADLS_ACUITY_SCORE: 57

## 2025-08-22 VITALS
WEIGHT: 170.42 LBS | BODY MASS INDEX: 29.09 KG/M2 | DIASTOLIC BLOOD PRESSURE: 73 MMHG | RESPIRATION RATE: 12 BRPM | SYSTOLIC BLOOD PRESSURE: 135 MMHG | HEIGHT: 64 IN | HEART RATE: 77 BPM | OXYGEN SATURATION: 95 % | TEMPERATURE: 98.5 F

## 2025-08-22 VITALS
WEIGHT: 170.42 LBS | OXYGEN SATURATION: 92 % | DIASTOLIC BLOOD PRESSURE: 65 MMHG | RESPIRATION RATE: 18 BRPM | TEMPERATURE: 98.6 F | SYSTOLIC BLOOD PRESSURE: 135 MMHG | BODY MASS INDEX: 29.09 KG/M2 | HEIGHT: 64 IN | HEART RATE: 76 BPM

## 2025-08-22 PROCEDURE — 96376 TX/PRO/DX INJ SAME DRUG ADON: CPT

## 2025-08-22 PROCEDURE — 99239 HOSP IP/OBS DSCHRG MGMT >30: CPT | Performed by: INTERNAL MEDICINE

## 2025-08-22 PROCEDURE — 250N000013 HC RX MED GY IP 250 OP 250 PS 637: Performed by: NURSE PRACTITIONER

## 2025-08-22 PROCEDURE — 250N000011 HC RX IP 250 OP 636: Performed by: NURSE PRACTITIONER

## 2025-08-22 PROCEDURE — G0378 HOSPITAL OBSERVATION PER HR: HCPCS

## 2025-08-22 PROCEDURE — 250N000013 HC RX MED GY IP 250 OP 250 PS 637: Performed by: STUDENT IN AN ORGANIZED HEALTH CARE EDUCATION/TRAINING PROGRAM

## 2025-08-22 RX ORDER — CEFAZOLIN SODIUM 2 G/50ML
2 SOLUTION INTRAVENOUS EVERY 8 HOURS
DISCHARGE
Start: 2025-08-22

## 2025-08-22 RX ORDER — HYDROMORPHONE HYDROCHLORIDE 2 MG/1
1-2 TABLET ORAL EVERY 4 HOURS PRN
Qty: 12 TABLET | Refills: 0 | Status: SHIPPED | OUTPATIENT
Start: 2025-08-22 | End: 2025-08-25

## 2025-08-22 RX ORDER — GABAPENTIN 100 MG/1
100 CAPSULE ORAL 3 TIMES DAILY
DISCHARGE
Start: 2025-08-22

## 2025-08-22 RX ADMIN — Medication 2 TABLET: at 09:50

## 2025-08-22 RX ADMIN — GABAPENTIN 100 MG: 100 CAPSULE ORAL at 09:50

## 2025-08-22 RX ADMIN — BUPROPION HYDROCHLORIDE 150 MG: 150 TABLET, FILM COATED, EXTENDED RELEASE ORAL at 09:50

## 2025-08-22 RX ADMIN — DULOXETINE 60 MG: 60 CAPSULE, DELAYED RELEASE ORAL at 09:50

## 2025-08-22 RX ADMIN — Medication 1 TABLET: at 09:49

## 2025-08-22 RX ADMIN — HYDRALAZINE HYDROCHLORIDE 100 MG: 50 TABLET ORAL at 09:50

## 2025-08-22 RX ADMIN — HYDROCHLOROTHIAZIDE 25 MG: 25 TABLET ORAL at 09:50

## 2025-08-22 RX ADMIN — CEFAZOLIN SODIUM 2 G: 2 SOLUTION INTRAVENOUS at 02:54

## 2025-08-22 RX ADMIN — ATENOLOL 50 MG: 50 TABLET ORAL at 09:49

## 2025-08-22 RX ADMIN — LISINOPRIL 40 MG: 20 TABLET ORAL at 09:50

## 2025-08-22 RX ADMIN — PANTOPRAZOLE SODIUM 40 MG: 40 TABLET, DELAYED RELEASE ORAL at 09:50

## 2025-08-22 RX ADMIN — ACETAMINOPHEN 650 MG: 325 TABLET ORAL at 10:05

## 2025-08-22 RX ADMIN — AMLODIPINE BESYLATE 5 MG: 5 TABLET ORAL at 09:49

## 2025-08-22 RX ADMIN — CEFAZOLIN SODIUM 2 G: 2 SOLUTION INTRAVENOUS at 09:52

## 2025-08-22 RX ADMIN — ASPIRIN 81 MG: 81 TABLET, DELAYED RELEASE ORAL at 09:50

## 2025-08-22 ASSESSMENT — ACTIVITIES OF DAILY LIVING (ADL)
ADLS_ACUITY_SCORE: 57
ADLS_ACUITY_SCORE: 60
ADLS_ACUITY_SCORE: 57
ADLS_ACUITY_SCORE: 60

## 2025-08-23 LAB
BACTERIA SPEC CULT: NO GROWTH
BACTERIA SPEC CULT: NO GROWTH

## 2025-08-25 ENCOUNTER — TRANSITIONAL CARE UNIT VISIT (OUTPATIENT)
Dept: GERIATRICS | Facility: CLINIC | Age: 80
End: 2025-08-25
Payer: COMMERCIAL

## 2025-08-25 VITALS
HEART RATE: 87 BPM | SYSTOLIC BLOOD PRESSURE: 138 MMHG | OXYGEN SATURATION: 97 % | RESPIRATION RATE: 18 BRPM | HEIGHT: 64 IN | DIASTOLIC BLOOD PRESSURE: 77 MMHG | BODY MASS INDEX: 28.99 KG/M2 | WEIGHT: 169.8 LBS | TEMPERATURE: 97.7 F

## 2025-08-25 DIAGNOSIS — R53.81 PHYSICAL DECONDITIONING: ICD-10-CM

## 2025-08-25 DIAGNOSIS — I25.10 CORONARY ARTERY DISEASE INVOLVING NATIVE CORONARY ARTERY OF NATIVE HEART WITHOUT ANGINA PECTORIS: ICD-10-CM

## 2025-08-25 DIAGNOSIS — D62 ABLA (ACUTE BLOOD LOSS ANEMIA): ICD-10-CM

## 2025-08-25 DIAGNOSIS — R19.7 DIARRHEA, UNSPECIFIED TYPE: Primary | ICD-10-CM

## 2025-08-25 DIAGNOSIS — I10 BENIGN ESSENTIAL HYPERTENSION: ICD-10-CM

## 2025-08-25 DIAGNOSIS — R41.89 COGNITIVE IMPAIRMENT: ICD-10-CM

## 2025-08-25 DIAGNOSIS — G89.18 ACUTE POST-OPERATIVE PAIN: ICD-10-CM

## 2025-08-25 DIAGNOSIS — K21.9 GASTROESOPHAGEAL REFLUX DISEASE, UNSPECIFIED WHETHER ESOPHAGITIS PRESENT: ICD-10-CM

## 2025-08-25 DIAGNOSIS — B95.61 MSSA BACTEREMIA: ICD-10-CM

## 2025-08-25 DIAGNOSIS — M00.9 SEPTIC ARTHRITIS OF LEFT ANKLE, DUE TO UNSPECIFIED ORGANISM (H): ICD-10-CM

## 2025-08-25 DIAGNOSIS — R78.81 MSSA BACTEREMIA: ICD-10-CM

## 2025-08-25 DIAGNOSIS — G47.00 INSOMNIA, UNSPECIFIED TYPE: ICD-10-CM

## 2025-08-25 DIAGNOSIS — F33.1 MODERATE RECURRENT MAJOR DEPRESSION (H): ICD-10-CM

## 2025-08-25 DIAGNOSIS — S82.852D CLOSED TRIMALLEOLAR FRACTURE OF LEFT ANKLE WITH ROUTINE HEALING, SUBSEQUENT ENCOUNTER: ICD-10-CM

## 2025-08-25 RX ORDER — HYDROMORPHONE HYDROCHLORIDE 2 MG/1
1-2 TABLET ORAL EVERY 4 HOURS PRN
Qty: 30 TABLET | Refills: 0 | Status: SHIPPED | OUTPATIENT
Start: 2025-08-25

## 2025-08-28 ENCOUNTER — TRANSITIONAL CARE UNIT VISIT (OUTPATIENT)
Dept: GERIATRICS | Facility: CLINIC | Age: 80
End: 2025-08-28
Payer: COMMERCIAL

## 2025-09-02 ENCOUNTER — TRANSITIONAL CARE UNIT VISIT (OUTPATIENT)
Dept: GERIATRICS | Facility: CLINIC | Age: 80
End: 2025-09-02
Payer: COMMERCIAL

## 2025-09-02 VITALS
SYSTOLIC BLOOD PRESSURE: 161 MMHG | DIASTOLIC BLOOD PRESSURE: 76 MMHG | RESPIRATION RATE: 18 BRPM | WEIGHT: 165 LBS | HEIGHT: 64 IN | BODY MASS INDEX: 28.17 KG/M2 | HEART RATE: 87 BPM | TEMPERATURE: 97.1 F | OXYGEN SATURATION: 95 %

## 2025-09-02 DIAGNOSIS — I10 ESSENTIAL HYPERTENSION: ICD-10-CM

## 2025-09-02 DIAGNOSIS — M00.9 SEPTIC ARTHRITIS OF LEFT ANKLE, DUE TO UNSPECIFIED ORGANISM (H): ICD-10-CM

## 2025-09-02 DIAGNOSIS — D62 ABLA (ACUTE BLOOD LOSS ANEMIA): ICD-10-CM

## 2025-09-02 DIAGNOSIS — F33.1 MODERATE RECURRENT MAJOR DEPRESSION (H): ICD-10-CM

## 2025-09-02 DIAGNOSIS — R07.89 ATYPICAL CHEST PAIN: ICD-10-CM

## 2025-09-02 DIAGNOSIS — R53.81 PHYSICAL DECONDITIONING: Primary | ICD-10-CM

## 2025-09-02 DIAGNOSIS — S82.852D CLOSED TRIMALLEOLAR FRACTURE OF LEFT ANKLE WITH ROUTINE HEALING, SUBSEQUENT ENCOUNTER: ICD-10-CM

## 2025-09-02 DIAGNOSIS — R41.89 COGNITIVE IMPAIRMENT: ICD-10-CM

## 2025-09-02 DIAGNOSIS — I25.10 CORONARY ARTERY DISEASE INVOLVING NATIVE CORONARY ARTERY OF NATIVE HEART WITHOUT ANGINA PECTORIS: ICD-10-CM

## 2025-09-02 DIAGNOSIS — R78.81 MSSA BACTEREMIA: ICD-10-CM

## 2025-09-02 DIAGNOSIS — K21.9 CHRONIC GASTROESOPHAGEAL REFLUX DISEASE: ICD-10-CM

## 2025-09-02 DIAGNOSIS — G47.00 INSOMNIA, UNSPECIFIED TYPE: ICD-10-CM

## 2025-09-02 DIAGNOSIS — B95.61 MSSA BACTEREMIA: ICD-10-CM

## 2025-09-03 ENCOUNTER — LAB REQUISITION (OUTPATIENT)
Dept: LAB | Facility: CLINIC | Age: 80
End: 2025-09-03
Payer: COMMERCIAL

## 2025-09-03 DIAGNOSIS — A41.9 SEPSIS, UNSPECIFIED ORGANISM (H): ICD-10-CM

## 2025-09-04 LAB
AST SERPL W P-5'-P-CCNC: 60 U/L (ref 0–45)
CK SERPL-CCNC: 25 U/L (ref 26–192)
CRP SERPL-MCNC: <3 MG/L
ERYTHROCYTE [DISTWIDTH] IN BLOOD BY AUTOMATED COUNT: 14.2 % (ref 10–15)
HCT VFR BLD AUTO: 28.6 % (ref 35–47)
HGB BLD-MCNC: 9.3 G/DL (ref 11.7–15.7)
MCH RBC QN AUTO: 29.6 PG (ref 26.5–33)
MCHC RBC AUTO-ENTMCNC: 32.5 G/DL (ref 31.5–36.5)
MCV RBC AUTO: 91.1 FL (ref 78–100)
PLATELET # BLD AUTO: 289 10E3/UL (ref 150–450)
RBC # BLD AUTO: 3.14 10E6/UL (ref 3.8–5.2)
WBC # BLD AUTO: 6.47 10E3/UL (ref 4–11)

## (undated) DEVICE — STPL SKIN 35W 6.9MM  PXW35

## (undated) DEVICE — PACK LOWER EXTREMITY RIDGES SOP32FAR14

## (undated) DEVICE — CAST PADDING 4" STERILE 9044S

## (undated) DEVICE — GLOVE BIOGEL PI SZ 7.5 40875

## (undated) DEVICE — SUTURE VICRYL+ 0 CT-1 36IN VLT VCP346H

## (undated) DEVICE — GLOVE PROTEXIS MICRO 6.0 LT BLUE 2D73PM60

## (undated) DEVICE — WIRE GUIDE STRK ASNIS III 1.4X150MM 702459

## (undated) DEVICE — LINEN HALF SHEET 5512

## (undated) DEVICE — ESU GROUND PAD ADULT W/CORD E7507

## (undated) DEVICE — DRSG XEROFORM 1X8"

## (undated) DEVICE — CAST PLASTER SPLINT XTRA FAST 5X30" 7392

## (undated) DEVICE — SU ETHILON 3-0 FS-1 18" 669H

## (undated) DEVICE — Device

## (undated) DEVICE — LINEN FULL SHEET 5511

## (undated) DEVICE — BAG CLEAR TRASH 1.3M 39X33" P4040C

## (undated) DEVICE — LINEN ORTHO ACL PACK 5447

## (undated) DEVICE — GLOVE PROTEXIS POWDER FREE 8.5 ORTHOPEDIC 2D73ET85

## (undated) DEVICE — SUTURE MONOCRYL+ 2-0 CT-1 36" UNDYED MCP945H

## (undated) DEVICE — TOURNIQUET CUFF 30" STERILE

## (undated) DEVICE — GLOVE BIOGEL PI SZ 6.0 40860

## (undated) DEVICE — CAST PADDING 4" UNSTERILE 9044

## (undated) DEVICE — DRILL BIT 2.6X135MM SCALED 703

## (undated) DEVICE — DRAPE X-RAY TUBE 00-901169-01-OEC

## (undated) DEVICE — GLOVE PROTEXIS BLUE W/NEU-THERA 7.5  2D73EB75

## (undated) DEVICE — SOLUTION IV IRRIGATION 0.9% NACL 3L R8206

## (undated) DEVICE — SU VICRYL 0 CT-1 27" J340H

## (undated) DEVICE — DRAPE C-ARMOR 5 SIDED 5523

## (undated) DEVICE — BLADE KNIFE SURG 15 371115

## (undated) DEVICE — GLOVE PROTEXIS BLUE W/NEU-THERA 6.5  2D73EB65

## (undated) DEVICE — DRSG XEROFORM 5X9" 8884431605

## (undated) DEVICE — TUBING SUCTION 12"X1/4" N612

## (undated) DEVICE — SU FIBERWIRE 2-0 TAPER CUT AR-7220

## (undated) DEVICE — DRSG GAUZE 4X4" TRAY

## (undated) DEVICE — DRAPE CONVERTORS U-DRAPE 60X72" 8476

## (undated) DEVICE — GLOVE PROTEXIS POWDER FREE 7.5 ORTHO LIME 2D73ET75

## (undated) DEVICE — GLOVE PROTEXIS BLUE W/NEU-THERA 8.5  2D73EB85

## (undated) DEVICE — BNDG ELASTIC 4"X5YDS UNSTERILE 6611-40

## (undated) DEVICE — DRAPE STOCKINETTE IMPERVIOUS 12" 1587

## (undated) DEVICE — TOURNIQUET SGL  BLADDER 30"X4" BLUE 5921030135

## (undated) DEVICE — SUCTION TIP YANKAUER W/O VENT K86

## (undated) DEVICE — SU MONOCRYL+ 3-0 SH 27IN MCP316H

## (undated) DEVICE — PREP CHLORAPREP 26ML TINTED HI-LITE ORANGE 930815

## (undated) DEVICE — GOWN IMPERVIOUS SPECIALTY XLG/XLONG 32474

## (undated) DEVICE — SU ETHILON 3-0 PS-2 18" 1669H

## (undated) DEVICE — DRAPE IOBAN INCISE 23X17" 6650EZ

## (undated) DEVICE — TOURNIQUET CUFF 34" STERILE

## (undated) DEVICE — DRSG ABDOMINAL 07 1/2X8" 7197D

## (undated) DEVICE — SUTURE VICRYL+ 2-0 36IN CT-1 UND VCP945H

## (undated) DEVICE — CAST BUCKET

## (undated) DEVICE — BNDG ELASTIC 6" DBL LENGTH UNSTERILE 6611-16

## (undated) DEVICE — TUBE CULTURE AEROBIC/ANAEROBIC W/O SWABS A.C.T.I.1. 12401

## (undated) DEVICE — DRSG KERLIX 4 1/2"X4YDS ROLL 6730

## (undated) RX ORDER — ONDANSETRON 2 MG/ML
INJECTION INTRAMUSCULAR; INTRAVENOUS
Status: DISPENSED
Start: 2025-05-15

## (undated) RX ORDER — EPHEDRINE SULFATE 50 MG/ML
INJECTION, SOLUTION INTRAMUSCULAR; INTRAVENOUS; SUBCUTANEOUS
Status: DISPENSED
Start: 2025-05-15

## (undated) RX ORDER — GLYCOPYRROLATE 0.2 MG/ML
INJECTION, SOLUTION INTRAMUSCULAR; INTRAVENOUS
Status: DISPENSED
Start: 2025-08-12

## (undated) RX ORDER — PROPOFOL 10 MG/ML
INJECTION, EMULSION INTRAVENOUS
Status: DISPENSED
Start: 2025-05-15

## (undated) RX ORDER — TRANEXAMIC ACID 10 MG/ML
INJECTION, SOLUTION INTRAVENOUS
Status: DISPENSED
Start: 2025-08-12

## (undated) RX ORDER — LIDOCAINE HYDROCHLORIDE 10 MG/ML
INJECTION, SOLUTION EPIDURAL; INFILTRATION; INTRACAUDAL; PERINEURAL
Status: DISPENSED
Start: 2025-08-12

## (undated) RX ORDER — FENTANYL CITRATE-0.9 % NACL/PF 10 MCG/ML
PLASTIC BAG, INJECTION (ML) INTRAVENOUS
Status: DISPENSED
Start: 2025-05-15

## (undated) RX ORDER — FENTANYL CITRATE 50 UG/ML
INJECTION, SOLUTION INTRAMUSCULAR; INTRAVENOUS
Status: DISPENSED
Start: 2025-05-03

## (undated) RX ORDER — FENTANYL CITRATE 50 UG/ML
INJECTION, SOLUTION INTRAMUSCULAR; INTRAVENOUS
Status: DISPENSED
Start: 2025-08-12

## (undated) RX ORDER — DEXAMETHASONE SODIUM PHOSPHATE 4 MG/ML
INJECTION, SOLUTION INTRA-ARTICULAR; INTRALESIONAL; INTRAMUSCULAR; INTRAVENOUS; SOFT TISSUE
Status: DISPENSED
Start: 2025-05-15

## (undated) RX ORDER — CEFAZOLIN SODIUM/WATER 2 G/20 ML
SYRINGE (ML) INTRAVENOUS
Status: DISPENSED
Start: 2025-05-03

## (undated) RX ORDER — ONDANSETRON 2 MG/ML
INJECTION INTRAMUSCULAR; INTRAVENOUS
Status: DISPENSED
Start: 2025-08-12

## (undated) RX ORDER — ACETAMINOPHEN 325 MG/1
TABLET ORAL
Status: DISPENSED
Start: 2025-08-12

## (undated) RX ORDER — DEXAMETHASONE SODIUM PHOSPHATE 4 MG/ML
INJECTION, SOLUTION INTRA-ARTICULAR; INTRALESIONAL; INTRAMUSCULAR; INTRAVENOUS; SOFT TISSUE
Status: DISPENSED
Start: 2025-08-12

## (undated) RX ORDER — FENTANYL CITRATE 50 UG/ML
INJECTION, SOLUTION INTRAMUSCULAR; INTRAVENOUS
Status: DISPENSED
Start: 2025-05-15

## (undated) RX ORDER — CEFAZOLIN SODIUM/WATER 2 G/20 ML
SYRINGE (ML) INTRAVENOUS
Status: DISPENSED
Start: 2025-08-12

## (undated) RX ORDER — ACETAMINOPHEN 325 MG/1
TABLET ORAL
Status: DISPENSED
Start: 2025-05-15

## (undated) RX ORDER — TRANEXAMIC ACID 10 MG/ML
INJECTION, SOLUTION INTRAVENOUS
Status: DISPENSED
Start: 2025-05-03

## (undated) RX ORDER — PROPOFOL 10 MG/ML
INJECTION, EMULSION INTRAVENOUS
Status: DISPENSED
Start: 2025-05-03